# Patient Record
Sex: MALE | Race: ASIAN | NOT HISPANIC OR LATINO | Employment: OTHER | URBAN - METROPOLITAN AREA
[De-identification: names, ages, dates, MRNs, and addresses within clinical notes are randomized per-mention and may not be internally consistent; named-entity substitution may affect disease eponyms.]

---

## 2023-01-01 ENCOUNTER — APPOINTMENT (OUTPATIENT)
Dept: RADIOLOGY | Facility: HOSPITAL | Age: 68
DRG: 243 | End: 2023-01-01
Payer: MEDICARE

## 2023-01-01 ENCOUNTER — HOSPITAL ENCOUNTER (OUTPATIENT)
Dept: CARDIOLOGY | Facility: HOSPITAL | Age: 68
Discharge: HOME | End: 2023-10-28
Payer: MEDICARE

## 2023-01-01 ENCOUNTER — APPOINTMENT (OUTPATIENT)
Dept: CARDIOLOGY | Facility: CLINIC | Age: 68
End: 2023-01-01
Payer: MEDICARE

## 2023-01-01 ENCOUNTER — HOSPITAL ENCOUNTER (INPATIENT)
Facility: HOSPITAL | Age: 68
LOS: 5 days | Discharge: HOME | DRG: 243 | End: 2023-10-24
Attending: STUDENT IN AN ORGANIZED HEALTH CARE EDUCATION/TRAINING PROGRAM | Admitting: STUDENT IN AN ORGANIZED HEALTH CARE EDUCATION/TRAINING PROGRAM
Payer: MEDICARE

## 2023-01-01 ENCOUNTER — PATIENT OUTREACH (OUTPATIENT)
Dept: CARE COORDINATION | Facility: CLINIC | Age: 68
End: 2023-01-01
Payer: MEDICARE

## 2023-01-01 ENCOUNTER — APPOINTMENT (OUTPATIENT)
Dept: PRIMARY CARE | Facility: CLINIC | Age: 68
End: 2023-01-01
Payer: COMMERCIAL

## 2023-01-01 ENCOUNTER — PATIENT OUTREACH (OUTPATIENT)
Dept: PRIMARY CARE | Facility: CLINIC | Age: 68
End: 2023-01-01

## 2023-01-01 ENCOUNTER — TELEPHONE (OUTPATIENT)
Dept: CARDIOLOGY | Facility: CLINIC | Age: 68
End: 2023-01-01
Payer: MEDICARE

## 2023-01-01 ENCOUNTER — HOSPITAL ENCOUNTER (OUTPATIENT)
Dept: CARDIOLOGY | Facility: CLINIC | Age: 68
Discharge: HOME | End: 2023-11-02
Payer: MEDICARE

## 2023-01-01 ENCOUNTER — HOSPITAL ENCOUNTER (OUTPATIENT)
Dept: CARDIOLOGY | Facility: CLINIC | Age: 68
Discharge: HOME | End: 2023-10-06
Payer: MEDICARE

## 2023-01-01 ENCOUNTER — HOSPITAL ENCOUNTER (OUTPATIENT)
Dept: CARDIOLOGY | Facility: HOSPITAL | Age: 68
Discharge: HOME | End: 2023-12-21
Payer: MEDICARE

## 2023-01-01 ENCOUNTER — OFFICE VISIT (OUTPATIENT)
Dept: PRIMARY CARE | Facility: CLINIC | Age: 68
End: 2023-01-01
Payer: MEDICARE

## 2023-01-01 ENCOUNTER — HOSPITAL ENCOUNTER (OUTPATIENT)
Dept: CARDIOLOGY | Facility: CLINIC | Age: 68
Discharge: HOME | End: 2023-10-25
Payer: MEDICARE

## 2023-01-01 ENCOUNTER — TELEMEDICINE (OUTPATIENT)
Dept: PRIMARY CARE | Facility: CLINIC | Age: 68
End: 2023-01-01
Payer: MEDICARE

## 2023-01-01 ENCOUNTER — PATIENT OUTREACH (OUTPATIENT)
Dept: PRIMARY CARE | Facility: CLINIC | Age: 68
End: 2023-01-01
Payer: MEDICARE

## 2023-01-01 ENCOUNTER — HOSPITAL ENCOUNTER (OUTPATIENT)
Dept: RADIOLOGY | Facility: CLINIC | Age: 68
Discharge: HOME | End: 2023-10-31
Payer: MEDICARE

## 2023-01-01 ENCOUNTER — PREP FOR PROCEDURE (OUTPATIENT)
Dept: CARDIOLOGY | Facility: HOSPITAL | Age: 68
End: 2023-01-01
Payer: MEDICARE

## 2023-01-01 ENCOUNTER — APPOINTMENT (OUTPATIENT)
Dept: PRIMARY CARE | Facility: CLINIC | Age: 68
End: 2023-01-01
Payer: MEDICARE

## 2023-01-01 ENCOUNTER — HOSPITAL ENCOUNTER (OUTPATIENT)
Dept: CARDIOLOGY | Facility: CLINIC | Age: 68
Discharge: HOME | End: 2023-12-19
Payer: MEDICARE

## 2023-01-01 ENCOUNTER — CLINICAL SUPPORT (OUTPATIENT)
Dept: CARDIOLOGY | Facility: CLINIC | Age: 68
End: 2023-01-01
Payer: MEDICARE

## 2023-01-01 ENCOUNTER — OFFICE VISIT (OUTPATIENT)
Dept: CARDIAC SURGERY | Facility: HOSPITAL | Age: 68
End: 2023-01-01
Payer: MEDICARE

## 2023-01-01 ENCOUNTER — OFFICE VISIT (OUTPATIENT)
Dept: CARDIOLOGY | Facility: CLINIC | Age: 68
End: 2023-01-01
Payer: MEDICARE

## 2023-01-01 VITALS
BODY MASS INDEX: 20.78 KG/M2 | WEIGHT: 137.13 LBS | HEART RATE: 60 BPM | OXYGEN SATURATION: 97 % | DIASTOLIC BLOOD PRESSURE: 88 MMHG | SYSTOLIC BLOOD PRESSURE: 154 MMHG | RESPIRATION RATE: 20 BRPM | TEMPERATURE: 97.7 F | HEIGHT: 68 IN

## 2023-01-01 VITALS
BODY MASS INDEX: 21.61 KG/M2 | HEIGHT: 68 IN | SYSTOLIC BLOOD PRESSURE: 156 MMHG | RESPIRATION RATE: 18 BRPM | HEART RATE: 82 BPM | DIASTOLIC BLOOD PRESSURE: 81 MMHG | WEIGHT: 142.6 LBS | OXYGEN SATURATION: 98 %

## 2023-01-01 VITALS
SYSTOLIC BLOOD PRESSURE: 122 MMHG | DIASTOLIC BLOOD PRESSURE: 70 MMHG | WEIGHT: 145 LBS | HEIGHT: 68 IN | BODY MASS INDEX: 21.98 KG/M2

## 2023-01-01 VITALS
BODY MASS INDEX: 20.92 KG/M2 | DIASTOLIC BLOOD PRESSURE: 66 MMHG | HEIGHT: 68 IN | WEIGHT: 138 LBS | SYSTOLIC BLOOD PRESSURE: 124 MMHG

## 2023-01-01 VITALS
SYSTOLIC BLOOD PRESSURE: 122 MMHG | BODY MASS INDEX: 21.22 KG/M2 | WEIGHT: 140 LBS | DIASTOLIC BLOOD PRESSURE: 70 MMHG | HEIGHT: 68 IN

## 2023-01-01 VITALS
SYSTOLIC BLOOD PRESSURE: 138 MMHG | OXYGEN SATURATION: 97 % | HEIGHT: 68 IN | DIASTOLIC BLOOD PRESSURE: 70 MMHG | HEART RATE: 81 BPM | BODY MASS INDEX: 21.95 KG/M2 | WEIGHT: 144.8 LBS

## 2023-01-01 DIAGNOSIS — Z09 HOSPITAL DISCHARGE FOLLOW-UP: ICD-10-CM

## 2023-01-01 DIAGNOSIS — Z95.0 S/P PLACEMENT OF CARDIAC PACEMAKER: ICD-10-CM

## 2023-01-01 DIAGNOSIS — R91.1 SOLITARY PULMONARY NODULE: ICD-10-CM

## 2023-01-01 DIAGNOSIS — Z95.0 CARDIAC PACEMAKER IN SITU: ICD-10-CM

## 2023-01-01 DIAGNOSIS — I71.00 DISSECTION OF AORTA, UNSPECIFIED PORTION OF AORTA (MULTI): ICD-10-CM

## 2023-01-01 DIAGNOSIS — Z94.4 LIVER TRANSPLANTED (MULTI): ICD-10-CM

## 2023-01-01 DIAGNOSIS — I44.2 ATRIOVENTRICULAR BLOCK, COMPLETE (MULTI): ICD-10-CM

## 2023-01-01 DIAGNOSIS — C79.9 METASTATIC MALIGNANT NEOPLASM, UNSPECIFIED SITE (MULTI): ICD-10-CM

## 2023-01-01 DIAGNOSIS — I49.5 SINOATRIAL NODE DYSFUNCTION (MULTI): ICD-10-CM

## 2023-01-01 DIAGNOSIS — I42.9 CARDIOMYOPATHY, UNSPECIFIED TYPE (MULTI): ICD-10-CM

## 2023-01-01 DIAGNOSIS — I49.5 SICK SINUS SYNDROME (MULTI): ICD-10-CM

## 2023-01-01 DIAGNOSIS — Z86.79 H/O REPAIR OF DISSECTING ANEURYSM OF ASCENDING THORACIC AORTA: ICD-10-CM

## 2023-01-01 DIAGNOSIS — I49.5 SSS (SICK SINUS SYNDROME) (MULTI): Primary | ICD-10-CM

## 2023-01-01 DIAGNOSIS — I10 ESSENTIAL (PRIMARY) HYPERTENSION: ICD-10-CM

## 2023-01-01 DIAGNOSIS — R91.8 LUNG NODULES: Primary | ICD-10-CM

## 2023-01-01 DIAGNOSIS — I71.012 DISSECTING ANEURYSM OF THORACIC AORTA, STANFORD TYPE B (MULTI): Primary | ICD-10-CM

## 2023-01-01 DIAGNOSIS — D64.9 ANEMIA, UNSPECIFIED TYPE: ICD-10-CM

## 2023-01-01 DIAGNOSIS — R79.89 ELEVATED TROPONIN: ICD-10-CM

## 2023-01-01 DIAGNOSIS — I10 PRIMARY HYPERTENSION: Primary | ICD-10-CM

## 2023-01-01 DIAGNOSIS — R06.00 DYSPNEA: ICD-10-CM

## 2023-01-01 DIAGNOSIS — Z94.0 KIDNEY TRANSPLANTED (HHS-HCC): ICD-10-CM

## 2023-01-01 DIAGNOSIS — E11.69 TYPE 2 DIABETES MELLITUS WITH OTHER SPECIFIED COMPLICATION, UNSPECIFIED WHETHER LONG TERM INSULIN USE (MULTI): Primary | ICD-10-CM

## 2023-01-01 DIAGNOSIS — R55 SYNCOPE AND COLLAPSE: Primary | ICD-10-CM

## 2023-01-01 DIAGNOSIS — I49.5 SICK SINUS SYNDROME (MULTI): Primary | ICD-10-CM

## 2023-01-01 DIAGNOSIS — R01.1 CARDIAC MURMUR, UNSPECIFIED: ICD-10-CM

## 2023-01-01 DIAGNOSIS — I10 HYPERTENSION, UNSPECIFIED TYPE: ICD-10-CM

## 2023-01-01 DIAGNOSIS — Z98.890 H/O REPAIR OF DISSECTING ANEURYSM OF ASCENDING THORACIC AORTA: ICD-10-CM

## 2023-01-01 LAB
ACANTHOCYTES BLD QL SMEAR: ABNORMAL
ALBUMIN SERPL BCP-MCNC: 4.5 G/DL (ref 3.4–5)
ALP SERPL-CCNC: 72 U/L (ref 33–136)
ALT SERPL W P-5'-P-CCNC: 11 U/L (ref 10–52)
ANION GAP SERPL CALC-SCNC: 11 MMOL/L (ref 10–20)
ANION GAP SERPL CALC-SCNC: 12 MMOL/L (ref 10–20)
ANION GAP SERPL CALC-SCNC: 13 MMOL/L (ref 10–20)
ANION GAP SERPL CALC-SCNC: 13 MMOL/L (ref 10–20)
ANION GAP SERPL CALC-SCNC: 14 MMOL/L (ref 10–20)
ANION GAP SERPL CALC-SCNC: 14 MMOL/L (ref 10–20)
ANION GAP SERPL CALC-SCNC: 15 MMOL/L (ref 10–20)
APPEARANCE UR: CLEAR
AST SERPL W P-5'-P-CCNC: 14 U/L (ref 9–39)
ATRIAL RATE: 117 BPM
ATRIAL RATE: 44 BPM
ATRIAL RATE: 55 BPM
ATRIAL RATE: 62 BPM
ATRIAL RATE: 65 BPM
BACTERIA UR CULT: NO GROWTH
BASOPHILS # BLD MANUAL: 0 X10*3/UL (ref 0–0.1)
BASOPHILS # BLD MANUAL: 0 X10*3/UL (ref 0–0.1)
BASOPHILS NFR BLD MANUAL: 0 %
BASOPHILS NFR BLD MANUAL: 0 %
BILIRUB SERPL-MCNC: 0.8 MG/DL (ref 0–1.2)
BILIRUB UR STRIP.AUTO-MCNC: NEGATIVE MG/DL
BODY SURFACE AREA: 1.78 M2
BUN SERPL-MCNC: 18 MG/DL (ref 6–23)
BUN SERPL-MCNC: 18 MG/DL (ref 6–23)
BUN SERPL-MCNC: 21 MG/DL (ref 6–23)
BUN SERPL-MCNC: 22 MG/DL (ref 6–23)
BUN SERPL-MCNC: 22 MG/DL (ref 6–23)
BUN SERPL-MCNC: 40 MG/DL (ref 6–23)
BUN SERPL-MCNC: 40 MG/DL (ref 6–23)
BURR CELLS BLD QL SMEAR: ABNORMAL
CALCIUM SERPL-MCNC: 8.9 MG/DL (ref 8.6–10.3)
CALCIUM SERPL-MCNC: 8.9 MG/DL (ref 8.6–10.3)
CALCIUM SERPL-MCNC: 9 MG/DL (ref 8.6–10.3)
CALCIUM SERPL-MCNC: 9 MG/DL (ref 8.6–10.3)
CALCIUM SERPL-MCNC: 9.1 MG/DL (ref 8.6–10.3)
CARDIAC TROPONIN I PNL SERPL HS: 120 NG/L (ref 0–20)
CARDIAC TROPONIN I PNL SERPL HS: 171 NG/L (ref 0–20)
CHLORIDE SERPL-SCNC: 103 MMOL/L (ref 98–107)
CHLORIDE SERPL-SCNC: 104 MMOL/L (ref 98–107)
CHLORIDE SERPL-SCNC: 105 MMOL/L (ref 98–107)
CHLORIDE SERPL-SCNC: 105 MMOL/L (ref 98–107)
CHLORIDE SERPL-SCNC: 106 MMOL/L (ref 98–107)
CHLORIDE SERPL-SCNC: 106 MMOL/L (ref 98–107)
CHLORIDE SERPL-SCNC: 107 MMOL/L (ref 98–107)
CO2 SERPL-SCNC: 17 MMOL/L (ref 21–32)
CO2 SERPL-SCNC: 18 MMOL/L (ref 21–32)
CO2 SERPL-SCNC: 20 MMOL/L (ref 21–32)
CO2 SERPL-SCNC: 21 MMOL/L (ref 21–32)
CO2 SERPL-SCNC: 22 MMOL/L (ref 21–32)
COLOR UR: YELLOW
CREAT SERPL-MCNC: 1.29 MG/DL (ref 0.5–1.3)
CREAT SERPL-MCNC: 1.31 MG/DL (ref 0.5–1.3)
CREAT SERPL-MCNC: 1.34 MG/DL (ref 0.5–1.3)
CREAT SERPL-MCNC: 1.41 MG/DL (ref 0.5–1.3)
CREAT SERPL-MCNC: 1.42 MG/DL (ref 0.5–1.3)
CREAT SERPL-MCNC: 1.73 MG/DL (ref 0.5–1.3)
CREAT SERPL-MCNC: 2.05 MG/DL (ref 0.5–1.3)
D DIMER PPP FEU-MCNC: 1956 NG/ML FEU
DACRYOCYTES BLD QL SMEAR: ABNORMAL
DIASTOLIC BLOOD PRESSURE: 75 MMHG
DIASTOLIC BLOOD PRESSURE: 78 MMHG
DIASTOLIC BLOOD PRESSURE: 79 MMHG
DIASTOLIC BLOOD PRESSURE: 89 MMHG
EJECTION FRACTION APICAL 4 CHAMBER: 56.7
EOSINOPHIL # BLD MANUAL: 0 X10*3/UL (ref 0–0.7)
EOSINOPHIL # BLD MANUAL: 0.04 X10*3/UL (ref 0–0.7)
EOSINOPHIL NFR BLD MANUAL: 0 %
EOSINOPHIL NFR BLD MANUAL: 1 %
ERYTHROCYTE [DISTWIDTH] IN BLOOD BY AUTOMATED COUNT: 13.9 % (ref 11.5–14.5)
ERYTHROCYTE [DISTWIDTH] IN BLOOD BY AUTOMATED COUNT: 14.3 % (ref 11.5–14.5)
ERYTHROCYTE [DISTWIDTH] IN BLOOD BY AUTOMATED COUNT: 14.4 % (ref 11.5–14.5)
ERYTHROCYTE [DISTWIDTH] IN BLOOD BY AUTOMATED COUNT: 14.6 % (ref 11.5–14.5)
GFR SERPL CREATININE-BSD FRML MDRD: 35 ML/MIN/1.73M*2
GFR SERPL CREATININE-BSD FRML MDRD: 43 ML/MIN/1.73M*2
GFR SERPL CREATININE-BSD FRML MDRD: 54 ML/MIN/1.73M*2
GFR SERPL CREATININE-BSD FRML MDRD: 55 ML/MIN/1.73M*2
GFR SERPL CREATININE-BSD FRML MDRD: 58 ML/MIN/1.73M*2
GFR SERPL CREATININE-BSD FRML MDRD: 60 ML/MIN/1.73M*2
GFR SERPL CREATININE-BSD FRML MDRD: 61 ML/MIN/1.73M*2
GLUCOSE SERPL-MCNC: 109 MG/DL (ref 74–99)
GLUCOSE SERPL-MCNC: 118 MG/DL (ref 74–99)
GLUCOSE SERPL-MCNC: 125 MG/DL (ref 74–99)
GLUCOSE SERPL-MCNC: 165 MG/DL (ref 74–99)
GLUCOSE SERPL-MCNC: 177 MG/DL (ref 74–99)
GLUCOSE SERPL-MCNC: 216 MG/DL (ref 74–99)
GLUCOSE SERPL-MCNC: 95 MG/DL (ref 74–99)
GLUCOSE UR STRIP.AUTO-MCNC: NEGATIVE MG/DL
HCT VFR BLD AUTO: 30.5 % (ref 41–52)
HCT VFR BLD AUTO: 30.6 % (ref 41–52)
HCT VFR BLD AUTO: 31.4 % (ref 41–52)
HCT VFR BLD AUTO: 31.7 % (ref 41–52)
HCT VFR BLD AUTO: 31.9 % (ref 41–52)
HCT VFR BLD AUTO: 31.9 % (ref 41–52)
HGB BLD-MCNC: 10.4 G/DL (ref 13.5–17.5)
HGB BLD-MCNC: 10.4 G/DL (ref 13.5–17.5)
HGB BLD-MCNC: 10.6 G/DL (ref 13.5–17.5)
HGB BLD-MCNC: 10.7 G/DL (ref 13.5–17.5)
HGB BLD-MCNC: 10.7 G/DL (ref 13.5–17.5)
HGB BLD-MCNC: 10.8 G/DL (ref 13.5–17.5)
HOLD SPECIMEN: NORMAL
HOLD SPECIMEN: NORMAL
IMM GRANULOCYTES # BLD AUTO: 0.01 X10*3/UL (ref 0–0.7)
IMM GRANULOCYTES # BLD AUTO: 0.02 X10*3/UL (ref 0–0.7)
IMM GRANULOCYTES NFR BLD AUTO: 0.2 % (ref 0–0.9)
IMM GRANULOCYTES NFR BLD AUTO: 0.3 % (ref 0–0.9)
KETONES UR STRIP.AUTO-MCNC: NEGATIVE MG/DL
LEUKOCYTE ESTERASE UR QL STRIP.AUTO: ABNORMAL
LYMPHOCYTES # BLD MANUAL: 0.5 X10*3/UL (ref 1.2–4.8)
LYMPHOCYTES # BLD MANUAL: 1.12 X10*3/UL (ref 1.2–4.8)
LYMPHOCYTES NFR BLD MANUAL: 12 %
LYMPHOCYTES NFR BLD MANUAL: 17 %
MAGNESIUM SERPL-MCNC: 1.37 MG/DL (ref 1.6–2.4)
MAGNESIUM SERPL-MCNC: 1.39 MG/DL (ref 1.6–2.4)
MAGNESIUM SERPL-MCNC: 2.05 MG/DL (ref 1.6–2.4)
MAGNESIUM SERPL-MCNC: 2.14 MG/DL (ref 1.6–2.4)
MCH RBC QN AUTO: 34.2 PG (ref 26–34)
MCH RBC QN AUTO: 34.4 PG (ref 26–34)
MCH RBC QN AUTO: 34.5 PG (ref 26–34)
MCH RBC QN AUTO: 34.5 PG (ref 26–34)
MCH RBC QN AUTO: 34.6 PG (ref 26–34)
MCH RBC QN AUTO: 34.8 PG (ref 26–34)
MCHC RBC AUTO-ENTMCNC: 33.5 G/DL (ref 32–36)
MCHC RBC AUTO-ENTMCNC: 33.5 G/DL (ref 32–36)
MCHC RBC AUTO-ENTMCNC: 33.8 G/DL (ref 32–36)
MCHC RBC AUTO-ENTMCNC: 34 G/DL (ref 32–36)
MCHC RBC AUTO-ENTMCNC: 34.1 G/DL (ref 32–36)
MCHC RBC AUTO-ENTMCNC: 34.1 G/DL (ref 32–36)
MCV RBC AUTO: 101 FL (ref 80–100)
MCV RBC AUTO: 101 FL (ref 80–100)
MCV RBC AUTO: 102 FL (ref 80–100)
MCV RBC AUTO: 103 FL (ref 80–100)
MONOCYTES # BLD MANUAL: 0.42 X10*3/UL (ref 0.1–1)
MONOCYTES # BLD MANUAL: 0.59 X10*3/UL (ref 0.1–1)
MONOCYTES NFR BLD MANUAL: 10 %
MONOCYTES NFR BLD MANUAL: 9 %
MUCOUS THREADS #/AREA URNS AUTO: NORMAL /LPF
NEUTROPHILS # BLD MANUAL: 3.23 X10*3/UL (ref 1.2–7.7)
NEUTS BAND # BLD MANUAL: 0.04 X10*3/UL (ref 0–0.7)
NEUTS BAND NFR BLD MANUAL: 1 %
NEUTS SEG # BLD MANUAL: 3.19 X10*3/UL (ref 1.2–7)
NEUTS SEG # BLD MANUAL: 4.88 X10*3/UL (ref 1.2–7)
NEUTS SEG NFR BLD MANUAL: 74 %
NEUTS SEG NFR BLD MANUAL: 76 %
NITRITE UR QL STRIP.AUTO: NEGATIVE
NRBC BLD-RTO: 0 /100 WBCS (ref 0–0)
NRBC BLD-RTO: 0.2 /100 WBCS (ref 0–0)
OVALOCYTES BLD QL SMEAR: ABNORMAL
OVALOCYTES BLD QL SMEAR: ABNORMAL
P AXIS: 84 DEGREES
PH UR STRIP.AUTO: 5 [PH]
PLATELET # BLD AUTO: 148 X10*3/UL (ref 150–450)
PLATELET # BLD AUTO: 152 X10*3/UL (ref 150–450)
PLATELET # BLD AUTO: 154 X10*3/UL (ref 150–450)
PLATELET # BLD AUTO: 166 X10*3/UL (ref 150–450)
PMV BLD AUTO: 11.3 FL (ref 7.5–11.5)
PMV BLD AUTO: 11.5 FL (ref 7.5–11.5)
PMV BLD AUTO: 12 FL (ref 7.5–11.5)
POTASSIUM SERPL-SCNC: 4 MMOL/L (ref 3.5–5.3)
POTASSIUM SERPL-SCNC: 4.4 MMOL/L (ref 3.5–5.3)
POTASSIUM SERPL-SCNC: 4.4 MMOL/L (ref 3.5–5.3)
POTASSIUM SERPL-SCNC: 4.6 MMOL/L (ref 3.5–5.3)
POTASSIUM SERPL-SCNC: 4.7 MMOL/L (ref 3.5–5.3)
POTASSIUM SERPL-SCNC: 4.9 MMOL/L (ref 3.5–5.3)
POTASSIUM SERPL-SCNC: 5.2 MMOL/L (ref 3.5–5.3)
PR INTERVAL: 248 MS
PR INTERVAL: 400 MS
PROCALCITONIN SERPL-MCNC: <0.02 NG/ML
PROT SERPL-MCNC: 6.9 G/DL (ref 6.4–8.2)
PROT UR STRIP.AUTO-MCNC: NEGATIVE MG/DL
Q ONSET: 201 MS
Q ONSET: 219 MS
Q ONSET: 219 MS
Q ONSET: 223 MS
Q ONSET: 224 MS
QRS COUNT: 11 BEATS
QRS COUNT: 11 BEATS
QRS COUNT: 8 BEATS
QRS COUNT: 8 BEATS
QRS COUNT: 9 BEATS
QRS DURATION: 146 MS
QRS DURATION: 148 MS
QRS DURATION: 152 MS
QRS DURATION: 152 MS
QRS DURATION: 176 MS
QT INTERVAL: 468 MS
QT INTERVAL: 472 MS
QT INTERVAL: 472 MS
QT INTERVAL: 494 MS
QT INTERVAL: 508 MS
QTC CALCULATION(BAZETT): 422 MS
QTC CALCULATION(BAZETT): 426 MS
QTC CALCULATION(BAZETT): 455 MS
QTC CALCULATION(BAZETT): 490 MS
QTC CALCULATION(BAZETT): 515 MS
QTC FREDERICIA: 440 MS
QTC FREDERICIA: 445 MS
QTC FREDERICIA: 461 MS
QTC FREDERICIA: 484 MS
QTC FREDERICIA: 513 MS
R AXIS: -11 DEGREES
R AXIS: -13 DEGREES
R AXIS: -15 DEGREES
R AXIS: -82 DEGREES
R AXIS: 67 DEGREES
RBC # BLD AUTO: 3.02 X10*6/UL (ref 4.5–5.9)
RBC # BLD AUTO: 3.04 X10*6/UL (ref 4.5–5.9)
RBC # BLD AUTO: 3.05 X10*6/UL (ref 4.5–5.9)
RBC # BLD AUTO: 3.1 X10*6/UL (ref 4.5–5.9)
RBC # BLD AUTO: 3.1 X10*6/UL (ref 4.5–5.9)
RBC # BLD AUTO: 3.12 X10*6/UL (ref 4.5–5.9)
RBC # UR STRIP.AUTO: NEGATIVE /UL
RBC #/AREA URNS AUTO: NORMAL /HPF
RBC MORPH BLD: ABNORMAL
RBC MORPH BLD: ABNORMAL
SCHISTOCYTES BLD QL SMEAR: ABNORMAL
SODIUM SERPL-SCNC: 132 MMOL/L (ref 136–145)
SODIUM SERPL-SCNC: 133 MMOL/L (ref 136–145)
SODIUM SERPL-SCNC: 134 MMOL/L (ref 136–145)
SODIUM SERPL-SCNC: 135 MMOL/L (ref 136–145)
SODIUM SERPL-SCNC: 136 MMOL/L (ref 136–145)
SP GR UR STRIP.AUTO: 1.01
SYSTOLIC BLOOD PRESSURE: 140 MMHG
SYSTOLIC BLOOD PRESSURE: 146 MMHG
SYSTOLIC BLOOD PRESSURE: 148 MMHG
SYSTOLIC BLOOD PRESSURE: 167 MMHG
T AXIS: 44 DEGREES
T AXIS: 61 DEGREES
T AXIS: 63 DEGREES
T AXIS: 84 DEGREES
T AXIS: 98 DEGREES
T OFFSET: 455 MS
T OFFSET: 457 MS
T OFFSET: 471 MS
TOTAL CELLS COUNTED BLD: 100
TOTAL CELLS COUNTED BLD: 100
UROBILINOGEN UR STRIP.AUTO-MCNC: <2 MG/DL
VENTRICULAR RATE: 44 BPM
VENTRICULAR RATE: 50 BPM
VENTRICULAR RATE: 56 BPM
VENTRICULAR RATE: 62 BPM
VENTRICULAR RATE: 65 BPM
WBC # BLD AUTO: 10.2 X10*3/UL (ref 4.4–11.3)
WBC # BLD AUTO: 2.7 X10*3/UL (ref 4.4–11.3)
WBC # BLD AUTO: 4.2 X10*3/UL (ref 4.4–11.3)
WBC # BLD AUTO: 6.6 X10*3/UL (ref 4.4–11.3)
WBC # BLD AUTO: 6.6 X10*3/UL (ref 4.4–11.3)
WBC # BLD AUTO: 9 X10*3/UL (ref 4.4–11.3)
WBC #/AREA URNS AUTO: NORMAL /HPF

## 2023-01-01 PROCEDURE — 2500000001 HC RX 250 WO HCPCS SELF ADMINISTERED DRUGS (ALT 637 FOR MEDICARE OP)

## 2023-01-01 PROCEDURE — 93010 ELECTROCARDIOGRAM REPORT: CPT | Performed by: INTERNAL MEDICINE

## 2023-01-01 PROCEDURE — 99214 OFFICE O/P EST MOD 30 MIN: CPT | Performed by: STUDENT IN AN ORGANIZED HEALTH CARE EDUCATION/TRAINING PROGRAM

## 2023-01-01 PROCEDURE — 33207 INSERT HEART PM VENTRICULAR: CPT | Performed by: STUDENT IN AN ORGANIZED HEALTH CARE EDUCATION/TRAINING PROGRAM

## 2023-01-01 PROCEDURE — 1210000001 HC SEMI-PRIVATE ROOM DAILY

## 2023-01-01 PROCEDURE — 3074F SYST BP LT 130 MM HG: CPT | Performed by: STUDENT IN AN ORGANIZED HEALTH CARE EDUCATION/TRAINING PROGRAM

## 2023-01-01 PROCEDURE — 2500000001 HC RX 250 WO HCPCS SELF ADMINISTERED DRUGS (ALT 637 FOR MEDICARE OP): Performed by: INTERNAL MEDICINE

## 2023-01-01 PROCEDURE — 1159F MED LIST DOCD IN RCRD: CPT | Performed by: INTERNAL MEDICINE

## 2023-01-01 PROCEDURE — 71275 CT ANGIOGRAPHY CHEST: CPT | Performed by: RADIOLOGY

## 2023-01-01 PROCEDURE — 85379 FIBRIN DEGRADATION QUANT: CPT | Performed by: STUDENT IN AN ORGANIZED HEALTH CARE EDUCATION/TRAINING PROGRAM

## 2023-01-01 PROCEDURE — 71045 X-RAY EXAM CHEST 1 VIEW: CPT | Performed by: RADIOLOGY

## 2023-01-01 PROCEDURE — 2020000001 HC ICU ROOM DAILY

## 2023-01-01 PROCEDURE — 99232 SBSQ HOSP IP/OBS MODERATE 35: CPT | Performed by: INTERNAL MEDICINE

## 2023-01-01 PROCEDURE — 85027 COMPLETE CBC AUTOMATED: CPT

## 2023-01-01 PROCEDURE — 76376 3D RENDER W/INTRP POSTPROCES: CPT

## 2023-01-01 PROCEDURE — 99233 SBSQ HOSP IP/OBS HIGH 50: CPT | Performed by: INTERNAL MEDICINE

## 2023-01-01 PROCEDURE — 1036F TOBACCO NON-USER: CPT | Performed by: STUDENT IN AN ORGANIZED HEALTH CARE EDUCATION/TRAINING PROGRAM

## 2023-01-01 PROCEDURE — 84484 ASSAY OF TROPONIN QUANT: CPT | Performed by: PHYSICIAN ASSISTANT

## 2023-01-01 PROCEDURE — 36415 COLL VENOUS BLD VENIPUNCTURE: CPT | Performed by: PHYSICIAN ASSISTANT

## 2023-01-01 PROCEDURE — 2500000005 HC RX 250 GENERAL PHARMACY W/O HCPCS: Performed by: STUDENT IN AN ORGANIZED HEALTH CARE EDUCATION/TRAINING PROGRAM

## 2023-01-01 PROCEDURE — 85027 COMPLETE CBC AUTOMATED: CPT | Performed by: STUDENT IN AN ORGANIZED HEALTH CARE EDUCATION/TRAINING PROGRAM

## 2023-01-01 PROCEDURE — 80048 BASIC METABOLIC PNL TOTAL CA: CPT | Performed by: STUDENT IN AN ORGANIZED HEALTH CARE EDUCATION/TRAINING PROGRAM

## 2023-01-01 PROCEDURE — 99233 SBSQ HOSP IP/OBS HIGH 50: CPT

## 2023-01-01 PROCEDURE — 33216 INSERT 1 ELECTRODE PM-DEFIB: CPT | Mod: 53 | Performed by: STUDENT IN AN ORGANIZED HEALTH CARE EDUCATION/TRAINING PROGRAM

## 2023-01-01 PROCEDURE — 2500000004 HC RX 250 GENERAL PHARMACY W/ HCPCS (ALT 636 FOR OP/ED): Performed by: INTERNAL MEDICINE

## 2023-01-01 PROCEDURE — 2500000004 HC RX 250 GENERAL PHARMACY W/ HCPCS (ALT 636 FOR OP/ED)

## 2023-01-01 PROCEDURE — 71046 X-RAY EXAM CHEST 2 VIEWS: CPT | Performed by: RADIOLOGY

## 2023-01-01 PROCEDURE — 93005 ELECTROCARDIOGRAM TRACING: CPT

## 2023-01-01 PROCEDURE — 36415 COLL VENOUS BLD VENIPUNCTURE: CPT

## 2023-01-01 PROCEDURE — 2500000004 HC RX 250 GENERAL PHARMACY W/ HCPCS (ALT 636 FOR OP/ED): Performed by: STUDENT IN AN ORGANIZED HEALTH CARE EDUCATION/TRAINING PROGRAM

## 2023-01-01 PROCEDURE — 2500000004 HC RX 250 GENERAL PHARMACY W/ HCPCS (ALT 636 FOR OP/ED): Performed by: NURSE PRACTITIONER

## 2023-01-01 PROCEDURE — 83735 ASSAY OF MAGNESIUM: CPT

## 2023-01-01 PROCEDURE — 99239 HOSP IP/OBS DSCHRG MGMT >30: CPT | Performed by: STUDENT IN AN ORGANIZED HEALTH CARE EDUCATION/TRAINING PROGRAM

## 2023-01-01 PROCEDURE — 2750000001 HC OR 275 NO HCPCS: Performed by: STUDENT IN AN ORGANIZED HEALTH CARE EDUCATION/TRAINING PROGRAM

## 2023-01-01 PROCEDURE — 99223 1ST HOSP IP/OBS HIGH 75: CPT | Performed by: INTERNAL MEDICINE

## 2023-01-01 PROCEDURE — 71250 CT THORAX DX C-: CPT | Performed by: RADIOLOGY

## 2023-01-01 PROCEDURE — 84145 PROCALCITONIN (PCT): CPT | Mod: CMCLAB,PARLAB

## 2023-01-01 PROCEDURE — 96372 THER/PROPH/DIAG INJ SC/IM: CPT | Performed by: NURSE PRACTITIONER

## 2023-01-01 PROCEDURE — 2500000001 HC RX 250 WO HCPCS SELF ADMINISTERED DRUGS (ALT 637 FOR MEDICARE OP): Performed by: PHYSICIAN ASSISTANT

## 2023-01-01 PROCEDURE — 96372 THER/PROPH/DIAG INJ SC/IM: CPT

## 2023-01-01 PROCEDURE — 2500000004 HC RX 250 GENERAL PHARMACY W/ HCPCS (ALT 636 FOR OP/ED): Performed by: EMERGENCY MEDICINE

## 2023-01-01 PROCEDURE — 93279 PRGRMG DEV EVAL PM/LDLS PM: CPT

## 2023-01-01 PROCEDURE — 2500000001 HC RX 250 WO HCPCS SELF ADMINISTERED DRUGS (ALT 637 FOR MEDICARE OP): Performed by: EMERGENCY MEDICINE

## 2023-01-01 PROCEDURE — 70450 CT HEAD/BRAIN W/O DYE: CPT | Performed by: RADIOLOGY

## 2023-01-01 PROCEDURE — 93306 TTE W/DOPPLER COMPLETE: CPT | Performed by: INTERNAL MEDICINE

## 2023-01-01 PROCEDURE — 70450 CT HEAD/BRAIN W/O DYE: CPT | Mod: MG

## 2023-01-01 PROCEDURE — 3078F DIAST BP <80 MM HG: CPT | Performed by: STUDENT IN AN ORGANIZED HEALTH CARE EDUCATION/TRAINING PROGRAM

## 2023-01-01 PROCEDURE — 1159F MED LIST DOCD IN RCRD: CPT

## 2023-01-01 PROCEDURE — 2780000003 HC OR 278 NO HCPCS: Performed by: STUDENT IN AN ORGANIZED HEALTH CARE EDUCATION/TRAINING PROGRAM

## 2023-01-01 PROCEDURE — 99214 OFFICE O/P EST MOD 30 MIN: CPT | Performed by: INTERNAL MEDICINE

## 2023-01-01 PROCEDURE — 2500000001 HC RX 250 WO HCPCS SELF ADMINISTERED DRUGS (ALT 637 FOR MEDICARE OP): Performed by: NURSE PRACTITIONER

## 2023-01-01 PROCEDURE — 96376 TX/PRO/DX INJ SAME DRUG ADON: CPT

## 2023-01-01 PROCEDURE — 83735 ASSAY OF MAGNESIUM: CPT | Performed by: STUDENT IN AN ORGANIZED HEALTH CARE EDUCATION/TRAINING PROGRAM

## 2023-01-01 PROCEDURE — C1786 PMKR, SINGLE, RATE-RESP: HCPCS | Performed by: STUDENT IN AN ORGANIZED HEALTH CARE EDUCATION/TRAINING PROGRAM

## 2023-01-01 PROCEDURE — 3079F DIAST BP 80-89 MM HG: CPT

## 2023-01-01 PROCEDURE — 93227 XTRNL ECG REC<48 HR R&I: CPT | Performed by: INTERNAL MEDICINE

## 2023-01-01 PROCEDURE — 80048 BASIC METABOLIC PNL TOTAL CA: CPT

## 2023-01-01 PROCEDURE — 76376 3D RENDER W/INTRP POSTPROCES: CPT | Performed by: INTERNAL MEDICINE

## 2023-01-01 PROCEDURE — 36415 COLL VENOUS BLD VENIPUNCTURE: CPT | Performed by: STUDENT IN AN ORGANIZED HEALTH CARE EDUCATION/TRAINING PROGRAM

## 2023-01-01 PROCEDURE — 1036F TOBACCO NON-USER: CPT | Performed by: INTERNAL MEDICINE

## 2023-01-01 PROCEDURE — 82310 ASSAY OF CALCIUM: CPT

## 2023-01-01 PROCEDURE — 93279 PRGRMG DEV EVAL PM/LDLS PM: CPT | Performed by: STUDENT IN AN ORGANIZED HEALTH CARE EDUCATION/TRAINING PROGRAM

## 2023-01-01 PROCEDURE — 72125 CT NECK SPINE W/O DYE: CPT | Mod: ME

## 2023-01-01 PROCEDURE — 3075F SYST BP GE 130 - 139MM HG: CPT | Performed by: INTERNAL MEDICINE

## 2023-01-01 PROCEDURE — 93225 XTRNL ECG REC<48 HRS REC: CPT

## 2023-01-01 PROCEDURE — 99222 1ST HOSP IP/OBS MODERATE 55: CPT | Performed by: INTERNAL MEDICINE

## 2023-01-01 PROCEDURE — 99223 1ST HOSP IP/OBS HIGH 75: CPT

## 2023-01-01 PROCEDURE — 1159F MED LIST DOCD IN RCRD: CPT | Performed by: STUDENT IN AN ORGANIZED HEALTH CARE EDUCATION/TRAINING PROGRAM

## 2023-01-01 PROCEDURE — 1160F RVW MEDS BY RX/DR IN RCRD: CPT

## 2023-01-01 PROCEDURE — 71275 CT ANGIOGRAPHY CHEST: CPT

## 2023-01-01 PROCEDURE — 99153 MOD SED SAME PHYS/QHP EA: CPT | Performed by: STUDENT IN AN ORGANIZED HEALTH CARE EDUCATION/TRAINING PROGRAM

## 2023-01-01 PROCEDURE — 96372 THER/PROPH/DIAG INJ SC/IM: CPT | Performed by: STUDENT IN AN ORGANIZED HEALTH CARE EDUCATION/TRAINING PROGRAM

## 2023-01-01 PROCEDURE — 81001 URINALYSIS AUTO W/SCOPE: CPT | Performed by: PHYSICIAN ASSISTANT

## 2023-01-01 PROCEDURE — 87086 URINE CULTURE/COLONY COUNT: CPT | Mod: CMCLAB,PARLAB | Performed by: PHYSICIAN ASSISTANT

## 2023-01-01 PROCEDURE — 71046 X-RAY EXAM CHEST 2 VIEWS: CPT | Mod: FY

## 2023-01-01 PROCEDURE — 85007 BL SMEAR W/DIFF WBC COUNT: CPT | Performed by: STUDENT IN AN ORGANIZED HEALTH CARE EDUCATION/TRAINING PROGRAM

## 2023-01-01 PROCEDURE — C1898 LEAD, PMKR, OTHER THAN TRANS: HCPCS | Performed by: STUDENT IN AN ORGANIZED HEALTH CARE EDUCATION/TRAINING PROGRAM

## 2023-01-01 PROCEDURE — 1126F AMNT PAIN NOTED NONE PRSNT: CPT | Performed by: STUDENT IN AN ORGANIZED HEALTH CARE EDUCATION/TRAINING PROGRAM

## 2023-01-01 PROCEDURE — 85027 COMPLETE CBC AUTOMATED: CPT | Performed by: PHYSICIAN ASSISTANT

## 2023-01-01 PROCEDURE — 99212 OFFICE O/P EST SF 10 MIN: CPT | Performed by: NURSE PRACTITIONER

## 2023-01-01 PROCEDURE — 96374 THER/PROPH/DIAG INJ IV PUSH: CPT

## 2023-01-01 PROCEDURE — 97165 OT EVAL LOW COMPLEX 30 MIN: CPT | Mod: GO

## 2023-01-01 PROCEDURE — 1111F DSCHRG MED/CURRENT MED MERGE: CPT

## 2023-01-01 PROCEDURE — 1126F AMNT PAIN NOTED NONE PRSNT: CPT | Performed by: INTERNAL MEDICINE

## 2023-01-01 PROCEDURE — 1126F AMNT PAIN NOTED NONE PRSNT: CPT

## 2023-01-01 PROCEDURE — 3078F DIAST BP <80 MM HG: CPT | Performed by: INTERNAL MEDICINE

## 2023-01-01 PROCEDURE — 72125 CT NECK SPINE W/O DYE: CPT | Performed by: RADIOLOGY

## 2023-01-01 PROCEDURE — 99152 MOD SED SAME PHYS/QHP 5/>YRS: CPT | Performed by: STUDENT IN AN ORGANIZED HEALTH CARE EDUCATION/TRAINING PROGRAM

## 2023-01-01 PROCEDURE — 74174 CTA ABD&PLVS W/CONTRAST: CPT | Performed by: RADIOLOGY

## 2023-01-01 PROCEDURE — 3077F SYST BP >= 140 MM HG: CPT

## 2023-01-01 PROCEDURE — 2500000004 HC RX 250 GENERAL PHARMACY W/ HCPCS (ALT 636 FOR OP/ED): Performed by: PHYSICIAN ASSISTANT

## 2023-01-01 PROCEDURE — 99214 OFFICE O/P EST MOD 30 MIN: CPT

## 2023-01-01 PROCEDURE — 71275 CT ANGIOGRAPHY CHEST: CPT | Mod: ME

## 2023-01-01 PROCEDURE — 2550000001 HC RX 255 CONTRASTS: Performed by: STUDENT IN AN ORGANIZED HEALTH CARE EDUCATION/TRAINING PROGRAM

## 2023-01-01 PROCEDURE — 02HK3JZ INSERTION OF PACEMAKER LEAD INTO RIGHT VENTRICLE, PERCUTANEOUS APPROACH: ICD-10-PCS | Performed by: INTERNAL MEDICINE

## 2023-01-01 PROCEDURE — 74174 CTA ABD&PLVS W/CONTRAST: CPT

## 2023-01-01 PROCEDURE — 99285 EMERGENCY DEPT VISIT HI MDM: CPT | Performed by: STUDENT IN AN ORGANIZED HEALTH CARE EDUCATION/TRAINING PROGRAM

## 2023-01-01 PROCEDURE — 97161 PT EVAL LOW COMPLEX 20 MIN: CPT | Mod: GP

## 2023-01-01 PROCEDURE — 99212 OFFICE O/P EST SF 10 MIN: CPT | Mod: ZK,95 | Performed by: NURSE PRACTITIONER

## 2023-01-01 PROCEDURE — 71045 X-RAY EXAM CHEST 1 VIEW: CPT

## 2023-01-01 PROCEDURE — 99496 TRANSJ CARE MGMT HIGH F2F 7D: CPT | Performed by: STUDENT IN AN ORGANIZED HEALTH CARE EDUCATION/TRAINING PROGRAM

## 2023-01-01 PROCEDURE — 80053 COMPREHEN METABOLIC PANEL: CPT | Performed by: PHYSICIAN ASSISTANT

## 2023-01-01 PROCEDURE — 0JH604Z INSERTION OF PACEMAKER, SINGLE CHAMBER INTO CHEST SUBCUTANEOUS TISSUE AND FASCIA, OPEN APPROACH: ICD-10-PCS | Performed by: INTERNAL MEDICINE

## 2023-01-01 PROCEDURE — 97116 GAIT TRAINING THERAPY: CPT | Mod: GP,CQ

## 2023-01-01 PROCEDURE — 1036F TOBACCO NON-USER: CPT

## 2023-01-01 PROCEDURE — 1111F DSCHRG MED/CURRENT MED MERGE: CPT | Performed by: STUDENT IN AN ORGANIZED HEALTH CARE EDUCATION/TRAINING PROGRAM

## 2023-01-01 PROCEDURE — 71250 CT THORAX DX C-: CPT

## 2023-01-01 PROCEDURE — 2720000007 HC OR 272 NO HCPCS: Performed by: STUDENT IN AN ORGANIZED HEALTH CARE EDUCATION/TRAINING PROGRAM

## 2023-01-01 PROCEDURE — 93356 MYOCRD STRAIN IMG SPCKL TRCK: CPT | Performed by: INTERNAL MEDICINE

## 2023-01-01 PROCEDURE — 96375 TX/PRO/DX INJ NEW DRUG ADDON: CPT

## 2023-01-01 PROCEDURE — C1892 INTRO/SHEATH,FIXED,PEEL-AWAY: HCPCS | Performed by: STUDENT IN AN ORGANIZED HEALTH CARE EDUCATION/TRAINING PROGRAM

## 2023-01-01 PROCEDURE — 1160F RVW MEDS BY RX/DR IN RCRD: CPT | Performed by: INTERNAL MEDICINE

## 2023-01-01 PROCEDURE — 36415 COLL VENOUS BLD VENIPUNCTURE: CPT | Mod: PARLAB

## 2023-01-01 PROCEDURE — 85007 BL SMEAR W/DIFF WBC COUNT: CPT | Performed by: PHYSICIAN ASSISTANT

## 2023-01-01 PROCEDURE — 93356 MYOCRD STRAIN IMG SPCKL TRCK: CPT

## 2023-01-01 DEVICE — LEAD 5076-52 CAPSUREFIX NOVUS US EN
Type: IMPLANTABLE DEVICE | Status: NON-FUNCTIONAL
Brand: CAPSUREFIX® NOVUS

## 2023-01-01 DEVICE — LEAD 5076-58 CAPSUREFIX NOVUS US EN
Type: IMPLANTABLE DEVICE | Site: HEART | Status: FUNCTIONAL
Brand: CAPSUREFIX® NOVUS

## 2023-01-01 DEVICE — IPG W1SR01 AZURE XT SR MRI WL USA BCP
Type: IMPLANTABLE DEVICE | Site: CHEST  WALL | Status: FUNCTIONAL
Brand: AZURE™ XT SR MRI SURESCAN™

## 2023-01-01 RX ORDER — ACETAMINOPHEN 325 MG/1
650 TABLET ORAL EVERY 4 HOURS PRN
Status: DISCONTINUED | OUTPATIENT
Start: 2023-01-01 | End: 2023-01-01 | Stop reason: HOSPADM

## 2023-01-01 RX ORDER — MAGNESIUM SULFATE HEPTAHYDRATE 40 MG/ML
4 INJECTION, SOLUTION INTRAVENOUS ONCE
Status: COMPLETED | OUTPATIENT
Start: 2023-01-01 | End: 2023-01-01

## 2023-01-01 RX ORDER — SODIUM CHLORIDE 9 MG/ML
INJECTION, SOLUTION INTRAVENOUS CONTINUOUS PRN
Status: DISCONTINUED | OUTPATIENT
Start: 2023-01-01 | End: 2023-01-01 | Stop reason: HOSPADM

## 2023-01-01 RX ORDER — CARVEDILOL 12.5 MG/1
12.5 TABLET ORAL
Qty: 180 TABLET | Refills: 3 | Status: SHIPPED | OUTPATIENT
Start: 2023-01-01 | End: 2024-01-01 | Stop reason: ALTCHOICE

## 2023-01-01 RX ORDER — FAMOTIDINE 20 MG/1
20 TABLET, FILM COATED ORAL ONCE
Status: DISCONTINUED | OUTPATIENT
Start: 2023-01-01 | End: 2023-01-01

## 2023-01-01 RX ORDER — DIPHENHYDRAMINE HCL 25 MG
50 CAPSULE ORAL ONCE
Status: DISCONTINUED | OUTPATIENT
Start: 2023-01-01 | End: 2023-01-01

## 2023-01-01 RX ORDER — LANOLIN ALCOHOL/MO/W.PET/CERES
400 CREAM (GRAM) TOPICAL DAILY
Status: DISCONTINUED | OUTPATIENT
Start: 2023-01-01 | End: 2023-01-01

## 2023-01-01 RX ORDER — ONDANSETRON 4 MG/1
TABLET, FILM COATED ORAL
COMMUNITY
Start: 2023-03-06 | End: 2023-01-01 | Stop reason: ALTCHOICE

## 2023-01-01 RX ORDER — FAMOTIDINE 20 MG/1
20 TABLET, FILM COATED ORAL ONCE
Status: COMPLETED | OUTPATIENT
Start: 2023-01-01 | End: 2023-01-01

## 2023-01-01 RX ORDER — DIPHENHYDRAMINE HCL 25 MG
50 CAPSULE ORAL ONCE
Status: COMPLETED | OUTPATIENT
Start: 2023-01-01 | End: 2023-01-01

## 2023-01-01 RX ORDER — VALGANCICLOVIR 450 MG/1
2 TABLET, FILM COATED ORAL WEEKLY
COMMUNITY
Start: 2022-09-27 | End: 2023-01-01 | Stop reason: ALTCHOICE

## 2023-01-01 RX ORDER — LIDOCAINE HYDROCHLORIDE 20 MG/ML
INJECTION, SOLUTION INFILTRATION; PERINEURAL AS NEEDED
Status: DISCONTINUED | OUTPATIENT
Start: 2023-01-01 | End: 2023-01-01 | Stop reason: HOSPADM

## 2023-01-01 RX ORDER — NYSTATIN 100000 [USP'U]/ML
SUSPENSION ORAL
COMMUNITY
Start: 2023-05-07 | End: 2023-01-01 | Stop reason: ALTCHOICE

## 2023-01-01 RX ORDER — CHLORHEXIDINE GLUCONATE 40 MG/ML
SOLUTION TOPICAL ONCE
Status: DISCONTINUED | OUTPATIENT
Start: 2023-01-01 | End: 2023-01-01

## 2023-01-01 RX ORDER — SULFAMETHOXAZOLE AND TRIMETHOPRIM 400; 80 MG/1; MG/1
1 TABLET ORAL
Status: DISCONTINUED | OUTPATIENT
Start: 2023-01-01 | End: 2023-01-01

## 2023-01-01 RX ORDER — MIDAZOLAM HYDROCHLORIDE 1 MG/ML
INJECTION INTRAMUSCULAR; INTRAVENOUS AS NEEDED
Status: DISCONTINUED | OUTPATIENT
Start: 2023-01-01 | End: 2023-01-01 | Stop reason: HOSPADM

## 2023-01-01 RX ORDER — DIPHENHYDRAMINE HYDROCHLORIDE 50 MG/ML
25 INJECTION INTRAMUSCULAR; INTRAVENOUS ONCE
Status: COMPLETED | OUTPATIENT
Start: 2023-01-01 | End: 2023-01-01

## 2023-01-01 RX ORDER — BUMETANIDE 2 MG/1
TABLET ORAL
COMMUNITY
Start: 2023-03-08 | End: 2023-01-01 | Stop reason: HOSPADM

## 2023-01-01 RX ORDER — HYDRALAZINE HYDROCHLORIDE 25 MG/1
TABLET, FILM COATED ORAL
COMMUNITY
Start: 2020-11-02 | End: 2023-01-01 | Stop reason: HOSPADM

## 2023-01-01 RX ORDER — CLONIDINE HYDROCHLORIDE 0.1 MG/1
1 TABLET ORAL 2 TIMES DAILY
COMMUNITY
End: 2023-01-01 | Stop reason: HOSPADM

## 2023-01-01 RX ORDER — HEPARIN SODIUM 5000 [USP'U]/ML
5000 INJECTION, SOLUTION INTRAVENOUS; SUBCUTANEOUS EVERY 8 HOURS
Status: DISCONTINUED | OUTPATIENT
Start: 2023-01-01 | End: 2023-01-01 | Stop reason: HOSPADM

## 2023-01-01 RX ORDER — ACETAMINOPHEN 160 MG/5ML
650 SOLUTION ORAL EVERY 4 HOURS PRN
Status: DISCONTINUED | OUTPATIENT
Start: 2023-01-01 | End: 2023-01-01 | Stop reason: HOSPADM

## 2023-01-01 RX ORDER — NAPROXEN SODIUM 220 MG/1
324 TABLET, FILM COATED ORAL ONCE
Status: COMPLETED | OUTPATIENT
Start: 2023-01-01 | End: 2023-01-01

## 2023-01-01 RX ORDER — PREDNISONE 50 MG/1
TABLET ORAL
COMMUNITY
End: 2023-01-01 | Stop reason: ALTCHOICE

## 2023-01-01 RX ORDER — DOPAMINE HYDROCHLORIDE 160 MG/100ML
1-5 INJECTION, SOLUTION INTRAVENOUS CONTINUOUS
Status: DISCONTINUED | OUTPATIENT
Start: 2023-01-01 | End: 2023-01-01

## 2023-01-01 RX ORDER — HYDRALAZINE HYDROCHLORIDE 100 MG/1
1 TABLET, FILM COATED ORAL 2 TIMES DAILY
COMMUNITY
Start: 2022-03-18 | End: 2023-01-01 | Stop reason: HOSPADM

## 2023-01-01 RX ORDER — CIPROFLOXACIN 500 MG/1
500 TABLET ORAL 2 TIMES DAILY
COMMUNITY
Start: 2022-10-13 | End: 2023-01-01 | Stop reason: ALTCHOICE

## 2023-01-01 RX ORDER — PANTOPRAZOLE SODIUM 40 MG/1
40 TABLET, DELAYED RELEASE ORAL DAILY
Status: DISCONTINUED | OUTPATIENT
Start: 2023-01-01 | End: 2023-01-01 | Stop reason: HOSPADM

## 2023-01-01 RX ORDER — VALACYCLOVIR HYDROCHLORIDE 1 G/1
1000 TABLET, FILM COATED ORAL 2 TIMES DAILY
COMMUNITY
Start: 2023-01-01 | End: 2023-01-01 | Stop reason: ALTCHOICE

## 2023-01-01 RX ORDER — ASPIRIN 81 MG/1
81 TABLET ORAL DAILY
Status: DISCONTINUED | OUTPATIENT
Start: 2023-01-01 | End: 2023-01-01 | Stop reason: HOSPADM

## 2023-01-01 RX ORDER — ENOXAPARIN SODIUM 100 MG/ML
40 INJECTION SUBCUTANEOUS EVERY 24 HOURS
Status: DISCONTINUED | OUTPATIENT
Start: 2023-01-01 | End: 2023-01-01

## 2023-01-01 RX ORDER — NALOXONE HYDROCHLORIDE 1 MG/ML
0.2 INJECTION INTRAMUSCULAR; INTRAVENOUS; SUBCUTANEOUS EVERY 5 MIN PRN
Status: DISCONTINUED | OUTPATIENT
Start: 2023-01-01 | End: 2023-01-01 | Stop reason: HOSPADM

## 2023-01-01 RX ORDER — TACROLIMUS 1 MG/1
1 CAPSULE ORAL 2 TIMES DAILY
Status: DISCONTINUED | OUTPATIENT
Start: 2023-01-01 | End: 2023-01-01 | Stop reason: HOSPADM

## 2023-01-01 RX ORDER — DOPAMINE HYDROCHLORIDE 160 MG/100ML
INJECTION, SOLUTION INTRAVENOUS
Status: COMPLETED
Start: 2023-01-01 | End: 2023-01-01

## 2023-01-01 RX ORDER — ONDANSETRON HYDROCHLORIDE 2 MG/ML
4 INJECTION, SOLUTION INTRAVENOUS ONCE
Status: COMPLETED | OUTPATIENT
Start: 2023-01-01 | End: 2023-01-01

## 2023-01-01 RX ORDER — DIPHENHYDRAMINE HYDROCHLORIDE 50 MG/ML
50 INJECTION INTRAMUSCULAR; INTRAVENOUS ONCE
Status: DISCONTINUED | OUTPATIENT
Start: 2023-01-01 | End: 2023-01-01

## 2023-01-01 RX ORDER — CICLOPIROX 80 MG/ML
SOLUTION TOPICAL
COMMUNITY
Start: 2023-01-01 | End: 2023-01-01 | Stop reason: ALTCHOICE

## 2023-01-01 RX ORDER — PREDNISONE 10 MG/1
TABLET ORAL
COMMUNITY
Start: 2023-03-06 | End: 2023-01-01 | Stop reason: ALTCHOICE

## 2023-01-01 RX ORDER — SULFAMETHOXAZOLE AND TRIMETHOPRIM 400; 80 MG/1; MG/1
1 TABLET ORAL
Status: DISCONTINUED | OUTPATIENT
Start: 2023-01-01 | End: 2023-01-01 | Stop reason: HOSPADM

## 2023-01-01 RX ORDER — TRAMADOL HYDROCHLORIDE 50 MG/1
50 TABLET ORAL EVERY 8 HOURS PRN
Status: DISCONTINUED | OUTPATIENT
Start: 2023-01-01 | End: 2023-01-01 | Stop reason: HOSPADM

## 2023-01-01 RX ORDER — MYCOPHENOLIC ACID 360 MG/1
2 TABLET, DELAYED RELEASE ORAL 2 TIMES DAILY
COMMUNITY
End: 2023-01-01 | Stop reason: ALTCHOICE

## 2023-01-01 RX ORDER — SODIUM CHLORIDE, SODIUM LACTATE, POTASSIUM CHLORIDE, CALCIUM CHLORIDE 600; 310; 30; 20 MG/100ML; MG/100ML; MG/100ML; MG/100ML
100 INJECTION, SOLUTION INTRAVENOUS CONTINUOUS
Status: ACTIVE | OUTPATIENT
Start: 2023-01-01 | End: 2023-01-01

## 2023-01-01 RX ORDER — CEFTRIAXONE 1 G/50ML
1 INJECTION, SOLUTION INTRAVENOUS EVERY 24 HOURS
Status: DISCONTINUED | OUTPATIENT
Start: 2023-01-01 | End: 2023-01-01

## 2023-01-01 RX ORDER — AMLODIPINE BESYLATE 10 MG/1
1 TABLET ORAL DAILY
COMMUNITY
End: 2023-01-01 | Stop reason: HOSPADM

## 2023-01-01 RX ORDER — CEPHALEXIN 500 MG/1
500 CAPSULE ORAL 2 TIMES DAILY
Qty: 14 CAPSULE | Refills: 0 | Status: SHIPPED | OUTPATIENT
Start: 2023-01-01 | End: 2023-01-01

## 2023-01-01 RX ORDER — MYCOPHENOLIC ACID 180 MG/1
2 TABLET, DELAYED RELEASE ORAL 2 TIMES DAILY
COMMUNITY
Start: 2021-01-27 | End: 2023-01-01 | Stop reason: ALTCHOICE

## 2023-01-01 RX ORDER — CEFAZOLIN SODIUM 1 G/50ML
1 SOLUTION INTRAVENOUS ONCE
Status: COMPLETED | OUTPATIENT
Start: 2023-01-01 | End: 2023-01-01

## 2023-01-01 RX ORDER — SULFAMETHOXAZOLE AND TRIMETHOPRIM 400; 80 MG/1; MG/1
1 TABLET ORAL DAILY
Status: DISCONTINUED | OUTPATIENT
Start: 2023-01-01 | End: 2023-01-01

## 2023-01-01 RX ORDER — HYDROXYZINE HYDROCHLORIDE 25 MG/1
25 TABLET, FILM COATED ORAL
COMMUNITY
End: 2023-01-01 | Stop reason: ALTCHOICE

## 2023-01-01 RX ORDER — ACETAMINOPHEN 650 MG/1
650 SUPPOSITORY RECTAL EVERY 4 HOURS PRN
Status: DISCONTINUED | OUTPATIENT
Start: 2023-01-01 | End: 2023-01-01 | Stop reason: HOSPADM

## 2023-01-01 RX ORDER — MYCOPHENOLATE MOFETIL 250 MG/1
250 CAPSULE ORAL 2 TIMES DAILY
Status: DISCONTINUED | OUTPATIENT
Start: 2023-01-01 | End: 2023-01-01 | Stop reason: HOSPADM

## 2023-01-01 RX ORDER — FENTANYL CITRATE 50 UG/ML
INJECTION, SOLUTION INTRAMUSCULAR; INTRAVENOUS AS NEEDED
Status: DISCONTINUED | OUTPATIENT
Start: 2023-01-01 | End: 2023-01-01 | Stop reason: HOSPADM

## 2023-01-01 RX ORDER — CARVEDILOL 3.12 MG/1
6.25 TABLET ORAL
Status: DISCONTINUED | OUTPATIENT
Start: 2023-01-01 | End: 2023-01-01 | Stop reason: HOSPADM

## 2023-01-01 RX ORDER — AMLODIPINE BESYLATE 5 MG/1
5 TABLET ORAL DAILY
Status: DISCONTINUED | OUTPATIENT
Start: 2023-01-01 | End: 2023-01-01 | Stop reason: HOSPADM

## 2023-01-01 RX ORDER — PREDNISONE 20 MG/1
TABLET ORAL
COMMUNITY
Start: 2023-03-08 | End: 2023-01-01 | Stop reason: ALTCHOICE

## 2023-01-01 RX ADMIN — MYCOPHENOLATE MOFETIL 250 MG: 250 CAPSULE ORAL at 20:56

## 2023-01-01 RX ADMIN — FAMOTIDINE 20 MG: 20 TABLET ORAL at 15:38

## 2023-01-01 RX ADMIN — ASPIRIN 81 MG: 81 TABLET, COATED ORAL at 09:59

## 2023-01-01 RX ADMIN — TACROLIMUS 1 MG: 1 CAPSULE ORAL at 08:33

## 2023-01-01 RX ADMIN — MYCOPHENOLATE MOFETIL 250 MG: 250 CAPSULE ORAL at 21:53

## 2023-01-01 RX ADMIN — TACROLIMUS 1 MG: 1 CAPSULE ORAL at 21:01

## 2023-01-01 RX ADMIN — TACROLIMUS 1 MG: 1 CAPSULE ORAL at 20:56

## 2023-01-01 RX ADMIN — PREDNISONE 50 MG: 20 TABLET ORAL at 01:58

## 2023-01-01 RX ADMIN — MAGNESIUM OXIDE TAB 400 MG (241.3 MG ELEMENTAL MG) 400 MG: 400 (241.3 MG) TAB at 09:59

## 2023-01-01 RX ADMIN — MYCOPHENOLATE MOFETIL 250 MG: 250 CAPSULE ORAL at 08:33

## 2023-01-01 RX ADMIN — AMLODIPINE BESYLATE 5 MG: 5 TABLET ORAL at 20:55

## 2023-01-01 RX ADMIN — DOPAMINE HYDROCHLORIDE 1 MCG/KG/MIN: 160 INJECTION, SOLUTION INTRAVENOUS at 03:27

## 2023-01-01 RX ADMIN — PANTOPRAZOLE SODIUM 40 MG: 40 TABLET, DELAYED RELEASE ORAL at 09:59

## 2023-01-01 RX ADMIN — PREDNISONE 50 MG: 10 TABLET ORAL at 15:38

## 2023-01-01 RX ADMIN — ENOXAPARIN SODIUM 40 MG: 40 INJECTION SUBCUTANEOUS at 00:23

## 2023-01-01 RX ADMIN — FAMOTIDINE 20 MG: 20 TABLET, FILM COATED ORAL at 12:08

## 2023-01-01 RX ADMIN — MYCOPHENOLATE MOFETIL 250 MG: 250 CAPSULE ORAL at 08:31

## 2023-01-01 RX ADMIN — TACROLIMUS 1 MG: 1 CAPSULE ORAL at 10:27

## 2023-01-01 RX ADMIN — MYCOPHENOLATE MOFETIL 250 MG: 250 CAPSULE ORAL at 21:02

## 2023-01-01 RX ADMIN — MAGNESIUM SULFATE HEPTAHYDRATE 4 G: 40 INJECTION, SOLUTION INTRAVENOUS at 07:48

## 2023-01-01 RX ADMIN — TACROLIMUS 1 MG: 1 CAPSULE ORAL at 08:31

## 2023-01-01 RX ADMIN — SULFAMETHOXAZOLE AND TRIMETHOPRIM 1 TABLET: 400; 80 TABLET ORAL at 09:59

## 2023-01-01 RX ADMIN — SODIUM CHLORIDE, POTASSIUM CHLORIDE, SODIUM LACTATE AND CALCIUM CHLORIDE 100 ML/HR: 600; 310; 30; 20 INJECTION, SOLUTION INTRAVENOUS at 11:46

## 2023-01-01 RX ADMIN — SULFAMETHOXAZOLE AND TRIMETHOPRIM 1 TABLET: 400; 80 TABLET ORAL at 08:13

## 2023-01-01 RX ADMIN — HYDROCORTISONE SODIUM SUCCINATE 200 MG: 100 INJECTION, POWDER, FOR SOLUTION INTRAMUSCULAR; INTRAVENOUS at 21:49

## 2023-01-01 RX ADMIN — PANTOPRAZOLE SODIUM 40 MG: 40 TABLET, DELAYED RELEASE ORAL at 08:31

## 2023-01-01 RX ADMIN — TACROLIMUS 1 MG: 1 CAPSULE ORAL at 21:02

## 2023-01-01 RX ADMIN — CEFAZOLIN SODIUM 1 G: 1 INJECTION, SOLUTION INTRAVENOUS at 14:31

## 2023-01-01 RX ADMIN — AMLODIPINE BESYLATE 5 MG: 5 TABLET ORAL at 21:02

## 2023-01-01 RX ADMIN — MAGNESIUM OXIDE TAB 400 MG (241.3 MG ELEMENTAL MG) 400 MG: 400 (241.3 MG) TAB at 21:01

## 2023-01-01 RX ADMIN — MYCOPHENOLATE MOFETIL 250 MG: 250 CAPSULE ORAL at 10:27

## 2023-01-01 RX ADMIN — ASPIRIN 81 MG: 81 TABLET, COATED ORAL at 08:33

## 2023-01-01 RX ADMIN — DIPHENHYDRAMINE HYDROCHLORIDE 50 MG: 25 CAPSULE ORAL at 12:08

## 2023-01-01 RX ADMIN — PANTOPRAZOLE SODIUM 40 MG: 40 TABLET, DELAYED RELEASE ORAL at 08:12

## 2023-01-01 RX ADMIN — IOHEXOL 75 ML: 350 INJECTION, SOLUTION INTRAVENOUS at 18:14

## 2023-01-01 RX ADMIN — ASPIRIN 81 MG 324 MG: 81 TABLET ORAL at 16:54

## 2023-01-01 RX ADMIN — DIPHENHYDRAMINE HYDROCHLORIDE 50 MG: 25 CAPSULE ORAL at 15:38

## 2023-01-01 RX ADMIN — MYCOPHENOLATE MOFETIL 250 MG: 250 CAPSULE ORAL at 09:00

## 2023-01-01 RX ADMIN — ASPIRIN 81 MG: 81 TABLET, COATED ORAL at 10:27

## 2023-01-01 RX ADMIN — MAGNESIUM OXIDE TAB 400 MG (241.3 MG ELEMENTAL MG) 400 MG: 400 (241.3 MG) TAB at 10:27

## 2023-01-01 RX ADMIN — TACROLIMUS 1 MG: 1 CAPSULE ORAL at 20:55

## 2023-01-01 RX ADMIN — AZITHROMYCIN MONOHYDRATE 500 MG: 500 INJECTION, POWDER, LYOPHILIZED, FOR SOLUTION INTRAVENOUS at 05:17

## 2023-01-01 RX ADMIN — TACROLIMUS 1 MG: 1 CAPSULE ORAL at 10:00

## 2023-01-01 RX ADMIN — PANTOPRAZOLE SODIUM 40 MG: 40 TABLET, DELAYED RELEASE ORAL at 08:33

## 2023-01-01 RX ADMIN — PREDNISONE 50 MG: 20 TABLET ORAL at 08:12

## 2023-01-01 RX ADMIN — CARVEDILOL 6.25 MG: 3.12 TABLET, FILM COATED ORAL at 13:02

## 2023-01-01 RX ADMIN — HYDROCORTISONE SODIUM SUCCINATE 200 MG: 100 INJECTION, POWDER, FOR SOLUTION INTRAMUSCULAR; INTRAVENOUS at 17:43

## 2023-01-01 RX ADMIN — MYCOPHENOLATE MOFETIL 250 MG: 250 CAPSULE ORAL at 20:55

## 2023-01-01 RX ADMIN — HEPARIN SODIUM 5000 UNITS: 5000 INJECTION INTRAVENOUS; SUBCUTANEOUS at 08:31

## 2023-01-01 RX ADMIN — CEFTRIAXONE SODIUM 1 G: 1 INJECTION, SOLUTION INTRAVENOUS at 03:26

## 2023-01-01 RX ADMIN — PANTOPRAZOLE SODIUM 40 MG: 40 TABLET, DELAYED RELEASE ORAL at 10:27

## 2023-01-01 RX ADMIN — MYCOPHENOLATE MOFETIL 250 MG: 250 CAPSULE ORAL at 08:13

## 2023-01-01 RX ADMIN — IOHEXOL 80 ML: 350 INJECTION, SOLUTION INTRAVENOUS at 19:45

## 2023-01-01 RX ADMIN — ENOXAPARIN SODIUM 40 MG: 40 INJECTION SUBCUTANEOUS at 00:38

## 2023-01-01 RX ADMIN — TACROLIMUS 1 MG: 1 CAPSULE ORAL at 08:13

## 2023-01-01 RX ADMIN — DOPAMINE HYDROCHLORIDE 3 MCG/KG/MIN: 160 INJECTION, SOLUTION INTRAVENOUS at 06:19

## 2023-01-01 RX ADMIN — AZITHROMYCIN MONOHYDRATE 500 MG: 500 INJECTION, POWDER, LYOPHILIZED, FOR SOLUTION INTRAVENOUS at 03:26

## 2023-01-01 RX ADMIN — AMLODIPINE BESYLATE 5 MG: 5 TABLET ORAL at 21:01

## 2023-01-01 RX ADMIN — DIPHENHYDRAMINE HYDROCHLORIDE 25 MG: 50 INJECTION, SOLUTION INTRAMUSCULAR; INTRAVENOUS at 16:55

## 2023-01-01 RX ADMIN — CEFTRIAXONE SODIUM 1 G: 1 INJECTION, SOLUTION INTRAVENOUS at 05:16

## 2023-01-01 RX ADMIN — ASPIRIN 81 MG: 81 TABLET, COATED ORAL at 08:12

## 2023-01-01 RX ADMIN — ASPIRIN 81 MG: 81 TABLET, COATED ORAL at 08:31

## 2023-01-01 RX ADMIN — ONDANSETRON 4 MG: 2 INJECTION INTRAMUSCULAR; INTRAVENOUS at 16:55

## 2023-01-01 RX ADMIN — PREDNISONE 50 MG: 20 TABLET ORAL at 12:08

## 2023-01-01 SDOH — ECONOMIC STABILITY: FOOD INSECURITY: WITHIN THE PAST 12 MONTHS, YOU WORRIED THAT YOUR FOOD WOULD RUN OUT BEFORE YOU GOT MONEY TO BUY MORE.: NEVER TRUE

## 2023-01-01 SDOH — SOCIAL STABILITY: SOCIAL INSECURITY: HAS ANYONE EVER THREATENED TO HURT YOUR FAMILY OR YOUR PETS?: NO

## 2023-01-01 SDOH — ECONOMIC STABILITY: FOOD INSECURITY: WITHIN THE PAST 12 MONTHS, THE FOOD YOU BOUGHT JUST DIDN'T LAST AND YOU DIDN'T HAVE MONEY TO GET MORE.: NEVER TRUE

## 2023-01-01 SDOH — SOCIAL STABILITY: SOCIAL INSECURITY: HAVE YOU HAD THOUGHTS OF HARMING ANYONE ELSE?: NO

## 2023-01-01 SDOH — SOCIAL STABILITY: SOCIAL INSECURITY: ABUSE: ADULT

## 2023-01-01 SDOH — SOCIAL STABILITY: SOCIAL INSECURITY: DO YOU FEEL UNSAFE GOING BACK TO THE PLACE WHERE YOU ARE LIVING?: NO

## 2023-01-01 SDOH — SOCIAL STABILITY: SOCIAL INSECURITY: WERE YOU ABLE TO COMPLETE ALL THE BEHAVIORAL HEALTH SCREENINGS?: YES

## 2023-01-01 SDOH — SOCIAL STABILITY: SOCIAL INSECURITY: ARE YOU OR HAVE YOU BEEN THREATENED OR ABUSED PHYSICALLY, EMOTIONALLY, OR SEXUALLY BY ANYONE?: NO

## 2023-01-01 SDOH — SOCIAL STABILITY: SOCIAL INSECURITY: DO YOU FEEL ANYONE HAS EXPLOITED OR TAKEN ADVANTAGE OF YOU FINANCIALLY OR OF YOUR PERSONAL PROPERTY?: NO

## 2023-01-01 SDOH — SOCIAL STABILITY: SOCIAL INSECURITY: ARE THERE ANY APPARENT SIGNS OF INJURIES/BEHAVIORS THAT COULD BE RELATED TO ABUSE/NEGLECT?: NO

## 2023-01-01 SDOH — SOCIAL STABILITY: SOCIAL INSECURITY: DOES ANYONE TRY TO KEEP YOU FROM HAVING/CONTACTING OTHER FRIENDS OR DOING THINGS OUTSIDE YOUR HOME?: NO

## 2023-01-01 ASSESSMENT — PATIENT HEALTH QUESTIONNAIRE - PHQ9
2. FEELING DOWN, DEPRESSED OR HOPELESS: NOT AT ALL
1. LITTLE INTEREST OR PLEASURE IN DOING THINGS: NOT AT ALL
SUM OF ALL RESPONSES TO PHQ9 QUESTIONS 1 AND 2: 0
2. FEELING DOWN, DEPRESSED OR HOPELESS: NOT AT ALL
SUM OF ALL RESPONSES TO PHQ9 QUESTIONS 1 & 2: 0
1. LITTLE INTEREST OR PLEASURE IN DOING THINGS: NOT AT ALL

## 2023-01-01 ASSESSMENT — COGNITIVE AND FUNCTIONAL STATUS - GENERAL
DAILY ACTIVITIY SCORE: 24
MOBILITY SCORE: 20
WALKING IN HOSPITAL ROOM: A LITTLE
CLIMB 3 TO 5 STEPS WITH RAILING: A LITTLE
DAILY ACTIVITIY SCORE: 24
MOBILITY SCORE: 24
MOVING TO AND FROM BED TO CHAIR: A LITTLE
MOBILITY SCORE: 22
MOBILITY SCORE: 21
WALKING IN HOSPITAL ROOM: A LITTLE
TOILETING: A LITTLE
MOBILITY SCORE: 22
STANDING UP FROM CHAIR USING ARMS: A LITTLE
CLIMB 3 TO 5 STEPS WITH RAILING: A LITTLE
HELP NEEDED FOR BATHING: A LITTLE
MOVING TO AND FROM BED TO CHAIR: A LITTLE
DRESSING REGULAR LOWER BODY CLOTHING: A LITTLE
DAILY ACTIVITIY SCORE: 24
MOBILITY SCORE: 24
WALKING IN HOSPITAL ROOM: A LITTLE
CLIMB 3 TO 5 STEPS WITH RAILING: A LITTLE
CLIMB 3 TO 5 STEPS WITH RAILING: A LITTLE
DAILY ACTIVITIY SCORE: 21
MOBILITY SCORE: 24
PATIENT BASELINE BEDBOUND: NO
WALKING IN HOSPITAL ROOM: A LITTLE

## 2023-01-01 ASSESSMENT — PAIN SCALES - GENERAL

## 2023-01-01 ASSESSMENT — PAIN - FUNCTIONAL ASSESSMENT

## 2023-01-01 ASSESSMENT — ENCOUNTER SYMPTOMS
HYPERTENSION: 1
HEADACHES: 0
SHORTNESS OF BREATH: 0
PND: 0
SHORTNESS OF BREATH: 0
HEADACHES: 0
BLURRED VISION: 0
PND: 0
BLURRED VISION: 0
SWEATS: 0
NECK PAIN: 0
WHEEZING: 0
SWEATS: 0
DYSPNEA ON EXERTION: 1
SYNCOPE: 1
CHILLS: 0
NECK PAIN: 0
ORTHOPNEA: 0
ORTHOPNEA: 0
PALPITATIONS: 0
PALPITATIONS: 0
OCCASIONAL FEELINGS OF UNSTEADINESS: 0
SHORTNESS OF BREATH: 0
DEPRESSION: 0
ORTHOPNEA: 0
PALPITATIONS: 0
FEVER: 0
HYPERTENSION: 1
LOSS OF SENSATION IN FEET: 0

## 2023-01-01 ASSESSMENT — ACTIVITIES OF DAILY LIVING (ADL)
HEARING - RIGHT EAR: FUNCTIONAL
LACK_OF_TRANSPORTATION: NO
TOILETING: INDEPENDENT
GROOMING: INDEPENDENT
PATIENT'S MEMORY ADEQUATE TO SAFELY COMPLETE DAILY ACTIVITIES?: YES
JUDGMENT_ADEQUATE_SAFELY_COMPLETE_DAILY_ACTIVITIES: YES
LACK_OF_TRANSPORTATION: NO
ADEQUATE_TO_COMPLETE_ADL: YES
ADL_ASSISTANCE: INDEPENDENT
DRESSING YOURSELF: INDEPENDENT
BATHING: INDEPENDENT
WALKS IN HOME: NEEDS ASSISTANCE
HEARING - LEFT EAR: FUNCTIONAL
FEEDING YOURSELF: INDEPENDENT

## 2023-01-01 ASSESSMENT — COLUMBIA-SUICIDE SEVERITY RATING SCALE - C-SSRS
6. HAVE YOU EVER DONE ANYTHING, STARTED TO DO ANYTHING, OR PREPARED TO DO ANYTHING TO END YOUR LIFE?: NO
6. HAVE YOU EVER DONE ANYTHING, STARTED TO DO ANYTHING, OR PREPARED TO DO ANYTHING TO END YOUR LIFE?: NO
2. HAVE YOU ACTUALLY HAD ANY THOUGHTS OF KILLING YOURSELF?: NO
1. IN THE PAST MONTH, HAVE YOU WISHED YOU WERE DEAD OR WISHED YOU COULD GO TO SLEEP AND NOT WAKE UP?: NO
2. HAVE YOU ACTUALLY HAD ANY THOUGHTS OF KILLING YOURSELF?: NO
1. IN THE PAST MONTH, HAVE YOU WISHED YOU WERE DEAD OR WISHED YOU COULD GO TO SLEEP AND NOT WAKE UP?: NO

## 2023-01-01 ASSESSMENT — LIFESTYLE VARIABLES
SKIP TO QUESTIONS 9-10: 1
HOW MANY STANDARD DRINKS CONTAINING ALCOHOL DO YOU HAVE ON A TYPICAL DAY: PATIENT DOES NOT DRINK
HOW OFTEN DO YOU HAVE A DRINK CONTAINING ALCOHOL: NEVER
SUBSTANCE_ABUSE_PAST_12_MONTHS: NO
AUDIT-C TOTAL SCORE: 0
EVER FELT BAD OR GUILTY ABOUT YOUR DRINKING: NO
HAVE YOU EVER FELT YOU SHOULD CUT DOWN ON YOUR DRINKING: NO
EVER HAD A DRINK FIRST THING IN THE MORNING TO STEADY YOUR NERVES TO GET RID OF A HANGOVER: NO
HOW OFTEN DO YOU HAVE 6 OR MORE DRINKS ON ONE OCCASION: NEVER
AUDIT-C TOTAL SCORE: 0
REASON UNABLE TO ASSESS: NO
PRESCIPTION_ABUSE_PAST_12_MONTHS: NO
HAVE PEOPLE ANNOYED YOU BY CRITICIZING YOUR DRINKING: NO

## 2023-05-10 ENCOUNTER — OFFICE VISIT (OUTPATIENT)
Dept: PRIMARY CARE | Facility: CLINIC | Age: 68
End: 2023-05-10
Payer: MEDICARE

## 2023-05-10 VITALS
WEIGHT: 130 LBS | BODY MASS INDEX: 19.7 KG/M2 | HEIGHT: 68 IN | SYSTOLIC BLOOD PRESSURE: 112 MMHG | DIASTOLIC BLOOD PRESSURE: 70 MMHG

## 2023-05-10 DIAGNOSIS — E46 PROTEIN-CALORIE MALNUTRITION, UNSPECIFIED SEVERITY (MULTI): ICD-10-CM

## 2023-05-10 DIAGNOSIS — C79.9 METASTATIC MALIGNANT NEOPLASM, UNSPECIFIED SITE (MULTI): Primary | ICD-10-CM

## 2023-05-10 DIAGNOSIS — I71.00 DISSECTION OF AORTA, UNSPECIFIED PORTION OF AORTA (MULTI): ICD-10-CM

## 2023-05-10 DIAGNOSIS — E11.69 TYPE 2 DIABETES MELLITUS WITH OTHER SPECIFIED COMPLICATION, UNSPECIFIED WHETHER LONG TERM INSULIN USE (MULTI): ICD-10-CM

## 2023-05-10 DIAGNOSIS — Z94.0 KIDNEY TRANSPLANTED (HHS-HCC): ICD-10-CM

## 2023-05-10 DIAGNOSIS — I44.2 ATRIOVENTRICULAR BLOCK, COMPLETE (MULTI): ICD-10-CM

## 2023-05-10 DIAGNOSIS — I42.9 CARDIOMYOPATHY, UNSPECIFIED TYPE (MULTI): ICD-10-CM

## 2023-05-10 DIAGNOSIS — Z94.4 LIVER TRANSPLANTED (MULTI): ICD-10-CM

## 2023-05-10 DIAGNOSIS — E11.69 TYPE 2 DIABETES MELLITUS WITH OTHER SPECIFIED COMPLICATION, WITHOUT LONG-TERM CURRENT USE OF INSULIN (MULTI): ICD-10-CM

## 2023-05-10 PROBLEM — Z99.2 DEPENDENT ON HEMODIALYSIS (CMS-HCC): Status: RESOLVED | Noted: 2023-05-10 | Resolved: 2023-05-10

## 2023-05-10 PROBLEM — Z99.2 DEPENDENT ON HEMODIALYSIS (CMS-HCC): Status: ACTIVE | Noted: 2023-05-10

## 2023-05-10 PROCEDURE — 1036F TOBACCO NON-USER: CPT | Performed by: STUDENT IN AN ORGANIZED HEALTH CARE EDUCATION/TRAINING PROGRAM

## 2023-05-10 PROCEDURE — 3074F SYST BP LT 130 MM HG: CPT | Performed by: STUDENT IN AN ORGANIZED HEALTH CARE EDUCATION/TRAINING PROGRAM

## 2023-05-10 PROCEDURE — 3078F DIAST BP <80 MM HG: CPT | Performed by: STUDENT IN AN ORGANIZED HEALTH CARE EDUCATION/TRAINING PROGRAM

## 2023-05-10 PROCEDURE — 99215 OFFICE O/P EST HI 40 MIN: CPT | Performed by: STUDENT IN AN ORGANIZED HEALTH CARE EDUCATION/TRAINING PROGRAM

## 2023-05-10 RX ORDER — LANOLIN ALCOHOL/MO/W.PET/CERES
400 CREAM (GRAM) TOPICAL DAILY
COMMUNITY
Start: 2023-05-04 | End: 2024-01-01 | Stop reason: ENTERED-IN-ERROR

## 2023-05-10 RX ORDER — ASPIRIN 81 MG/1
81 TABLET ORAL EVERY MORNING
COMMUNITY
End: 2024-01-01 | Stop reason: HOSPADM

## 2023-05-10 RX ORDER — ERGOCALCIFEROL 1.25 MG/1
50000 CAPSULE ORAL
COMMUNITY
Start: 2023-04-13 | End: 2023-01-01 | Stop reason: ALTCHOICE

## 2023-05-10 RX ORDER — AMLODIPINE BESYLATE 5 MG/1
5 TABLET ORAL DAILY
COMMUNITY
Start: 2023-04-24 | End: 2024-01-01 | Stop reason: WASHOUT

## 2023-05-10 RX ORDER — PATIROMER 8.4 G/1
POWDER, FOR SUSPENSION ORAL
COMMUNITY
Start: 2023-05-09 | End: 2023-01-01 | Stop reason: ALTCHOICE

## 2023-05-10 RX ORDER — CARVEDILOL 6.25 MG/1
6.25 TABLET ORAL
COMMUNITY
End: 2023-01-01 | Stop reason: ALTCHOICE

## 2023-05-10 RX ORDER — TACROLIMUS 1 MG/1
2 CAPSULE ORAL EVERY EVENING
COMMUNITY
End: 2024-01-01 | Stop reason: HOSPADM

## 2023-05-10 RX ORDER — VALGANCICLOVIR 450 MG/1
450 TABLET, FILM COATED ORAL DAILY
COMMUNITY
Start: 2020-06-04 | End: 2023-01-01 | Stop reason: ALTCHOICE

## 2023-05-10 RX ORDER — SULFAMETHOXAZOLE AND TRIMETHOPRIM 400; 80 MG/1; MG/1
1 TABLET ORAL
COMMUNITY
End: 2024-01-01 | Stop reason: HOSPADM

## 2023-05-10 RX ORDER — MYCOPHENOLATE MOFETIL 250 MG/1
250 CAPSULE ORAL 2 TIMES DAILY
COMMUNITY
Start: 2023-05-09 | End: 2024-01-01 | Stop reason: ENTERED-IN-ERROR

## 2023-05-10 RX ORDER — PANTOPRAZOLE SODIUM 40 MG/1
40 TABLET, DELAYED RELEASE ORAL EVERY MORNING
COMMUNITY
Start: 2018-03-21 | End: 2024-01-01 | Stop reason: HOSPADM

## 2023-05-10 NOTE — PROGRESS NOTES
"Subjective   Patient ID: Reynaldo Francisco is a 67 y.o. male who presents for Establish Care.    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 Recent labs reviewed.     Accompanied by his wife who assists with history.    PMHx: as above  PSHx: liver transplant 2018, aortic dissection repair 2020, pancreatic surgery, kidney transplant March 2023, appendectomy, cholecystectomy, splenectomy   Allergies: shellfish, IV contrast dye (nausea)  Fam Hx: DM, HTN, mother with pacemaker  Social Hx: never smoker, rare alcohol use, no drug use, , 2 daughters, 1 grandchild & expecting second one, retired, worked as a  in a factory    Review of Systems  12-point ROS reviewed and is negative unless otherwise noted in HPI.     Objective   /70   Ht 1.727 m (5' 8\")   Wt 59 kg (130 lb)   BMI 19.77 kg/m²     Physical Exam  Physical Exam  GEN: conversant, NAD  HEENT: PERRL, EOMI, wearing mask, TMs clear bilaterally  NECK: supple  CV: S1, S2, regular, no murmur  PULM: CTAB  ABD: soft, NT, ND  EXT: no LE edema  NEURO: no gross focal deficits  PSYCH: normal affect    Assessment/Plan     #Hx insulinoma with mets to liver s/p chemo, pancreatic surgery & liver transplant 2018    #Type A aortic dissection s/p repair 2020  #h/o complete heart block (2/2 coreg)  #Renovascular HTN  #HLD  #Aortic regurgitation  -Follows with cardiology Dr. Barber, continue amlodipine, coreg, baby asa    #Hx ESRD on HD now s/p kidney transplant & off HD: follows with transplant team at [facility], managing immunosuppressants     #Anemia: follows with hematology Dr." Leopoldo    #HM: Last colonoscopy in 2019, next due in 5 years. Received both pna shots (last 2/23), Shingrix, flu shot. Vaccinated against COVID-19 & boosters. Tetanus ~2011, will double check records to make sure this is accurate. Recent labs reviewed.     RTC 3 mos or PRN    Patient seen and evaluated with resident Yamila Saldana.    Trainee role: Resident    I saw and evaluated the patient. I personally obtained the key and critical portions of the history and physical exam or was physically present for key and critical portions performed by the trainee. I reviewed the trainee's documentation and discussed the patient with the trainee. I agree with the trainee's medical decision making as documented on the trainee's notes.    John Manrique M.D.

## 2023-07-31 NOTE — PROGRESS NOTES
Subjective   Patient ID: Reynaldo Francisco is a 67 y.o. male who presents for Follow-up.    Hypertension  This is a chronic problem. The problem is unchanged. Pertinent negatives include no anxiety, blurred vision, chest pain, headaches, malaise/fatigue, neck pain, orthopnea, palpitations, peripheral edema, PND, shortness of breath or sweats. There are no associated agents to hypertension. There are no known risk factors for coronary artery disease. There are no compliance problems.       Mr. 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 He had a root canal on 7/10/23 and on 7/13-15 noticed a 4x4cm patch of vesicular rash accompanied with never pain and sensitivity. He was prescribed a course of valacyclovir by the transplant team which was extended after the rash did not improve after 6 days (currently finishing up 3 more days). Currently the rash, neve sensitivity has much improved over the past 3 days. Of note, he had a prior episode of shingles in 1/2015 hover rash was much worst and on his entire L leg. Pt received the singles vaccine in 2018. Pt reports diarrhea for the past day. He has 3 episode of watery stool yesterday and had one episode today. It is accompanied by mild abd pain that resolves after the BM. Pt denies any recent sick contacts nor new food or take out. He has been able to stay hydrated and eat normally.     Accompanied by his wife.    ROS negative for fever, chills, chest pain, shortness of breath, cough, abdominal pain, nausea, vomiting, dysuria.     Objective   There were no vitals taken for this visit.    Physical Exam  Constitutional:       General: He is not in acute  distress.  HENT:      Mouth/Throat:      Mouth: Mucous membranes are moist.   Eyes:      Extraocular Movements: Extraocular movements intact.      Pupils: Pupils are equal, round, and reactive to light.   Cardiovascular:      Rate and Rhythm: Normal rate and regular rhythm.   Pulmonary:      Breath sounds: Normal breath sounds. No wheezing or rhonchi.   Abdominal:      General: Bowel sounds are normal. There is no distension.      Palpations: Abdomen is soft.      Tenderness: There is no abdominal tenderness.   Musculoskeletal:      Right lower leg: No edema.      Left lower leg: No edema.   Skin:     General: Skin is warm and dry.   Neurological:      General: No focal deficit present.      Mental Status: He is alert and oriented to person, place, and time.   Psychiatric:         Mood and Affect: Mood normal.           Assessment/Plan   Mr. 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 Pt currently recovering from shingles (currently on valacyclovir). As for the diarrhea, suspect this is due to the recently increased dose of tacrolimus as well as the valacyclovir; pt has been able to hydrate well and is able to tolerate regular PO intake.    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 Barber, continue amlodipine, coreg, baby asa     #Hx ESRD on HD now s/p kidney transplant & off HD: follows with transplant team at [facility], managing immunosuppressants      #Anemia: follows with hematology  Dr. Mina     #HM: Last colonoscopy in 2019, next due in 5 years. Received both pna shots (last 2/23), Shingrix, flu shot. Vaccinated against COVID-19 & boosters. Tetanus ~2011, will double check records to make sure this is accurate. Recent labs reviewed.      RTC 3-4 mos or PRN    Trainee role: Resident    I saw and evaluated the patient. I personally obtained the key and critical portions of the history and physical exam or was physically present for key and critical portions performed by the trainee. I reviewed the trainee's documentation and discussed the patient with the trainee. I agree with the trainee's medical decision making as documented on the trainee's notes.    John Manrique M.D.

## 2023-08-21 PROBLEM — Z95.828: Status: ACTIVE | Noted: 2023-01-01

## 2023-08-21 PROBLEM — C44.92 SCC (SQUAMOUS CELL CARCINOMA): Status: ACTIVE | Noted: 2023-01-01

## 2023-08-21 PROBLEM — I45.10 RBBB: Status: ACTIVE | Noted: 2019-06-04

## 2023-08-21 PROBLEM — R91.1 LUNG NODULE: Status: ACTIVE | Noted: 2023-01-01

## 2023-08-21 PROBLEM — M54.9 BACK PAIN: Status: ACTIVE | Noted: 2023-01-01

## 2023-08-21 PROBLEM — T82.858A: Status: ACTIVE | Noted: 2023-01-01

## 2023-08-21 PROBLEM — A04.72 COLITIS, CLOSTRIDIUM DIFFICILE: Status: ACTIVE | Noted: 2018-01-13

## 2023-08-21 PROBLEM — R07.89 CHEST HEAVINESS: Status: ACTIVE | Noted: 2022-09-27

## 2023-08-21 PROBLEM — U07.1 COVID-19: Status: ACTIVE | Noted: 2023-01-01

## 2023-08-21 PROBLEM — Z99.2 END STAGE RENAL DISEASE ON DIALYSIS (MULTI): Status: ACTIVE | Noted: 2023-01-01

## 2023-08-21 PROBLEM — N17.0 ATN (ACUTE TUBULAR NECROSIS) (CMS-HCC): Status: ACTIVE | Noted: 2018-01-10

## 2023-08-21 PROBLEM — R10.31 RIGHT LOWER QUADRANT ABDOMINAL PAIN: Status: ACTIVE | Noted: 2023-01-01

## 2023-08-21 PROBLEM — N18.5 STAGE 5 CHRONIC KIDNEY DISEASE (MULTI): Status: ACTIVE | Noted: 2019-06-04

## 2023-08-21 PROBLEM — R00.0 INCREASED HEART RATE: Status: ACTIVE | Noted: 2023-01-01

## 2023-08-21 PROBLEM — I35.1 MODERATE AORTIC INSUFFICIENCY: Status: ACTIVE | Noted: 2023-01-01

## 2023-08-21 PROBLEM — I71.21 ASCENDING AORTIC ANEURYSM (CMS-HCC): Status: ACTIVE | Noted: 2023-01-01

## 2023-08-21 PROBLEM — I82.890 RIGHT JUGULAR VEIN THROMBOSIS: Status: ACTIVE | Noted: 2021-01-09

## 2023-08-21 PROBLEM — Z98.890 HISTORY OF REPAIR OF STANFORD TYPE A DISSECTING ANEURYSM OF THORACIC AORTA: Status: ACTIVE | Noted: 2020-09-30

## 2023-08-21 PROBLEM — Z86.79 HISTORY OF REPAIR OF STANFORD TYPE A DISSECTING ANEURYSM OF THORACIC AORTA: Status: ACTIVE | Noted: 2020-09-30

## 2023-08-21 PROBLEM — C18.9 MALIGNANT NEOPLASM OF COLON (MULTI): Status: ACTIVE | Noted: 2020-10-22

## 2023-08-21 PROBLEM — D64.9 ANEMIA: Status: ACTIVE | Noted: 2018-09-18

## 2023-08-21 PROBLEM — R01.1 SYSTOLIC MURMUR: Status: ACTIVE | Noted: 2023-01-01

## 2023-08-21 PROBLEM — K86.2 PANCREATIC CYST (HHS-HCC): Status: ACTIVE | Noted: 2023-01-01

## 2023-08-21 PROBLEM — T81.89XA SUTURE GRANULOMA: Status: ACTIVE | Noted: 2023-01-01

## 2023-08-21 PROBLEM — I66.9 CEREBRAL ARTERY OCCLUSION: Status: ACTIVE | Noted: 2019-06-04

## 2023-08-21 PROBLEM — N18.6 END STAGE RENAL DISEASE ON DIALYSIS (MULTI): Status: ACTIVE | Noted: 2023-01-01

## 2023-08-21 PROBLEM — I10 HYPERTENSION: Status: ACTIVE | Noted: 2020-10-22

## 2023-08-21 PROBLEM — K21.9 GASTROESOPHAGEAL REFLUX DISEASE: Status: ACTIVE | Noted: 2020-10-22

## 2023-10-19 PROBLEM — R55 SYNCOPE AND COLLAPSE: Status: ACTIVE | Noted: 2023-01-01

## 2023-10-19 NOTE — Clinical Note
An attempt was made to extract the LEAD, [employer], 52 CM - [id number] - [id number] lead. This resulted in complete removal of the lead.

## 2023-10-19 NOTE — Clinical Note
The PACEMAKER, SHANIA XT SR MRI - [id number] device was inserted. The leads were placed into the connector and visually verified to be in correct position. with Flow seal and tyrx pouch

## 2023-10-19 NOTE — ED TRIAGE NOTES
Patient arrives from home with complaints of dizziness ongoing for several weeks.  Patient is currently alert and oriented to person place and time he reports he was on the phone with daughter when he fell in the bathroom, unresponsive unknown length of time, states that he did not hit head

## 2023-10-19 NOTE — ED PROVIDER NOTES
HPI   Chief Complaint   Patient presents with   • Dizziness   • Weakness, Gen     Reports dizziness for 2-3 weeks, syncope, patient A&O on ems arrival.        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  He reports for the past 2 to 3 weeks he has felt lightheaded.  Today he was standing when he came lightheaded and had a syncopal event while talking to his daughter.  He did fall to the ground.  He denies having a headache, vision changes, neck pain, back pain or extremity pain.  He denies weakness or paresthesias in his extremities.  He denies having chest pain, shortness of breath or palpitations.  He denies recent illness including fevers, chills, cough, congestion, vomiting, diarrhea, melena or hematochezia.  He also denies recent urinary symptoms including frequency, dysuria or hematuria.  He does not have any other complaints at this time.                          Edmund Coma Scale Score: 15                  Patient History   Past Medical History:   Diagnosis Date   • Arteriovenous fistula, acquired (CMS/HCC) 06/17/2022    AV fistula   • Nutritional anemia, unspecified     Anemia, deficiency   • Personal history of malignant neoplasm of pancreas 06/17/2022    History of malignant neoplasm of pancreas     Past Surgical History:   Procedure Laterality Date   • LIVER TRANSPLANTATION     • OTHER SURGICAL HISTORY  03/17/2022    Pancreatic surgery   • OTHER SURGICAL HISTORY  06/17/2022    Liver surgery   • OTHER SURGICAL HISTORY  06/17/2022    Liver transplantation   • US KIDNEY TRANSPLANT       Family History   Problem Relation Name Age of Onset   • Diabetes Mother     • Hypertension Mother       Social History     Tobacco Use   • Smoking status: Never   • Smokeless tobacco: Never   Vaping Use   • Vaping Use: Never used    Substance Use Topics   • Alcohol use: Never   • Drug use: Not Currently       Physical Exam   ED Triage Vitals   Temp Heart Rate Resp BP   10/19/23 1145 10/19/23 1135 10/19/23 1135 10/19/23 1135   35.9 °C (96.6 °F) 50 17 145/65      SpO2 Temp Source Heart Rate Source Patient Position   10/19/23 1138 10/19/23 1145 10/19/23 1135 10/19/23 1135   99 % Tympanic Monitor Lying      BP Location FiO2 (%)     10/19/23 1145 --     Right arm        Physical Exam      Physical Exam:    Appearance: Alert and oriented.  Well-nourished well-developed male lying in bed in no acute distress.  He is slightly bradycardic with a heart rate of 50.  The rest of his vital signs are within acceptable normal limits.    Head: Normocephalic,  atraumatic.    Eyes: PERRLA, EOMI.    ENT: Moist mucous membranes.    Neck: No midline spinal tenderness palpation.    Cardiac: Regular rate and rhythm.  No murmur.    Pulmonary: Lungs clear bilaterally with good aeration.  No audible wheezing, rales or rhonchi.    Abdomen: Soft, nondistended, nontender with normoactive bowel sounds throughout.    Back: No midline spinal tenderness to palpation.    Musculoskeletal: No peripheral edema.  Neurovascular intact throughout.    Neurological: No focal or lateralizing deficit.    Skin: No rashes.    Psychiatric: Appropriate mood and affect.       ED Course & MDM   ED Course as of 10/26/23 0947   Thu Oct 19, 2023   1534 Troponin I, High Sensitivity(!!): 120 [SM]   2109 CT angio chest for pulmonary embolism [EC]      ED Course User Index  [EC] YESENIA Alfaro-CNP  [SM] Lucero Harrison PA-C         Diagnoses as of 10/26/23 0947   Syncope and collapse   Elevated troponin       Medical Decision Making  This is a 67-year-old male who presents by EMS after having a syncopal episode.  He reports for the past 2 to 3 weeks he has felt lightheaded.  Today he was standing when he became lightheaded and then syncopized and fell to the ground.  He denies having  headache or other neurologic symptoms.  He denies suffering any injuries from his fall.  He denies having chest pain, shortness of breath or palpitations.  He denies recent viral, GI or  symptoms.  He denies any recent adjustments to his medications.  He presents afebrile and hemodynamically stable however is bradycardic with a heart rate of 50.  On exam there are no signs of head or extremity trauma.  He does not have midline spinal tenderness.  He does not have any focal or lateralizing deficit.  After reviewing his records he was found to have a recent transthoracic echocardiogram done on the sixth of this month.  He was found to have an EF of 50 to 55% with no wall motion abnormalities.  Both the left and right atrium were moderately dilated.  He has mild aortic valve regurgitation and mild tricuspid regurgitation.  An EKG was obtained on arrival which revealed a sinus rhythm with a rate of 51 and a right bundle branch block which is not new.  ID interval 251 and QTc 460.  No acute ischemic changes.  Orthostatic vital signs were negative.  Labs are obtained and CBC revealed a mild leukopenia of 4.2.  CMP revealed a slightly low sodium of 132 and BUN/creatinine at 1.34.  His initial troponin was elevated at 171.  A 1 hour repeat was obtained and improved to 120.  Urinalysis was positive for moderate leukocytes but negative for white blood cells and bacteria.  Due to his elevated troponin and syncopal event a D-dimer was obtained and elevated at 1956.  He initially had an iodine contrast allergy listed however after thorough discussion he states he just gets nauseous and slightly warm.  A CT angiogram of the chest will be obtained. The patient will be signed out to Unruly Rowland CNP.  The plan will be to admit the patient pending this result.        Procedure  Procedures     Lucero Harrison PA-C  10/26/23 0947

## 2023-10-20 NOTE — HOSPITAL COURSE
Reynaldo Francisco is a 67-year-old male with a past medical history of insulinoma with metastasis to the liver (status post chemotherapy), pancreatic surgery, Type A aortic dissection status post repair in 2020, history of complete heart block, end-stage renal disease status post kidney transplant, hypertension, aortic vegetation, shingles, and anemia who presented with a fall and syncope found to be caused by symptomatic bradycardia.  Patient was also treated for CAP due to right lower lobe consolidation.  Patient was found to have sick sinus syndrome and was followed by cardiology who held his beta-blockers and inserted a pacemaker on 10/23.  Following the insertion of the pacemaker, patient's heart rate improved.  Patient's pneumonia work-up was found to be unremarkable and patient was not treated with any antibiotics.  Patient continue to improved and was deemed medically stable for discharge on 10/24 with

## 2023-10-20 NOTE — PROGRESS NOTES
SW spoke to Pt, Pt requested SW speak to Pt's dtr/Nan, Pt's English speaking/comprehension prevented full assessment w/ SW. SW spoke to Pt's dtr. Explained the role of TCC/ SW and completed initial assessment. Dtr denied needs for additional supports or services at home. Dtr/Nan stated Pt will stay w/ dtr/Nan after discharge until Pt's spouse returns from a trip so that Pt will not be alone post-discharge. Pt is independent at home and lives in a full ranch home. No concerns noted. Pt to discharge home with dtr/Nan - no additional needs.      10/20/23 1011   Discharge Planning   Living Arrangements Spouse/significant other;Other (Comment)  (Staying with adult children until spouse returns from trip)   Support Systems Spouse/significant other;Children   Assistance Needed None   Type of Residence Private residence   Number of Stairs to Enter Residence 3   Number of Stairs Within Residence 0   Do you have animals or pets at home? No   Home or Post Acute Services None   Patient expects to be discharged to: Home w/ adult daughter   Does the patient need discharge transport arranged? No   RoundTrip coordination needed? No   Has discharge transport been arranged? No   Financial Resource Strain   How hard is it for you to pay for the very basics like food, housing, medical care, and heating? Not hard   Housing Stability   In the last 12 months, was there a time when you were not able to pay the mortgage or rent on time? N   In the last 12 months, how many places have you lived? 1   In the last 12 months, was there a time when you did not have a steady place to sleep or slept in a shelter (including now)? N   Transportation Needs   In the past 12 months, has lack of transportation kept you from medical appointments or from getting medications? no   In the past 12 months, has lack of transportation kept you from meetings, work, or from getting things needed for daily living? No   Patient Choice   Provider Choice list and CMS  website (https://medicare.gov/care-compare#search) for post-acute Quality and Resource Measure Data were provided and reviewed with: Other (Comment)  (No choice list needed)

## 2023-10-20 NOTE — H&P
History Of Present Illness  Reynaldo Francisco is a 67 y.o. 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  Speaking the patient he mentions that he has had this issue before where he had lightheadedness/syncopal episode while on Coreg.  He says his symptoms have been going on for several weeks where he will intermittently feel lightheaded and have to wait for the feeling to subside.  Patient says he has noticed this has been around the time that he takes his carvedilol in the morning.  Earlier today around an hour and a half after taking his morning carvedilol dose he was on the phone with his daughter and as he was in the bathroom he had a syncopal episode where he fell to the ground.  He says that this was for a few seconds and he awoke to the sound of his daughter calling his name over the phone.  Patient was brought in by EMS for further evaluation.  He says that he has not had significant symptoms since coming into the hospital and currently feels well.    Recent lymph node enlargement being followed by [facility].   donor kidney transplant 2023, liver 2018.  Metastatic neuroendocrine insulinoma 2011.  Patient received 3 months of valacyclovir for CMV prophylaxis off prophylaxis currently.  Note from 2023 indicates negative CMV PCR to be repeated every 2 weeks.  Patient to be remained on Bactrim  for pneumocystis jiroveci prophylaxis.  Patient's next biopsy scheduled for 3/5/2024.  Patient had history of CMV and EBV as well as latent TB s/p  INH.    -Open cholecystectomy 2010  -Liver lobectomy 2010  -Splenectomy 2010.    ED Interventions and results:  Initial presentation the emergency department patient's heart rate was 50, respirations 17, blood pressure 145/65, patient's EKG in the emergency department demonstrated a type I AV block with prolonged KY.  Patient's initial labs significant for a slight hyponatremia of 132, bicarb of 20, creatinine 1.34 (roughly at baseline post transplant), troponin 170 1 repeat 120.  D-dimer 1956, hemoglobin 10.4, .  Patient had moderate leukocyte esterase, no WBCs or bacteria noted.  While in the emergency department patient received hydrocortisone 200 mg every 4 hours, CellCept    Admission Details  Patient is a 67-year-old male with very extensive past medical history including 2 transplants liver and most recently kidney earlier in 2023 on immunosuppressants, relevant history listed above.  Presents to [facility] with intermittent dizziness and a syncopal episode.    Acute Medical Conditions  #Syncopal episode  #Suspect cardiogenic syncope secondary to bradycardia  #Type I AV block  Patient's history of complete heart block on Coreg with similar symptoms in the past and current symptoms related to his Coreg dosing raise suspicion for symptomatic bradycardia secondary to his Coreg use.  Considering he has a clear precipitating cause may not require pacemaker but considering his extensive history would like evaluation by cardiology to clarify next steps.  -Holding patient's Coreg for the time being  -Monitor on telemetry  -We will consult cardiology, appreciate recommendations  -N.p.o. at midnight in case patient shows signs of complete heart block necessitating pacemaker  -Echo 10/2023 EF 50 to 55% with moderate left and right atrial dilation, mild tricuspid regurgitation, mild aortic valve regurgitation and reduced global longitudinal strain -15.9%.  -Orthostats to be obtained.      #Right lower  lobe consolidation  #Right sided pleural effusion moderate to large  Patient does not endorse any signs or symptoms of infection could be concerning for pneumonia, has no oxygen requirement or fever.  Patient demonstrated some signs of anasarca and left upper quadrant fluid collection, may be collecting fluid? His reported consolidation may be related to atelectasis secondary to his pleural effusion. Overall low suspicion for pneumonia but considering patient's extensive history of opportunistic infections (CMV, EBV, TB) as well as his current use of immunosuppressants after his transplant we will add coverage for community-acquired pneumonia and add procalcitonin to aid in the de-escalation of antibiotics..  -We will tentatively start patient on CAP coverage with Pro-Neal to aid in de-escalation  -We will consult pulmonology, evaluation of lung consolidation and large pleural effusion in the setting of immunosuppression.  Although patient not requiring oxygen unsure if patient may warrant a diagnostic Thora?.    Incidental Findings  -Outpouching distal aortic arch possible small penetrating ulcer at 1.9 cm previously 1.7 in 10/22.  -Thoracic aortic arch dissection stable from 10/2022  -Complex pancreatic cystic lesion 2.1 cm.  Also unchanged    Follow ups and Discharge Instructions    DVT: Patient on enoxaparin for DVT prophylaxis, creatinine clearance 45+  Diet: Regular diet, n.p.o. at midnight  Fluids: Maintenance fluids LR at 100/h for 1 L overnight  Electrolytes: We will replete as needed  Dispo: Telemetry  Code Status: Assumed full code, to be confirmed patient's daughter in the morning  Consults: Pulmonology  Antibiotics: Rocephin/azithromycin (10/20-)    Dr. Refugio Ozuna   Internal Medicine PGY-2  Available on [employer] for any questions.    This is a preliminary note pending signature from the attending physician.     Past Medical History  He has a past medical history of Arteriovenous fistula, acquired  (CMS/HCC) (06/17/2022), Nutritional anemia, unspecified, and Personal history of malignant neoplasm of pancreas (06/17/2022).    Surgical History  He has a past surgical history that includes Other surgical history (03/17/2022); Other surgical history (06/17/2022); Other surgical history (06/17/2022); US kidney transplant; and Liver transplantation.     Social History  He reports that he has never smoked. He has never used smokeless tobacco. He reports that he does not currently use drugs. He reports that he does not drink alcohol.    Family History  Family History   Problem Relation Name Age of Onset    Diabetes Mother      Hypertension Mother        Allergies  Shellfish derived    Review of Systems     Physical Exam  Constitutional: Well developed,  no distress, alert and cooperative  Respiratory/Thorax: Patent airways, diminished lung sounds right base  Cardiovascular: bradycardic, S1-S2 present  Gastrointestinal: Distended, nontender  Neurological: alert and oriented x3, intact senses, motor,   Psychological: Appropriate mood and behavior  Skin: Warm and dry    Last Recorded Vitals  BP (!) 193/86   Pulse 57   Temp 35.9 °C (96.6 °F) (Tympanic)   Resp 18   Wt 62.1 kg (137 lb)   SpO2 97%     Relevant Results         Assessment/Plan       Refugio Ozuna MD

## 2023-10-20 NOTE — PROGRESS NOTES
"Subjective:  Pt corroborated previously gathered history, mentioned that his daughters know his medical history better than him and could be reached for questions.     Objective:  BP (!) 185/83   Pulse 57   Temp 36.7 °C (98.1 °F)   Resp 18   Ht 1.727 m (5' 8\")   Wt 62.1 kg (137 lb)   SpO2 99%   BMI 20.83 kg/m²     Physical Exam:   GENERAL: Awake/ alert, cooperative.   HEAD:  Normocephalic, atraumatic.  EYES:  Round and reactive.  ENT:  No nasal discharge, mucous membranes moist and pink.  NECK:  Atraumatic, no meningismus.  CARDIOVASCULAR:  Regular rate and rhythm, no murmur.  RESPIRATORY:  Diminished breath sounds, no active work of breathing.   ABDOMEN:  Soft, no tenderness, nondistended.  EXTREMITIES:  No peripheral edema, no calf tenderness.  SKIN:  Warm and dry.  NEUROLOGICAL:  Nonfocal grossly.    Assessment/Plan:  Reynaldo Francisco is a 67 y.o. 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     10/20: Transferred to CVICU because of several pauses seen on telemetry tracing.     #Syncopal episode  #Suspect cardiogenic syncope secondary to bradycardia, Coreg use  #Type I AV block  #Symptomatic sick sinus syndrome  Patient's history of complete heart block on Coreg with similar symptoms in the past and current symptoms related to his Coreg dosing raise suspicion for symptomatic bradycardia secondary to his Coreg use.   -Holding home Coreg  -Monitor on telemetry  -We will consult cardiology, appreciate recommendations  Permanent pacemaker to be placed, NPO at midnight  -Echo 10/2023 EF 50 " to 55% with moderate left and right atrial dilation, mild tricuspid regurgitation, mild aortic valve regurgitation and reduced global longitudinal strain -15.9%.     #Right lower lobe consolidation  #Right sided pleural effusion moderate to large  Patient does not endorse any signs or symptoms of infection could be concerning for pneumonia, has no oxygen requirement or fever.  Patient demonstrated some signs of anasarca and left upper quadrant fluid collection, may be collecting fluid? His reported consolidation may be related to atelectasis secondary to his pleural effusion. Overall low suspicion for pneumonia but considering patient's extensive history of opportunistic infections (CMV, EBV, TB) as well as his current use of immunosuppressants after his transplant we will add coverage for community-acquired pneumonia and add procalcitonin to aid in the de-escalation of antibiotics..  -We will consult pulmonology, evaluation of lung consolidation and large pleural effusion in the setting of immunosuppression.  -Abx (day 1): Rocephin/azithromycin (started 10/20)     Incidental Findings  -Outpouching distal aortic arch possible small penetrating ulcer at 1.9 cm previously 1.7 in 10/22.  -Thoracic aortic arch dissection stable from 10/2022  -Complex pancreatic cystic lesion 2.1 cm.  Also unchanged     DVT: Patient on enoxaparin for DVT prophylaxis, creatinine clearance 45+  Diet: Regular diet, NPO at midnight  Fluids: None  Dispo: TBD  Consults: Pulmonology  Antibiotics: Rocephin/azithromycin (10/20-)    Orlin West DO   Internal Medicine PGY-1

## 2023-10-20 NOTE — NURSING NOTE
Patient's telemetry monitor was alarming at this time.  This RN ran to the room to see what was going on as the monitor read asystole with a HR of 0.  Patient was slumped forward and was drooling.  I grabbed his on his shoulder and said his name and asked him if he was okay and he promptly responded.  A few minutes later another event as such occurred, patient was awake and alert.  This RN contacted Dr. Ramicone to make him aware of the event and also Dr. Nichols the resident on his team to let him know that this occurred and that the patient should perhaps transfer to the CVICU.  He asked to obtain a set of VS and an EKG.  This RN delegated these tasks to the PCNA who completed them and shortly after a transfer order was placed.

## 2023-10-20 NOTE — CONSULTS
Consults  History Of Present Illness:    Reynaldo Francisco is a 67 y.o. male with PMH of insulinoma with mets to liver S/P Chemo, pancreatic surgery and liver transplant 2018, type A aortic dissection (S/P repair 2020), complete heart block secondary to Coreg, renovascular hypertension.  He has had previous bradycardiac episodes with Coreg but he has not had Coreg for > 24 hours and had a 4 second pause  this morning on telemetry.  He has had severe light headed episodes with near syncope.  No chest pain     Last Recorded Vitals:  Vitals:    10/19/23 2302 10/19/23 2303 10/20/23 0015 10/20/23 0807   BP: 168/82 166/83 158/81 (!) 182/81   BP Location: Right arm Right arm     Patient Position: Sitting Standing     Pulse: 61 66 59 61   Resp: 18 18 18 18   Temp:   36.6 °C (97.9 °F) 35.9 °C (96.6 °F)   TempSrc:    Temporal   SpO2: 97% 96% 97% 97%   Weight:       Height:           Last Labs:  CBC - 10/20/2023:  5:03 AM  2.7 10.4 166    30.6      CMP - 10/20/2023:  5:03 AM  9.1 6.9 14 --- 0.8   _ 4.5 11 72      PTT - No results in last year.  _   _ _     Troponin I, High Sensitivity   Date/Time Value Ref Range Status   10/19/2023 02:19  (HH) 0 - 20 ng/L Final     Comment:     Previous result verified on 10/19/2023 1402 on specimen/case [id number] called with component [facility] for procedure Troponin I, High Sensitivity with value 171 ng/L.   10/19/2023 01:16  (HH) 0 - 20 ng/L Final     Troponin I   Date/Time Value Ref Range Status   10/16/2022 12:57 AM 29 (H) 0 - 20 ng/L Final     Comment:     .  Less than 99th percentile of normal range cutoff-  Female and children under 18 years old <14 ng/L; Male <21 ng/L: Negative  Repeat testing should be performed if clinically indicated.   .  Female and children under 18 years old 14-50 ng/L; Male 21-50 ng/L:  Consistent with possible cardiac damage and possible increased clinical   risk. Serial measurements may help to assess extent of myocardial damage.   .  >50 ng/L:  Consistent with cardiac damage, increased clinical risk and  myocardial infarction. Serial measurements may help assess extent of   myocardial damage.   .   NOTE: Children less than 1 year old may have higher baseline troponin   levels and results should be interpreted in conjunction with the overall   clinical context.   .  NOTE: Troponin I testing is performed using a different   testing methodology at [facility] than at other   [facility]. Direct result comparisons should only   be made within the same method.     10/15/2022 11:23 PM 32 (H) 0 - 20 ng/L Final     Comment:     .  Less than 99th percentile of normal range cutoff-  Female and children under 18 years old <14 ng/L; Male <21 ng/L: Negative  Repeat testing should be performed if clinically indicated.   .  Female and children under 18 years old 14-50 ng/L; Male 21-50 ng/L:  Consistent with possible cardiac damage and possible increased clinical   risk. Serial measurements may help to assess extent of myocardial damage.   .  >50 ng/L: Consistent with cardiac damage, increased clinical risk and  myocardial infarction. Serial measurements may help assess extent of   myocardial damage.   .   NOTE: Children less than 1 year old may have higher baseline troponin   levels and results should be interpreted in conjunction with the overall   clinical context.   .  NOTE: Troponin I testing is performed using a different   testing methodology at [facility] than at other   [facility]. Direct result comparisons should only   be made within the same method.       BNP   Date/Time Value Ref Range Status   10/15/2022 11:23  (H) 0 - 99 pg/mL Final     Comment:     .  <100 pg/mL - Heart failure unlikely  100-299 pg/mL - Intermediate probability of acute heart  .               failure exacerbation. Correlate with clinical  .               context and patient history.    >=300 pg/mL - Heart Failure likely. Correlate with  "clinical  .               context and patient history.  BNP testing is performed using different testing   methodology at [facility] than at other   [facility]. Direct result comparisons should   only be made within the same method.     03/17/2022 10:02 AM 1,348 (H) 0 - 99 pg/mL Final     Comment:     .  <100 pg/mL - Heart failure unlikely  100-299 pg/mL - Intermediate probability of acute heart  .               failure exacerbation. Correlate with clinical  .               context and patient history.    >=300 pg/mL - Heart Failure likely. Correlate with clinical  .               context and patient history.  BNP testing is performed using different testing   methodology at [facility] than at other   [facility] hospitals. Direct result comparisons should   only be made within the same method.       Hemoglobin A1C   Date/Time Value Ref Range Status   05/01/2023 06:22 AM 6.1 (H) <5.7 % Final   03/07/2023 03:24 AM 5.2 <5.7 % Final      Last I/O:  No intake/output data recorded.    Past Cardiology Tests (Last 3 Years):  EKG:  No results found for this or any previous visit from the past 1095 days.    Echo:  Onco-Echo Complete (Strain & 3D) 10/11/2023    Ejection Fractions:  No results found for: \"EF\"  Cath:  No results found for this or any previous visit from the past 1095 days.    Stress Test:  No results found for this or any previous visit from the past 1095 days.    Cardiac Imaging:  No results found for this or any previous visit from the past 1095 days.      Past Medical History:  He has a past medical history of Arteriovenous fistula, acquired (CMS/HCC) (06/17/2022), Nutritional anemia, unspecified, and Personal history of malignant neoplasm of pancreas (06/17/2022).    Past Surgical History:  He has a past surgical history that includes Other surgical history (03/17/2022); Other surgical history (06/17/2022); Other surgical history (06/17/2022); US kidney transplant; and Liver " transplantation.      Social History:  He reports that he has never smoked. He has never used smokeless tobacco. He reports that he does not currently use drugs. He reports that he does not drink alcohol.    Family History:  Family History   Problem Relation Name Age of Onset    Diabetes Mother      Hypertension Mother          Allergies:  Shellfish derived and Iodinated contrast media    Inpatient Medications:  Scheduled medications   Medication Dose Route Frequency    amLODIPine  5 mg oral Daily    aspirin  81 mg oral Daily    azithromycin  500 mg intravenous q24h    [Held by provider] carvedilol  6.25 mg oral BID with meals    cefTRIAXone  1 g intravenous q24h    enoxaparin  40 mg subcutaneous q24h    magnesium oxide  400 mg oral Daily    mycophenolate  250 mg oral BID    pantoprazole  40 mg oral Daily    sulfamethoxazole-trimethoprim  1 tablet oral Every Mon/Wed/Fri    tacrolimus  1 mg oral BID     PRN medications   Medication     Continuous Medications   Medication Dose Last Rate     Outpatient Medications:  Current Outpatient Medications   Medication Instructions    amLODIPine (Norvasc) 10 mg tablet 1 tablet, oral, Daily    amLODIPine (NORVASC) 5 mg, oral, Daily    aspirin 81 mg, oral, Daily    bumetanide (Bumex) 2 mg tablet     carvedilol (COREG) 6.25 mg, oral, 2 times daily with meals    cloNIDine (Catapres) 0.1 mg tablet 1 tablet, oral, 2 times daily    hydrALAZINE (Apresoline) 100 mg tablet 1 tablet, oral, 2 times daily    hydrALAZINE (Apresoline) 25 mg tablet     magnesium oxide (MAG-OX) 400 mg, oral, Daily    mycophenolate (CELLCEPT) 250 mg, oral, 2 times daily    Protonix 40 mg, oral, Daily    sulfamethoxazole-trimethoprim (Bactrim) 400-80 mg tablet 1 tablet, oral, Every Mon/Wed/Fri    tacrolimus (PROGRAF) 1 mg, oral, 2 times daily       Physical Exam:  Gen : WD wnm sitting in bed  Skin: Warm good turgor     Assessment/Plan   IMP:  1.) Symptomatic sick sinus syndrome with 10/11 23 echocardiogram LVEF  =60-65%\  2.) S/P aortic dissection repair 2020  3.) Insulinoma with mets to the liver s/P chemo  4.) hypertension  5.) hyperlipidemia  6.) H/O shingles  7.) Mild aortic insufficiency  REC:  Permanent pacemaker discussed with dr. Ramicone by Test           Thank you for the consultation                             Will follow with you                                                    JLS    Peripheral IV 10/19/23 18 G Right Antecubital (Active)   Site Assessment Clean;Dry;Intact 10/20/23 0800   Dressing Status Clean;Dry 10/20/23 0800   Number of days: 1       Code Status:  Full Code    I spent 45minutes in the professional and overall care of this patient.        Emanuel Bain MD

## 2023-10-20 NOTE — CARE PLAN
Problem: OT Goals  Goal: Pt will independently complete ADL routine with 1 or fewer seated rest breaks.   Outcome: Progressing  Goal: Pt will tolerate standing for >10-minutes without s/s of SOB or dizziness.  Outcome: Progressing  Goal: Pt will relay 3 energy conservation strategies independently.   Outcome: Progressing

## 2023-10-20 NOTE — CONSULTS
Inpatient consult to Cardiology  Consult performed by: James C Ramicone, DO  Consult ordered by: Emanuel Bain MD      History Of Present Illness:      This is a 67-year-old male with a history of bradycardia.  The patient is being seen today in consultation at the request of Dr. Bain.  The patient has been on carvedilol and states that he has been noticing pauses in his heart rate which have been associated with lightheadedness and dizziness.  He has had some brief episodes where he loses consciousness.  Several pauses have been documented on telemetry, but the patient has now been off carvedilol for approximately 24 hours.  One of the pauses this morning was associated with near syncope.    Past medical history:    Insulinoma  Pancreatic surgery and liver transplant  Type a aortic dissection with surgical repair 2020  ESRD, history of renal transplant March 2023  Renovascular hypertension  Aortic regurgitation    Review of Systems    Other review of systems negative    Social History:  He reports that he has never smoked. He has never used smokeless tobacco. He reports that he does not currently use drugs. He reports that he does not drink alcohol.    Family History:  Family History   Problem Relation Name Age of Onset    Diabetes Mother      Hypertension Mother          Allergies:  Shellfish derived and Iodinated contrast media    Medications:  Current Facility-Administered Medications   Medication Dose Route Frequency Provider Last Rate Last Admin    amLODIPine (Norvasc) tablet 5 mg  5 mg oral Daily Refugio Ozuna MD   5 mg at 10/19/23 2101    aspirin EC tablet 81 mg  81 mg oral Daily Refugio Ozuna MD   81 mg at 10/20/23 0959    azithromycin (Zithromax) in dextrose 5 % in water (D5W) 250 mL  mg  500 mg intravenous q24h Refugio Ozuna MD   Stopped at 10/20/23 0617    [Held by provider] carvedilol (Coreg) tablet 6.25 mg  6.25 mg oral BID with meals Refugio Ozuna MD        cefTRIAXone (Rocephin)  "IVPB 1 g  1 g intravenous q24h Refugio Ozuna MD   Stopped at 10/20/23 0546    [MAR Hold] enoxaparin (Lovenox) syringe 40 mg  40 mg subcutaneous q24h Refugio Ozuna MD   40 mg at 10/20/23 0023    magnesium oxide (Mag-Ox) tablet 400 mg  400 mg oral Daily Refugio Ozuna MD   400 mg at 10/20/23 0959    mycophenolate (Cellcept) capsule 250 mg  250 mg oral BID Refugio Oznua MD   250 mg at 10/20/23 0900    pantoprazole (ProtoNix) EC tablet 40 mg  40 mg oral Daily Refugio Ozuna MD   40 mg at 10/20/23 0959    sulfamethoxazole-trimethoprim (Bactrim) 400-80 mg per tablet 1 tablet  1 tablet oral Every Mon/Wed/Fri Simone Polk MD   1 tablet at 10/20/23 0959    tacrolimus (Prograf) capsule 1 mg  1 mg oral BID Refugio Ozuna MD   1 mg at 10/20/23 1000         Last Recorded Vitals:  Vitals:    10/20/23 1154 10/20/23 1438 10/20/23 1500 10/20/23 1600   BP: (!) 185/83 (S) (!) 184/81 158/77 (S) 179/80   BP Location:  Right arm     Patient Position:  Sitting     Pulse: 57 61 56 56   Resp:  (!) 27 21 21   Temp: 36.7 °C (98.1 °F) 36.5 °C (97.7 °F)     TempSrc:  Temporal     SpO2: 99% 99% 98% 97%   Weight:       Height:           Physical Exam:    General Appearance:  Alert, oriented, no distress  Skin:  Warm and dry  Head and Neck:  No elevation of JVP, no carotid bruits  Cardiac Exam:  Rhythm is regular, S1 and S2 are normal, no murmur S3 or S4  Lungs:  Clear to auscultation  Extremities:  no edema  Neurologic:  No focal deficits  Psychiatric:  Appropriate mood and behavior        Last Labs:  CBC -  Lab Results   Component Value Date    WBC 2.7 (L) 10/20/2023    HGB 10.4 (L) 10/20/2023    HCT 30.6 (L) 10/20/2023     (H) 10/20/2023     10/20/2023       CMP -  Lab Results   Component Value Date    CALCIUM 9.1 10/20/2023    PROT 6.9 10/19/2023    ALBUMIN 4.5 10/19/2023    AST 14 10/19/2023    ALT 11 10/19/2023    ALKPHOS 72 10/19/2023    BILITOT 0.8 10/19/2023       LIPID PANEL -   No results found for: \"CHOL\", " "\"TRIG\", \"HDL\", \"CHHDL\", \"LDLF\", \"VLDL\", \"NHDL\"    RENAL FUNCTION PANEL -   Lab Results   Component Value Date    GLUCOSE 216 (H) 10/20/2023     (L) 10/20/2023    K 4.6 10/20/2023     10/20/2023    CO2 17 (L) 10/20/2023    ANIONGAP 15 10/20/2023    BUN 18 10/20/2023    CREATININE 1.31 (H) 10/20/2023    CALCIUM 9.1 10/20/2023    ALBUMIN 4.5 10/19/2023        No results found for: \"BNP\", \"HGBA1C\"    Test Review:  I have personally review the diagnostic testing:  Telemetry: Sinus rhythm with sinus bradycardia and sinus pauses  Echocardiogram: Normal left ventricular function    Assessment/Plan:    1.  Sick sinus syndrome: This patient has been experiencing episodes of symptomatic bradycardia which have been occurring in the absence of carvedilol.  A permanent pacemaker is recommended.  The device implantation procedure, risks, alternatives were discussed with the patient.  I offered to implant the pacemaker today, but he would like to wait another 24 to 48 hours to monitor his heart rate before proceeding with a pacemaker insertion.  The patient was transferred to heart center for closer monitoring because of the episodes of symptomatic bradycardia.  I will continue to follow this patient's progress.                James C Ramicone, DO   "

## 2023-10-20 NOTE — PROGRESS NOTES
Emergency Medicine Transition of Care Note.    I received Reynaldo Francisco in signout from [employer] [facility].  Please see the previous ED provider note for all HPI, PE and MDM up to the time of signout at 1700. This is in addition to the primary record.    In brief Reynaldo Francisco is an 67 y.o. male presenting for   Chief Complaint   Patient presents with    Dizziness    Weakness, Gen     Reports dizziness for 2-3 weeks, syncope, patient A&O on ems arrival.      At the time of signout we were awaiting: CTA of the chest    ED Course as of 10/19/23 2115   Thu Oct 19, 2023   1534 Troponin I, High Sensitivity(!!): 120 [SM]   2109 CT angio chest for pulmonary embolism [EC]      ED Course User Index  [EC] YESENIA Alfaro-CNP  [] Lucero Harrison PA-C         Diagnoses as of 10/19/23 2115   Syncope and collapse   Elevated troponin       Medical Decision Making  Patient care was signed out to me pending CTA chest.  CT PE study with chest does reveal an aortic dissection similar to previous exam, further evaluation with CT or MRI angiogram of the chest abdomen and pelvis per aortic dissection protocol was recommended.  CTA of the abdomen pelvis and chest was ordered.  During that time was brought to our attention patient had a 5-second pause as caught on the telemetry monitor.  Patient remained alert and oriented and not syncopized during that event.  CTA of the chest abdomen and pelvis reveals postsurgical changes of the aortic root and ascending thoracic aorta which focal outpouching along the distal aortic arch which could represent a small penetrating ulcer measuring up to 1.9 cm, previously 1.7 cm on 10/16/2022.  Moderate abdominal pelvic ascites with mesenteric stranding and mild anasarca, dominant false lumen is suboptimally filled with contrast predominantly at the level of distal thoracic aorta and proximal abdominal aorta which may be related to technique and contrast flow however new from prior imaging.  Given findings I  consulted Dr. Tadeo Manrique for admission.  Dr. Manrique has agreed admit patient to his service and will manage inpatient care.    Final diagnoses:   [R55] Syncope and collapse   [R79.89] Elevated troponin           Procedure  Procedures    Unruly Rowland, APRN-CNP

## 2023-10-20 NOTE — PROGRESS NOTES
Occupational Therapy    Evaluation    Patient Name: Reynaldo Francisco  MRN: [id number]  Today's Date: 10/20/2023  Time Calculation  Start Time: 1014  Stop Time: 1034  Time Calculation (min): 20 min        Assessment:  OT Assessment: OT Eval complete. Pt presents with deficits in activity tolerance, standing balance, endurance/strength which decreases independence in ADL task completion and transfers/mobility required for safe DC home. Recommend low intensity OT services to address previously listed deficits.  Prognosis: Good  Evaluation/Treatment Tolerance: Patient tolerated treatment well  End of Session Patient Position: Bed, 2 rail up, Alarm off, not on at start of session  OT Assessment Results: Decreased ADL status, Decreased safe judgment during ADL, Decreased endurance, Decreased sensation, Decreased functional mobility  Prognosis: Good  Evaluation/Treatment Tolerance: Patient tolerated treatment well  Strengths: Attitude of self, Ability to acquire knowledge, Living arrangement secure  Barriers to Participation: Comorbidities  Plan:  Treatment Interventions: ADL retraining, Functional transfer training, UE strengthening/ROM, Endurance training, Patient/family training, Fine motor coordination activities, Compensatory technique education  OT Frequency: 1 time per week  OT Discharge Recommendations: Low intensity level of continued care  Treatment Interventions: ADL retraining, Functional transfer training, UE strengthening/ROM, Endurance training, Patient/family training, Fine motor coordination activities, Compensatory technique education    Subjective   Current Problem:  1. Syncope and collapse        2. Elevated troponin          General:  General  Reason for Referral: Decline in ADL functioning and functional transfers/mobility  Referred By:  Ivory)  Past Medical History Relevant to Rehab:  (Anemia, ESRD, HTN, HLD, insulinoma, mets to liver s/p chemi, pancreatic sx, liver transplant x2, complete heart block,  aortic regurgitation, shingles, CMV, EBV, latent TB, aortic dissection repair, kidney transplant.)  Family/Caregiver Present: No  Co-Treatment: PT  Co-Treatment Reason:  (Safely assess pt deficits)  Prior to Session Communication: Bedside nurse  Patient Position Received: Bed, 2 rail up, Alarm off, not on at start of session (Tele monitor, RUE PICC line)  General Comment: Pt to ED from home with cardiogenic syncope, Type 1 AV block, R pleurel effusion, and small penetrating distal aortic ulcer.  Precautions:     Vital Signs:     Pain:  Pain Assessment  Pain Assessment: 0-10  Pain Score: 0 - No pain    Objective   Cognition:  Overall Cognitive Status: Within Functional Limits           Home Living:  Type of Home: House  Lives With: Spouse  Home Adaptive Equipment: None  Home Layout: One level  Home Access: Stairs to enter with rails (3 OTIS)  Bathroom Shower/Tub: Tub/shower unit  Bathroom Toilet: Standard  Bathroom Equipment: Shower chair with back  Prior Function:  Level of [place]: Independent with ADLs and functional transfers, Needs assistance with homemaking  Receives Help From: Family (spouse)  ADL Assistance: Independent  Homemaking Assistance: Needs assistance  Ambulatory Assistance: Independent (without device)  IADL History:     ADL:  ADL Comments:  (ADLs not formally assessed however through observation of pt's mobility and functional reach to BLE, it is anticipated that pt requires SBA for ADLs at this time with increased assist required when completing tasks standing d/t recent episodes of syncope)  Activity Tolerance:  Endurance:  (Fair)  Bed Mobility/Transfers: Bed Mobility  Bed Mobility: Yes (Independent for bed mobility)   and Transfers  Transfer: Yes (SBA without device; pt ambulated around room)   Modalities:       Sensation:  Sensation Comment:  (BLE neuropathy)  Strength:  Strength Comments:  (BUE strength/ROM WFL)           Outcome Measures:[facility] Daily Activity  Putting on and taking off  regular lower body clothing: A little  Bathing (including washing, rinsing, drying): A little  Putting on and taking off regular upper body clothing: None  Toileting, which includes using toilet, bedpan or urinal: A little  Taking care of personal grooming such as brushing teeth: None  Eating Meals: None  Daily Activity - Total Score: 21        Education Documentation  ADL Training, taught by Jesica Robbins OT at 10/20/2023  1:17 PM.  Learner: Patient  Readiness: Acceptance  Method: Explanation  Response: Verbalizes Understanding, Needs Reinforcement    Education Comments  No comments found.        OP EDUCATION:       Goals:  Encounter Problems       Encounter Problems (Active)       OT Goals       Pt will independently complete ADL routine with 1 or fewer seated rest breaks.  (Progressing)       Start:  10/20/23    Expected End:  11/04/23            Pt will tolerate standing for >10-minutes without s/s of SOB or dizziness. (Progressing)       Start:  10/20/23    Expected End:  11/04/23            Pt will relay 3 energy conservation strategies independently.  (Progressing)       Start:  10/20/23    Expected End:  11/04/23

## 2023-10-20 NOTE — H&P (VIEW-ONLY)
Inpatient consult to Cardiology  Consult performed by: James C Ramicone, DO  Consult ordered by: Emanuel Bain MD      History Of Present Illness:      This is a 67-year-old male with a history of bradycardia.  The patient is being seen today in consultation at the request of Dr. Bain.  The patient has been on carvedilol and states that he has been noticing pauses in his heart rate which have been associated with lightheadedness and dizziness.  He has had some brief episodes where he loses consciousness.  Several pauses have been documented on telemetry, but the patient has now been off carvedilol for approximately 24 hours.  One of the pauses this morning was associated with near syncope.    Past medical history:    Insulinoma  Pancreatic surgery and liver transplant  Type a aortic dissection with surgical repair 2020  ESRD, history of renal transplant March 2023  Renovascular hypertension  Aortic regurgitation    Review of Systems    Other review of systems negative    Social History:  He reports that he has never smoked. He has never used smokeless tobacco. He reports that he does not currently use drugs. He reports that he does not drink alcohol.    Family History:  Family History   Problem Relation Name Age of Onset    Diabetes Mother      Hypertension Mother          Allergies:  Shellfish derived and Iodinated contrast media    Medications:  Current Facility-Administered Medications   Medication Dose Route Frequency Provider Last Rate Last Admin    amLODIPine (Norvasc) tablet 5 mg  5 mg oral Daily Refugio Ozuna MD   5 mg at 10/19/23 2101    aspirin EC tablet 81 mg  81 mg oral Daily Refugio Ozuna MD   81 mg at 10/20/23 0959    azithromycin (Zithromax) in dextrose 5 % in water (D5W) 250 mL  mg  500 mg intravenous q24h Refugio Ozuna MD   Stopped at 10/20/23 0617    [Held by provider] carvedilol (Coreg) tablet 6.25 mg  6.25 mg oral BID with meals Refugio Ozuna MD        cefTRIAXone (Rocephin)  "IVPB 1 g  1 g intravenous q24h Refugio Ozuna MD   Stopped at 10/20/23 0546    [MAR Hold] enoxaparin (Lovenox) syringe 40 mg  40 mg subcutaneous q24h Refugio Ozuna MD   40 mg at 10/20/23 0023    magnesium oxide (Mag-Ox) tablet 400 mg  400 mg oral Daily Refugio Ozuna MD   400 mg at 10/20/23 0959    mycophenolate (Cellcept) capsule 250 mg  250 mg oral BID Refugio Ozuna MD   250 mg at 10/20/23 0900    pantoprazole (ProtoNix) EC tablet 40 mg  40 mg oral Daily Refugio Ozuna MD   40 mg at 10/20/23 0959    sulfamethoxazole-trimethoprim (Bactrim) 400-80 mg per tablet 1 tablet  1 tablet oral Every Mon/Wed/Fri Simone Polk MD   1 tablet at 10/20/23 0959    tacrolimus (Prograf) capsule 1 mg  1 mg oral BID Refugio Ozuna MD   1 mg at 10/20/23 1000         Last Recorded Vitals:  Vitals:    10/20/23 1154 10/20/23 1438 10/20/23 1500 10/20/23 1600   BP: (!) 185/83 (S) (!) 184/81 158/77 (S) 179/80   BP Location:  Right arm     Patient Position:  Sitting     Pulse: 57 61 56 56   Resp:  (!) 27 21 21   Temp: 36.7 °C (98.1 °F) 36.5 °C (97.7 °F)     TempSrc:  Temporal     SpO2: 99% 99% 98% 97%   Weight:       Height:           Physical Exam:    General Appearance:  Alert, oriented, no distress  Skin:  Warm and dry  Head and Neck:  No elevation of JVP, no carotid bruits  Cardiac Exam:  Rhythm is regular, S1 and S2 are normal, no murmur S3 or S4  Lungs:  Clear to auscultation  Extremities:  no edema  Neurologic:  No focal deficits  Psychiatric:  Appropriate mood and behavior        Last Labs:  CBC -  Lab Results   Component Value Date    WBC 2.7 (L) 10/20/2023    HGB 10.4 (L) 10/20/2023    HCT 30.6 (L) 10/20/2023     (H) 10/20/2023     10/20/2023       CMP -  Lab Results   Component Value Date    CALCIUM 9.1 10/20/2023    PROT 6.9 10/19/2023    ALBUMIN 4.5 10/19/2023    AST 14 10/19/2023    ALT 11 10/19/2023    ALKPHOS 72 10/19/2023    BILITOT 0.8 10/19/2023       LIPID PANEL -   No results found for: \"CHOL\", " "\"TRIG\", \"HDL\", \"CHHDL\", \"LDLF\", \"VLDL\", \"NHDL\"    RENAL FUNCTION PANEL -   Lab Results   Component Value Date    GLUCOSE 216 (H) 10/20/2023     (L) 10/20/2023    K 4.6 10/20/2023     10/20/2023    CO2 17 (L) 10/20/2023    ANIONGAP 15 10/20/2023    BUN 18 10/20/2023    CREATININE 1.31 (H) 10/20/2023    CALCIUM 9.1 10/20/2023    ALBUMIN 4.5 10/19/2023        No results found for: \"BNP\", \"HGBA1C\"    Test Review:  I have personally review the diagnostic testing:  Telemetry: Sinus rhythm with sinus bradycardia and sinus pauses  Echocardiogram: Normal left ventricular function    Assessment/Plan:    1.  Sick sinus syndrome: This patient has been experiencing episodes of symptomatic bradycardia which have been occurring in the absence of carvedilol.  A permanent pacemaker is recommended.  The device implantation procedure, risks, alternatives were discussed with the patient.  I offered to implant the pacemaker today, but he would like to wait another 24 to 48 hours to monitor his heart rate before proceeding with a pacemaker insertion.  The patient was transferred to heart center for closer monitoring because of the episodes of symptomatic bradycardia.  I will continue to follow this patient's progress.                James C Ramicone, DO   "

## 2023-10-20 NOTE — PROGRESS NOTES
Physical Therapy    Physical Therapy Evaluation    Patient Name: Reynaldo Francisco  MRN: [id number]  Today's Date: 10/20/2023   Time Calculation  Start Time: 1015  Stop Time: 1034  Time Calculation (min): 19 min    Assessment/Plan   PT Assessment  PT Assessment Results: Impaired balance, Decreased mobility  End of Session Patient Position: Alarm off, not on at start of session (Sitting up on EOB with all needs in reach and no complaints noted, educated pt in not getting up without assist, pt voiced understanding.)  IP OR SWING BED PT PLAN  Inpatient or Swing Bed: Inpatient  PT Plan  Treatment/Interventions: Transfer training, Gait training, Strengthening  PT Plan: Skilled PT  PT Frequency: 2 times per week  PT Discharge Recommendations: Low intensity level of continued care  PT - OK to Discharge: Yes    Subjective     Current Problem:  1. Syncope and collapse        2. Elevated troponin          Past Medical History:   Diagnosis Date    Arteriovenous fistula, acquired (CMS/HCC) 06/17/2022    AV fistula    Nutritional anemia, unspecified     Anemia, deficiency    Personal history of malignant neoplasm of pancreas 06/17/2022    History of malignant neoplasm of pancreas     Past Surgical History:   Procedure Laterality Date    LIVER TRANSPLANTATION      OTHER SURGICAL HISTORY  03/17/2022    Pancreatic surgery    OTHER SURGICAL HISTORY  06/17/2022    Liver surgery    OTHER SURGICAL HISTORY  06/17/2022    Liver transplantation    US KIDNEY TRANSPLANT         General Visit Information:  General  Reason for Referral: PT Eval and Treat  Referred By: Sulma  Past Medical History Relevant to Rehab: 67 y.o. male with extensive past medical history including insulinoma (mets to liver s/p chemo, pancreatic surgery and liver transplant 2018), type a aortic dissection (s/p repair 2020, history of complete heart block (secondary to Coreg), history of end-stage renal disease (s/p kidney transplant 03/2023), renovascular hypertension,  hyperlipidemia, aortic regurgitation, history of shingles (on valacyclovir) and anemia presenting with syncopal episode in the setting of bradycardia.  Prior to Session Communication: Bedside nurse  Patient Position Received:  (Supine in bed with daughter on the phone and agreeable to PT)    Home Living:  Home Living  Home Living Comments: Pt lives with his wife in a 1 story house with 3 OTIS with HR, there is a basement with laundry however pt does not go down there. Pt has a bathroom with bathtub/shower with seat and standard toilet (pts daughter assisted with the hx)    Prior Level of Function:  Prior Function Per Pt/Caregiver Report  Level of [place]: Independent with ADLs and functional transfers, Needs assistance with homemaking (Pt amb independently without an AD and (+) driving)    Precautions:  Precautions  Precautions Comment: Fall Precautions    Objective     Pain:  Pain Assessment  Pain Assessment:  (0/10)    Cognition:  Cognition  Overall Cognitive Status: Within Functional Limits    General Assessments:  Sensation  Light Touch: No apparent deficits  Strength  Strength Comments: B LE ROM and strength WFL  Dynamic Standing Balance  Dynamic Standing-Comments: Fair+ standing balance    Functional Assessments:  Bed Mobility  Bed Mobility:  (supine <> sitting: independent)  Transfers  Transfer:  (STS: SBA)  Ambulation/Gait Training  Ambulation/Gait Training Performed:  (Pt was able to amb 30' x 2 without an AD with CGA- SBA with no LOB noted)    Outcome Measures:  [facility] Basic Mobility  Turning from your back to your side while in a flat bed without using bedrails: None  Moving from lying on your back to sitting on the side of a flat bed without using bedrails: None  Moving to and from bed to chair (including a wheelchair): A little  Standing up from a chair using your arms (e.g. wheelchair or bedside chair): A little  To walk in hospital room: A little  Climbing 3-5 steps with railing: A little  Basic  Mobility - Total Score: 20     Goals:  Encounter Problems       Encounter Problems (Active)       PT Problem       STG - Pt will transfer STS with IND  (Progressing)       Start:  10/20/23    Expected End:  11/03/23            STG - Pt will amb 50' using no AD with SUP  (Progressing)       Start:  10/20/23    Expected End:  11/03/23            STG - Pt will perform a B LE ther ex program of 2-3 sets of 10  (Progressing)       Start:  10/20/23    Expected End:  11/03/23                 Education Documentation  Precautions, taught by Dotty Junior PT at 10/20/2023  2:13 PM.  Learner: Patient  Readiness: Acceptance  Method: Explanation  Response: Verbalizes Understanding    Mobility Training, taught by Dotty Junior PT at 10/20/2023  2:13 PM.  Learner: Patient  Readiness: Acceptance  Method: Explanation  Response: Verbalizes Understanding    Education Comments  No comments found.

## 2023-10-21 NOTE — PROGRESS NOTES
"Reynaldo Francisco is a 67 y.o. male on day 2 of admission presenting with Syncope and collapse.    HPI:  This is a 67-year-old male with symptomatic bradycardia.  He was transferred to the heart center last night because of sinus pauses and intravenous dopamine was initiated.  Currently telemetry shows sinus bradycardia with periods of junctional bradycardia and the heart rate has been primarily in the 40s and 50s.  He is asymptomatic at this time.    Physical Exam:    General Appearance:  Alert, oriented, no distress  Skin:  Warm and dry  Head and Neck:  No elevation of JVP, no carotid bruits  Cardiac Exam:  Rhythm is regular and bradycardic, S1 and S2 are normal, no murmur S3 or S4  Lungs:  Clear to auscultation  Extremities:  no edema  Neurologic:  No focal deficits  Psychiatric:  Appropriate mood and behavior     Last Recorded Vitals  Blood pressure 121/60, pulse (!) 38, temperature 36.6 °C (97.9 °F), temperature source Temporal, resp. rate 12, height 1.727 m (5' 8\"), weight 59 kg (130 lb 1.1 oz), SpO2 98 %.  Intake/Output last 3 Shifts:  I/O last 3 completed shifts:  In: - (0 mL/kg)   Out: 1575 (26.7 mL/kg) [Urine:1575 (0.7 mL/kg/hr)]  Weight: 59 kg     Medications:  Scheduled medications  amLODIPine, 5 mg, oral, Daily  aspirin, 81 mg, oral, Daily  [Held by provider] carvedilol, 6.25 mg, oral, BID with meals  [MAR Hold] enoxaparin, 40 mg, subcutaneous, q24h  magnesium oxide, 400 mg, oral, Daily  mycophenolate, 250 mg, oral, BID  pantoprazole, 40 mg, oral, Daily  sulfamethoxazole-trimethoprim, 1 tablet, oral, Every Mon/Wed/Fri  tacrolimus, 1 mg, oral, BID        Labs:  CBC -  Lab Results   Component Value Date    WBC 6.6 10/21/2023    WBC 6.6 10/21/2023    HGB 10.7 (L) 10/21/2023    HGB 10.7 (L) 10/21/2023    HCT 31.9 (L) 10/21/2023    HCT 31.9 (L) 10/21/2023     (H) 10/21/2023     (H) 10/21/2023     10/21/2023     10/21/2023       CMP -  Lab Results   Component Value Date    CALCIUM 9.1 " "10/21/2023    PROT 6.9 10/19/2023    ALBUMIN 4.5 10/19/2023    AST 14 10/19/2023    ALT 11 10/19/2023    ALKPHOS 72 10/19/2023    BILITOT 0.8 10/19/2023       LIPID PANEL -   No results found for: \"CHOL\", \"TRIG\", \"HDL\", \"CHHDL\", \"LDLF\", \"VLDL\", \"NHDL\"    RENAL FUNCTION PANEL -   Lab Results   Component Value Date    GLUCOSE 125 (H) 10/21/2023     (L) 10/21/2023    K 4.0 10/21/2023     10/21/2023    CO2 18 (L) 10/21/2023    ANIONGAP 14 10/21/2023    BUN 22 10/21/2023    CREATININE 1.42 (H) 10/21/2023    CALCIUM 9.1 10/21/2023    ALBUMIN 4.5 10/19/2023        Test Results:    Echocardiogram: Normal left ventricular function     Assessment/Plan:    1.  Sick sinus syndrome: This patient will require placement of a dual-chamber permanent pacemaker for treatment of symptomatic sinus bradycardia.  He has stabilized on low-dose intravenous dopamine and is currently asymptomatic.  The device implantation procedure, risks, and alternatives were discussed with the patient.  He is in agreement with the recommendation.  The pacemaker will be scheduled for Monday.    James C Ramicone, DO     "

## 2023-10-21 NOTE — PROGRESS NOTES
"Subjective:  Pt endorses feeling intermittently unwell (combination of nausea/dizziness/fatigue) overnight. Denies chest pain, SOB, dyspnea, palpitations.     Objective:  /60   Pulse (!) 38   Temp 36.6 °C (97.9 °F) (Temporal)   Resp 12   Ht 1.727 m (5' 8\")   Wt 59 kg (130 lb 1.1 oz)   SpO2 98%   BMI 19.78 kg/m²     Physical Exam:   GENERAL: Awake/ alert, cooperative.   HEAD:  Normocephalic, atraumatic.  EYES:  Round and reactive.  ENT:  No nasal discharge, mucous membranes moist and pink.  NECK:  Atraumatic, no meningismus.  CARDIOVASCULAR:  Regular rate and rhythm, no murmur.  RESPIRATORY:  Diminished breath sounds, no active work of breathing.   ABDOMEN:  Soft, no tenderness, nondistended.  EXTREMITIES:  No peripheral edema, no calf tenderness.  SKIN:  Warm and dry.  NEUROLOGICAL:  Nonfocal grossly.    Assessment/Plan:  Reynaldo Francisco is a 67 y.o. 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     10/20: Transferred to CVICU because of several pauses seen on telemetry tracing.   10/21: Vitals and labs stable. Pacemaker placement tomorrow or Monday per cardiology.    #Syncopal episode  #Suspect cardiogenic syncope secondary to bradycardia, Coreg use  #Type I AV block  #Symptomatic sick sinus syndrome  Patient's history of complete heart block on Coreg with similar symptoms in the past and current symptoms related to his Coreg dosing raise suspicion for symptomatic bradycardia secondary to his Coreg use.   -Holding home Coreg  -Low-dose IV dopamine  -Monitor on telemetry: 20+ episodes " of sinus pauses overnight  -We will consult cardiology, appreciate recommendations  Dual chamber permanent pacemaker to be placed on Monday per Dr. Ramicone  -Echo 10/2023 EF 50 to 55% with moderate left and right atrial dilation, mild tricuspid regurgitation, mild aortic valve regurgitation and reduced global longitudinal strain -15.9%.     #Right lower lobe consolidation 2/2 atelectasis   #Right sided pleural effusion moderate to large  Patient does not endorse any signs or symptoms of infection could be concerning for pneumonia, has no oxygen requirement or fever.  Patient demonstrated some signs of anasarca and left upper quadrant fluid collection, may be collecting fluid? His reported consolidation may be related to atelectasis secondary to his pleural effusion. Overall low suspicion for pneumonia but considering patient's extensive history of opportunistic infections (CMV, EBV, TB) as well as his current use of immunosuppressants after his transplant we will add coverage for community-acquired pneumonia and add procalcitonin to aid in the de-escalation of antibiotics..  -We will consult pulmonology, evaluation of lung consolidation and large pleural effusion in the setting of immunosuppression.  -Procal negative, abx discontinued      Incidental Findings  -Outpouching distal aortic arch possible small penetrating ulcer at 1.9 cm previously 1.7 in 10/22.  -Thoracic aortic arch dissection stable from 10/2022  -Complex pancreatic cystic lesion 2.1 cm.  Also unchanged     DVT: Patient on enoxaparin for DVT prophylaxis, creatinine clearance 45+  Diet: Regular diet, NPO at midnight  Fluids: None  Dispo: TBD  Consults: Pulmonology, Cardiology  Antibiotics: None    Orlin West DO   Internal Medicine PGY-1

## 2023-10-22 NOTE — PROGRESS NOTES
"Subjective:  Pt denies chest pain, SOB, dyspnea, palpitations, dizziness, faintness. Eagerly awaiting permanent pacemaker placement. Per cardiology, procedure will be tomorrow afternoon with Dr. Pina.     Objective:  /76   Pulse 59   Temp 36.6 °C (97.9 °F) (Tympanic)   Resp 20   Ht 1.727 m (5' 8\")   Wt 62.2 kg (137 lb 2 oz)   SpO2 99%   BMI 20.85 kg/m²     Physical Exam:   GENERAL: Awake/ alert, cooperative.   HEAD:  Normocephalic, atraumatic.  EYES:  Round and reactive.  ENT:  No nasal discharge, mucous membranes moist and pink.  NECK:  Atraumatic, no meningismus.  CARDIOVASCULAR:  Regular rate and rhythm, no murmur.  RESPIRATORY:  Diminished breath sounds, no active work of breathing.   ABDOMEN:  Soft, no tenderness, nondistended.  EXTREMITIES:  No peripheral edema, no calf tenderness.  SKIN:  Warm and dry.  NEUROLOGICAL:  Nonfocal grossly.    Assessment/Plan:  Reynaldo Francisco is a 67 y.o. 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     10/20: Transferred to CVICU because of several pauses seen on telemetry tracing.   10/21: Vitals and labs stable. Pacemaker placement tomorrow or Monday per cardiology.  10/22: Vitals and labs stable. On 2L NC. Low magnesium; repleted. Permanent pacemaker placement tomorrow afternoon with Dr. Pina.     #Syncopal episode  #Suspect cardiogenic syncope secondary to bradycardia, Coreg use  #Type I AV block  #Symptomatic sick sinus syndrome  Patient's history of complete heart block on Coreg with similar symptoms in the past and current symptoms related " to his Coreg dosing raise suspicion for symptomatic bradycardia secondary to his Coreg use.   -Holding home Coreg  -Low-dose IV dopamine  -Monitor on telemetry: 20+ episodes of sinus pauses overnight  -We will consult cardiology, appreciate recommendations  Dual chamber permanent pacemaker to be placed on Monday per Dr. Ramicone  -Echo 10/2023 EF 50 to 55% with moderate left and right atrial dilation, mild tricuspid regurgitation, mild aortic valve regurgitation and reduced global longitudinal strain -15.9%.     #Right lower lobe consolidation 2/2 atelectasis   #Right sided pleural effusion moderate to large  Patient does not endorse any signs or symptoms of infection could be concerning for pneumonia, has no oxygen requirement or fever.  Patient demonstrated some signs of anasarca and left upper quadrant fluid collection, may be collecting fluid? His reported consolidation may be related to atelectasis secondary to his pleural effusion. Overall low suspicion for pneumonia but considering patient's extensive history of opportunistic infections (CMV, EBV, TB) as well as his current use of immunosuppressants after his transplant we will add coverage for community-acquired pneumonia and add procalcitonin to aid in the de-escalation of antibiotics..  -We will consult pulmonology, evaluation of lung consolidation and large pleural effusion in the setting of immunosuppression.  -Procal negative, abx discontinued      Incidental Findings  -Outpouching distal aortic arch possible small penetrating ulcer at 1.9 cm previously 1.7 in 10/22.  -Thoracic aortic arch dissection stable from 10/2022  -Complex pancreatic cystic lesion 2.1 cm.  Also unchanged     DVT: Patient on enoxaparin for DVT prophylaxis, creatinine clearance 45+  Diet: Regular diet, NPO at midnight  Fluids: None  Dispo: TBD  Consults: Pulmonology, Cardiology  Antibiotics: None    Orlin West DO   Internal Medicine PGY-1

## 2023-10-22 NOTE — PROGRESS NOTES
"Reynaldo Francisco is a 67 y.o. male on day 3 of admission presenting with Syncope and collapse.    HPI:  This is a 67-year-old male with symptomatic bradycardia.  He was transferred to the heart center last night because of sinus pauses and intravenous dopamine was initiated.  Currently telemetry shows sinus bradycardia with periods of junctional bradycardia and the heart rate has been primarily in the 40s and 50s.  He is asymptomatic at this time.    Physical Exam:    General Appearance:  Alert, oriented, no distress  Skin:  Warm and dry  Head and Neck:  No elevation of JVP, no carotid bruits  Cardiac Exam:  Rhythm is regular and bradycardic, S1 and S2 are normal, no murmur S3 or S4  Lungs:  Clear to auscultation  Extremities:  no edema  Neurologic:  No focal deficits  Psychiatric:  Appropriate mood and behavior     Last Recorded Vitals  Blood pressure 162/76, pulse 59, temperature 36.6 °C (97.9 °F), temperature source Tympanic, resp. rate 20, height 1.727 m (5' 8\"), weight 62.2 kg (137 lb 2 oz), SpO2 99 %.  Intake/Output last 3 Shifts:  I/O last 3 completed shifts:  In: 859.5 (13.8 mL/kg) [P.O.:720; I.V.:139.5 (2.2 mL/kg)]  Out: 2750 (44.2 mL/kg) [Urine:2750 (1.2 mL/kg/hr)]  Weight: 62.2 kg     Medications:  Scheduled medications  amLODIPine, 5 mg, oral, Daily  aspirin, 81 mg, oral, Daily  [Held by provider] carvedilol, 6.25 mg, oral, BID with meals  [MAR Hold] enoxaparin, 40 mg, subcutaneous, q24h  mycophenolate, 250 mg, oral, BID  pantoprazole, 40 mg, oral, Daily  sulfamethoxazole-trimethoprim, 1 tablet, oral, Every Mon/Wed/Fri  tacrolimus, 1 mg, oral, BID        Labs:  CBC -  Lab Results   Component Value Date    WBC 9.0 10/22/2023    HGB 10.6 (L) 10/22/2023    HCT 31.4 (L) 10/22/2023     (H) 10/22/2023     (L) 10/22/2023       CMP -  Lab Results   Component Value Date    CALCIUM 8.9 10/22/2023    PROT 6.9 10/19/2023    ALBUMIN 4.5 10/19/2023    AST 14 10/19/2023    ALT 11 10/19/2023    ALKPHOS 72 " "10/19/2023    BILITOT 0.8 10/19/2023       LIPID PANEL -   No results found for: \"CHOL\", \"TRIG\", \"HDL\", \"CHHDL\", \"LDLF\", \"VLDL\", \"NHDL\"    RENAL FUNCTION PANEL -   Lab Results   Component Value Date    GLUCOSE 95 10/22/2023     10/22/2023    K 4.4 10/22/2023     10/22/2023    CO2 22 10/22/2023    ANIONGAP 12 10/22/2023    BUN 22 10/22/2023    CREATININE 1.41 (H) 10/22/2023    CALCIUM 8.9 10/22/2023    ALBUMIN 4.5 10/19/2023        Test Results:    Echocardiogram: Normal left ventricular function     Assessment/Plan:    1.  Sick sinus syndrome: This patient will require placement of a dual-chamber permanent pacemaker for treatment of symptomatic sinus bradycardia.  He has stabilized on low-dose intravenous dopamine.  The pacemaker will be scheduled for Monday afternoon with Dr. Pina.    James C Ramicone, DO     "

## 2023-10-22 NOTE — CARE PLAN
The patient's goals for the shift include      The clinical goals for the shift include patient will remain HDS throughout the shift

## 2023-10-23 PROBLEM — I49.5 SICK SINUS SYNDROME (MULTI): Status: ACTIVE | Noted: 2023-01-01

## 2023-10-23 NOTE — INTERVAL H&P NOTE
H&P reviewed. The patient was examined and there are no changes to the H&P.    Pt has an old AV fistula along left arm from prior HD (pre-renal transplant) and h/o port on right sided. Plan will be to ultrasound sight side and if vessel open then plan for right sided dual chamber pacermaker, if vessel small on not open then will plan for left sided device. This was discuss with pt and he is agreeable.   Pt also has ?Contrast allergy and was pre-medicated with steroids, will plan for IV benadryl intraprocedure is contrast is used.

## 2023-10-23 NOTE — NURSING NOTE
07:45 - assumed care of patient.   08:30 - patient re-educated on remaining NPO for procedure at 14:00.  09:46 - cath lab Susi RN notified of patient having left arm fistula with inability to insert IV into that arm. Per Susi RN - patient to be picked up for cath lab at 13:00 - give benadryl, pepcid, and prednisone at 12:00.  14:00 - cath lab RN's at bedside to take patient back to cath lab for ppm placement. Patient off floor at this time for ppm placement  17:00 - patient returned from cath lab. VS in chart.   19:15 - report given to oncoming RN; signing off patient at this time.

## 2023-10-23 NOTE — PROGRESS NOTES
"Reynaldo Francisco is a 67 y.o. male on day 4 of admission presenting with Syncope and collapse.    SUBJECTIVE    Overnight patient was found to have sinus pause and was started on IV dopamine.  Patient was transferred to heart center.  Today, patient is not endorsing any new or active symptoms.  Patient denies chest pain, shortness of breath, and states he feels good.  Patient ready for pacemaker procedure later today.    OBJECTIVE    Physical Exam:  General:  Pleasant and cooperative. No apparent distress.  HEENT:  Normocephalic, atraumatic  Chest:  Clear to auscultation bilaterally. No wheezes, rales, or rhonchi.  CV:  Regular rate and rhythm. Systolic murmur   Abdomen: Abdomen is soft, non-tender, non-distended. BS +   Extremities:  No lower extremity edema or cyanosis.   Neurological:  AAOx3. No focal deficits.  Skin:  Warm and dry.    Last Recorded Vitals  Blood pressure 156/72, pulse (!) 46, temperature 36.5 °C (97.7 °F), temperature source Temporal, resp. rate 16, height 1.727 m (5' 8\"), weight 62.2 kg (137 lb 2 oz), SpO2 97 %.  Intake/Output last 3 Shifts:  I/O last 3 completed shifts:  In: 1480.1 (23.8 mL/kg) [P.O.:1200; I.V.:280.1 (4.5 mL/kg)]  Out: 2450 (39.4 mL/kg) [Urine:2450 (1.1 mL/kg/hr)]  Weight: 62.2 kg     Last CBC & BMP  Lab Results   Component Value Date    GLUCOSE 177 (H) 10/23/2023    CALCIUM 9.1 10/23/2023     (L) 10/23/2023    K 5.2 10/23/2023    CO2 22 10/23/2023     10/23/2023    BUN 21 10/23/2023    CREATININE 1.29 10/23/2023     Lab Results   Component Value Date    WBC 9.0 10/22/2023    HGB 10.6 (L) 10/22/2023    HCT 31.4 (L) 10/22/2023     (H) 10/22/2023     (L) 10/22/2023       ASSESSMENT & PLAN  Reynaldo Francisco is a 67 y.o. 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      Suspected cardiogenic syncopal episode secondary to bradycardia with Coreg use  Type I AV block  Symptomatic sinus bradycardia  -Echo 50-55% with moderate left and right atrial dilation, mild tricuspid regurgitation, mild aortic valve regurgitation   Plan:  -Patient will require replacement of dual-chamber permanent pacemaker pericardial, will undergo treatment today  -Patient currently on low-dose dopamine  -Continue to hold Coreg  -Continue telemetry      Right lower lobe consolidation 2/2 atelectasis   Right sided pleural effusion moderate to large  Plan:  -There is low suspicion for infectious process given patient's lack of symptoms, leukocytosis, shortness of breath, or need for supplemental O2  -Continue to follow    Chronic medical conditions:  Insulinoma  History of complete heart block  Ascending aortic dissection  End-stage renal disease, not on dialysis  Hypertension  Aortic regurg/tricuspid regurg  Shingles  Anemia  Plan:  -Continue amlodipine, aspirin,   -Continue Tacrolimus, Mycophenolate, & Bactrim  -Held Carvedilol, Clonidine, Hydralazine, Coreg     DVT ppx: Lovenox Held   GI ppx: PPIs  Drip: Dopamine  Diet: NPO  Consults: Cardiology        Michael Sanders MD   PGY-1, Internal Medicine  Please SecureChat for any further questions  This is a preliminary note, please await attending attestation for final A/P

## 2023-10-23 NOTE — CARE PLAN
The patient's goals for the shift include  remaining free from falls/injuries and have increased heart rate post ppm placement.     The clinical goals for the shift include ppm placement

## 2023-10-24 NOTE — PROGRESS NOTES
10/24/2023   Followed up with pt in room, introduced self and explained role.  Discussed how he did in therapy and recommendation for HHC.  Pt stated he is going to stay with his daughter and does not feel he needs HHC. Stated home is all flat. Stated he has no problem walking. Stated he is able to perform ADL's.  Support provided.   Lucero Crespo RN [facility]

## 2023-10-24 NOTE — PROGRESS NOTES
10/24/2023   Last [facility]-= 20, low recommended on 10/20. Asking therapy for updated therapy today. Ct Team will follow for needs.   Lucero Crespo RN [facility]

## 2023-10-24 NOTE — DISCHARGE INSTRUCTIONS
Pacemaker incision check 10/30/23 at 1 PM.  [facility]  [address] [address]  [phone]    Medication changes:  -Continue with Coreg   -Continue with other home medications, unchanged  -Take Keflex twice a day for 7 days

## 2023-10-24 NOTE — CARE PLAN
The patient's goals for the shift include      The clinical goals for the shift include Patient will get rest by end of shift.

## 2023-10-24 NOTE — PROGRESS NOTES
"Reynaldo Francisco is a 67 y.o. male on day 5 of admission presenting with Syncope and collapse.    HPI:    The patient is feeling well today.  No complaints of chest pain or shortness of breath.  He was ambulating in the [place] with physical therapy.    Physical Exam:    General Appearance:  Alert, oriented, no distress  Skin:  Warm and dry  Head and Neck:  No elevation of JVP, no carotid bruits  Cardiac Exam:  Rhythm is regular, S1 and S2 are normal, no murmur S3 or S4  Lungs:  Clear to auscultation  Extremities:  no edema  Neurologic:  No focal deficits  Psychiatric:  Appropriate mood and behavior     Last Recorded Vitals  Blood pressure 144/85, pulse 65, temperature 36.8 °C (98.2 °F), resp. rate 20, height 1.727 m (5' 8\"), weight 62.2 kg (137 lb 2 oz), SpO2 97 %.  Intake/Output last 3 Shifts:  I/O last 3 completed shifts:  In: 125.1 (2 mL/kg) [I.V.:125.1 (2 mL/kg)]  Out: 565 (9.1 mL/kg) [Urine:550 (0.2 mL/kg/hr); Blood:15]  Weight: 62.2 kg     Medications:  Scheduled medications  amLODIPine, 5 mg, oral, Daily  aspirin, 81 mg, oral, Daily  carvedilol, 6.25 mg, oral, BID with meals  heparin (porcine), 5,000 Units, subcutaneous, q8h  mycophenolate, 250 mg, oral, BID  pantoprazole, 40 mg, oral, Daily  sulfamethoxazole-trimethoprim, 1 tablet, oral, Every Mon/Wed/Fri  tacrolimus, 1 mg, oral, BID        Labs:  CBC -  Lab Results   Component Value Date    WBC 10.2 10/24/2023    HGB 10.8 (L) 10/24/2023    HCT 31.7 (L) 10/24/2023     (H) 10/24/2023     10/24/2023       CMP -  Lab Results   Component Value Date    CALCIUM 8.9 10/24/2023       LIPID PANEL -   No results found for: \"CHOL\", \"TRIG\", \"HDL\", \"CHHDL\", \"LDLF\", \"VLDL\", \"NHDL\"    RENAL FUNCTION PANEL -   Lab Results   Component Value Date    GLUCOSE 165 (H) 10/24/2023     (L) 10/24/2023    K 4.9 10/24/2023     10/24/2023    CO2 21 10/24/2023    ANIONGAP 13 10/24/2023    BUN 40 (H) 10/24/2023    CREATININE 2.05 (H) 10/24/2023    CALCIUM 8.9 10/24/2023 "        Test Results:    Telemetry shows sinus rhythm    Assessment/Plan:    1.  Sick sinus syndrome: This patient underwent insertion of a pacemaker yesterday.  He received a VVI pacemaker because there was no capture in the atrium with the atrial pacing lead and it was therefore removed.  Please refer to the operative report for details.  The patient is clear for discharge today from a cardiac perspective and arrangements will be made for a pacemaker incision check next week.  Incision care instructions were discussed with the patient.    James C Ramicone, DO

## 2023-10-24 NOTE — CARE PLAN
The patient's goals for the shift include      The clinical goals for the shift include Patient will maintain heart rate >60bpm through 10/25/23

## 2023-10-24 NOTE — DISCHARGE SUMMARY
Discharge Diagnosis  Syncope and collapse    Issues Requiring Follow-Up  Follow up PCP  Follow up blood work  Follow up cardiology on 10/31    Discharge Meds     Your medication list        START taking these medications        Instructions Last Dose Given Next Dose Due   cephalexin 500 mg capsule  Commonly known as: Keflex      Take 1 capsule (500 mg) by mouth 2 times a day for 7 days.              CHANGE how you take these medications        Instructions Last Dose Given Next Dose Due   amLODIPine 5 mg tablet  Commonly known as: Norvasc  What changed: Another medication with the same name was removed. Continue taking this medication, and follow the directions you see here.                  CONTINUE taking these medications        Instructions Last Dose Given Next Dose Due   aspirin 81 mg EC tablet           carvedilol 6.25 mg tablet  Commonly known as: Coreg           magnesium oxide 400 mg (241.3 mg magnesium) tablet  Commonly known as: Mag-Ox           mycophenolate 250 mg capsule  Commonly known as: Cellcept           Protonix 40 mg EC tablet  Generic drug: pantoprazole           sulfamethoxazole-trimethoprim 400-80 mg tablet  Commonly known as: Bactrim           tacrolimus 1 mg capsule  Commonly known as: Prograf                  STOP taking these medications      bumetanide 2 mg tablet  Commonly known as: Bumex        cloNIDine 0.1 mg tablet  Commonly known as: Catapres        hydrALAZINE 100 mg tablet  Commonly known as: Apresoline        hydrALAZINE 25 mg tablet  Commonly known as: Apresoline                  Where to Get Your Medications        These medications were sent to [facility]/pharmacy #[id number] [address] AT CORNER OF [place] [id number]      Phone: [phone]   cephalexin 500 mg capsule         Test Results Pending At Discharge  Pending Labs       No current pending labs.            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  Patient was also treated for CAP due to right lower lobe consolidation.  Patient was found to have sick sinus syndrome and was followed by cardiology who held his beta-blockers and inserted a pacemaker on 10/23.  Following the insertion of the pacemaker, patient's heart rate improved.  Patient's pneumonia work-up was found to be unremarkable and patient was not treated with any antibiotics.  Patient continue to improved and was deemed medically stable for discharge on 10/24 with        Pertinent Physical Exam At Time of Discharge  Physical Exam  General:  Pleasant and cooperative. No apparent distress.  HEENT:  Normocephalic, atraumatic  Chest:  Clear to auscultation bilaterally. No wheezes, rales, or rhonchi.  CV:  Regular rate and rhythm. Systolic murmur   Abdomen: Abdomen is soft, non-tender, non-distended. BS +   Extremities:  No lower extremity edema or cyanosis.   Neurological:  AAOx3. No focal deficits.  Skin:  Warm and dry.    Outpatient Follow-Up  Future Appointments   Date Time Provider Department Center   10/30/2023  1:00 PM CARDIOLOGY DO [id number] NURSE [id number] [place]   10/31/2023  8:30 AM PAR OPCTR PET CT PAROPCPETMOB [facility]   11/2/2023  1:00 PM Melanie Rivera APRN-CNP [id number] [place]   11/6/2023  9:00 AM Benita Garcia APRN-CNP [id number] [facility]   12/5/2023 11:45 AM John Manrique MD [id number] [place]   12/7/2023  2:30 PM Vladimir Barber MD [id number] [place]   2/5/2024 11:00 AM [id number] [place]   2/12/2024 11:00 AM Declan Mina MD [id number] [place]         Michael Sanders MD    Trainee role:  Resident    I saw and evaluated the patient. I personally obtained the key and critical portions of the history and physical exam or was physically present for key and critical portions performed by the trainee. I reviewed the trainee's documentation and discussed the patient with the trainee. I agree with the trainee's medical decision making as documented on the trainee's notes.    Bradycardia SSS s/p PPM placement. Discharged home. Advised close PCP and cardiology fu. I spent >30 minutes in direct patient care, counseling, and/or coordination of care.      John Manrique M.D.

## 2023-10-24 NOTE — PROGRESS NOTES
Physical Therapy    Physical Therapy Treatment    Patient Name: Reynaldo Francisco  MRN: [id number]  Today's Date: 10/24/2023  Time Calculation  Start Time: 1400  Stop Time: 1412  Time Calculation (min): 12 min       Assessment/Plan   PT Assessment  Evaluation/Treatment Tolerance: Patient tolerated treatment well  End of Session Patient Position:  (pt seated EOB;  MD present in room.)     PT Plan  Treatment/Interventions: Transfer training, Gait training, Strengthening  PT Plan: Skilled PT  PT Frequency: 2 times per week  PT Discharge Recommendations: Low intensity level of continued care  PT - OK to Discharge: Yes      General Visit Information:   PT  Visit  PT Received On: 10/24/23  General  Prior to Session Communication: Bedside nurse  Patient Position Received: Bed, 2 rail up, Alarm off, not on at start of session  General Comment:  (pt pleasant and agreeable to participate in therapy session)    Subjective   Precautions:  Precautions  Precautions Comment: Fall Precautions     Objective   Pain:  Pain Assessment  Pain Assessment: 0-10  Pain Score: 0 - No pain     Treatments:  Bed Mobility  Bed Mobility:  (sup -> sit independent)    Ambulation/Gait Training  Ambulation/Gait Training Performed:  (pt ambulates >/= 50 ft x 2 with no AD and supervision.  slower praveen with some increased lateral sway noted however overall steady with no instances LOB.)    Transfers  Transfer:  (sit <-> stand independent)    Stairs  Stairs:  (pt navigates 3 steps with BHR and SBA to supervision.)    Outcome Measures:  [facility] Basic Mobility  Turning from your back to your side while in a flat bed without using bedrails: None  Moving from lying on your back to sitting on the side of a flat bed without using bedrails: None  Moving to and from bed to chair (including a wheelchair): A little  Standing up from a chair using your arms (e.g. wheelchair or bedside chair): None  To walk in hospital room: A little  Climbing 3-5 steps with railing: A  christopher  Basic Mobility - Total Score: 21    Education Documentation  Mobility Training, taught by Halina Barr PTA at 10/24/2023  2:26 PM.  Learner: Patient  Readiness: Acceptance  Method: Explanation  Response: Verbalizes Understanding, Demonstrated Understanding    Education Comments  No comments found.        EDUCATION:       Encounter Problems       Encounter Problems (Active)       PT Problem       STG - Pt will transfer STS with IND  (Met)       Start:  10/20/23    Expected End:  11/03/23    Resolved:  10/24/23         STG - Pt will amb 50' using no AD with SUP  (Met)       Start:  10/20/23    Expected End:  11/03/23    Resolved:  10/24/23         STG - Pt will perform a B LE ther ex program of 2-3 sets of 10  (Progressing)       Start:  10/20/23    Expected End:  11/03/23

## 2023-10-25 NOTE — PROGRESS NOTES
Discharge Facility:  [facility]   Discharge Diagnosis:  Syncope and collapse   Admission Date:  10/19/23   Discharge Date:   10/24/23     PCP Appointment Date:  10/26/23  Specialist Appointment Date:  10/30/23  Hospital Encounter and Summary: Linked  See discharge assessment below for further details    Engagement  Call Start Time: 1300 (10/25/2023  1:04 PM)    Medications  Medications reviewed with patient/caregiver?: Yes (10/25/2023  1:04 PM)  Is the patient having any side effects they believe may be caused by any medication additions or changes?: No (10/25/2023  1:04 PM)  Does the patient have all medications ordered at discharge?: Yes (10/25/2023  1:04 PM)  Prescription Comments: keflex script given at d/c (10/25/2023  1:04 PM)  Is the patient taking all medications as directed (includes completed medication regime)?: Yes (10/25/2023  1:04 PM)  Medication Comments: pt denies problems obtaining or affording medication (10/25/2023  1:04 PM)    Appointments  Does the patient have a primary care provider?: Yes (10/25/2023  1:04 PM)  Care Management Interventions: Verified appointment date/time/provider (10/25/2023  1:04 PM)    Self Management  What is the home health agency?: denies need (10/25/2023  1:04 PM)    Patient Teaching  Does the patient have access to their discharge instructions?: Yes (10/25/2023  1:04 PM)  Care Management Interventions: Reviewed instructions with patient (10/25/2023  1:04 PM)  What is the patient's perception of their health status since discharge?: Improving (10/25/2023  1:04 PM)  Is the patient/caregiver able to teach back the hierarchy of who to call/visit for symptoms/problems? PCP, Specialist, Home Health nurse, Urgent Care, ED, 911: Yes (10/25/2023  1:04 PM)  Patient/Caregiver Education Comments: This CM spoke with pts spouse via phone. Spouse reports pt doing well at home since discharge. New meds reviewed. Pt denies CP and SOB. spouse aware of my availability for non emergent  concerns. Contact info provided to spouse. (10/25/2023  1:04 PM)

## 2023-10-26 NOTE — PROGRESS NOTES
"Subjective   Patient ID: Reynaldo Francisco is a 67 y.o. male who presents for Hospital Follow-up (HOSP FOLLOW UP ([place])).    HPI   Hospital discharge follow-up and transition care management visit.  He was hospitalized at [facility] about 1 week ago for bradycardia and syncope. He was found to have SSS. PPM was placed. Patient was discharged home 2 days ago.    No new complaints, feeling better than prior to PPM placement.    He is accompanied today by his daughter.    Review of Systems  A ten point ROS was performed and was negative unless mentioned above    Objective   /67   Ht 1.727 m (5' 8\")   Wt 62.6 kg (138 lb)   BMI 20.98 kg/m²     Physical Exam  GEN: conversant, NAD  HEENT: PERRL, EOMI, MMM  NECK: supple, no carotid bruits appreciated b/l  CV: S1, S2, RRR  PULM: CTAB  ABD: soft, NT, ND  NEURO: no new gross focal deficits  EXT: no sig LE edema  PSYCH: appropriate affect    Assessment/Plan     This was a highly complex transition of care visit completed within 7 days of hospital discharge. Available hospital records, labs, imaging were reviewed. Inpatient and outpatient medications were reconciled.    #Sick Sinus syndrome - Pacemaker placed 10/2023. Continue following with cardiology     #Hx insulinoma with mets to liver s/p chemo, pancreatic surgery & liver transplant 2018     #Type A aortic dissection s/p repair 2020  #h/o complete heart block (2/2 coreg)  #Renovascular HTN  #HLD  #Aortic regurgitation  -Follows with cardiology Dr. Barber, continue amlodipine, coreg, baby asa     #Hx ESRD on HD now s/p kidney transplant & off HD: follows with transplant team at [facility], managing immunosuppressants      #Anemia: follows with hematology Dr. Mina     #HM: Last colonoscopy in 2019, next due in 5 years. Received both pna shots (last 2/23), Shingrix, flu shot. Vaccinated against COVID-19 & boosters. Tetanus ~2011    RTC 3-4 mos or PRN     John Saldana DO, PGY-1   Internal Medicine    Trainee role: " Resident    I saw and evaluated the patient. I personally obtained the key and critical portions of the history and physical exam or was physically present for key and critical portions performed by the trainee. I reviewed the trainee's documentation and discussed the patient with the trainee. I agree with the trainee's medical decision making as documented on the trainee's notes.    John Manrique M.D.

## 2023-10-30 NOTE — PROGRESS NOTES
Pt presented with daughter for site check, following pacemaker placement by Dr Pina on 10/23/2023. Surgical bandage removed. Steri strips intact. Instructed pt and daughter to allow them to fall off naturally. Site has a large amount of bruising around pacer site (right chest). Bruising is resolving as demonstrated by fading and yellow color. Pacer site is soft with mild swelling. No drainage, no signs of infection. Pt states that it is a little sore when site is touched. I shared pic of site to Dr Pina, no new orders. Reinforced activity restrictions with pt and daughter. Follow up appts given to pt and daughter.

## 2023-11-02 PROBLEM — T86.19: Status: ACTIVE | Noted: 2023-03-08

## 2023-11-02 PROBLEM — Z95.0 PACEMAKER: Status: ACTIVE | Noted: 2023-01-01

## 2023-11-02 PROBLEM — Z99.2: Status: ACTIVE | Noted: 2023-03-08

## 2023-11-02 PROBLEM — I72.1: Status: ACTIVE | Noted: 2021-04-21

## 2023-11-02 NOTE — PATIENT INSTRUCTIONS
Previously seen lung nodules measuring up to 3 mm. are not apparent on the exam.    Moderate right pleural effusion unchanged, with decrease in the compression atelectasis of the right lung.  No Pulmonary parenchymal nodule is seen on this exam.    Patient will be following with Pulmonary, Cardiology and Oncology Moving forward.   Family is taking patient to [place] tomorrow -  Dr. Vanderweil [facility] - transplant institution.  Oncology

## 2023-11-02 NOTE — PROGRESS NOTES
Subjective   Patient ID: Reynaldo Francisco is a 67 y.o. male who presents for No chief complaint on file..  HPI  67-year-old male presents today for lung nodule clinic    Never smoker  No family history of lung cancer.    CT Chest 10/31/2023  No pulmonary parenchymal nodule is seen. A moderate  right-sided effusion unchanged from the previous examination  10/19/2023. Mild compression atelectasis is seen at the right lung  base, decreased from the previous examination    CTA Chest dated 10/19/2023  Small to moderate right pleural effusion with adjacent atelectasis present.    Chest CT 7/20/2023 compared with Chest CT 4/19/2023  Scattered micronodules are  seen throughout the lungs with the 2 largest measuring 2 mm in the right upper lobe.     CTA chest dated 10/16/2022  Minimal paraseptal emphysema. Stable 0.3 cm subpleural nodule at the lateral right lung apex.   Review of Systems  Consultation only.   Spoke with patient and his daugher re: results.  Objective   There were no vitals taken for this visit.     Physical Exam  Assessment/Plan        Family is taking patient to [place] Dr. [facility]we[facility] - transplant institution.  Oncology tomorrow.   Patient will be following with Pulmonary, Cardiology and Oncology Moving forward.   Moderate right pleural effusion unchanged, with decrease in the compression atelectasis of the right lung.  No Pulmonary parenchymal nodule is seen on this exam.

## 2023-11-06 PROBLEM — Z86.79 H/O REPAIR OF DISSECTING ANEURYSM OF ASCENDING THORACIC AORTA: Status: ACTIVE | Noted: 2023-01-01

## 2023-11-06 PROBLEM — I71.012 DISSECTING ANEURYSM OF THORACIC AORTA, STANFORD TYPE B (MULTI): Status: ACTIVE | Noted: 2023-01-01

## 2023-11-06 PROBLEM — Z98.890 H/O REPAIR OF DISSECTING ANEURYSM OF ASCENDING THORACIC AORTA: Status: ACTIVE | Noted: 2023-01-01

## 2023-11-06 NOTE — PROGRESS NOTES
Subjective   Reynaldo Francisco is a 68 y.o. male.    Chief Complaint:  Aortic clinic follow-up visit    HPI    68 years old male who has been followed by the aortic center due to his long history of previous type A aortic dissection that was repaired back in 2020. He is being followed because of the residual type B dissection.     Mr. Francisco presents today accompanied by his daughter Clara for his follow up appointment. He reports that he is about two weeks post pacemaker placement and still feels pretty sore and bruised  in the area. Clara stated that he has not been taking his Carvedilol as directed and patient confirmed by saying that it was because his pulse was in the 40's prior to his pacemaker being placed. He also reports soreness and bruising underneath the left breast area and dyspnea on exertion. He denies chest pain, palpitations, orthopnea, and LE edema.     Review of Systems   Constitutional: Negative for chills and fever.   Cardiovascular:  Positive for dyspnea on exertion. Negative for leg swelling, orthopnea and palpitations.   Respiratory:  Negative for shortness of breath and wheezing.        Objective   Constitutional:       General: Awake.   Neurological:      Mental Status: Alert.   Psychiatric:         Behavior: Behavior is cooperative.       Assessment/Plan     In summary Mr. Francisco is being followed by the aortic center because of a type B foraminal dissection which remains stable at 4.8 cm confirmed by CT done on 10/19/2023. There is a small bilateral pleural effusion was found on both CT and chest x-ray. Clara stated that patient has not been very active at all. Patient states that he will start to increase his level of activity. Blood pressure was not at goal today, so re-educated on the importance of taking his Carvedilol as prescribed and included the risks associated with not taking and the benefits of taking as prescribed. Pacemaker site looks pretty edematous and there is purple and reddish bruising  around the pacemaker site and underneath the left breat to midline of his chest.    Plan:  Take Carvedilol 6.25 mg twice daily.  Increase level of activity  Do deep breathing exercises.  Contact electrophysiologist about pacemaker site.  Follow up in 1 year with CT angio chest, abdomen & Pelvis.

## 2023-12-04 NOTE — PROGRESS NOTES
"Pallavi Francisco is a 68 y.o. male.    Chief Complaint:  Hospital follow-up.  Permanent pacemaker.    HPI    Since his last visit he presented with a syncopal event.  Monitoring demonstrated up to 10-second pauses.  He is admitted for pacemaker.  He is here for follow-up.  He feels much better.  No further episodes.  No chest pains or shortness of breath.  Activity levels are back to normal.    Permanent pacer insertion was performed on October 23, 2023.  The patient had a single-chamber VVI device placed.    He was hospitalized on March 28, 2023 when he presented with a syncopal event. He was found to be in complete heart block. His beta-blockers were stopped and his heart block resolved. He did require a temporary pacemaker. At the time of his admission he was taken carvedilol 100 mg twice daily.     The patient suffered a type a aortic dissection in 2020 in [place]. He had emergent surgery.     Past medical history significant for a liver transplant. He has end-stage renal disease on dialysis after his liver transplant. He makes no urine. He is on a transplant list.     Allergies  Medication    · No Known Drug Allergies   Recorded By: Ruperto Bustos; 3/17/2022 8:47:23 AM  NonMedication    · IV Contrast Dye   Allergy; Edema; Swelling; Recorded By: Ruperto Bustos; 3/17/2022 8:47:23 AM     Family History  Mother    · Family history of diabetes mellitus (V18.0) (Z83.3)   · Family history of hypertension (V17.49) (Z82.49)     Social History  Problems    · No alcohol use   · No illicit drug use   · Non-smoker (V49.89) (Z78.9)      Review of Systems   Cardiovascular:  Positive for syncope.       Visit Vitals  /70 (BP Location: Left arm, Patient Position: Sitting, BP Cuff Size: Adult)   Pulse 81   Ht 1.727 m (5' 8\")   Wt 65.7 kg (144 lb 12.8 oz)   SpO2 97%   BMI 22.02 kg/m²   Smoking Status Never   BSA 1.78 m²        Objective     HEENT: Normal  Neck: No carotid bruits  Lungs: Clear to auscultation  Chest: " Pacer site looks good with no hematoma formation.  Cardiac: Regular rhythm normal S1 and S2 no murmur or gallop  Abdomen: Benign  Extremities: 2+ pulses without edema  Neurologic: No focal deficits  Skin: No rash    Lab Review:   Lab Results   Component Value Date     (L) 10/24/2023    K 4.7 10/24/2023     10/24/2023    CO2 22 10/24/2023    BUN 40 (H) 10/24/2023    CREATININE 1.73 (H) 10/24/2023    GLUCOSE 118 (H) 10/24/2023    CALCIUM 9.0 10/24/2023       Assessment:    1.  Status post pacer.  A pacer check demonstrates normal pacer function.    2.  Coronary disease risk factors.  We personally reviewed the CT scan of the chest done on October 19, 2023.  This shows no evidence of atherosclerosis in the distribution of the coronary arteries.  This makes his risk of of an acute coronary syndrome extremely low.    3.  Type B aortic dissection.  Clearly demonstrated.  He has good pedal pulses.  No intervention is indicated at this point in time.    4.  Renal insufficiency.  Has had a renal transplant.  Followed by the transplant service    5.  Hypertension.  Will increase carvedilol.  If the blood pressures continue to be elevated we can add hydralazine 50 mg twice daily.  Other alternative is to discontinue carvedilol and start labetalol.

## 2023-12-07 NOTE — PATIENT INSTRUCTIONS
Increase the carvedilol to 12.5 mg twice daily.    If your blood pressure continues to be above 140 systolic (upper number) call us.

## 2023-12-11 NOTE — PROGRESS NOTES
Spouse returned call states pt is doing well . Verified appt for 12/13. No further concerns at this time. Pts spouse  aware of my availability for non emergent concerns. Contact info provided.     The catheter was inserted over the wire into the left ventricle. Hemodynamics were performed.  and Pullback was recorded.

## 2024-01-01 ENCOUNTER — APPOINTMENT (OUTPATIENT)
Dept: DIALYSIS | Facility: HOSPITAL | Age: 69
End: 2024-01-01
Payer: MEDICARE

## 2024-01-01 ENCOUNTER — APPOINTMENT (OUTPATIENT)
Dept: RADIOLOGY | Facility: HOSPITAL | Age: 69
DRG: 275 | End: 2024-01-01
Payer: MEDICARE

## 2024-01-01 ENCOUNTER — CLINICAL SUPPORT (OUTPATIENT)
Dept: EMERGENCY MEDICINE | Facility: HOSPITAL | Age: 69
DRG: 312 | End: 2024-01-01
Payer: OTHER MISCELLANEOUS

## 2024-01-01 ENCOUNTER — HOME CARE VISIT (OUTPATIENT)
Dept: HOME HEALTH SERVICES | Facility: HOME HEALTH | Age: 69
End: 2024-01-01
Payer: MEDICARE

## 2024-01-01 ENCOUNTER — TELEPHONE (OUTPATIENT)
Dept: NEPHROLOGY | Facility: HOSPITAL | Age: 69
End: 2024-01-01

## 2024-01-01 ENCOUNTER — OFFICE VISIT (OUTPATIENT)
Dept: PRIMARY CARE | Facility: CLINIC | Age: 69
End: 2024-01-01
Payer: MEDICARE

## 2024-01-01 ENCOUNTER — APPOINTMENT (OUTPATIENT)
Dept: CARDIOLOGY | Facility: HOSPITAL | Age: 69
DRG: 312 | End: 2024-01-01
Payer: OTHER MISCELLANEOUS

## 2024-01-01 ENCOUNTER — TELEPHONE (OUTPATIENT)
Dept: CARDIOLOGY | Facility: CLINIC | Age: 69
End: 2024-01-01
Payer: MEDICARE

## 2024-01-01 ENCOUNTER — APPOINTMENT (OUTPATIENT)
Dept: RADIOLOGY | Facility: HOSPITAL | Age: 69
DRG: 280 | End: 2024-01-01
Payer: MEDICARE

## 2024-01-01 ENCOUNTER — OFFICE VISIT (OUTPATIENT)
Dept: CARDIOLOGY | Facility: CLINIC | Age: 69
End: 2024-01-01
Payer: MEDICARE

## 2024-01-01 ENCOUNTER — TELEPHONE (OUTPATIENT)
Dept: PRIMARY CARE | Facility: CLINIC | Age: 69
End: 2024-01-01
Payer: MEDICARE

## 2024-01-01 ENCOUNTER — TELEPHONE (OUTPATIENT)
Dept: TRANSPLANT | Facility: HOSPITAL | Age: 69
End: 2024-01-01
Payer: MEDICARE

## 2024-01-01 ENCOUNTER — HOSPITAL ENCOUNTER (OUTPATIENT)
Dept: CARDIOLOGY | Facility: CLINIC | Age: 69
Discharge: HOME | End: 2024-03-28
Payer: MEDICARE

## 2024-01-01 ENCOUNTER — APPOINTMENT (OUTPATIENT)
Dept: RADIOLOGY | Facility: HOSPITAL | Age: 69
DRG: 291 | End: 2024-01-01
Payer: MEDICARE

## 2024-01-01 ENCOUNTER — HOSPITAL ENCOUNTER (EMERGENCY)
Facility: HOSPITAL | Age: 69
Discharge: OTHER INSTITUTION | End: 2024-02-29
Attending: STUDENT IN AN ORGANIZED HEALTH CARE EDUCATION/TRAINING PROGRAM
Payer: MEDICARE

## 2024-01-01 ENCOUNTER — DOCUMENTATION (OUTPATIENT)
Dept: HOME HEALTH SERVICES | Facility: HOME HEALTH | Age: 69
End: 2024-01-01
Payer: MEDICARE

## 2024-01-01 ENCOUNTER — APPOINTMENT (OUTPATIENT)
Dept: RADIOLOGY | Facility: HOSPITAL | Age: 69
End: 2024-01-01
Payer: MEDICARE

## 2024-01-01 ENCOUNTER — APPOINTMENT (OUTPATIENT)
Dept: CARDIOLOGY | Facility: HOSPITAL | Age: 69
DRG: 275 | End: 2024-01-01
Payer: MEDICARE

## 2024-01-01 ENCOUNTER — DOCUMENTATION (OUTPATIENT)
Dept: PRIMARY CARE | Facility: CLINIC | Age: 69
End: 2024-01-01

## 2024-01-01 ENCOUNTER — APPOINTMENT (OUTPATIENT)
Dept: CARDIOLOGY | Facility: HOSPITAL | Age: 69
DRG: 280 | End: 2024-01-01
Payer: MEDICARE

## 2024-01-01 ENCOUNTER — APPOINTMENT (OUTPATIENT)
Dept: CARDIOLOGY | Facility: HOSPITAL | Age: 69
End: 2024-01-01
Payer: MEDICARE

## 2024-01-01 ENCOUNTER — TELEPHONE (OUTPATIENT)
Dept: CARDIOLOGY | Facility: CLINIC | Age: 69
End: 2024-01-01

## 2024-01-01 ENCOUNTER — HOSPITAL ENCOUNTER (OUTPATIENT)
Dept: DIALYSIS | Facility: HOSPITAL | Age: 69
Setting detail: DIALYSIS SERIES
Discharge: HOME | End: 2024-04-01
Payer: MEDICARE

## 2024-01-01 ENCOUNTER — ANCILLARY PROCEDURE (OUTPATIENT)
Dept: RADIOLOGY | Facility: CLINIC | Age: 69
End: 2024-01-01
Payer: MEDICARE

## 2024-01-01 ENCOUNTER — APPOINTMENT (OUTPATIENT)
Dept: CARDIOLOGY | Facility: CLINIC | Age: 69
End: 2024-01-01
Payer: MEDICARE

## 2024-01-01 ENCOUNTER — PATIENT OUTREACH (OUTPATIENT)
Dept: CARE COORDINATION | Facility: CLINIC | Age: 69
End: 2024-01-01

## 2024-01-01 ENCOUNTER — HOSPITAL ENCOUNTER (OUTPATIENT)
Dept: DIALYSIS | Facility: HOSPITAL | Age: 69
Setting detail: DIALYSIS SERIES
Discharge: HOME | End: 2024-03-25
Payer: MEDICARE

## 2024-01-01 ENCOUNTER — APPOINTMENT (OUTPATIENT)
Dept: RADIOLOGY | Facility: HOSPITAL | Age: 69
DRG: 312 | End: 2024-01-01
Payer: OTHER MISCELLANEOUS

## 2024-01-01 ENCOUNTER — APPOINTMENT (OUTPATIENT)
Dept: TRANSPLANT | Facility: HOSPITAL | Age: 69
End: 2024-01-01
Payer: MEDICARE

## 2024-01-01 ENCOUNTER — HOSPITAL ENCOUNTER (INPATIENT)
Facility: HOSPITAL | Age: 69
LOS: 1 days | DRG: 312 | End: 2024-05-23
Attending: INTERNAL MEDICINE
Payer: OTHER MISCELLANEOUS

## 2024-01-01 ENCOUNTER — TELEMEDICINE (OUTPATIENT)
Dept: PHARMACY | Facility: HOSPITAL | Age: 69
End: 2024-01-01
Payer: MEDICARE

## 2024-01-01 ENCOUNTER — PATIENT OUTREACH (OUTPATIENT)
Dept: CARE COORDINATION | Facility: CLINIC | Age: 69
End: 2024-01-01
Payer: MEDICARE

## 2024-01-01 ENCOUNTER — HOSPITAL ENCOUNTER (INPATIENT)
Facility: HOSPITAL | Age: 69
LOS: 20 days | Discharge: HOME | DRG: 275 | End: 2024-03-20
Attending: INTERNAL MEDICINE | Admitting: INTERNAL MEDICINE
Payer: MEDICARE

## 2024-01-01 ENCOUNTER — HOSPITAL ENCOUNTER (OUTPATIENT)
Dept: DIALYSIS | Facility: HOSPITAL | Age: 69
Setting detail: DIALYSIS SERIES
Discharge: HOME | End: 2024-03-22
Payer: MEDICARE

## 2024-01-01 ENCOUNTER — PHARMACY VISIT (OUTPATIENT)
Dept: PHARMACY | Facility: CLINIC | Age: 69
End: 2024-01-01
Payer: COMMERCIAL

## 2024-01-01 ENCOUNTER — LAB (OUTPATIENT)
Dept: LAB | Facility: CLINIC | Age: 69
End: 2024-01-01
Payer: MEDICARE

## 2024-01-01 ENCOUNTER — LAB (OUTPATIENT)
Dept: LAB | Facility: LAB | Age: 69
End: 2024-01-01
Payer: MEDICARE

## 2024-01-01 ENCOUNTER — HOSPITAL ENCOUNTER (OUTPATIENT)
Dept: CARDIOLOGY | Facility: HOSPITAL | Age: 69
Discharge: HOME | End: 2024-04-12
Payer: MEDICARE

## 2024-01-01 ENCOUNTER — HOSPITAL ENCOUNTER (OUTPATIENT)
Dept: DIALYSIS | Facility: HOSPITAL | Age: 69
Setting detail: DIALYSIS SERIES
Discharge: HOME | End: 2024-04-15
Payer: MEDICARE

## 2024-01-01 ENCOUNTER — TELEMEDICINE (OUTPATIENT)
Dept: PRIMARY CARE | Facility: CLINIC | Age: 69
End: 2024-01-01
Payer: MEDICARE

## 2024-01-01 ENCOUNTER — APPOINTMENT (OUTPATIENT)
Dept: PRIMARY CARE | Facility: CLINIC | Age: 69
End: 2024-01-01
Payer: MEDICARE

## 2024-01-01 ENCOUNTER — LAB REQUISITION (OUTPATIENT)
Dept: LAB | Facility: CLINIC | Age: 69
End: 2024-01-01
Payer: MEDICARE

## 2024-01-01 ENCOUNTER — OFFICE VISIT (OUTPATIENT)
Dept: TRANSPLANT | Facility: HOSPITAL | Age: 69
DRG: 291 | End: 2024-01-01
Payer: MEDICARE

## 2024-01-01 ENCOUNTER — HOSPITAL ENCOUNTER (INPATIENT)
Facility: HOSPITAL | Age: 69
LOS: 7 days | Discharge: HOME | DRG: 291 | End: 2024-04-08
Attending: STUDENT IN AN ORGANIZED HEALTH CARE EDUCATION/TRAINING PROGRAM | Admitting: INTERNAL MEDICINE
Payer: MEDICARE

## 2024-01-01 ENCOUNTER — DOCUMENTATION (OUTPATIENT)
Dept: PRIMARY CARE | Facility: CLINIC | Age: 69
End: 2024-01-01
Payer: MEDICARE

## 2024-01-01 ENCOUNTER — HOME HEALTH ADMISSION (OUTPATIENT)
Dept: HOME HEALTH SERVICES | Facility: HOME HEALTH | Age: 69
End: 2024-01-01
Payer: MEDICARE

## 2024-01-01 ENCOUNTER — HOSPITAL ENCOUNTER (OUTPATIENT)
Dept: DIALYSIS | Facility: HOSPITAL | Age: 69
Setting detail: DIALYSIS SERIES
Discharge: HOME | End: 2024-03-29
Payer: MEDICARE

## 2024-01-01 ENCOUNTER — OFFICE VISIT (OUTPATIENT)
Dept: HEMATOLOGY/ONCOLOGY | Facility: CLINIC | Age: 69
End: 2024-01-01
Payer: MEDICARE

## 2024-01-01 ENCOUNTER — HOSPITAL ENCOUNTER (OUTPATIENT)
Dept: DIALYSIS | Facility: HOSPITAL | Age: 69
Setting detail: DIALYSIS SERIES
Discharge: HOME | End: 2024-04-12
Payer: MEDICARE

## 2024-01-01 ENCOUNTER — APPOINTMENT (OUTPATIENT)
Dept: LAB | Facility: CLINIC | Age: 69
End: 2024-01-01
Payer: MEDICARE

## 2024-01-01 ENCOUNTER — HOSPITAL ENCOUNTER (OUTPATIENT)
Dept: DIALYSIS | Facility: HOSPITAL | Age: 69
Setting detail: DIALYSIS SERIES
Discharge: HOME | End: 2024-04-10
Payer: MEDICARE

## 2024-01-01 ENCOUNTER — DOCUMENTATION (OUTPATIENT)
Dept: NEPHROLOGY | Facility: HOSPITAL | Age: 69
End: 2024-01-01
Payer: MEDICARE

## 2024-01-01 ENCOUNTER — HOSPITAL ENCOUNTER (OUTPATIENT)
Dept: CARDIOLOGY | Facility: HOSPITAL | Age: 69
Discharge: HOME | DRG: 280 | End: 2024-04-24
Payer: MEDICARE

## 2024-01-01 ENCOUNTER — HOSPITAL ENCOUNTER (INPATIENT)
Facility: HOSPITAL | Age: 69
LOS: 1 days | Discharge: HOSPICE | DRG: 312 | End: 2024-05-22
Attending: EMERGENCY MEDICINE
Payer: OTHER MISCELLANEOUS

## 2024-01-01 ENCOUNTER — HOSPITAL ENCOUNTER (INPATIENT)
Facility: HOSPITAL | Age: 69
LOS: 1 days | Discharge: HOME | DRG: 280 | End: 2024-04-24
Attending: INTERNAL MEDICINE | Admitting: STUDENT IN AN ORGANIZED HEALTH CARE EDUCATION/TRAINING PROGRAM
Payer: MEDICARE

## 2024-01-01 ENCOUNTER — APPOINTMENT (OUTPATIENT)
Dept: PHYSICAL THERAPY | Facility: CLINIC | Age: 69
End: 2024-01-01
Payer: MEDICARE

## 2024-01-01 VITALS — DIASTOLIC BLOOD PRESSURE: 62 MMHG | SYSTOLIC BLOOD PRESSURE: 100 MMHG | HEIGHT: 68 IN | BODY MASS INDEX: 19.14 KG/M2

## 2024-01-01 VITALS
OXYGEN SATURATION: 99 % | RESPIRATION RATE: 18 BRPM | SYSTOLIC BLOOD PRESSURE: 152 MMHG | HEART RATE: 61 BPM | BODY MASS INDEX: 21.66 KG/M2 | HEIGHT: 67 IN | TEMPERATURE: 97 F | DIASTOLIC BLOOD PRESSURE: 70 MMHG | WEIGHT: 138.01 LBS

## 2024-01-01 VITALS — WEIGHT: 152 LBS | BODY MASS INDEX: 23.11 KG/M2 | DIASTOLIC BLOOD PRESSURE: 68 MMHG | SYSTOLIC BLOOD PRESSURE: 113 MMHG

## 2024-01-01 VITALS
BODY MASS INDEX: 22.74 KG/M2 | OXYGEN SATURATION: 100 % | HEART RATE: 90 BPM | DIASTOLIC BLOOD PRESSURE: 67 MMHG | WEIGHT: 145.2 LBS | SYSTOLIC BLOOD PRESSURE: 103 MMHG | TEMPERATURE: 97.9 F

## 2024-01-01 VITALS
RESPIRATION RATE: 20 BRPM | SYSTOLIC BLOOD PRESSURE: 102 MMHG | DIASTOLIC BLOOD PRESSURE: 64 MMHG | TEMPERATURE: 98.6 F | OXYGEN SATURATION: 97 % | WEIGHT: 137 LBS | BODY MASS INDEX: 21.5 KG/M2 | HEIGHT: 67 IN | HEART RATE: 73 BPM

## 2024-01-01 VITALS
TEMPERATURE: 98.1 F | RESPIRATION RATE: 21 BRPM | HEIGHT: 68 IN | OXYGEN SATURATION: 100 % | DIASTOLIC BLOOD PRESSURE: 67 MMHG | HEART RATE: 60 BPM | WEIGHT: 137 LBS | SYSTOLIC BLOOD PRESSURE: 135 MMHG | BODY MASS INDEX: 20.76 KG/M2

## 2024-01-01 VITALS — HEART RATE: 77 BPM | TEMPERATURE: 97.6 F

## 2024-01-01 VITALS
WEIGHT: 136 LBS | DIASTOLIC BLOOD PRESSURE: 70 MMHG | HEIGHT: 67 IN | BODY MASS INDEX: 21.35 KG/M2 | OXYGEN SATURATION: 98 % | SYSTOLIC BLOOD PRESSURE: 130 MMHG | HEART RATE: 79 BPM

## 2024-01-01 VITALS
WEIGHT: 141.31 LBS | RESPIRATION RATE: 17 BRPM | OXYGEN SATURATION: 92 % | SYSTOLIC BLOOD PRESSURE: 98 MMHG | HEART RATE: 73 BPM | HEIGHT: 67 IN | DIASTOLIC BLOOD PRESSURE: 56 MMHG | BODY MASS INDEX: 22.18 KG/M2 | TEMPERATURE: 99.1 F

## 2024-01-01 VITALS
SYSTOLIC BLOOD PRESSURE: 78 MMHG | BODY MASS INDEX: 20.4 KG/M2 | DIASTOLIC BLOOD PRESSURE: 42 MMHG | WEIGHT: 130 LBS | HEART RATE: 65 BPM | HEIGHT: 67 IN | OXYGEN SATURATION: 95 %

## 2024-01-01 VITALS
TEMPERATURE: 98 F | RESPIRATION RATE: 22 BRPM | HEART RATE: 63 BPM | BODY MASS INDEX: 19.3 KG/M2 | DIASTOLIC BLOOD PRESSURE: 55 MMHG | OXYGEN SATURATION: 94 % | SYSTOLIC BLOOD PRESSURE: 81 MMHG | WEIGHT: 123 LBS | HEIGHT: 67 IN

## 2024-01-01 VITALS — SYSTOLIC BLOOD PRESSURE: 92 MMHG | OXYGEN SATURATION: 96 % | DIASTOLIC BLOOD PRESSURE: 60 MMHG | HEART RATE: 64 BPM

## 2024-01-01 VITALS — SYSTOLIC BLOOD PRESSURE: 128 MMHG | WEIGHT: 130 LBS | BODY MASS INDEX: 19.77 KG/M2 | DIASTOLIC BLOOD PRESSURE: 70 MMHG

## 2024-01-01 VITALS — HEART RATE: 76 BPM | SYSTOLIC BLOOD PRESSURE: 100 MMHG | DIASTOLIC BLOOD PRESSURE: 60 MMHG

## 2024-01-01 VITALS
HEART RATE: 89 BPM | WEIGHT: 137 LBS | HEIGHT: 67 IN | DIASTOLIC BLOOD PRESSURE: 60 MMHG | SYSTOLIC BLOOD PRESSURE: 96 MMHG | OXYGEN SATURATION: 96 % | BODY MASS INDEX: 21.5 KG/M2

## 2024-01-01 VITALS
OXYGEN SATURATION: 95 % | SYSTOLIC BLOOD PRESSURE: 100 MMHG | HEART RATE: 72 BPM | WEIGHT: 130 LBS | HEIGHT: 67 IN | DIASTOLIC BLOOD PRESSURE: 64 MMHG | TEMPERATURE: 98.4 F | BODY MASS INDEX: 20.4 KG/M2 | RESPIRATION RATE: 16 BRPM

## 2024-01-01 VITALS — OXYGEN SATURATION: 98 %

## 2024-01-01 VITALS — DIASTOLIC BLOOD PRESSURE: 75 MMHG | SYSTOLIC BLOOD PRESSURE: 108 MMHG

## 2024-01-01 VITALS — TEMPERATURE: 96.8 F | HEART RATE: 69 BPM

## 2024-01-01 VITALS
HEIGHT: 68 IN | DIASTOLIC BLOOD PRESSURE: 53 MMHG | BODY MASS INDEX: 19.08 KG/M2 | WEIGHT: 125.88 LBS | HEART RATE: 83 BPM | TEMPERATURE: 97.5 F | OXYGEN SATURATION: 96 % | RESPIRATION RATE: 18 BRPM | TEMPERATURE: 95.7 F | SYSTOLIC BLOOD PRESSURE: 104 MMHG | HEART RATE: 89 BPM

## 2024-01-01 VITALS — TEMPERATURE: 97.5 F | HEART RATE: 90 BPM

## 2024-01-01 VITALS — OXYGEN SATURATION: 97 %

## 2024-01-01 VITALS — HEART RATE: 103 BPM | TEMPERATURE: 97.3 F

## 2024-01-01 VITALS — OXYGEN SATURATION: 96 %

## 2024-01-01 VITALS — HEART RATE: 74 BPM | TEMPERATURE: 97.8 F

## 2024-01-01 VITALS — OXYGEN SATURATION: 97 % | SYSTOLIC BLOOD PRESSURE: 100 MMHG | DIASTOLIC BLOOD PRESSURE: 60 MMHG | HEART RATE: 76 BPM

## 2024-01-01 VITALS — HEART RATE: 89 BPM | TEMPERATURE: 98.2 F

## 2024-01-01 DIAGNOSIS — Z95.0 PACEMAKER: ICD-10-CM

## 2024-01-01 DIAGNOSIS — I49.5 SICK SINUS SYNDROME (MULTI): ICD-10-CM

## 2024-01-01 DIAGNOSIS — N18.5 CKD (CHRONIC KIDNEY DISEASE) STAGE 5, GFR LESS THAN 15 ML/MIN (MULTI): Primary | ICD-10-CM

## 2024-01-01 DIAGNOSIS — I42.5 OTHER RESTRICTIVE CARDIOMYOPATHY (MULTI): ICD-10-CM

## 2024-01-01 DIAGNOSIS — Z51.5 HOSPICE CARE: ICD-10-CM

## 2024-01-01 DIAGNOSIS — I45.4 BBB (BUNDLE BRANCH BLOCK): ICD-10-CM

## 2024-01-01 DIAGNOSIS — N18.6 END STAGE RENAL DISEASE ON DIALYSIS (MULTI): ICD-10-CM

## 2024-01-01 DIAGNOSIS — I21.9 MYOCARDIAL INFARCTION, UNSPECIFIED MI TYPE, UNSPECIFIED ARTERY (MULTI): ICD-10-CM

## 2024-01-01 DIAGNOSIS — N18.4 ACUTE RENAL FAILURE WITH ACUTE RENAL CORTICAL NECROSIS SUPERIMPOSED ON STAGE 4 CHRONIC KIDNEY DISEASE (MULTI): Primary | ICD-10-CM

## 2024-01-01 DIAGNOSIS — I42.0 DILATED CARDIOMYOPATHY (MULTI): ICD-10-CM

## 2024-01-01 DIAGNOSIS — I13.0: ICD-10-CM

## 2024-01-01 DIAGNOSIS — Z95.0 S/P PLACEMENT OF CARDIAC PACEMAKER: ICD-10-CM

## 2024-01-01 DIAGNOSIS — R29.898 MUSCULAR DECONDITIONING: ICD-10-CM

## 2024-01-01 DIAGNOSIS — N48.1 BALANITIS: ICD-10-CM

## 2024-01-01 DIAGNOSIS — N19 RENAL FAILURE: Primary | ICD-10-CM

## 2024-01-01 DIAGNOSIS — I42.9 CARDIOMYOPATHY OF UNDETERMINED TYPE (MULTI): ICD-10-CM

## 2024-01-01 DIAGNOSIS — N17.1 ACUTE RENAL FAILURE WITH ACUTE RENAL CORTICAL NECROSIS SUPERIMPOSED ON STAGE 4 CHRONIC KIDNEY DISEASE (MULTI): Primary | ICD-10-CM

## 2024-01-01 DIAGNOSIS — R53.83 FATIGUE, UNSPECIFIED TYPE: ICD-10-CM

## 2024-01-01 DIAGNOSIS — Z98.890 HISTORY OF REPAIR OF STANFORD TYPE A DISSECTING ANEURYSM OF THORACIC AORTA: ICD-10-CM

## 2024-01-01 DIAGNOSIS — I50.20 HFREF (HEART FAILURE WITH REDUCED EJECTION FRACTION) (MULTI): ICD-10-CM

## 2024-01-01 DIAGNOSIS — D64.9 ANEMIA, UNSPECIFIED TYPE: Primary | ICD-10-CM

## 2024-01-01 DIAGNOSIS — Z99.2 END STAGE RENAL DISEASE ON DIALYSIS (MULTI): ICD-10-CM

## 2024-01-01 DIAGNOSIS — E11.9 TYPE 2 DIABETES MELLITUS WITHOUT COMPLICATION, WITHOUT LONG-TERM CURRENT USE OF INSULIN (MULTI): ICD-10-CM

## 2024-01-01 DIAGNOSIS — E11.69 TYPE 2 DIABETES MELLITUS WITH OTHER SPECIFIED COMPLICATION, WITHOUT LONG-TERM CURRENT USE OF INSULIN (MULTI): ICD-10-CM

## 2024-01-01 DIAGNOSIS — C79.9 METASTATIC MALIGNANT NEOPLASM, UNSPECIFIED SITE (MULTI): ICD-10-CM

## 2024-01-01 DIAGNOSIS — N18.4 ACUTE RENAL FAILURE WITH ACUTE RENAL CORTICAL NECROSIS SUPERIMPOSED ON STAGE 4 CHRONIC KIDNEY DISEASE (MULTI): ICD-10-CM

## 2024-01-01 DIAGNOSIS — N17.1 ACUTE RENAL FAILURE WITH ACUTE RENAL CORTICAL NECROSIS SUPERIMPOSED ON STAGE 4 CHRONIC KIDNEY DISEASE (MULTI): ICD-10-CM

## 2024-01-01 DIAGNOSIS — C18.9 MALIGNANT NEOPLASM OF COLON, UNSPECIFIED PART OF COLON (MULTI): ICD-10-CM

## 2024-01-01 DIAGNOSIS — N18.6 ESRD (END STAGE RENAL DISEASE) (MULTI): ICD-10-CM

## 2024-01-01 DIAGNOSIS — Z94.0 KIDNEY TRANSPLANTED (HHS-HCC): ICD-10-CM

## 2024-01-01 DIAGNOSIS — Z95.810 AICD (AUTOMATIC CARDIOVERTER/DEFIBRILLATOR) PRESENT: Primary | ICD-10-CM

## 2024-01-01 DIAGNOSIS — I50.9 HEART FAILURE (MULTI): ICD-10-CM

## 2024-01-01 DIAGNOSIS — I49.5 SSS (SICK SINUS SYNDROME) (MULTI): ICD-10-CM

## 2024-01-01 DIAGNOSIS — I50.21 ACUTE SYSTOLIC (CONGESTIVE) HEART FAILURE (MULTI): ICD-10-CM

## 2024-01-01 DIAGNOSIS — R40.4 TRANSIENT ALTERATION OF AWARENESS: Primary | ICD-10-CM

## 2024-01-01 DIAGNOSIS — I35.1 MODERATE AORTIC INSUFFICIENCY: ICD-10-CM

## 2024-01-01 DIAGNOSIS — R06.02 SHORTNESS OF BREATH: ICD-10-CM

## 2024-01-01 DIAGNOSIS — Z00.00 EXAMINATION: ICD-10-CM

## 2024-01-01 DIAGNOSIS — R53.1 GENERALIZED WEAKNESS: Primary | ICD-10-CM

## 2024-01-01 DIAGNOSIS — I50.23 ACUTE ON CHRONIC SYSTOLIC (CONGESTIVE) HEART FAILURE (MULTI): ICD-10-CM

## 2024-01-01 DIAGNOSIS — I45.10 RBBB: Primary | ICD-10-CM

## 2024-01-01 DIAGNOSIS — R06.02 SHORTNESS OF BREATH: Primary | ICD-10-CM

## 2024-01-01 DIAGNOSIS — I50.20 HFREF (HEART FAILURE WITH REDUCED EJECTION FRACTION) (MULTI): Primary | ICD-10-CM

## 2024-01-01 DIAGNOSIS — D64.9 ANEMIA, UNSPECIFIED TYPE: ICD-10-CM

## 2024-01-01 DIAGNOSIS — E87.79 OTHER HYPERVOLEMIA: ICD-10-CM

## 2024-01-01 DIAGNOSIS — I50.21 ACUTE SYSTOLIC (CONGESTIVE) HEART FAILURE (MULTI): Primary | ICD-10-CM

## 2024-01-01 DIAGNOSIS — I15.1 HYPERTENSION SECONDARY TO OTHER RENAL DISORDERS: Primary | ICD-10-CM

## 2024-01-01 DIAGNOSIS — I50.23 ACUTE ON CHRONIC SYSTOLIC CONGESTIVE HEART FAILURE (MULTI): ICD-10-CM

## 2024-01-01 DIAGNOSIS — D13.7 INSULINOMA: ICD-10-CM

## 2024-01-01 DIAGNOSIS — R55 SYNCOPE AND COLLAPSE: ICD-10-CM

## 2024-01-01 DIAGNOSIS — I50.21 HEART FAILURE, ACUTE SYSTOLIC (MULTI): ICD-10-CM

## 2024-01-01 DIAGNOSIS — I47.29 PAROXYSMAL VENTRICULAR TACHYCARDIA (MULTI): ICD-10-CM

## 2024-01-01 DIAGNOSIS — I42.9 CARDIOMYOPATHY, UNSPECIFIED TYPE (MULTI): ICD-10-CM

## 2024-01-01 DIAGNOSIS — I21.4 NSTEMI, INITIAL EPISODE OF CARE (MULTI): Primary | ICD-10-CM

## 2024-01-01 DIAGNOSIS — Z94.0 KIDNEY TRANSPLANT STATUS: ICD-10-CM

## 2024-01-01 DIAGNOSIS — I50.9 HEART FAILURE, UNSPECIFIED HF CHRONICITY, UNSPECIFIED HEART FAILURE TYPE (MULTI): ICD-10-CM

## 2024-01-01 DIAGNOSIS — Z94.4 LIVER TRANSPLANTED (MULTI): ICD-10-CM

## 2024-01-01 DIAGNOSIS — I21.4 NSTEMI (NON-ST ELEVATED MYOCARDIAL INFARCTION) (MULTI): ICD-10-CM

## 2024-01-01 DIAGNOSIS — Z95.810 AICD (AUTOMATIC CARDIOVERTER/DEFIBRILLATOR) PRESENT: ICD-10-CM

## 2024-01-01 DIAGNOSIS — Z09 HOSPITAL DISCHARGE FOLLOW-UP: Primary | ICD-10-CM

## 2024-01-01 DIAGNOSIS — I95.9 HYPOTENSION, UNSPECIFIED HYPOTENSION TYPE: ICD-10-CM

## 2024-01-01 DIAGNOSIS — Z86.79 HISTORY OF REPAIR OF STANFORD TYPE A DISSECTING ANEURYSM OF THORACIC AORTA: ICD-10-CM

## 2024-01-01 DIAGNOSIS — N17.9 AKI (ACUTE KIDNEY INJURY) (CMS-HCC): Primary | ICD-10-CM

## 2024-01-01 DIAGNOSIS — Z94.0 KIDNEY REPLACED BY TRANSPLANT (HHS-HCC): ICD-10-CM

## 2024-01-01 DIAGNOSIS — I15.1 HYPERTENSION SECONDARY TO OTHER RENAL DISORDERS: ICD-10-CM

## 2024-01-01 DIAGNOSIS — Z94.4 LIVER TRANSPLANTED (MULTI): Primary | ICD-10-CM

## 2024-01-01 DIAGNOSIS — I50.9 HEART FAILURE, UNSPECIFIED HF CHRONICITY, UNSPECIFIED HEART FAILURE TYPE (MULTI): Primary | ICD-10-CM

## 2024-01-01 DIAGNOSIS — I44.2 ATRIOVENTRICULAR BLOCK, COMPLETE (MULTI): ICD-10-CM

## 2024-01-01 DIAGNOSIS — Z09 HOSPITAL DISCHARGE FOLLOW-UP: ICD-10-CM

## 2024-01-01 LAB
ABO GROUP (TYPE) IN BLOOD: NORMAL
ACANTHOCYTES BLD QL SMEAR: ABNORMAL
ACANTHOCYTES BLD QL SMEAR: NORMAL
ALBUMIN MFR UR ELPH: 57.2 %
ALBUMIN SERPL BCP-MCNC: 2.7 G/DL (ref 3.4–5)
ALBUMIN SERPL BCP-MCNC: 3.6 G/DL (ref 3.4–5)
ALBUMIN SERPL BCP-MCNC: 3.7 G/DL (ref 3.4–5)
ALBUMIN SERPL BCP-MCNC: 3.8 G/DL (ref 3.4–5)
ALBUMIN SERPL BCP-MCNC: 3.9 G/DL (ref 3.4–5)
ALBUMIN SERPL BCP-MCNC: 4 G/DL (ref 3.4–5)
ALBUMIN SERPL BCP-MCNC: 4.1 G/DL (ref 3.4–5)
ALBUMIN SERPL BCP-MCNC: 4.2 G/DL (ref 3.4–5)
ALBUMIN SERPL BCP-MCNC: 4.3 G/DL (ref 3.4–5)
ALBUMIN SERPL BCP-MCNC: 4.4 G/DL (ref 3.4–5)
ALBUMIN SERPL BCP-MCNC: 4.5 G/DL (ref 3.4–5)
ALBUMIN SERPL BCP-MCNC: 4.6 G/DL (ref 3.4–5)
ALBUMIN SERPL BCP-MCNC: 4.6 G/DL (ref 3.4–5)
ALBUMIN SERPL BCP-MCNC: 4.7 G/DL (ref 3.4–5)
ALBUMIN SERPL BCP-MCNC: 4.7 G/DL (ref 3.4–5)
ALBUMIN: 4.2 G/DL (ref 3.4–5)
ALP SERPL-CCNC: 46 U/L (ref 33–136)
ALP SERPL-CCNC: 62 U/L (ref 33–136)
ALP SERPL-CCNC: 62 U/L (ref 33–136)
ALP SERPL-CCNC: 63 U/L (ref 33–136)
ALP SERPL-CCNC: 69 U/L (ref 33–136)
ALP SERPL-CCNC: 70 U/L (ref 33–136)
ALP SERPL-CCNC: 74 U/L (ref 33–136)
ALP SERPL-CCNC: 77 U/L (ref 33–136)
ALP SERPL-CCNC: 77 U/L (ref 33–136)
ALP SERPL-CCNC: 78 U/L (ref 33–136)
ALP SERPL-CCNC: 82 U/L (ref 33–136)
ALP SERPL-CCNC: 84 U/L (ref 33–136)
ALP SERPL-CCNC: 92 U/L (ref 33–136)
ALPHA 1 GLOBULIN: 0.3 G/DL (ref 0.2–0.6)
ALPHA 2 GLOBULIN: 0.5 G/DL (ref 0.4–1.1)
ALPHA1 GLOB MFR UR ELPH: 7.1 %
ALPHA2 GLOB MFR UR ELPH: 5.4 %
ALT SERPL W P-5'-P-CCNC: 10 U/L (ref 10–52)
ALT SERPL W P-5'-P-CCNC: 4 U/L (ref 10–52)
ALT SERPL W P-5'-P-CCNC: 4 U/L (ref 10–52)
ALT SERPL W P-5'-P-CCNC: 5 U/L (ref 10–52)
ALT SERPL W P-5'-P-CCNC: 5 U/L (ref 10–52)
ALT SERPL W P-5'-P-CCNC: 6 U/L (ref 10–52)
ALT SERPL W P-5'-P-CCNC: 7 U/L (ref 10–52)
ALT SERPL W P-5'-P-CCNC: 8 U/L (ref 10–52)
AMYLASE FLD-CCNC: 15 U/L
ANION GAP BLDMV CALCULATED.4IONS-SCNC: 12 MMO/L (ref 10–25)
ANION GAP BLDMV CALCULATED.4IONS-SCNC: 13 MMO/L (ref 10–25)
ANION GAP BLDMV CALCULATED.4IONS-SCNC: 13 MMO/L (ref 10–25)
ANION GAP BLDMV CALCULATED.4IONS-SCNC: 14 MMO/L (ref 10–25)
ANION GAP BLDMV CALCULATED.4IONS-SCNC: 15 MMO/L (ref 10–25)
ANION GAP BLDMV CALCULATED.4IONS-SCNC: 15 MMO/L (ref 10–25)
ANION GAP BLDMV CALCULATED.4IONS-SCNC: 16 MMO/L (ref 10–25)
ANION GAP BLDMV CALCULATED.4IONS-SCNC: 17 MMO/L (ref 10–25)
ANION GAP BLDMV CALCULATED.4IONS-SCNC: 20 MMO/L (ref 10–25)
ANION GAP BLDV CALCULATED.4IONS-SCNC: 17 MMOL/L (ref 10–25)
ANION GAP BLDV CALCULATED.4IONS-SCNC: 9 MMOL/L (ref 10–25)
ANION GAP SERPL CALC-SCNC: 12 MMOL/L (ref 10–20)
ANION GAP SERPL CALC-SCNC: 13 MMOL/L (ref 10–20)
ANION GAP SERPL CALC-SCNC: 14 MMOL/L (ref 10–20)
ANION GAP SERPL CALC-SCNC: 15 MMOL/L (ref 10–20)
ANION GAP SERPL CALC-SCNC: 16 MMOL/L (ref 10–20)
ANION GAP SERPL CALC-SCNC: 17 MMOL/L (ref 10–20)
ANION GAP SERPL CALC-SCNC: 18 MMOL/L (ref 10–20)
ANION GAP SERPL CALC-SCNC: 19 MMOL/L (ref 10–20)
ANION GAP SERPL CALC-SCNC: 20 MMOL/L (ref 10–20)
ANION GAP SERPL CALC-SCNC: 20 MMOL/L (ref 10–20)
ANION GAP SERPL CALC-SCNC: 21 MMOL/L (ref 10–20)
ANTIBODY SCREEN: NORMAL
AORTIC VALVE PEAK VELOCITY: 0.87 M/S
AORTIC VALVE PEAK VELOCITY: 1.04 M/S
APPEARANCE UR: CLEAR
APPEARANCE UR: CLEAR
APTT PPP: 30 SECONDS (ref 27–38)
APTT PPP: 31 SECONDS (ref 27–38)
APTT PPP: 31 SECONDS (ref 27–38)
APTT PPP: 32 SECONDS (ref 27–38)
APTT PPP: 33 SECONDS (ref 27–38)
APTT PPP: 51 SECONDS (ref 27–38)
AST SERPL W P-5'-P-CCNC: 10 U/L (ref 9–39)
AST SERPL W P-5'-P-CCNC: 14 U/L (ref 9–39)
AST SERPL W P-5'-P-CCNC: 14 U/L (ref 9–39)
AST SERPL W P-5'-P-CCNC: 15 U/L (ref 9–39)
AST SERPL W P-5'-P-CCNC: 15 U/L (ref 9–39)
AST SERPL W P-5'-P-CCNC: 16 U/L (ref 9–39)
AST SERPL W P-5'-P-CCNC: 17 U/L (ref 9–39)
AST SERPL W P-5'-P-CCNC: 19 U/L (ref 9–39)
ATRIAL RATE: 0 BPM
ATRIAL RATE: 0 BPM
ATRIAL RATE: 36 BPM
ATRIAL RATE: 43 BPM
ATRIAL RATE: 52 BPM
ATRIAL RATE: 63 BPM
ATRIAL RATE: 71 BPM
ATRIAL RATE: 75 BPM
ATRIAL RATE: 80 BPM
ATRIAL RATE: 80 BPM
ATRIAL RATE: 89 BPM
AV PEAK GRADIENT: 3.1 MMHG
AV PEAK GRADIENT: 4.3 MMHG
AVA (PEAK VEL): 2.97 CM2
B-GLOBULIN MFR UR ELPH: 10.8 %
BACTERIA FLD CULT: NORMAL
BASE EXCESS BLDMV CALC-SCNC: -0.2 MMOL/L (ref -2–3)
BASE EXCESS BLDMV CALC-SCNC: -0.3 MMOL/L (ref -2–3)
BASE EXCESS BLDMV CALC-SCNC: -0.7 MMOL/L (ref -2–3)
BASE EXCESS BLDMV CALC-SCNC: -10.8 MMOL/L (ref -2–3)
BASE EXCESS BLDMV CALC-SCNC: -2.5 MMOL/L (ref -2–3)
BASE EXCESS BLDMV CALC-SCNC: -4.3 MMOL/L (ref -2–3)
BASE EXCESS BLDMV CALC-SCNC: -4.8 MMOL/L (ref -2–3)
BASE EXCESS BLDMV CALC-SCNC: -4.8 MMOL/L (ref -2–3)
BASE EXCESS BLDMV CALC-SCNC: -7 MMOL/L (ref -2–3)
BASE EXCESS BLDMV CALC-SCNC: -7.4 MMOL/L (ref -2–3)
BASE EXCESS BLDMV CALC-SCNC: -8.5 MMOL/L (ref -2–3)
BASE EXCESS BLDMV CALC-SCNC: 1 MMOL/L (ref -2–3)
BASE EXCESS BLDMV CALC-SCNC: 1 MMOL/L (ref -2–3)
BASE EXCESS BLDV CALC-SCNC: -10.7 MMOL/L (ref -2–3)
BASE EXCESS BLDV CALC-SCNC: 6.9 MMOL/L (ref -2–3)
BASO STIPL BLD QL SMEAR: PRESENT
BASOPHILS # BLD AUTO: 0.01 X10*3/UL (ref 0–0.1)
BASOPHILS # BLD AUTO: 0.02 X10*3/UL (ref 0–0.1)
BASOPHILS # BLD AUTO: 0.03 X10*3/UL (ref 0–0.1)
BASOPHILS # BLD AUTO: 0.03 X10*3/UL (ref 0–0.1)
BASOPHILS # BLD AUTO: 0.04 X10*3/UL (ref 0–0.1)
BASOPHILS # BLD AUTO: 0.04 X10*3/UL (ref 0–0.1)
BASOPHILS # BLD MANUAL: 0 X10*3/UL (ref 0–0.1)
BASOPHILS # BLD MANUAL: 0.03 X10*3/UL (ref 0–0.1)
BASOPHILS # BLD MANUAL: 0.03 X10*3/UL (ref 0–0.1)
BASOPHILS # BLD MANUAL: 0.07 X10*3/UL (ref 0–0.1)
BASOPHILS # BLD MANUAL: 0.07 X10*3/UL (ref 0–0.1)
BASOPHILS # BLD MANUAL: 0.09 X10*3/UL (ref 0–0.1)
BASOPHILS NFR BLD AUTO: 0.2 %
BASOPHILS NFR BLD AUTO: 0.2 %
BASOPHILS NFR BLD AUTO: 0.3 %
BASOPHILS NFR BLD AUTO: 0.5 %
BASOPHILS NFR BLD AUTO: 0.7 %
BASOPHILS NFR BLD AUTO: 0.7 %
BASOPHILS NFR BLD AUTO: 0.9 %
BASOPHILS NFR BLD AUTO: 0.9 %
BASOPHILS NFR BLD AUTO: 1.1 %
BASOPHILS NFR BLD MANUAL: 0 %
BASOPHILS NFR BLD MANUAL: 0.9 %
BASOPHILS NFR BLD MANUAL: 0.9 %
BASOPHILS NFR BLD MANUAL: 1.7 %
BASOPHILS NFR BLD MANUAL: 2 %
BASOPHILS NFR BLD MANUAL: 2 %
BASOPHILS NFR FLD MANUAL: 0 %
BETA GLOBULIN: 0.6 G/DL (ref 0.5–1.2)
BILIRUB DIRECT SERPL-MCNC: 0.1 MG/DL (ref 0–0.3)
BILIRUB DIRECT SERPL-MCNC: 0.2 MG/DL (ref 0–0.3)
BILIRUB DIRECT SERPL-MCNC: 0.3 MG/DL (ref 0–0.3)
BILIRUB DIRECT SERPL-MCNC: 0.4 MG/DL (ref 0–0.3)
BILIRUB SERPL-MCNC: 0.4 MG/DL (ref 0–1.2)
BILIRUB SERPL-MCNC: 0.7 MG/DL (ref 0–1.2)
BILIRUB SERPL-MCNC: 0.8 MG/DL (ref 0–1.2)
BILIRUB SERPL-MCNC: 0.8 MG/DL (ref 0–1.2)
BILIRUB SERPL-MCNC: 0.9 MG/DL (ref 0–1.2)
BILIRUB SERPL-MCNC: 1 MG/DL (ref 0–1.2)
BILIRUB SERPL-MCNC: 1 MG/DL (ref 0–1.2)
BILIRUB SERPL-MCNC: 1.1 MG/DL (ref 0–1.2)
BILIRUB SERPL-MCNC: 1.4 MG/DL (ref 0–1.2)
BILIRUB SERPL-MCNC: 1.6 MG/DL (ref 0–1.2)
BILIRUB SERPL-MCNC: 1.8 MG/DL (ref 0–1.2)
BILIRUB UR STRIP.AUTO-MCNC: NEGATIVE MG/DL
BILIRUB UR STRIP.AUTO-MCNC: NEGATIVE MG/DL
BKV DNA SERPL NAA+PROBE-ACNC: 38 IU/ML
BKV DNA SERPL NAA+PROBE-LOG#: 1.58 LOG IU/ML
BLASTS NFR FLD MANUAL: 0 %
BLOOD EXPIRATION DATE: NORMAL
BNP SERPL-MCNC: 1284 PG/ML (ref 0–99)
BNP SERPL-MCNC: 1722 PG/ML (ref 0–99)
BNP SERPL-MCNC: 408 PG/ML (ref 0–99)
BNP SERPL-MCNC: 802 PG/ML (ref 0–99)
BNP SERPL-MCNC: 863 PG/ML (ref 0–99)
BODY TEMPERATURE: 37 DEGREES CELSIUS
BUN SERPL-MCNC: 102 MG/DL (ref 6–23)
BUN SERPL-MCNC: 16 MG/DL (ref 6–23)
BUN SERPL-MCNC: 24 MG/DL (ref 6–23)
BUN SERPL-MCNC: 30 MG/DL (ref 6–23)
BUN SERPL-MCNC: 36 MG/DL (ref 6–23)
BUN SERPL-MCNC: 40 MG/DL (ref 6–23)
BUN SERPL-MCNC: 40 MG/DL (ref 6–23)
BUN SERPL-MCNC: 41 MG/DL (ref 6–23)
BUN SERPL-MCNC: 41 MG/DL (ref 6–23)
BUN SERPL-MCNC: 47 MG/DL (ref 6–23)
BUN SERPL-MCNC: 48 MG/DL (ref 6–23)
BUN SERPL-MCNC: 49 MG/DL (ref 6–23)
BUN SERPL-MCNC: 50 MG/DL (ref 6–23)
BUN SERPL-MCNC: 51 MG/DL (ref 6–23)
BUN SERPL-MCNC: 52 MG/DL (ref 6–23)
BUN SERPL-MCNC: 53 MG/DL (ref 6–23)
BUN SERPL-MCNC: 56 MG/DL (ref 6–23)
BUN SERPL-MCNC: 58 MG/DL (ref 6–23)
BUN SERPL-MCNC: 63 MG/DL (ref 6–23)
BUN SERPL-MCNC: 63 MG/DL (ref 6–23)
BUN SERPL-MCNC: 66 MG/DL (ref 6–23)
BUN SERPL-MCNC: 67 MG/DL (ref 6–23)
BUN SERPL-MCNC: 71 MG/DL (ref 6–23)
BUN SERPL-MCNC: 73 MG/DL (ref 6–23)
BUN SERPL-MCNC: 76 MG/DL (ref 6–23)
BUN SERPL-MCNC: 77 MG/DL (ref 6–23)
BUN SERPL-MCNC: 79 MG/DL (ref 6–23)
BUN SERPL-MCNC: 79 MG/DL (ref 6–23)
BUN SERPL-MCNC: 80 MG/DL (ref 6–23)
BUN SERPL-MCNC: 81 MG/DL (ref 6–23)
BUN SERPL-MCNC: 83 MG/DL (ref 6–23)
BUN SERPL-MCNC: 84 MG/DL (ref 6–23)
BUN SERPL-MCNC: 86 MG/DL (ref 6–23)
BUN SERPL-MCNC: 86 MG/DL (ref 6–23)
BUN SERPL-MCNC: 88 MG/DL (ref 6–23)
BUN SERPL-MCNC: 88 MG/DL (ref 6–23)
BUN SERPL-MCNC: 90 MG/DL (ref 6–23)
BUN SERPL-MCNC: 90 MG/DL (ref 6–23)
BUN SERPL-MCNC: 93 MG/DL (ref 6–23)
BUN SERPL-MCNC: 93 MG/DL (ref 6–23)
BUN SERPL-MCNC: 95 MG/DL (ref 6–23)
BUN SERPL-MCNC: 98 MG/DL (ref 6–23)
BURR CELLS BLD QL SMEAR: ABNORMAL
CA-I BLDMV-SCNC: 1.17 MMOL/L (ref 1.1–1.33)
CA-I BLDMV-SCNC: 1.19 MMOL/L (ref 1.1–1.33)
CA-I BLDMV-SCNC: 1.2 MMOL/L (ref 1.1–1.33)
CA-I BLDMV-SCNC: 1.2 MMOL/L (ref 1.1–1.33)
CA-I BLDMV-SCNC: 1.21 MMOL/L (ref 1.1–1.33)
CA-I BLDMV-SCNC: 1.24 MMOL/L (ref 1.1–1.33)
CA-I BLDMV-SCNC: 1.26 MMOL/L (ref 1.1–1.33)
CA-I BLDV-SCNC: 1.08 MMOL/L (ref 1.1–1.33)
CA-I BLDV-SCNC: 1.21 MMOL/L (ref 1.1–1.33)
CALCIUM SERPL-MCNC: 10 MG/DL (ref 8.6–10.6)
CALCIUM SERPL-MCNC: 6.1 MG/DL (ref 8.6–10.6)
CALCIUM SERPL-MCNC: 8.3 MG/DL (ref 8.6–10.6)
CALCIUM SERPL-MCNC: 8.4 MG/DL (ref 8.6–10.6)
CALCIUM SERPL-MCNC: 8.4 MG/DL (ref 8.6–10.6)
CALCIUM SERPL-MCNC: 8.5 MG/DL (ref 8.6–10.3)
CALCIUM SERPL-MCNC: 8.5 MG/DL (ref 8.6–10.6)
CALCIUM SERPL-MCNC: 8.6 MG/DL (ref 8.6–10.3)
CALCIUM SERPL-MCNC: 8.6 MG/DL (ref 8.6–10.6)
CALCIUM SERPL-MCNC: 8.7 MG/DL (ref 8.6–10.6)
CALCIUM SERPL-MCNC: 8.7 MG/DL (ref 8.6–10.6)
CALCIUM SERPL-MCNC: 8.8 MG/DL (ref 8.6–10.6)
CALCIUM SERPL-MCNC: 8.9 MG/DL (ref 8.6–10.3)
CALCIUM SERPL-MCNC: 8.9 MG/DL (ref 8.6–10.6)
CALCIUM SERPL-MCNC: 8.9 MG/DL (ref 8.6–10.6)
CALCIUM SERPL-MCNC: 9 MG/DL (ref 8.6–10.6)
CALCIUM SERPL-MCNC: 9.1 MG/DL (ref 8.6–10.6)
CALCIUM SERPL-MCNC: 9.2 MG/DL (ref 8.6–10.6)
CALCIUM SERPL-MCNC: 9.2 MG/DL (ref 8.6–10.6)
CALCIUM SERPL-MCNC: 9.3 MG/DL (ref 8.6–10.3)
CALCIUM SERPL-MCNC: 9.3 MG/DL (ref 8.6–10.6)
CALCIUM SERPL-MCNC: 9.4 MG/DL (ref 8.6–10.3)
CALCIUM SERPL-MCNC: 9.4 MG/DL (ref 8.6–10.6)
CALCIUM SERPL-MCNC: 9.5 MG/DL (ref 8.6–10.6)
CALCIUM SERPL-MCNC: 9.6 MG/DL (ref 8.6–10.6)
CALCIUM SERPL-MCNC: 9.6 MG/DL (ref 8.6–10.6)
CALCIUM SERPL-MCNC: 9.7 MG/DL (ref 8.6–10.6)
CALCIUM SERPL-MCNC: 9.9 MG/DL (ref 8.6–10.6)
CALCIUM SERPL-MCNC: 9.9 MG/DL (ref 8.6–10.6)
CARDIAC TROPONIN I PNL SERPL HS: 1008 NG/L (ref 0–53)
CARDIAC TROPONIN I PNL SERPL HS: 467 NG/L (ref 0–20)
CARDIAC TROPONIN I PNL SERPL HS: 604 NG/L (ref 0–20)
CARDIAC TROPONIN I PNL SERPL HS: 614 NG/L (ref 0–20)
CARDIAC TROPONIN I PNL SERPL HS: 650 NG/L (ref 0–20)
CARDIAC TROPONIN I PNL SERPL HS: 664 NG/L (ref 0–20)
CARDIAC TROPONIN I PNL SERPL HS: 771 NG/L (ref 0–20)
CARDIAC TROPONIN I PNL SERPL HS: 845 NG/L (ref 0–53)
CARDIAC TROPONIN I PNL SERPL HS: 873 NG/L (ref 0–20)
CHLORIDE BLD-SCNC: 102 MMOL/L (ref 98–107)
CHLORIDE BLD-SCNC: 104 MMOL/L (ref 98–107)
CHLORIDE BLD-SCNC: 104 MMOL/L (ref 98–107)
CHLORIDE BLD-SCNC: 105 MMOL/L (ref 98–107)
CHLORIDE BLD-SCNC: 106 MMOL/L (ref 98–107)
CHLORIDE BLD-SCNC: 106 MMOL/L (ref 98–107)
CHLORIDE BLD-SCNC: 107 MMOL/L (ref 98–107)
CHLORIDE BLD-SCNC: 109 MMOL/L (ref 98–107)
CHLORIDE BLD-SCNC: 110 MMOL/L (ref 98–107)
CHLORIDE BLDV-SCNC: 111 MMOL/L (ref 98–107)
CHLORIDE BLDV-SCNC: 96 MMOL/L (ref 98–107)
CHLORIDE SERPL-SCNC: 100 MMOL/L (ref 98–107)
CHLORIDE SERPL-SCNC: 100 MMOL/L (ref 98–107)
CHLORIDE SERPL-SCNC: 101 MMOL/L (ref 98–107)
CHLORIDE SERPL-SCNC: 102 MMOL/L (ref 98–107)
CHLORIDE SERPL-SCNC: 102 MMOL/L (ref 98–107)
CHLORIDE SERPL-SCNC: 103 MMOL/L (ref 98–107)
CHLORIDE SERPL-SCNC: 103 MMOL/L (ref 98–107)
CHLORIDE SERPL-SCNC: 106 MMOL/L (ref 98–107)
CHLORIDE SERPL-SCNC: 109 MMOL/L (ref 98–107)
CHLORIDE SERPL-SCNC: 111 MMOL/L (ref 98–107)
CHLORIDE SERPL-SCNC: 112 MMOL/L (ref 98–107)
CHLORIDE SERPL-SCNC: 113 MMOL/L (ref 98–107)
CHLORIDE SERPL-SCNC: 114 MMOL/L (ref 98–107)
CHLORIDE SERPL-SCNC: 92 MMOL/L (ref 98–107)
CHLORIDE SERPL-SCNC: 93 MMOL/L (ref 98–107)
CHLORIDE SERPL-SCNC: 93 MMOL/L (ref 98–107)
CHLORIDE SERPL-SCNC: 94 MMOL/L (ref 98–107)
CHLORIDE SERPL-SCNC: 95 MMOL/L (ref 98–107)
CHLORIDE SERPL-SCNC: 96 MMOL/L (ref 98–107)
CHLORIDE SERPL-SCNC: 97 MMOL/L (ref 98–107)
CHLORIDE SERPL-SCNC: 98 MMOL/L (ref 98–107)
CHLORIDE SERPL-SCNC: 99 MMOL/L (ref 98–107)
CHLORIDE UR-SCNC: 138 MMOL/L
CHLORIDE UR-SCNC: 71 MMOL/L
CHLORIDE/CREATININE (MMOL/G) IN URINE: 72 MMOL/G CREAT (ref 23–275)
CHLORIDE/CREATININE (MMOL/G) IN URINE: 730 MMOL/G CREAT (ref 23–275)
CLARITY FLD: CLEAR
CMV DNA SERPL NAA+PROBE-LOG IU: NORMAL {LOG_IU}/ML
CO2 SERPL-SCNC: 13 MMOL/L (ref 21–32)
CO2 SERPL-SCNC: 13 MMOL/L (ref 21–32)
CO2 SERPL-SCNC: 16 MMOL/L (ref 21–32)
CO2 SERPL-SCNC: 16 MMOL/L (ref 21–32)
CO2 SERPL-SCNC: 18 MMOL/L (ref 21–32)
CO2 SERPL-SCNC: 18 MMOL/L (ref 21–32)
CO2 SERPL-SCNC: 19 MMOL/L (ref 21–32)
CO2 SERPL-SCNC: 20 MMOL/L (ref 21–32)
CO2 SERPL-SCNC: 22 MMOL/L (ref 21–32)
CO2 SERPL-SCNC: 22 MMOL/L (ref 21–32)
CO2 SERPL-SCNC: 23 MMOL/L (ref 21–32)
CO2 SERPL-SCNC: 24 MMOL/L (ref 21–32)
CO2 SERPL-SCNC: 25 MMOL/L (ref 21–32)
CO2 SERPL-SCNC: 26 MMOL/L (ref 21–32)
CO2 SERPL-SCNC: 27 MMOL/L (ref 21–32)
CO2 SERPL-SCNC: 28 MMOL/L (ref 21–32)
CO2 SERPL-SCNC: 28 MMOL/L (ref 21–32)
CO2 SERPL-SCNC: 29 MMOL/L (ref 21–32)
CO2 SERPL-SCNC: 30 MMOL/L (ref 21–32)
CO2 SERPL-SCNC: 30 MMOL/L (ref 21–32)
COLOR FLD: YELLOW
COLOR UR: COLORLESS
COLOR UR: YELLOW
CREAT SERPL-MCNC: 2.2 MG/DL (ref 0.5–1.3)
CREAT SERPL-MCNC: 2.74 MG/DL (ref 0.5–1.3)
CREAT SERPL-MCNC: 3.08 MG/DL (ref 0.5–1.3)
CREAT SERPL-MCNC: 3.23 MG/DL (ref 0.5–1.3)
CREAT SERPL-MCNC: 3.34 MG/DL (ref 0.5–1.3)
CREAT SERPL-MCNC: 3.37 MG/DL (ref 0.5–1.3)
CREAT SERPL-MCNC: 3.41 MG/DL (ref 0.5–1.3)
CREAT SERPL-MCNC: 3.48 MG/DL (ref 0.5–1.3)
CREAT SERPL-MCNC: 3.5 MG/DL (ref 0.5–1.3)
CREAT SERPL-MCNC: 3.53 MG/DL (ref 0.5–1.3)
CREAT SERPL-MCNC: 3.7 MG/DL (ref 0.5–1.3)
CREAT SERPL-MCNC: 3.71 MG/DL (ref 0.5–1.3)
CREAT SERPL-MCNC: 3.74 MG/DL (ref 0.5–1.3)
CREAT SERPL-MCNC: 3.83 MG/DL (ref 0.5–1.3)
CREAT SERPL-MCNC: 3.85 MG/DL (ref 0.5–1.3)
CREAT SERPL-MCNC: 3.96 MG/DL (ref 0.5–1.3)
CREAT SERPL-MCNC: 4.01 MG/DL (ref 0.5–1.3)
CREAT SERPL-MCNC: 4.03 MG/DL (ref 0.5–1.3)
CREAT SERPL-MCNC: 4.06 MG/DL (ref 0.5–1.3)
CREAT SERPL-MCNC: 4.17 MG/DL (ref 0.5–1.3)
CREAT SERPL-MCNC: 4.24 MG/DL (ref 0.5–1.3)
CREAT SERPL-MCNC: 4.28 MG/DL (ref 0.5–1.3)
CREAT SERPL-MCNC: 4.3 MG/DL (ref 0.5–1.3)
CREAT SERPL-MCNC: 4.33 MG/DL (ref 0.5–1.3)
CREAT SERPL-MCNC: 4.36 MG/DL (ref 0.5–1.3)
CREAT SERPL-MCNC: 4.39 MG/DL (ref 0.5–1.3)
CREAT SERPL-MCNC: 4.39 MG/DL (ref 0.5–1.3)
CREAT SERPL-MCNC: 4.46 MG/DL (ref 0.5–1.3)
CREAT SERPL-MCNC: 4.5 MG/DL (ref 0.5–1.3)
CREAT SERPL-MCNC: 4.54 MG/DL (ref 0.5–1.3)
CREAT SERPL-MCNC: 4.68 MG/DL (ref 0.5–1.3)
CREAT SERPL-MCNC: 4.7 MG/DL (ref 0.5–1.3)
CREAT SERPL-MCNC: 4.74 MG/DL (ref 0.5–1.3)
CREAT SERPL-MCNC: 4.74 MG/DL (ref 0.5–1.3)
CREAT SERPL-MCNC: 4.83 MG/DL (ref 0.5–1.3)
CREAT SERPL-MCNC: 4.86 MG/DL (ref 0.5–1.3)
CREAT SERPL-MCNC: 4.91 MG/DL (ref 0.5–1.3)
CREAT SERPL-MCNC: 4.92 MG/DL (ref 0.5–1.3)
CREAT SERPL-MCNC: 5.23 MG/DL (ref 0.5–1.3)
CREAT SERPL-MCNC: 5.26 MG/DL (ref 0.5–1.3)
CREAT SERPL-MCNC: 5.3 MG/DL (ref 0.5–1.3)
CREAT SERPL-MCNC: 5.32 MG/DL (ref 0.5–1.3)
CREAT SERPL-MCNC: 5.49 MG/DL (ref 0.5–1.3)
CREAT SERPL-MCNC: 5.64 MG/DL (ref 0.5–1.3)
CREAT SERPL-MCNC: 5.7 MG/DL (ref 0.5–1.3)
CREAT SERPL-MCNC: 5.85 MG/DL (ref 0.5–1.3)
CREAT UR-MCNC: 18.9 MG/DL (ref 20–370)
CREAT UR-MCNC: 98.6 MG/DL (ref 20–370)
CREAT UR-MCNC: 98.8 MG/DL (ref 20–370)
D DIMER PPP FEU-MCNC: 6880 NG/ML FEU
DISPENSE STATUS: NORMAL
EBV DNA SPEC NAA+PROBE-LOG#: NORMAL {LOG_COPIES}/ML
EGFRCR SERPLBLD CKD-EPI 2021: 10 ML/MIN/1.73M*2
EGFRCR SERPLBLD CKD-EPI 2021: 11 ML/MIN/1.73M*2
EGFRCR SERPLBLD CKD-EPI 2021: 12 ML/MIN/1.73M*2
EGFRCR SERPLBLD CKD-EPI 2021: 13 ML/MIN/1.73M*2
EGFRCR SERPLBLD CKD-EPI 2021: 14 ML/MIN/1.73M*2
EGFRCR SERPLBLD CKD-EPI 2021: 15 ML/MIN/1.73M*2
EGFRCR SERPLBLD CKD-EPI 2021: 16 ML/MIN/1.73M*2
EGFRCR SERPLBLD CKD-EPI 2021: 17 ML/MIN/1.73M*2
EGFRCR SERPLBLD CKD-EPI 2021: 18 ML/MIN/1.73M*2
EGFRCR SERPLBLD CKD-EPI 2021: 19 ML/MIN/1.73M*2
EGFRCR SERPLBLD CKD-EPI 2021: 20 ML/MIN/1.73M*2
EGFRCR SERPLBLD CKD-EPI 2021: 21 ML/MIN/1.73M*2
EGFRCR SERPLBLD CKD-EPI 2021: 25 ML/MIN/1.73M*2
EGFRCR SERPLBLD CKD-EPI 2021: 32 ML/MIN/1.73M*2
EJECTION FRACTION APICAL 4 CHAMBER: 20.9
EJECTION FRACTION APICAL 4 CHAMBER: 23.7
EJECTION FRACTION: 33 %
EOSINOPHIL # BLD AUTO: 0.03 X10*3/UL (ref 0–0.7)
EOSINOPHIL # BLD AUTO: 0.03 X10*3/UL (ref 0–0.7)
EOSINOPHIL # BLD AUTO: 0.06 X10*3/UL (ref 0–0.7)
EOSINOPHIL # BLD AUTO: 0.06 X10*3/UL (ref 0–0.7)
EOSINOPHIL # BLD AUTO: 0.07 X10*3/UL (ref 0–0.7)
EOSINOPHIL # BLD AUTO: 0.08 X10*3/UL (ref 0–0.7)
EOSINOPHIL # BLD AUTO: 0.08 X10*3/UL (ref 0–0.7)
EOSINOPHIL # BLD AUTO: 0.09 X10*3/UL (ref 0–0.7)
EOSINOPHIL # BLD AUTO: 0.09 X10*3/UL (ref 0–0.7)
EOSINOPHIL # BLD AUTO: 0.12 X10*3/UL (ref 0–0.7)
EOSINOPHIL # BLD AUTO: 0.13 X10*3/UL (ref 0–0.7)
EOSINOPHIL # BLD AUTO: 0.16 X10*3/UL (ref 0–0.7)
EOSINOPHIL # BLD AUTO: 0.16 X10*3/UL (ref 0–0.7)
EOSINOPHIL # BLD AUTO: 0.17 X10*3/UL (ref 0–0.7)
EOSINOPHIL # BLD AUTO: 0.2 X10*3/UL (ref 0–0.7)
EOSINOPHIL # BLD MANUAL: 0 X10*3/UL (ref 0–0.7)
EOSINOPHIL # BLD MANUAL: 0.04 X10*3/UL (ref 0–0.7)
EOSINOPHIL # BLD MANUAL: 0.05 X10*3/UL (ref 0–0.7)
EOSINOPHIL # BLD MANUAL: 0.07 X10*3/UL (ref 0–0.7)
EOSINOPHIL # BLD MANUAL: 0.07 X10*3/UL (ref 0–0.7)
EOSINOPHIL # BLD MANUAL: 0.09 X10*3/UL (ref 0–0.7)
EOSINOPHIL # BLD MANUAL: 0.19 X10*3/UL (ref 0–0.7)
EOSINOPHIL NFR BLD AUTO: 0.8 %
EOSINOPHIL NFR BLD AUTO: 1 %
EOSINOPHIL NFR BLD AUTO: 1.1 %
EOSINOPHIL NFR BLD AUTO: 1.5 %
EOSINOPHIL NFR BLD AUTO: 1.6 %
EOSINOPHIL NFR BLD AUTO: 1.7 %
EOSINOPHIL NFR BLD AUTO: 2.2 %
EOSINOPHIL NFR BLD AUTO: 2.2 %
EOSINOPHIL NFR BLD AUTO: 2.3 %
EOSINOPHIL NFR BLD AUTO: 2.4 %
EOSINOPHIL NFR BLD AUTO: 2.4 %
EOSINOPHIL NFR BLD AUTO: 3.2 %
EOSINOPHIL NFR BLD AUTO: 3.7 %
EOSINOPHIL NFR BLD AUTO: 4.3 %
EOSINOPHIL NFR BLD AUTO: 4.5 %
EOSINOPHIL NFR BLD AUTO: 4.6 %
EOSINOPHIL NFR BLD AUTO: 5.8 %
EOSINOPHIL NFR BLD MANUAL: 0 %
EOSINOPHIL NFR BLD MANUAL: 0.9 %
EOSINOPHIL NFR BLD MANUAL: 1 %
EOSINOPHIL NFR BLD MANUAL: 1.8 %
EOSINOPHIL NFR BLD MANUAL: 2 %
EOSINOPHIL NFR BLD MANUAL: 2.6 %
EOSINOPHIL NFR BLD MANUAL: 4.4 %
EOSINOPHIL NFR FLD MANUAL: 0 %
ERYTHROCYTE [DISTWIDTH] IN BLOOD BY AUTOMATED COUNT: 13.9 % (ref 11.5–14.5)
ERYTHROCYTE [DISTWIDTH] IN BLOOD BY AUTOMATED COUNT: 13.9 % (ref 11.5–14.5)
ERYTHROCYTE [DISTWIDTH] IN BLOOD BY AUTOMATED COUNT: 14 % (ref 11.5–14.5)
ERYTHROCYTE [DISTWIDTH] IN BLOOD BY AUTOMATED COUNT: 14 % (ref 11.5–14.5)
ERYTHROCYTE [DISTWIDTH] IN BLOOD BY AUTOMATED COUNT: 14.2 % (ref 11.5–14.5)
ERYTHROCYTE [DISTWIDTH] IN BLOOD BY AUTOMATED COUNT: 14.5 % (ref 11.5–14.5)
ERYTHROCYTE [DISTWIDTH] IN BLOOD BY AUTOMATED COUNT: 14.6 % (ref 11.5–14.5)
ERYTHROCYTE [DISTWIDTH] IN BLOOD BY AUTOMATED COUNT: 14.6 % (ref 11.5–14.5)
ERYTHROCYTE [DISTWIDTH] IN BLOOD BY AUTOMATED COUNT: 14.7 % (ref 11.5–14.5)
ERYTHROCYTE [DISTWIDTH] IN BLOOD BY AUTOMATED COUNT: 14.8 % (ref 11.5–14.5)
ERYTHROCYTE [DISTWIDTH] IN BLOOD BY AUTOMATED COUNT: 14.9 % (ref 11.5–14.5)
ERYTHROCYTE [DISTWIDTH] IN BLOOD BY AUTOMATED COUNT: 14.9 % (ref 11.5–14.5)
ERYTHROCYTE [DISTWIDTH] IN BLOOD BY AUTOMATED COUNT: 15 % (ref 11.5–14.5)
ERYTHROCYTE [DISTWIDTH] IN BLOOD BY AUTOMATED COUNT: 15.6 % (ref 11.5–14.5)
ERYTHROCYTE [DISTWIDTH] IN BLOOD BY AUTOMATED COUNT: 15.6 % (ref 11.5–14.5)
ERYTHROCYTE [DISTWIDTH] IN BLOOD BY AUTOMATED COUNT: 15.9 % (ref 11.5–14.5)
ERYTHROCYTE [DISTWIDTH] IN BLOOD BY AUTOMATED COUNT: 16 % (ref 11.5–14.5)
ERYTHROCYTE [DISTWIDTH] IN BLOOD BY AUTOMATED COUNT: 16 % (ref 11.5–14.5)
ERYTHROCYTE [DISTWIDTH] IN BLOOD BY AUTOMATED COUNT: 16.1 % (ref 11.5–14.5)
ERYTHROCYTE [DISTWIDTH] IN BLOOD BY AUTOMATED COUNT: 16.2 % (ref 11.5–14.5)
ERYTHROCYTE [DISTWIDTH] IN BLOOD BY AUTOMATED COUNT: 16.3 % (ref 11.5–14.5)
ERYTHROCYTE [DISTWIDTH] IN BLOOD BY AUTOMATED COUNT: 16.4 % (ref 11.5–14.5)
ERYTHROCYTE [DISTWIDTH] IN BLOOD BY AUTOMATED COUNT: 16.4 % (ref 11.5–14.5)
ERYTHROCYTE [DISTWIDTH] IN BLOOD BY AUTOMATED COUNT: 16.7 % (ref 11.5–14.5)
ERYTHROCYTE [DISTWIDTH] IN BLOOD BY AUTOMATED COUNT: 16.8 % (ref 11.5–14.5)
ERYTHROCYTE [DISTWIDTH] IN BLOOD BY AUTOMATED COUNT: 16.8 % (ref 11.5–14.5)
ERYTHROCYTE [DISTWIDTH] IN BLOOD BY AUTOMATED COUNT: 17 % (ref 11.5–14.5)
ERYTHROCYTE [DISTWIDTH] IN BLOOD BY AUTOMATED COUNT: 17.1 % (ref 11.5–14.5)
ERYTHROCYTE [DISTWIDTH] IN BLOOD BY AUTOMATED COUNT: 17.1 % (ref 11.5–14.5)
ERYTHROCYTE [DISTWIDTH] IN BLOOD BY AUTOMATED COUNT: 17.2 % (ref 11.5–14.5)
ERYTHROCYTE [DISTWIDTH] IN BLOOD BY AUTOMATED COUNT: 17.3 % (ref 11.5–14.5)
ERYTHROCYTE [DISTWIDTH] IN BLOOD BY AUTOMATED COUNT: 17.4 % (ref 11.5–14.5)
ERYTHROCYTE [DISTWIDTH] IN BLOOD BY AUTOMATED COUNT: 17.5 % (ref 11.5–14.5)
ERYTHROCYTE [DISTWIDTH] IN BLOOD BY AUTOMATED COUNT: 17.6 % (ref 11.5–14.5)
ERYTHROCYTE [DISTWIDTH] IN BLOOD BY AUTOMATED COUNT: 18.5 % (ref 11.5–14.5)
EST. AVERAGE GLUCOSE BLD GHB EST-MCNC: 123 MG/DL
FERRITIN SERPL-MCNC: 764 NG/ML (ref 20–300)
FERRITIN SERPL-MCNC: 822 NG/ML (ref 20–300)
FLUAV RNA RESP QL NAA+PROBE: NOT DETECTED
FLUAV RNA RESP QL NAA+PROBE: NOT DETECTED
FLUBV RNA RESP QL NAA+PROBE: NOT DETECTED
FLUBV RNA RESP QL NAA+PROBE: NOT DETECTED
GAMMA GLOB MFR UR ELPH: 19.5 %
GAMMA GLOBULIN: 1.3 G/DL (ref 0.5–1.4)
GIANT PLATELETS BLD QL SMEAR: ABNORMAL
GLUCOSE BLD MANUAL STRIP-MCNC: 101 MG/DL (ref 74–99)
GLUCOSE BLD MANUAL STRIP-MCNC: 76 MG/DL (ref 74–99)
GLUCOSE BLD MANUAL STRIP-MCNC: 80 MG/DL (ref 74–99)
GLUCOSE BLD-MCNC: 104 MG/DL (ref 74–99)
GLUCOSE BLD-MCNC: 110 MG/DL (ref 74–99)
GLUCOSE BLD-MCNC: 115 MG/DL (ref 74–99)
GLUCOSE BLD-MCNC: 117 MG/DL (ref 74–99)
GLUCOSE BLD-MCNC: 119 MG/DL (ref 74–99)
GLUCOSE BLD-MCNC: 120 MG/DL (ref 74–99)
GLUCOSE BLD-MCNC: 120 MG/DL (ref 74–99)
GLUCOSE BLD-MCNC: 125 MG/DL (ref 74–99)
GLUCOSE BLD-MCNC: 129 MG/DL (ref 74–99)
GLUCOSE BLD-MCNC: 138 MG/DL (ref 74–99)
GLUCOSE BLD-MCNC: 160 MG/DL (ref 74–99)
GLUCOSE BLD-MCNC: 182 MG/DL (ref 74–99)
GLUCOSE BLD-MCNC: 93 MG/DL (ref 74–99)
GLUCOSE BLDV-MCNC: 103 MG/DL (ref 74–99)
GLUCOSE BLDV-MCNC: 106 MG/DL (ref 74–99)
GLUCOSE FLD-MCNC: 115 MG/DL
GLUCOSE SERPL-MCNC: 101 MG/DL (ref 74–99)
GLUCOSE SERPL-MCNC: 101 MG/DL (ref 74–99)
GLUCOSE SERPL-MCNC: 103 MG/DL (ref 74–99)
GLUCOSE SERPL-MCNC: 104 MG/DL (ref 74–99)
GLUCOSE SERPL-MCNC: 104 MG/DL (ref 74–99)
GLUCOSE SERPL-MCNC: 105 MG/DL (ref 74–99)
GLUCOSE SERPL-MCNC: 106 MG/DL (ref 74–99)
GLUCOSE SERPL-MCNC: 106 MG/DL (ref 74–99)
GLUCOSE SERPL-MCNC: 107 MG/DL (ref 74–99)
GLUCOSE SERPL-MCNC: 110 MG/DL (ref 74–99)
GLUCOSE SERPL-MCNC: 112 MG/DL (ref 74–99)
GLUCOSE SERPL-MCNC: 112 MG/DL (ref 74–99)
GLUCOSE SERPL-MCNC: 113 MG/DL (ref 74–99)
GLUCOSE SERPL-MCNC: 113 MG/DL (ref 74–99)
GLUCOSE SERPL-MCNC: 115 MG/DL (ref 74–99)
GLUCOSE SERPL-MCNC: 116 MG/DL (ref 74–99)
GLUCOSE SERPL-MCNC: 117 MG/DL (ref 74–99)
GLUCOSE SERPL-MCNC: 120 MG/DL (ref 74–99)
GLUCOSE SERPL-MCNC: 120 MG/DL (ref 74–99)
GLUCOSE SERPL-MCNC: 122 MG/DL (ref 74–99)
GLUCOSE SERPL-MCNC: 123 MG/DL (ref 74–99)
GLUCOSE SERPL-MCNC: 125 MG/DL (ref 74–99)
GLUCOSE SERPL-MCNC: 126 MG/DL (ref 74–99)
GLUCOSE SERPL-MCNC: 132 MG/DL (ref 74–99)
GLUCOSE SERPL-MCNC: 139 MG/DL (ref 74–99)
GLUCOSE SERPL-MCNC: 145 MG/DL (ref 74–99)
GLUCOSE SERPL-MCNC: 147 MG/DL (ref 74–99)
GLUCOSE SERPL-MCNC: 150 MG/DL (ref 74–99)
GLUCOSE SERPL-MCNC: 159 MG/DL (ref 74–99)
GLUCOSE SERPL-MCNC: 165 MG/DL (ref 74–99)
GLUCOSE SERPL-MCNC: 173 MG/DL (ref 74–99)
GLUCOSE SERPL-MCNC: 178 MG/DL (ref 74–99)
GLUCOSE SERPL-MCNC: 72 MG/DL (ref 74–99)
GLUCOSE SERPL-MCNC: 87 MG/DL (ref 74–99)
GLUCOSE SERPL-MCNC: 88 MG/DL (ref 74–99)
GLUCOSE SERPL-MCNC: 90 MG/DL (ref 74–99)
GLUCOSE SERPL-MCNC: 91 MG/DL (ref 74–99)
GLUCOSE SERPL-MCNC: 91 MG/DL (ref 74–99)
GLUCOSE SERPL-MCNC: 93 MG/DL (ref 74–99)
GLUCOSE SERPL-MCNC: 94 MG/DL (ref 74–99)
GLUCOSE SERPL-MCNC: 95 MG/DL (ref 74–99)
GLUCOSE SERPL-MCNC: 95 MG/DL (ref 74–99)
GLUCOSE SERPL-MCNC: 98 MG/DL (ref 74–99)
GLUCOSE SERPL-MCNC: 99 MG/DL (ref 74–99)
GLUCOSE UR STRIP.AUTO-MCNC: NORMAL MG/DL
GLUCOSE UR STRIP.AUTO-MCNC: NORMAL MG/DL
GRAM STN SPEC: NORMAL
GRAM STN SPEC: NORMAL
HBA1C MFR BLD: 5.9 %
HBV SURFACE AB SER-ACNC: 7.1 MIU/ML
HBV SURFACE AB SER-ACNC: 7.4 MIU/ML
HBV SURFACE AG SERPL QL IA: NONREACTIVE
HBV SURFACE AG SERPL QL IA: NONREACTIVE
HCO3 BLDMV-SCNC: 13.7 MMOL/L (ref 22–26)
HCO3 BLDMV-SCNC: 15.8 MMOL/L (ref 22–26)
HCO3 BLDMV-SCNC: 16.6 MMOL/L (ref 22–26)
HCO3 BLDMV-SCNC: 17.3 MMOL/L (ref 22–26)
HCO3 BLDMV-SCNC: 19 MMOL/L (ref 22–26)
HCO3 BLDMV-SCNC: 19.4 MMOL/L (ref 22–26)
HCO3 BLDMV-SCNC: 20 MMOL/L (ref 22–26)
HCO3 BLDMV-SCNC: 21.4 MMOL/L (ref 22–26)
HCO3 BLDMV-SCNC: 23.1 MMOL/L (ref 22–26)
HCO3 BLDMV-SCNC: 23.9 MMOL/L (ref 22–26)
HCO3 BLDMV-SCNC: 24 MMOL/L (ref 22–26)
HCO3 BLDMV-SCNC: 25.1 MMOL/L (ref 22–26)
HCO3 BLDMV-SCNC: 25.1 MMOL/L (ref 22–26)
HCO3 BLDV-SCNC: 15 MMOL/L (ref 22–26)
HCO3 BLDV-SCNC: 31.3 MMOL/L (ref 22–26)
HCT VFR BLD AUTO: 19.4 % (ref 41–52)
HCT VFR BLD AUTO: 20.2 % (ref 41–52)
HCT VFR BLD AUTO: 21.6 % (ref 41–52)
HCT VFR BLD AUTO: 22.1 % (ref 41–52)
HCT VFR BLD AUTO: 22.6 % (ref 41–52)
HCT VFR BLD AUTO: 22.7 % (ref 41–52)
HCT VFR BLD AUTO: 22.8 % (ref 41–52)
HCT VFR BLD AUTO: 22.8 % (ref 41–52)
HCT VFR BLD AUTO: 23.2 % (ref 41–52)
HCT VFR BLD AUTO: 23.7 % (ref 41–52)
HCT VFR BLD AUTO: 23.7 % (ref 41–52)
HCT VFR BLD AUTO: 23.9 % (ref 41–52)
HCT VFR BLD AUTO: 23.9 % (ref 41–52)
HCT VFR BLD AUTO: 24 % (ref 41–52)
HCT VFR BLD AUTO: 24.1 % (ref 41–52)
HCT VFR BLD AUTO: 24.3 % (ref 41–52)
HCT VFR BLD AUTO: 24.4 % (ref 41–52)
HCT VFR BLD AUTO: 24.5 % (ref 41–52)
HCT VFR BLD AUTO: 24.6 % (ref 41–52)
HCT VFR BLD AUTO: 24.7 % (ref 41–52)
HCT VFR BLD AUTO: 24.8 % (ref 41–52)
HCT VFR BLD AUTO: 24.9 % (ref 41–52)
HCT VFR BLD AUTO: 25 % (ref 41–52)
HCT VFR BLD AUTO: 25.1 % (ref 41–52)
HCT VFR BLD AUTO: 25.1 % (ref 41–52)
HCT VFR BLD AUTO: 25.6 % (ref 41–52)
HCT VFR BLD AUTO: 25.6 % (ref 41–52)
HCT VFR BLD AUTO: 25.7 % (ref 41–52)
HCT VFR BLD AUTO: 25.8 % (ref 41–52)
HCT VFR BLD AUTO: 26.5 % (ref 41–52)
HCT VFR BLD AUTO: 26.8 % (ref 41–52)
HCT VFR BLD AUTO: 27.2 % (ref 41–52)
HCT VFR BLD AUTO: 27.3 % (ref 41–52)
HCT VFR BLD AUTO: 28.1 % (ref 41–52)
HCT VFR BLD AUTO: 29.4 % (ref 41–52)
HCT VFR BLD AUTO: 30.3 % (ref 41–52)
HCT VFR BLD AUTO: 31 % (ref 41–52)
HCT VFR BLD EST: 27 % (ref 41–52)
HCT VFR BLD EST: 28 % (ref 41–52)
HCT VFR BLD EST: 30 % (ref 41–52)
HCT VFR BLD EST: 31 % (ref 41–52)
HCT VFR BLD EST: 32 % (ref 41–52)
HCT VFR BLD EST: 33 % (ref 41–52)
HGB BLD-MCNC: 10.3 G/DL (ref 13.5–17.5)
HGB BLD-MCNC: 10.4 G/DL (ref 13.5–17.5)
HGB BLD-MCNC: 10.6 G/DL (ref 13.5–17.5)
HGB BLD-MCNC: 6.8 G/DL (ref 13.5–17.5)
HGB BLD-MCNC: 7 G/DL (ref 13.5–17.5)
HGB BLD-MCNC: 7.4 G/DL (ref 13.5–17.5)
HGB BLD-MCNC: 7.4 G/DL (ref 13.5–17.5)
HGB BLD-MCNC: 7.7 G/DL (ref 13.5–17.5)
HGB BLD-MCNC: 7.8 G/DL (ref 13.5–17.5)
HGB BLD-MCNC: 7.8 G/DL (ref 13.5–17.5)
HGB BLD-MCNC: 7.9 G/DL (ref 13.5–17.5)
HGB BLD-MCNC: 8 G/DL (ref 13.5–17.5)
HGB BLD-MCNC: 8.1 G/DL (ref 13.5–17.5)
HGB BLD-MCNC: 8.2 G/DL (ref 13.5–17.5)
HGB BLD-MCNC: 8.2 G/DL (ref 13.5–17.5)
HGB BLD-MCNC: 8.3 G/DL (ref 13.5–17.5)
HGB BLD-MCNC: 8.3 G/DL (ref 13.5–17.5)
HGB BLD-MCNC: 8.4 G/DL (ref 13.5–17.5)
HGB BLD-MCNC: 8.4 G/DL (ref 13.5–17.5)
HGB BLD-MCNC: 8.6 G/DL (ref 13.5–17.5)
HGB BLD-MCNC: 8.6 G/DL (ref 13.5–17.5)
HGB BLD-MCNC: 8.7 G/DL (ref 13.5–17.5)
HGB BLD-MCNC: 8.8 G/DL (ref 13.5–17.5)
HGB BLD-MCNC: 9.1 G/DL (ref 13.5–17.5)
HGB BLD-MCNC: 9.2 G/DL (ref 13.5–17.5)
HGB BLD-MCNC: 9.6 G/DL (ref 13.5–17.5)
HGB BLD-MCNC: 9.9 G/DL (ref 13.5–17.5)
HGB BLDMV-MCNC: 10 G/DL (ref 13.5–17.5)
HGB BLDMV-MCNC: 10.1 G/DL (ref 13.5–17.5)
HGB BLDMV-MCNC: 10.2 G/DL (ref 13.5–17.5)
HGB BLDMV-MCNC: 10.2 G/DL (ref 13.5–17.5)
HGB BLDMV-MCNC: 10.3 G/DL (ref 13.5–17.5)
HGB BLDMV-MCNC: 10.4 G/DL (ref 13.5–17.5)
HGB BLDMV-MCNC: 11.1 G/DL (ref 13.5–17.5)
HGB BLDMV-MCNC: 9.1 G/DL (ref 13.5–17.5)
HGB BLDMV-MCNC: 9.3 G/DL (ref 13.5–17.5)
HGB BLDV-MCNC: 10.2 G/DL (ref 13.5–17.5)
HGB BLDV-MCNC: 10.8 G/DL (ref 13.5–17.5)
HLA CLS I TYP PNL BLD/T DONR HIGH RES: NORMAL
HLA RESULTS: NORMAL
HLA RESULTS: NORMAL
HLA-A+B+C AB NFR SER: NORMAL %
HLA-DP+DQ+DR AB NFR SER: NORMAL %
HLA-DP2 QL: NORMAL
HLA-DQB1 HIGH RES: NORMAL
HLA-DRB1 HIGH RES: NORMAL
HOLD SPECIMEN: NORMAL
HOLD SPECIMEN: NORMAL
HOWELL-JOLLY BOD BLD QL SMEAR: PRESENT
HYALINE CASTS #/AREA URNS AUTO: ABNORMAL /LPF
HYPOCHROMIA BLD QL SMEAR: ABNORMAL
IMM GRANULOCYTES # BLD AUTO: 0 X10*3/UL (ref 0–0.7)
IMM GRANULOCYTES # BLD AUTO: 0 X10*3/UL (ref 0–0.7)
IMM GRANULOCYTES # BLD AUTO: 0.01 X10*3/UL (ref 0–0.7)
IMM GRANULOCYTES # BLD AUTO: 0.02 X10*3/UL (ref 0–0.7)
IMM GRANULOCYTES NFR BLD AUTO: 0 % (ref 0–0.9)
IMM GRANULOCYTES NFR BLD AUTO: 0 % (ref 0–0.9)
IMM GRANULOCYTES NFR BLD AUTO: 0.2 % (ref 0–0.9)
IMM GRANULOCYTES NFR BLD AUTO: 0.2 % (ref 0–0.9)
IMM GRANULOCYTES NFR BLD AUTO: 0.3 % (ref 0–0.9)
IMM GRANULOCYTES NFR BLD AUTO: 0.4 % (ref 0–0.9)
IMM GRANULOCYTES NFR BLD AUTO: 0.5 % (ref 0–0.9)
IMMATURE GRANULOCYTES IN FLUID: 0 %
IMMUNOFIXATION COMMENT: NORMAL
IMMUNOFIXATION COMMENT: NORMAL
INHALED O2 CONCENTRATION: 21 %
INR PPP: 1.2 (ref 0.9–1.1)
INR PPP: 1.2 (ref 0.9–1.1)
INR PPP: 1.3 (ref 0.9–1.1)
INR PPP: 1.4 (ref 0.9–1.1)
IRON SATN MFR SERPL: 29 % (ref 25–45)
IRON SATN MFR SERPL: 30 % (ref 25–45)
IRON SERPL-MCNC: 75 UG/DL (ref 35–150)
IRON SERPL-MCNC: 81 UG/DL (ref 35–150)
KAPPA LC SERPL-MCNC: 10.09 MG/DL (ref 0.33–1.94)
KAPPA LC/LAMBDA SER: 1.78 {RATIO} (ref 0.26–1.65)
KETONES UR STRIP.AUTO-MCNC: NEGATIVE MG/DL
KETONES UR STRIP.AUTO-MCNC: NEGATIVE MG/DL
LAB AP ASR DISCLAIMER: NORMAL
LABORATORY COMMENT REPORT: DETECTED
LABORATORY COMMENT REPORT: NORMAL
LABORATORY COMMENT REPORT: NOT DETECTED
LABORATORY COMMENT REPORT: NOT DETECTED
LACTATE BLDMV-SCNC: 0.4 MMOL/L (ref 0.4–2)
LACTATE BLDMV-SCNC: 0.5 MMOL/L (ref 0.4–2)
LACTATE BLDMV-SCNC: 0.6 MMOL/L (ref 0.4–2)
LACTATE BLDMV-SCNC: 0.7 MMOL/L (ref 0.4–2)
LACTATE BLDMV-SCNC: 0.8 MMOL/L (ref 0.4–2)
LACTATE BLDV-SCNC: 1 MMOL/L (ref 0.4–2)
LACTATE BLDV-SCNC: 1.8 MMOL/L (ref 0.4–2)
LACTATE SERPL-SCNC: 1.2 MMOL/L (ref 0.4–2)
LAMBDA LC SERPL-MCNC: 5.67 MG/DL (ref 0.57–2.63)
LDH FLD L TO P-CCNC: 90 U/L
LDH SERPL L TO P-CCNC: 200 U/L (ref 84–246)
LEFT ATRIUM VOLUME AREA LENGTH INDEX BSA: 49.1 ML/M2
LEFT VENTRICLE INTERNAL DIMENSION DIASTOLE: 5.7 CM (ref 3.5–6)
LEFT VENTRICLE INTERNAL DIMENSION DIASTOLE: 6.3 CM (ref 3.5–6)
LEFT VENTRICULAR OUTFLOW TRACT DIAMETER: 2.1 CM
LEUKOCYTE ESTERASE UR QL STRIP.AUTO: NEGATIVE
LEUKOCYTE ESTERASE UR QL STRIP.AUTO: NEGATIVE
LV EJECTION FRACTION BIPLANE: 26 %
LYMPHOCYTES # BLD AUTO: 0.51 X10*3/UL (ref 1.2–4.8)
LYMPHOCYTES # BLD AUTO: 0.52 X10*3/UL (ref 1.2–4.8)
LYMPHOCYTES # BLD AUTO: 0.53 X10*3/UL (ref 1.2–4.8)
LYMPHOCYTES # BLD AUTO: 0.56 X10*3/UL (ref 1.2–4.8)
LYMPHOCYTES # BLD AUTO: 0.57 X10*3/UL (ref 1.2–4.8)
LYMPHOCYTES # BLD AUTO: 0.58 X10*3/UL (ref 1.2–4.8)
LYMPHOCYTES # BLD AUTO: 0.59 X10*3/UL (ref 1.2–4.8)
LYMPHOCYTES # BLD AUTO: 0.6 X10*3/UL (ref 1.2–4.8)
LYMPHOCYTES # BLD AUTO: 0.65 X10*3/UL (ref 1.2–4.8)
LYMPHOCYTES # BLD AUTO: 0.67 X10*3/UL (ref 1.2–4.8)
LYMPHOCYTES # BLD AUTO: 0.69 X10*3/UL (ref 1.2–4.8)
LYMPHOCYTES # BLD AUTO: 0.73 X10*3/UL (ref 1.2–4.8)
LYMPHOCYTES # BLD AUTO: 0.74 X10*3/UL (ref 1.2–4.8)
LYMPHOCYTES # BLD AUTO: 0.82 X10*3/UL (ref 1.2–4.8)
LYMPHOCYTES # BLD AUTO: 0.83 X10*3/UL (ref 1.2–4.8)
LYMPHOCYTES # BLD MANUAL: 0.27 X10*3/UL (ref 1.2–4.8)
LYMPHOCYTES # BLD MANUAL: 0.36 X10*3/UL (ref 1.2–4.8)
LYMPHOCYTES # BLD MANUAL: 0.45 X10*3/UL (ref 1.2–4.8)
LYMPHOCYTES # BLD MANUAL: 0.48 X10*3/UL (ref 1.2–4.8)
LYMPHOCYTES # BLD MANUAL: 0.55 X10*3/UL (ref 1.2–4.8)
LYMPHOCYTES # BLD MANUAL: 0.58 X10*3/UL (ref 1.2–4.8)
LYMPHOCYTES # BLD MANUAL: 0.68 X10*3/UL (ref 1.2–4.8)
LYMPHOCYTES # BLD MANUAL: 0.69 X10*3/UL (ref 1.2–4.8)
LYMPHOCYTES # BLD MANUAL: 0.69 X10*3/UL (ref 1.2–4.8)
LYMPHOCYTES # BLD MANUAL: 0.83 X10*3/UL (ref 1.2–4.8)
LYMPHOCYTES NFR BLD AUTO: 11.1 %
LYMPHOCYTES NFR BLD AUTO: 12.2 %
LYMPHOCYTES NFR BLD AUTO: 13.2 %
LYMPHOCYTES NFR BLD AUTO: 13.3 %
LYMPHOCYTES NFR BLD AUTO: 14.5 %
LYMPHOCYTES NFR BLD AUTO: 14.9 %
LYMPHOCYTES NFR BLD AUTO: 15.4 %
LYMPHOCYTES NFR BLD AUTO: 15.5 %
LYMPHOCYTES NFR BLD AUTO: 15.5 %
LYMPHOCYTES NFR BLD AUTO: 15.9 %
LYMPHOCYTES NFR BLD AUTO: 18.5 %
LYMPHOCYTES NFR BLD AUTO: 20.7 %
LYMPHOCYTES NFR BLD AUTO: 21.4 %
LYMPHOCYTES NFR BLD AUTO: 21.8 %
LYMPHOCYTES NFR BLD AUTO: 22 %
LYMPHOCYTES NFR BLD AUTO: 22.2 %
LYMPHOCYTES NFR BLD AUTO: 22.6 %
LYMPHOCYTES NFR BLD MANUAL: 10.4 %
LYMPHOCYTES NFR BLD MANUAL: 10.7 %
LYMPHOCYTES NFR BLD MANUAL: 14.1 %
LYMPHOCYTES NFR BLD MANUAL: 14.2 %
LYMPHOCYTES NFR BLD MANUAL: 16.8 %
LYMPHOCYTES NFR BLD MANUAL: 19 %
LYMPHOCYTES NFR BLD MANUAL: 21 %
LYMPHOCYTES NFR BLD MANUAL: 25 %
LYMPHOCYTES NFR BLD MANUAL: 5.2 %
LYMPHOCYTES NFR BLD MANUAL: 9.5 %
LYMPHOCYTES NFR FLD MANUAL: 59 %
MAGNESIUM SERPL-MCNC: 1.24 MG/DL (ref 1.6–2.4)
MAGNESIUM SERPL-MCNC: 1.6 MG/DL (ref 1.6–2.4)
MAGNESIUM SERPL-MCNC: 1.61 MG/DL (ref 1.6–2.4)
MAGNESIUM SERPL-MCNC: 1.62 MG/DL (ref 1.6–2.4)
MAGNESIUM SERPL-MCNC: 1.64 MG/DL (ref 1.6–2.4)
MAGNESIUM SERPL-MCNC: 1.64 MG/DL (ref 1.6–2.4)
MAGNESIUM SERPL-MCNC: 1.67 MG/DL (ref 1.6–2.4)
MAGNESIUM SERPL-MCNC: 1.68 MG/DL (ref 1.6–2.4)
MAGNESIUM SERPL-MCNC: 1.69 MG/DL (ref 1.6–2.4)
MAGNESIUM SERPL-MCNC: 1.7 MG/DL (ref 1.6–2.4)
MAGNESIUM SERPL-MCNC: 1.7 MG/DL (ref 1.6–2.4)
MAGNESIUM SERPL-MCNC: 1.71 MG/DL (ref 1.6–2.4)
MAGNESIUM SERPL-MCNC: 1.73 MG/DL (ref 1.6–2.4)
MAGNESIUM SERPL-MCNC: 1.74 MG/DL (ref 1.6–2.4)
MAGNESIUM SERPL-MCNC: 1.79 MG/DL (ref 1.6–2.4)
MAGNESIUM SERPL-MCNC: 1.8 MG/DL (ref 1.6–2.4)
MAGNESIUM SERPL-MCNC: 1.81 MG/DL (ref 1.6–2.4)
MAGNESIUM SERPL-MCNC: 1.84 MG/DL (ref 1.6–2.4)
MAGNESIUM SERPL-MCNC: 1.85 MG/DL (ref 1.6–2.4)
MAGNESIUM SERPL-MCNC: 1.87 MG/DL (ref 1.6–2.4)
MAGNESIUM SERPL-MCNC: 1.91 MG/DL (ref 1.6–2.4)
MAGNESIUM SERPL-MCNC: 1.92 MG/DL (ref 1.6–2.4)
MAGNESIUM SERPL-MCNC: 1.94 MG/DL (ref 1.6–2.4)
MAGNESIUM SERPL-MCNC: 1.94 MG/DL (ref 1.6–2.4)
MAGNESIUM SERPL-MCNC: 1.97 MG/DL (ref 1.6–2.4)
MAGNESIUM SERPL-MCNC: 1.98 MG/DL (ref 1.6–2.4)
MAGNESIUM SERPL-MCNC: 2 MG/DL (ref 1.6–2.4)
MAGNESIUM SERPL-MCNC: 2 MG/DL (ref 1.6–2.4)
MAGNESIUM SERPL-MCNC: 2.04 MG/DL (ref 1.6–2.4)
MAGNESIUM SERPL-MCNC: 2.05 MG/DL (ref 1.6–2.4)
MAGNESIUM SERPL-MCNC: 2.05 MG/DL (ref 1.6–2.4)
MAGNESIUM SERPL-MCNC: 2.06 MG/DL (ref 1.6–2.4)
MAGNESIUM SERPL-MCNC: 2.09 MG/DL (ref 1.6–2.4)
MAGNESIUM SERPL-MCNC: 2.1 MG/DL (ref 1.6–2.4)
MAGNESIUM SERPL-MCNC: 2.12 MG/DL (ref 1.6–2.4)
MAGNESIUM SERPL-MCNC: 2.13 MG/DL (ref 1.6–2.4)
MAGNESIUM SERPL-MCNC: 2.13 MG/DL (ref 1.6–2.4)
MAGNESIUM SERPL-MCNC: 2.17 MG/DL (ref 1.6–2.4)
MAGNESIUM SERPL-MCNC: 2.18 MG/DL (ref 1.6–2.4)
MAGNESIUM SERPL-MCNC: 2.26 MG/DL (ref 1.6–2.4)
MAGNESIUM SERPL-MCNC: 2.28 MG/DL (ref 1.6–2.4)
MAGNESIUM SERPL-MCNC: 2.29 MG/DL (ref 1.6–2.4)
MAGNESIUM SERPL-MCNC: 2.72 MG/DL (ref 1.6–2.4)
MCH RBC QN AUTO: 32.1 PG (ref 26–34)
MCH RBC QN AUTO: 32.5 PG (ref 26–34)
MCH RBC QN AUTO: 32.5 PG (ref 26–34)
MCH RBC QN AUTO: 32.6 PG (ref 26–34)
MCH RBC QN AUTO: 32.7 PG (ref 26–34)
MCH RBC QN AUTO: 32.8 PG (ref 26–34)
MCH RBC QN AUTO: 32.8 PG (ref 26–34)
MCH RBC QN AUTO: 32.9 PG (ref 26–34)
MCH RBC QN AUTO: 33.2 PG (ref 26–34)
MCH RBC QN AUTO: 33.3 PG (ref 26–34)
MCH RBC QN AUTO: 33.5 PG (ref 26–34)
MCH RBC QN AUTO: 33.6 PG (ref 26–34)
MCH RBC QN AUTO: 33.6 PG (ref 26–34)
MCH RBC QN AUTO: 33.7 PG (ref 26–34)
MCH RBC QN AUTO: 33.7 PG (ref 26–34)
MCH RBC QN AUTO: 33.8 PG (ref 26–34)
MCH RBC QN AUTO: 33.8 PG (ref 26–34)
MCH RBC QN AUTO: 33.9 PG (ref 26–34)
MCH RBC QN AUTO: 34.1 PG (ref 26–34)
MCH RBC QN AUTO: 34.2 PG (ref 26–34)
MCH RBC QN AUTO: 34.3 PG (ref 26–34)
MCH RBC QN AUTO: 34.5 PG (ref 26–34)
MCH RBC QN AUTO: 34.8 PG (ref 26–34)
MCHC RBC AUTO-ENTMCNC: 30.6 G/DL (ref 32–36)
MCHC RBC AUTO-ENTMCNC: 31 G/DL (ref 32–36)
MCHC RBC AUTO-ENTMCNC: 32.1 G/DL (ref 32–36)
MCHC RBC AUTO-ENTMCNC: 32.1 G/DL (ref 32–36)
MCHC RBC AUTO-ENTMCNC: 32.4 G/DL (ref 32–36)
MCHC RBC AUTO-ENTMCNC: 32.6 G/DL (ref 32–36)
MCHC RBC AUTO-ENTMCNC: 32.7 G/DL (ref 32–36)
MCHC RBC AUTO-ENTMCNC: 32.8 G/DL (ref 32–36)
MCHC RBC AUTO-ENTMCNC: 32.8 G/DL (ref 32–36)
MCHC RBC AUTO-ENTMCNC: 32.9 G/DL (ref 32–36)
MCHC RBC AUTO-ENTMCNC: 33.2 G/DL (ref 32–36)
MCHC RBC AUTO-ENTMCNC: 33.2 G/DL (ref 32–36)
MCHC RBC AUTO-ENTMCNC: 33.3 G/DL (ref 32–36)
MCHC RBC AUTO-ENTMCNC: 33.3 G/DL (ref 32–36)
MCHC RBC AUTO-ENTMCNC: 33.5 G/DL (ref 32–36)
MCHC RBC AUTO-ENTMCNC: 33.6 G/DL (ref 32–36)
MCHC RBC AUTO-ENTMCNC: 33.7 G/DL (ref 32–36)
MCHC RBC AUTO-ENTMCNC: 33.7 G/DL (ref 32–36)
MCHC RBC AUTO-ENTMCNC: 33.8 G/DL (ref 32–36)
MCHC RBC AUTO-ENTMCNC: 33.9 G/DL (ref 32–36)
MCHC RBC AUTO-ENTMCNC: 34 G/DL (ref 32–36)
MCHC RBC AUTO-ENTMCNC: 34.1 G/DL (ref 32–36)
MCHC RBC AUTO-ENTMCNC: 34.2 G/DL (ref 32–36)
MCHC RBC AUTO-ENTMCNC: 34.3 G/DL (ref 32–36)
MCHC RBC AUTO-ENTMCNC: 34.3 G/DL (ref 32–36)
MCHC RBC AUTO-ENTMCNC: 34.4 G/DL (ref 32–36)
MCHC RBC AUTO-ENTMCNC: 34.5 G/DL (ref 32–36)
MCHC RBC AUTO-ENTMCNC: 34.6 G/DL (ref 32–36)
MCHC RBC AUTO-ENTMCNC: 34.7 G/DL (ref 32–36)
MCHC RBC AUTO-ENTMCNC: 34.8 G/DL (ref 32–36)
MCHC RBC AUTO-ENTMCNC: 35.1 G/DL (ref 32–36)
MCHC RBC AUTO-ENTMCNC: 35.2 G/DL (ref 32–36)
MCHC RBC AUTO-ENTMCNC: 35.4 G/DL (ref 32–36)
MCHC RBC AUTO-ENTMCNC: 35.9 G/DL (ref 32–36)
MCHC RBC AUTO-ENTMCNC: 36.3 G/DL (ref 32–36)
MCHC RBC AUTO-ENTMCNC: 36.4 G/DL (ref 32–36)
MCHC RBC AUTO-ENTMCNC: 37.1 G/DL (ref 32–36)
MCHC RBC AUTO-ENTMCNC: 37.4 G/DL (ref 32–36)
MCV RBC AUTO: 100 FL (ref 80–100)
MCV RBC AUTO: 101 FL (ref 80–100)
MCV RBC AUTO: 102 FL (ref 80–100)
MCV RBC AUTO: 103 FL (ref 80–100)
MCV RBC AUTO: 104 FL (ref 80–100)
MCV RBC AUTO: 104 FL (ref 80–100)
MCV RBC AUTO: 105 FL (ref 80–100)
MCV RBC AUTO: 106 FL (ref 80–100)
MCV RBC AUTO: 90 FL (ref 80–100)
MCV RBC AUTO: 91 FL (ref 80–100)
MCV RBC AUTO: 92 FL (ref 80–100)
MCV RBC AUTO: 93 FL (ref 80–100)
MCV RBC AUTO: 95 FL (ref 80–100)
MCV RBC AUTO: 96 FL (ref 80–100)
MCV RBC AUTO: 96 FL (ref 80–100)
MCV RBC AUTO: 97 FL (ref 80–100)
MCV RBC AUTO: 98 FL (ref 80–100)
MCV RBC AUTO: 98 FL (ref 80–100)
MCV RBC AUTO: 99 FL (ref 80–100)
MONOCYTES # BLD AUTO: 0.47 X10*3/UL (ref 0.1–1)
MONOCYTES # BLD AUTO: 0.48 X10*3/UL (ref 0.1–1)
MONOCYTES # BLD AUTO: 0.55 X10*3/UL (ref 0.1–1)
MONOCYTES # BLD AUTO: 0.56 X10*3/UL (ref 0.1–1)
MONOCYTES # BLD AUTO: 0.57 X10*3/UL (ref 0.1–1)
MONOCYTES # BLD AUTO: 0.58 X10*3/UL (ref 0.1–1)
MONOCYTES # BLD AUTO: 0.6 X10*3/UL (ref 0.1–1)
MONOCYTES # BLD AUTO: 0.6 X10*3/UL (ref 0.1–1)
MONOCYTES # BLD AUTO: 0.62 X10*3/UL (ref 0.1–1)
MONOCYTES # BLD AUTO: 0.63 X10*3/UL (ref 0.1–1)
MONOCYTES # BLD AUTO: 0.65 X10*3/UL (ref 0.1–1)
MONOCYTES # BLD AUTO: 0.66 X10*3/UL (ref 0.1–1)
MONOCYTES # BLD AUTO: 0.71 X10*3/UL (ref 0.1–1)
MONOCYTES # BLD AUTO: 0.8 X10*3/UL (ref 0.1–1)
MONOCYTES # BLD MANUAL: 0.23 X10*3/UL (ref 0.1–1)
MONOCYTES # BLD MANUAL: 0.23 X10*3/UL (ref 0.1–1)
MONOCYTES # BLD MANUAL: 0.27 X10*3/UL (ref 0.1–1)
MONOCYTES # BLD MANUAL: 0.3 X10*3/UL (ref 0.1–1)
MONOCYTES # BLD MANUAL: 0.31 X10*3/UL (ref 0.1–1)
MONOCYTES # BLD MANUAL: 0.32 X10*3/UL (ref 0.1–1)
MONOCYTES # BLD MANUAL: 0.34 X10*3/UL (ref 0.1–1)
MONOCYTES # BLD MANUAL: 0.36 X10*3/UL (ref 0.1–1)
MONOCYTES # BLD MANUAL: 0.39 X10*3/UL (ref 0.1–1)
MONOCYTES # BLD MANUAL: 0.65 X10*3/UL (ref 0.1–1)
MONOCYTES NFR BLD AUTO: 12.5 %
MONOCYTES NFR BLD AUTO: 12.5 %
MONOCYTES NFR BLD AUTO: 12.7 %
MONOCYTES NFR BLD AUTO: 13 %
MONOCYTES NFR BLD AUTO: 14.2 %
MONOCYTES NFR BLD AUTO: 14.5 %
MONOCYTES NFR BLD AUTO: 15.2 %
MONOCYTES NFR BLD AUTO: 16 %
MONOCYTES NFR BLD AUTO: 16.5 %
MONOCYTES NFR BLD AUTO: 16.6 %
MONOCYTES NFR BLD AUTO: 17.6 %
MONOCYTES NFR BLD AUTO: 18 %
MONOCYTES NFR BLD AUTO: 18 %
MONOCYTES NFR BLD AUTO: 18.1 %
MONOCYTES NFR BLD AUTO: 18.9 %
MONOCYTES NFR BLD AUTO: 19.1 %
MONOCYTES NFR BLD AUTO: 19.3 %
MONOCYTES NFR BLD MANUAL: 18 %
MONOCYTES NFR BLD MANUAL: 6 %
MONOCYTES NFR BLD MANUAL: 7 %
MONOCYTES NFR BLD MANUAL: 7.7 %
MONOCYTES NFR BLD MANUAL: 8 %
MONOCYTES NFR BLD MANUAL: 8.8 %
MONOCYTES NFR BLD MANUAL: 9.7 %
MONOS+MACROS NFR FLD MANUAL: 32 %
MUCOUS THREADS #/AREA URNS AUTO: ABNORMAL /LPF
MYCOPHENOLATE SERPL-MCNC: 4 UG/ML (ref 1–3.5)
NEUTROPHILS # BLD AUTO: 1.53 X10*3/UL (ref 1.2–7.7)
NEUTROPHILS # BLD AUTO: 1.8 X10*3/UL (ref 1.2–7.7)
NEUTROPHILS # BLD AUTO: 1.84 X10*3/UL (ref 1.2–7.7)
NEUTROPHILS # BLD AUTO: 1.89 X10*3/UL (ref 1.2–7.7)
NEUTROPHILS # BLD AUTO: 1.89 X10*3/UL (ref 1.2–7.7)
NEUTROPHILS # BLD AUTO: 1.91 X10*3/UL (ref 1.2–7.7)
NEUTROPHILS # BLD AUTO: 2.11 X10*3/UL (ref 1.2–7.7)
NEUTROPHILS # BLD AUTO: 2.17 X10*3/UL (ref 1.2–7.7)
NEUTROPHILS # BLD AUTO: 2.41 X10*3/UL (ref 1.2–7.7)
NEUTROPHILS # BLD AUTO: 2.56 X10*3/UL (ref 1.2–7.7)
NEUTROPHILS # BLD AUTO: 2.58 X10*3/UL (ref 1.2–7.7)
NEUTROPHILS # BLD AUTO: 2.66 X10*3/UL (ref 1.2–7.7)
NEUTROPHILS # BLD AUTO: 2.68 X10*3/UL (ref 1.2–7.7)
NEUTROPHILS # BLD AUTO: 2.98 X10*3/UL (ref 1.2–7.7)
NEUTROPHILS # BLD AUTO: 3.24 X10*3/UL (ref 1.2–7.7)
NEUTROPHILS # BLD AUTO: 3.39 X10*3/UL (ref 1.2–7.7)
NEUTROPHILS # BLD AUTO: 3.9 X10*3/UL (ref 1.2–7.7)
NEUTROPHILS # BLD MANUAL: 2.25 X10*3/UL (ref 1.2–7.7)
NEUTROPHILS # BLD MANUAL: 2.73 X10*3/UL (ref 1.2–7.7)
NEUTROPHILS # BLD MANUAL: 3.29 X10*3/UL (ref 1.2–7.7)
NEUTROPHILS # BLD MANUAL: 3.74 X10*3/UL (ref 1.2–7.7)
NEUTROPHILS NFR BLD AUTO: 53.1 %
NEUTROPHILS NFR BLD AUTO: 55.1 %
NEUTROPHILS NFR BLD AUTO: 56.4 %
NEUTROPHILS NFR BLD AUTO: 56.8 %
NEUTROPHILS NFR BLD AUTO: 56.9 %
NEUTROPHILS NFR BLD AUTO: 58.5 %
NEUTROPHILS NFR BLD AUTO: 59.2 %
NEUTROPHILS NFR BLD AUTO: 61 %
NEUTROPHILS NFR BLD AUTO: 64.2 %
NEUTROPHILS NFR BLD AUTO: 66 %
NEUTROPHILS NFR BLD AUTO: 67.8 %
NEUTROPHILS NFR BLD AUTO: 68.2 %
NEUTROPHILS NFR BLD AUTO: 68.6 %
NEUTROPHILS NFR BLD AUTO: 69.1 %
NEUTROPHILS NFR BLD AUTO: 71 %
NEUTROPHILS NFR BLD AUTO: 72.3 %
NEUTROPHILS NFR BLD AUTO: 73.6 %
NEUTROPHILS NFR FLD MANUAL: 9 %
NEUTS BAND # BLD MANUAL: 0.07 X10*3/UL (ref 0–0.7)
NEUTS BAND # BLD MANUAL: 0.11 X10*3/UL (ref 0–0.7)
NEUTS BAND # BLD MANUAL: 0.12 X10*3/UL (ref 0–0.7)
NEUTS BAND # BLD MANUAL: 0.22 X10*3/UL (ref 0–0.7)
NEUTS BAND NFR BLD MANUAL: 2 %
NEUTS BAND NFR BLD MANUAL: 2.6 %
NEUTS BAND NFR BLD MANUAL: 3.6 %
NEUTS BAND NFR BLD MANUAL: 4.4 %
NEUTS SEG # BLD MANUAL: 2.16 X10*3/UL (ref 1.2–7)
NEUTS SEG # BLD MANUAL: 2.18 X10*3/UL (ref 1.2–7)
NEUTS SEG # BLD MANUAL: 2.22 X10*3/UL (ref 1.2–7)
NEUTS SEG # BLD MANUAL: 2.24 X10*3/UL (ref 1.2–7)
NEUTS SEG # BLD MANUAL: 2.61 X10*3/UL (ref 1.2–7)
NEUTS SEG # BLD MANUAL: 3.08 X10*3/UL (ref 1.2–7)
NEUTS SEG # BLD MANUAL: 3.18 X10*3/UL (ref 1.2–7)
NEUTS SEG # BLD MANUAL: 3.52 X10*3/UL (ref 1.2–7)
NEUTS SEG # BLD MANUAL: 4.18 X10*3/UL (ref 1.2–7)
NEUTS SEG # BLD MANUAL: 4.48 X10*3/UL (ref 1.2–7)
NEUTS SEG NFR BLD MANUAL: 60 %
NEUTS SEG NFR BLD MANUAL: 66 %
NEUTS SEG NFR BLD MANUAL: 67.3 %
NEUTS SEG NFR BLD MANUAL: 68 %
NEUTS SEG NFR BLD MANUAL: 71.9 %
NEUTS SEG NFR BLD MANUAL: 73.9 %
NEUTS SEG NFR BLD MANUAL: 75.2 %
NEUTS SEG NFR BLD MANUAL: 76.8 %
NEUTS SEG NFR BLD MANUAL: 81.9 %
NEUTS SEG NFR BLD MANUAL: 86.2 %
NITRITE UR QL STRIP.AUTO: NEGATIVE
NITRITE UR QL STRIP.AUTO: NEGATIVE
NRBC BLD-RTO: 0 /100 WBCS (ref 0–0)
NRBC BLD-RTO: 0.4 /100 WBCS (ref 0–0)
NRBC BLD-RTO: 0.5 /100 WBCS (ref 0–0)
NRBC BLD-RTO: 0.5 /100 WBCS (ref 0–0)
NRBC BLD-RTO: 0.6 /100 WBCS (ref 0–0)
NRBC BLD-RTO: 0.6 /100 WBCS (ref 0–0)
NRBC BLD-RTO: 0.7 /100 WBCS (ref 0–0)
NRBC BLD-RTO: 0.8 /100 WBCS (ref 0–0)
NRBC BLD-RTO: 1 /100 WBCS (ref 0–0)
NRBC BLD-RTO: 1 /100 WBCS (ref 0–0)
NRBC BLD-RTO: 1.1 /100 WBCS (ref 0–0)
NRBC BLD-RTO: 1.1 /100 WBCS (ref 0–0)
NRBC BLD-RTO: 1.4 /100 WBCS (ref 0–0)
NRBC BLD-RTO: 1.5 /100 WBCS (ref 0–0)
OSMOLALITY UR: 328 MOSM/KG (ref 200–1200)
OTHER CELLS NFR FLD MANUAL: 0 %
OVALOCYTES BLD QL SMEAR: ABNORMAL
OXYHGB MFR BLDMV: 64.2 % (ref 45–75)
OXYHGB MFR BLDMV: 67.4 % (ref 45–75)
OXYHGB MFR BLDMV: 67.8 % (ref 45–75)
OXYHGB MFR BLDMV: 68.5 % (ref 45–75)
OXYHGB MFR BLDMV: 69.2 % (ref 45–75)
OXYHGB MFR BLDMV: 69.7 % (ref 45–75)
OXYHGB MFR BLDMV: 70.2 % (ref 45–75)
OXYHGB MFR BLDMV: 70.3 % (ref 45–75)
OXYHGB MFR BLDMV: 71.6 % (ref 45–75)
OXYHGB MFR BLDMV: 72.6 % (ref 45–75)
OXYHGB MFR BLDMV: 73 % (ref 45–75)
OXYHGB MFR BLDMV: 74.9 % (ref 45–75)
OXYHGB MFR BLDMV: 76 % (ref 45–75)
OXYHGB MFR BLDV: 27 % (ref 45–75)
OXYHGB MFR BLDV: 56.1 % (ref 45–75)
P AXIS: -46 DEGREES
P AXIS: -58 DEGREES
P AXIS: 245 DEGREES
P AXIS: 50 DEGREES
P AXIS: 60 DEGREES
P AXIS: 76 DEGREES
P OFFSET: 208 MS
P OFFSET: 211 MS
P OFFSET: 216 MS
P ONSET: 144 MS
P ONSET: 173 MS
P ONSET: 174 MS
PATH REPORT.ADDENDUM SPEC: NORMAL
PATH REPORT.COMMENTS IMP SPEC: NORMAL
PATH REPORT.FINAL DX SPEC: NORMAL
PATH REPORT.GROSS SPEC: NORMAL
PATH REPORT.MICROSCOPIC SPEC OTHER STN: NORMAL
PATH REPORT.MICROSCOPIC SPEC OTHER STN: NORMAL
PATH REPORT.RELEVANT HX SPEC: NORMAL
PATH REPORT.TOTAL CANCER: NORMAL
PATH REVIEW - SERUM IMMUNOFIXATION: NORMAL
PATH REVIEW - URINE IMMUNOFIXATION: NORMAL
PATH REVIEW-SERUM PROTEIN ELECTROPHORESIS: NORMAL
PATH REVIEW-URINE PROTEIN ELECTROPHORESIS: NORMAL
PCO2 BLDMV: 26 MM HG (ref 41–51)
PCO2 BLDMV: 28 MM HG (ref 41–51)
PCO2 BLDMV: 28 MM HG (ref 41–51)
PCO2 BLDMV: 30 MM HG (ref 41–51)
PCO2 BLDMV: 30 MM HG (ref 41–51)
PCO2 BLDMV: 32 MM HG (ref 41–51)
PCO2 BLDMV: 33 MM HG (ref 41–51)
PCO2 BLDMV: 33 MM HG (ref 41–51)
PCO2 BLDMV: 34 MM HG (ref 41–51)
PCO2 BLDMV: 36 MM HG (ref 41–51)
PCO2 BLDMV: 37 MM HG (ref 41–51)
PCO2 BLDV: 32 MM HG (ref 41–51)
PCO2 BLDV: 43 MM HG (ref 41–51)
PH BLDMV: 7.33 PH (ref 7.33–7.43)
PH BLDMV: 7.36 PH (ref 7.33–7.43)
PH BLDMV: 7.37 PH (ref 7.33–7.43)
PH BLDMV: 7.38 PH (ref 7.33–7.43)
PH BLDMV: 7.39 PH (ref 7.33–7.43)
PH BLDMV: 7.39 PH (ref 7.33–7.43)
PH BLDMV: 7.41 PH (ref 7.33–7.43)
PH BLDMV: 7.42 PH (ref 7.33–7.43)
PH BLDMV: 7.42 PH (ref 7.33–7.43)
PH BLDMV: 7.43 PH (ref 7.33–7.43)
PH BLDMV: 7.44 PH (ref 7.33–7.43)
PH BLDV: 7.28 PH (ref 7.33–7.43)
PH BLDV: 7.47 PH (ref 7.33–7.43)
PH FLD: 5.45 [PH]
PH UR STRIP.AUTO: 5 [PH]
PH UR STRIP.AUTO: 5.5 [PH]
PHOSPHATE SERPL-MCNC: 2.5 MG/DL (ref 2.5–4.9)
PHOSPHATE SERPL-MCNC: 2.8 MG/DL (ref 2.5–4.9)
PHOSPHATE SERPL-MCNC: 2.9 MG/DL (ref 2.5–4.9)
PHOSPHATE SERPL-MCNC: 2.9 MG/DL (ref 2.5–4.9)
PHOSPHATE SERPL-MCNC: 3.1 MG/DL (ref 2.5–4.9)
PHOSPHATE SERPL-MCNC: 3.2 MG/DL (ref 2.5–4.9)
PHOSPHATE SERPL-MCNC: 3.3 MG/DL (ref 2.5–4.9)
PHOSPHATE SERPL-MCNC: 3.5 MG/DL (ref 2.5–4.9)
PHOSPHATE SERPL-MCNC: 3.5 MG/DL (ref 2.5–4.9)
PHOSPHATE SERPL-MCNC: 3.6 MG/DL (ref 2.5–4.9)
PHOSPHATE SERPL-MCNC: 3.6 MG/DL (ref 2.5–4.9)
PHOSPHATE SERPL-MCNC: 3.7 MG/DL (ref 2.5–4.9)
PHOSPHATE SERPL-MCNC: 3.8 MG/DL (ref 2.5–4.9)
PHOSPHATE SERPL-MCNC: 3.8 MG/DL (ref 2.5–4.9)
PHOSPHATE SERPL-MCNC: 3.9 MG/DL (ref 2.5–4.9)
PHOSPHATE SERPL-MCNC: 4 MG/DL (ref 2.5–4.9)
PHOSPHATE SERPL-MCNC: 4.1 MG/DL (ref 2.5–4.9)
PHOSPHATE SERPL-MCNC: 4.1 MG/DL (ref 2.5–4.9)
PHOSPHATE SERPL-MCNC: 4.2 MG/DL (ref 2.5–4.9)
PHOSPHATE SERPL-MCNC: 4.3 MG/DL (ref 2.5–4.9)
PHOSPHATE SERPL-MCNC: 4.3 MG/DL (ref 2.5–4.9)
PHOSPHATE SERPL-MCNC: 4.4 MG/DL (ref 2.5–4.9)
PHOSPHATE SERPL-MCNC: 4.4 MG/DL (ref 2.5–4.9)
PHOSPHATE SERPL-MCNC: 4.6 MG/DL (ref 2.5–4.9)
PHOSPHATE SERPL-MCNC: 4.7 MG/DL (ref 2.5–4.9)
PHOSPHATE SERPL-MCNC: 4.8 MG/DL (ref 2.5–4.9)
PHOSPHATE SERPL-MCNC: 4.8 MG/DL (ref 2.5–4.9)
PHOSPHATE SERPL-MCNC: 4.9 MG/DL (ref 2.5–4.9)
PHOSPHATE SERPL-MCNC: 4.9 MG/DL (ref 2.5–4.9)
PHOSPHATE SERPL-MCNC: 5 MG/DL (ref 2.5–4.9)
PHOSPHATE SERPL-MCNC: 5 MG/DL (ref 2.5–4.9)
PLASMA CELLS NFR FLD MANUAL: 0 %
PLATELET # BLD AUTO: 103 X10*3/UL (ref 150–450)
PLATELET # BLD AUTO: 103 X10*3/UL (ref 150–450)
PLATELET # BLD AUTO: 105 X10*3/UL (ref 150–450)
PLATELET # BLD AUTO: 105 X10*3/UL (ref 150–450)
PLATELET # BLD AUTO: 107 X10*3/UL (ref 150–450)
PLATELET # BLD AUTO: 109 X10*3/UL (ref 150–450)
PLATELET # BLD AUTO: 110 X10*3/UL (ref 150–450)
PLATELET # BLD AUTO: 113 X10*3/UL (ref 150–450)
PLATELET # BLD AUTO: 114 X10*3/UL (ref 150–450)
PLATELET # BLD AUTO: 116 X10*3/UL (ref 150–450)
PLATELET # BLD AUTO: 117 X10*3/UL (ref 150–450)
PLATELET # BLD AUTO: 119 X10*3/UL (ref 150–450)
PLATELET # BLD AUTO: 122 X10*3/UL (ref 150–450)
PLATELET # BLD AUTO: 131 X10*3/UL (ref 150–450)
PLATELET # BLD AUTO: 133 X10*3/UL (ref 150–450)
PLATELET # BLD AUTO: 141 X10*3/UL (ref 150–450)
PLATELET # BLD AUTO: 142 X10*3/UL (ref 150–450)
PLATELET # BLD AUTO: 143 X10*3/UL (ref 150–450)
PLATELET # BLD AUTO: 145 X10*3/UL (ref 150–450)
PLATELET # BLD AUTO: 145 X10*3/UL (ref 150–450)
PLATELET # BLD AUTO: 149 X10*3/UL (ref 150–450)
PLATELET # BLD AUTO: 153 X10*3/UL (ref 150–450)
PLATELET # BLD AUTO: 161 X10*3/UL (ref 150–450)
PLATELET # BLD AUTO: 163 X10*3/UL (ref 150–450)
PLATELET # BLD AUTO: 167 X10*3/UL (ref 150–450)
PLATELET # BLD AUTO: 303 X10*3/UL (ref 150–450)
PLATELET # BLD AUTO: 73 X10*3/UL (ref 150–450)
PLATELET # BLD AUTO: 78 X10*3/UL (ref 150–450)
PLATELET # BLD AUTO: 80 X10*3/UL (ref 150–450)
PLATELET # BLD AUTO: 80 X10*3/UL (ref 150–450)
PLATELET # BLD AUTO: 89 X10*3/UL (ref 150–450)
PLATELET # BLD AUTO: 90 X10*3/UL (ref 150–450)
PLATELET # BLD AUTO: 90 X10*3/UL (ref 150–450)
PLATELET # BLD AUTO: 92 X10*3/UL (ref 150–450)
PLATELET # BLD AUTO: 94 X10*3/UL (ref 150–450)
PLATELET # BLD AUTO: 95 X10*3/UL (ref 150–450)
PLATELET # BLD AUTO: 96 X10*3/UL (ref 150–450)
PLATELET # BLD AUTO: 97 X10*3/UL (ref 150–450)
PLATELET # BLD AUTO: 98 X10*3/UL (ref 150–450)
PO2 BLDMV: 43 MM HG (ref 35–45)
PO2 BLDMV: 46 MM HG (ref 35–45)
PO2 BLDMV: 47 MM HG (ref 35–45)
PO2 BLDMV: 48 MM HG (ref 35–45)
PO2 BLDMV: 49 MM HG (ref 35–45)
PO2 BLDMV: 50 MM HG (ref 35–45)
PO2 BLDMV: 51 MM HG (ref 35–45)
PO2 BLDV: 27 MM HG (ref 35–45)
PO2 BLDV: 41 MM HG (ref 35–45)
POLYCHROMASIA BLD QL SMEAR: ABNORMAL
POLYCHROMASIA BLD QL SMEAR: ABNORMAL
POLYCHROMASIA BLD QL SMEAR: NORMAL
POTASSIUM BLDMV-SCNC: 4.4 MMOL/L (ref 3.5–5.3)
POTASSIUM BLDMV-SCNC: 4.4 MMOL/L (ref 3.5–5.3)
POTASSIUM BLDMV-SCNC: 4.5 MMOL/L (ref 3.5–5.3)
POTASSIUM BLDMV-SCNC: 4.7 MMOL/L (ref 3.5–5.3)
POTASSIUM BLDMV-SCNC: 4.8 MMOL/L (ref 3.5–5.3)
POTASSIUM BLDMV-SCNC: 5 MMOL/L (ref 3.5–5.3)
POTASSIUM BLDV-SCNC: 2.9 MMOL/L (ref 3.5–5.3)
POTASSIUM BLDV-SCNC: 4.8 MMOL/L (ref 3.5–5.3)
POTASSIUM SERPL-SCNC: 2.8 MMOL/L (ref 3.5–5.3)
POTASSIUM SERPL-SCNC: 2.9 MMOL/L (ref 3.5–5.3)
POTASSIUM SERPL-SCNC: 3.4 MMOL/L (ref 3.5–5.3)
POTASSIUM SERPL-SCNC: 3.5 MMOL/L (ref 3.5–5.3)
POTASSIUM SERPL-SCNC: 3.6 MMOL/L (ref 3.5–5.3)
POTASSIUM SERPL-SCNC: 3.7 MMOL/L (ref 3.5–5.3)
POTASSIUM SERPL-SCNC: 3.8 MMOL/L (ref 3.5–5.3)
POTASSIUM SERPL-SCNC: 3.9 MMOL/L (ref 3.5–5.3)
POTASSIUM SERPL-SCNC: 4 MMOL/L (ref 3.5–5.3)
POTASSIUM SERPL-SCNC: 4.1 MMOL/L (ref 3.5–5.3)
POTASSIUM SERPL-SCNC: 4.2 MMOL/L (ref 3.5–5.3)
POTASSIUM SERPL-SCNC: 4.3 MMOL/L (ref 3.5–5.3)
POTASSIUM SERPL-SCNC: 4.4 MMOL/L (ref 3.5–5.3)
POTASSIUM SERPL-SCNC: 4.5 MMOL/L (ref 3.5–5.3)
POTASSIUM SERPL-SCNC: 4.6 MMOL/L (ref 3.5–5.3)
POTASSIUM SERPL-SCNC: 4.6 MMOL/L (ref 3.5–5.3)
POTASSIUM SERPL-SCNC: 4.7 MMOL/L (ref 3.5–5.3)
POTASSIUM SERPL-SCNC: 4.8 MMOL/L (ref 3.5–5.3)
POTASSIUM SERPL-SCNC: 4.9 MMOL/L (ref 3.5–5.3)
POTASSIUM SERPL-SCNC: 4.9 MMOL/L (ref 3.5–5.3)
POTASSIUM SERPL-SCNC: 5 MMOL/L (ref 3.5–5.3)
POTASSIUM UR-SCNC: 26 MMOL/L
POTASSIUM UR-SCNC: 8 MMOL/L
POTASSIUM/CREAT UR-RTO: 26 MMOL/G CREAT
POTASSIUM/CREAT UR-RTO: 42 MMOL/G CREAT
PR INTERVAL: 148 MS
PR INTERVAL: 251 MS
PR INTERVAL: 380 MS
PR INTERVAL: 45 MS
PR INTERVAL: 84 MS
PROCALCITONIN SERPL-MCNC: 0.08 NG/ML
PRODUCT BLOOD TYPE: 5100
PRODUCT CODE: NORMAL
PROT FLD-MCNC: 3.6 G/DL
PROT SERPL-MCNC: 4.5 G/DL (ref 6.4–8.2)
PROT SERPL-MCNC: 6.5 G/DL (ref 6.4–8.2)
PROT SERPL-MCNC: 6.6 G/DL (ref 6.4–8.2)
PROT SERPL-MCNC: 6.7 G/DL (ref 6.4–8.2)
PROT SERPL-MCNC: 6.8 G/DL (ref 6.4–8.2)
PROT SERPL-MCNC: 6.9 G/DL (ref 6.4–8.2)
PROT SERPL-MCNC: 7 G/DL (ref 6.4–8.2)
PROT SERPL-MCNC: 7 G/DL (ref 6.4–8.2)
PROT SERPL-MCNC: 7.1 G/DL (ref 6.4–8.2)
PROT SERPL-MCNC: 7.2 G/DL (ref 6.4–8.2)
PROT SERPL-MCNC: 7.3 G/DL (ref 6.4–8.2)
PROT SERPL-MCNC: 7.3 G/DL (ref 6.4–8.2)
PROT SERPL-MCNC: 7.4 G/DL (ref 6.4–8.2)
PROT SERPL-MCNC: 7.6 G/DL (ref 6.4–8.2)
PROT UR STRIP.AUTO-MCNC: ABNORMAL MG/DL
PROT UR STRIP.AUTO-MCNC: NEGATIVE MG/DL
PROT UR-ACNC: 21 MG/DL (ref 5–25)
PROT UR-ACNC: 6 MG/DL (ref 5–25)
PROT/CREAT UR: 0.06 MG/MG CREAT (ref 0–0.17)
PROTEIN ELECTROPHORESIS COMMENT: NORMAL
PROTHROMBIN TIME: 13.3 SECONDS (ref 9.8–12.8)
PROTHROMBIN TIME: 13.7 SECONDS (ref 9.8–12.8)
PROTHROMBIN TIME: 14.2 SECONDS (ref 9.8–12.8)
PROTHROMBIN TIME: 14.4 SECONDS (ref 9.8–12.8)
PROTHROMBIN TIME: 14.6 SECONDS (ref 9.8–12.8)
PROTHROMBIN TIME: 15.2 SECONDS (ref 9.8–12.8)
PROTHROMBIN TIME: 15.3 SECONDS (ref 9.8–12.8)
PTH-INTACT SERPL-MCNC: 148.5 PG/ML (ref 18.5–88)
Q ONSET: 174 MS
Q ONSET: 183 MS
Q ONSET: 184 MS
Q ONSET: 188 MS
Q ONSET: 201 MS
Q ONSET: 213 MS
Q ONSET: 216 MS
Q ONSET: 218 MS
Q ONSET: 249 MS
Q ONSET: 249 MS
Q ONSET: 253 MS
QRS COUNT: 10 BEATS
QRS COUNT: 10 BEATS
QRS COUNT: 11 BEATS
QRS COUNT: 12 BEATS
QRS COUNT: 13 BEATS
QRS COUNT: 9 BEATS
QRS COUNT: 9 BEATS
QRS DURATION: 122 MS
QRS DURATION: 162 MS
QRS DURATION: 164 MS
QRS DURATION: 168 MS
QRS DURATION: 172 MS
QRS DURATION: 174 MS
QRS DURATION: 174 MS
QRS DURATION: 186 MS
QRS DURATION: 208 MS
QRS DURATION: 90 MS
QRS DURATION: 92 MS
QT INTERVAL: 396 MS
QT INTERVAL: 418 MS
QT INTERVAL: 422 MS
QT INTERVAL: 486 MS
QT INTERVAL: 486 MS
QT INTERVAL: 492 MS
QT INTERVAL: 502 MS
QT INTERVAL: 508 MS
QT INTERVAL: 513 MS
QT INTERVAL: 522 MS
QT INTERVAL: 542 MS
QTC CALCULATION(BAZETT): 442 MS
QTC CALCULATION(BAZETT): 456 MS
QTC CALCULATION(BAZETT): 468 MS
QTC CALCULATION(BAZETT): 482 MS
QTC CALCULATION(BAZETT): 486 MS
QTC CALCULATION(BAZETT): 513 MS
QTC CALCULATION(BAZETT): 526 MS
QTC CALCULATION(BAZETT): 530 MS
QTC CALCULATION(BAZETT): 559 MS
QTC CALCULATION(BAZETT): 571 MS
QTC CALCULATION(BAZETT): 588 MS
QTC FREDERICIA: 436 MS
QTC FREDERICIA: 436 MS
QTC FREDERICIA: 460 MS
QTC FREDERICIA: 481 MS
QTC FREDERICIA: 486 MS
QTC FREDERICIA: 504 MS
QTC FREDERICIA: 521 MS
QTC FREDERICIA: 521 MS
QTC FREDERICIA: 543 MS
QTC FREDERICIA: 547 MS
QTC FREDERICIA: 573 MS
R AXIS: -11 DEGREES
R AXIS: -34 DEGREES
R AXIS: -73 DEGREES
R AXIS: -76 DEGREES
R AXIS: 0 DEGREES
R AXIS: 124 DEGREES
R AXIS: 172 DEGREES
R AXIS: 211 DEGREES
R AXIS: 216 DEGREES
R AXIS: 229 DEGREES
R AXIS: 230 DEGREES
RBC # BLD AUTO: 2.02 X10*6/UL (ref 4.5–5.9)
RBC # BLD AUTO: 2.19 X10*6/UL (ref 4.5–5.9)
RBC # BLD AUTO: 2.23 X10*6/UL (ref 4.5–5.9)
RBC # BLD AUTO: 2.26 X10*6/UL (ref 4.5–5.9)
RBC # BLD AUTO: 2.28 X10*6/UL (ref 4.5–5.9)
RBC # BLD AUTO: 2.28 X10*6/UL (ref 4.5–5.9)
RBC # BLD AUTO: 2.31 X10*6/UL (ref 4.5–5.9)
RBC # BLD AUTO: 2.31 X10*6/UL (ref 4.5–5.9)
RBC # BLD AUTO: 2.32 X10*6/UL (ref 4.5–5.9)
RBC # BLD AUTO: 2.33 X10*6/UL (ref 4.5–5.9)
RBC # BLD AUTO: 2.35 X10*6/UL (ref 4.5–5.9)
RBC # BLD AUTO: 2.37 X10*6/UL (ref 4.5–5.9)
RBC # BLD AUTO: 2.37 X10*6/UL (ref 4.5–5.9)
RBC # BLD AUTO: 2.39 X10*6/UL (ref 4.5–5.9)
RBC # BLD AUTO: 2.39 X10*6/UL (ref 4.5–5.9)
RBC # BLD AUTO: 2.4 X10*6/UL (ref 4.5–5.9)
RBC # BLD AUTO: 2.4 X10*6/UL (ref 4.5–5.9)
RBC # BLD AUTO: 2.41 X10*6/UL (ref 4.5–5.9)
RBC # BLD AUTO: 2.42 X10*6/UL (ref 4.5–5.9)
RBC # BLD AUTO: 2.42 X10*6/UL (ref 4.5–5.9)
RBC # BLD AUTO: 2.43 X10*6/UL (ref 4.5–5.9)
RBC # BLD AUTO: 2.44 X10*6/UL (ref 4.5–5.9)
RBC # BLD AUTO: 2.45 X10*6/UL (ref 4.5–5.9)
RBC # BLD AUTO: 2.45 X10*6/UL (ref 4.5–5.9)
RBC # BLD AUTO: 2.5 X10*6/UL (ref 4.5–5.9)
RBC # BLD AUTO: 2.51 X10*6/UL (ref 4.5–5.9)
RBC # BLD AUTO: 2.52 X10*6/UL (ref 4.5–5.9)
RBC # BLD AUTO: 2.53 X10*6/UL (ref 4.5–5.9)
RBC # BLD AUTO: 2.56 X10*6/UL (ref 4.5–5.9)
RBC # BLD AUTO: 2.57 X10*6/UL (ref 4.5–5.9)
RBC # BLD AUTO: 2.58 X10*6/UL (ref 4.5–5.9)
RBC # BLD AUTO: 2.58 X10*6/UL (ref 4.5–5.9)
RBC # BLD AUTO: 2.67 X10*6/UL (ref 4.5–5.9)
RBC # BLD AUTO: 2.83 X10*6/UL (ref 4.5–5.9)
RBC # BLD AUTO: 2.83 X10*6/UL (ref 4.5–5.9)
RBC # BLD AUTO: 2.92 X10*6/UL (ref 4.5–5.9)
RBC # BLD AUTO: 3.02 X10*6/UL (ref 4.5–5.9)
RBC # BLD AUTO: 3.02 X10*6/UL (ref 4.5–5.9)
RBC # BLD AUTO: 3.08 X10*6/UL (ref 4.5–5.9)
RBC # BLD AUTO: 3.1 X10*6/UL (ref 4.5–5.9)
RBC # BLD AUTO: 3.13 X10*6/UL (ref 4.5–5.9)
RBC # FLD MANUAL: 1000 /UL
RBC # UR STRIP.AUTO: ABNORMAL /UL
RBC # UR STRIP.AUTO: NEGATIVE /UL
RBC #/AREA URNS AUTO: ABNORMAL /HPF
RBC #/AREA URNS AUTO: NORMAL /HPF
RBC MORPH BLD: ABNORMAL
RBC MORPH BLD: NORMAL
RBC MORPH BLD: NORMAL
RH FACTOR (ANTIGEN D): NORMAL
RIGHT VENTRICLE FREE WALL PEAK S': 7.38 CM/S
RIGHT VENTRICLE PEAK SYSTOLIC PRESSURE: 42.7 MMHG
RIGHT VENTRICLE PEAK SYSTOLIC PRESSURE: 59.4 MMHG
SAO2 % BLDMV: 65 % (ref 45–75)
SAO2 % BLDMV: 69 % (ref 45–75)
SAO2 % BLDMV: 69 % (ref 45–75)
SAO2 % BLDMV: 70 % (ref 45–75)
SAO2 % BLDMV: 71 % (ref 45–75)
SAO2 % BLDMV: 71 % (ref 45–75)
SAO2 % BLDMV: 72 % (ref 45–75)
SAO2 % BLDMV: 72 % (ref 45–75)
SAO2 % BLDMV: 73 % (ref 45–75)
SAO2 % BLDMV: 74 % (ref 45–75)
SAO2 % BLDMV: 74 % (ref 45–75)
SAO2 % BLDMV: 76 % (ref 45–75)
SAO2 % BLDMV: 78 % (ref 45–75)
SAO2 % BLDV: 27 % (ref 45–75)
SAO2 % BLDV: 57 % (ref 45–75)
SARS-COV-2 RNA RESP QL NAA+PROBE: NOT DETECTED
SARS-COV-2 RNA RESP QL NAA+PROBE: NOT DETECTED
SCAN RESULT: ABNORMAL
SCHISTOCYTES BLD QL SMEAR: ABNORMAL
SODIUM BLDMV-SCNC: 134 MMOL/L (ref 136–145)
SODIUM BLDMV-SCNC: 135 MMOL/L (ref 136–145)
SODIUM BLDMV-SCNC: 136 MMOL/L (ref 136–145)
SODIUM BLDMV-SCNC: 137 MMOL/L (ref 136–145)
SODIUM BLDMV-SCNC: 137 MMOL/L (ref 136–145)
SODIUM BLDMV-SCNC: 138 MMOL/L (ref 136–145)
SODIUM BLDMV-SCNC: 139 MMOL/L (ref 136–145)
SODIUM BLDV-SCNC: 133 MMOL/L (ref 136–145)
SODIUM BLDV-SCNC: 138 MMOL/L (ref 136–145)
SODIUM SERPL-SCNC: 131 MMOL/L (ref 136–145)
SODIUM SERPL-SCNC: 131 MMOL/L (ref 136–145)
SODIUM SERPL-SCNC: 132 MMOL/L (ref 136–145)
SODIUM SERPL-SCNC: 133 MMOL/L (ref 136–145)
SODIUM SERPL-SCNC: 134 MMOL/L (ref 136–145)
SODIUM SERPL-SCNC: 135 MMOL/L (ref 136–145)
SODIUM SERPL-SCNC: 136 MMOL/L (ref 136–145)
SODIUM SERPL-SCNC: 137 MMOL/L (ref 136–145)
SODIUM SERPL-SCNC: 138 MMOL/L (ref 136–145)
SODIUM SERPL-SCNC: 139 MMOL/L (ref 136–145)
SODIUM SERPL-SCNC: 140 MMOL/L (ref 136–145)
SODIUM SERPL-SCNC: 141 MMOL/L (ref 136–145)
SODIUM SERPL-SCNC: 141 MMOL/L (ref 136–145)
SODIUM UR-SCNC: 127 MMOL/L
SODIUM UR-SCNC: 72 MMOL/L
SODIUM/CREAT UR-RTO: 672 MMOL/G CREAT
SODIUM/CREAT UR-RTO: 73 MMOL/G CREAT
SP GR UR STRIP.AUTO: 1.01
SP GR UR STRIP.AUTO: 1.02
T AXIS: 100 DEGREES
T AXIS: 105 DEGREES
T AXIS: 21 DEGREES
T AXIS: 29 DEGREES
T AXIS: 36 DEGREES
T AXIS: 56 DEGREES
T AXIS: 60 DEGREES
T AXIS: 64 DEGREES
T AXIS: 64 DEGREES
T AXIS: 65 DEGREES
T AXIS: 95 DEGREES
T OFFSET: 416 MS
T OFFSET: 417 MS
T OFFSET: 424 MS
T OFFSET: 425 MS
T OFFSET: 430 MS
T OFFSET: 444 MS
T OFFSET: 444 MS
T OFFSET: 459 MS
T OFFSET: 500 MS
T OFFSET: 503 MS
T OFFSET: 509 MS
TACROLIMUS BLD-MCNC: 10.5 NG/ML
TACROLIMUS BLD-MCNC: 10.8 NG/ML
TACROLIMUS BLD-MCNC: 11.3 NG/ML
TACROLIMUS BLD-MCNC: 11.6 NG/ML
TACROLIMUS BLD-MCNC: 12.2 NG/ML
TACROLIMUS BLD-MCNC: 13.5 NG/ML
TACROLIMUS BLD-MCNC: 14.2 NG/ML
TACROLIMUS BLD-MCNC: 15.6 NG/ML
TACROLIMUS BLD-MCNC: 4.3 NG/ML
TACROLIMUS BLD-MCNC: 4.4 NG/ML
TACROLIMUS BLD-MCNC: 4.5 NG/ML
TACROLIMUS BLD-MCNC: 4.8 NG/ML
TACROLIMUS BLD-MCNC: 4.9 NG/ML
TACROLIMUS BLD-MCNC: 5.6 NG/ML
TACROLIMUS BLD-MCNC: 5.8 NG/ML
TACROLIMUS BLD-MCNC: 6.1 NG/ML
TACROLIMUS BLD-MCNC: 6.2 NG/ML
TACROLIMUS BLD-MCNC: 6.3 NG/ML
TACROLIMUS BLD-MCNC: 6.5 NG/ML
TACROLIMUS BLD-MCNC: 6.6 NG/ML
TACROLIMUS BLD-MCNC: 6.8 NG/ML
TACROLIMUS BLD-MCNC: 7.2 NG/ML
TACROLIMUS BLD-MCNC: 7.7 NG/ML
TACROLIMUS BLD-MCNC: 8.3 NG/ML
TACROLIMUS BLD-MCNC: 8.5 NG/ML
TACROLIMUS BLD-MCNC: 9.3 NG/ML
TACROLIMUS BLD-MCNC: 9.9 NG/ML
TARGETS BLD QL SMEAR: ABNORMAL
TARGETS BLD QL SMEAR: NORMAL
TIBC SERPL-MCNC: 252 UG/DL (ref 240–445)
TIBC SERPL-MCNC: 283 UG/DL (ref 240–445)
TOTAL CELLS COUNTED BLD: 100
TOTAL CELLS COUNTED BLD: 112
TOTAL CELLS COUNTED BLD: 113
TOTAL CELLS COUNTED BLD: 113
TOTAL CELLS COUNTED BLD: 114
TOTAL CELLS COUNTED BLD: 115
TOTAL CELLS COUNTED BLD: 116
TOTAL CELLS COUNTED BLD: 116
TOTAL CELLS COUNTED FLD: 100
TRICUSPID ANNULAR PLANE SYSTOLIC EXCURSION: 1 CM
TRIGL FLD-MCNC: 35 MG/DL
UFH PPP CHRO-ACNC: 0.4 IU/ML
UFH PPP CHRO-ACNC: <0.1 IU/ML
UIBC SERPL-MCNC: 177 UG/DL (ref 110–370)
UIBC SERPL-MCNC: 202 UG/DL (ref 110–370)
UNIT ABO: NORMAL
UNIT NUMBER: NORMAL
UNIT RH: NORMAL
UNIT VOLUME: 350
URINE ELECTROPHORESIS COMMENT: NORMAL
UROBILINOGEN UR STRIP.AUTO-MCNC: NORMAL MG/DL
UROBILINOGEN UR STRIP.AUTO-MCNC: NORMAL MG/DL
VARIANT LYMPHS # BLD MANUAL: 0.05 X10*3/UL (ref 0–0.5)
VARIANT LYMPHS # BLD MANUAL: 0.07 X10*3/UL (ref 0–0.5)
VARIANT LYMPHS # BLD MANUAL: 0.09 X10*3/UL (ref 0–0.5)
VARIANT LYMPHS # BLD MANUAL: 0.13 X10*3/UL (ref 0–0.5)
VARIANT LYMPHS NFR BLD: 0.9 %
VARIANT LYMPHS NFR BLD: 1.7 %
VARIANT LYMPHS NFR BLD: 2.6 %
VARIANT LYMPHS NFR BLD: 2.7 %
VENTRICULAR RATE: 51 BPM
VENTRICULAR RATE: 60 BPM
VENTRICULAR RATE: 63 BPM
VENTRICULAR RATE: 66 BPM
VENTRICULAR RATE: 67 BPM
VENTRICULAR RATE: 67 BPM
VENTRICULAR RATE: 69 BPM
VENTRICULAR RATE: 71 BPM
VENTRICULAR RATE: 80 BPM
VENTRICULAR RATE: 80 BPM
VENTRICULAR RATE: 81 BPM
WBC # BLD AUTO: 2.8 X10*3/UL (ref 4.4–11.3)
WBC # BLD AUTO: 2.9 X10*3/UL (ref 4.4–11.3)
WBC # BLD AUTO: 3 X10*3/UL (ref 4.4–11.3)
WBC # BLD AUTO: 3 X10*3/UL (ref 4.4–11.3)
WBC # BLD AUTO: 3.1 X10*3/UL (ref 4.4–11.3)
WBC # BLD AUTO: 3.2 X10*3/UL (ref 4.4–11.3)
WBC # BLD AUTO: 3.2 X10*3/UL (ref 4.4–11.3)
WBC # BLD AUTO: 3.3 X10*3/UL (ref 4.4–11.3)
WBC # BLD AUTO: 3.4 X10*3/UL (ref 4.4–11.3)
WBC # BLD AUTO: 3.5 X10*3/UL (ref 4.4–11.3)
WBC # BLD AUTO: 3.6 X10*3/UL (ref 4.4–11.3)
WBC # BLD AUTO: 3.7 X10*3/UL (ref 4.4–11.3)
WBC # BLD AUTO: 3.8 X10*3/UL (ref 4.4–11.3)
WBC # BLD AUTO: 3.9 X10*3/UL (ref 4.4–11.3)
WBC # BLD AUTO: 3.9 X10*3/UL (ref 4.4–11.3)
WBC # BLD AUTO: 4 X10*3/UL (ref 4.4–11.3)
WBC # BLD AUTO: 4.1 X10*3/UL (ref 4.4–11.3)
WBC # BLD AUTO: 4.3 X10*3/UL (ref 4.4–11.3)
WBC # BLD AUTO: 4.3 X10*3/UL (ref 4.4–11.3)
WBC # BLD AUTO: 4.5 X10*3/UL (ref 4.4–11.3)
WBC # BLD AUTO: 4.5 X10*3/UL (ref 4.4–11.3)
WBC # BLD AUTO: 4.6 X10*3/UL (ref 4.4–11.3)
WBC # BLD AUTO: 4.6 X10*3/UL (ref 4.4–11.3)
WBC # BLD AUTO: 4.7 X10*3/UL (ref 4.4–11.3)
WBC # BLD AUTO: 4.9 X10*3/UL (ref 4.4–11.3)
WBC # BLD AUTO: 5.1 X10*3/UL (ref 4.4–11.3)
WBC # BLD AUTO: 5.2 X10*3/UL (ref 4.4–11.3)
WBC # BLD AUTO: 5.6 X10*3/UL (ref 4.4–11.3)
WBC # BLD AUTO: 5.9 X10*3/UL (ref 4.4–11.3)
WBC # FLD MANUAL: 83 /UL
WBC #/AREA URNS AUTO: ABNORMAL /HPF
WBC #/AREA URNS AUTO: NORMAL /HPF
XM INTEP: NORMAL

## 2024-01-01 PROCEDURE — 84132 ASSAY OF SERUM POTASSIUM: CPT

## 2024-01-01 PROCEDURE — 85027 COMPLETE CBC AUTOMATED: CPT

## 2024-01-01 PROCEDURE — 83735 ASSAY OF MAGNESIUM: CPT

## 2024-01-01 PROCEDURE — 1090000001 HH PPS REVENUE CREDIT

## 2024-01-01 PROCEDURE — 1159F MED LIST DOCD IN RCRD: CPT | Performed by: STUDENT IN AN ORGANIZED HEALTH CARE EDUCATION/TRAINING PROGRAM

## 2024-01-01 PROCEDURE — 1090000002 HH PPS REVENUE DEBIT

## 2024-01-01 PROCEDURE — 1126F AMNT PAIN NOTED NONE PRSNT: CPT | Performed by: INTERNAL MEDICINE

## 2024-01-01 PROCEDURE — 99233 SBSQ HOSP IP/OBS HIGH 50: CPT | Performed by: INTERNAL MEDICINE

## 2024-01-01 PROCEDURE — 3074F SYST BP LT 130 MM HG: CPT | Performed by: STUDENT IN AN ORGANIZED HEALTH CARE EDUCATION/TRAINING PROGRAM

## 2024-01-01 PROCEDURE — 2780000003 HC OR 278 NO HCPCS: Performed by: INTERNAL MEDICINE

## 2024-01-01 PROCEDURE — 5A1D70Z PERFORMANCE OF URINARY FILTRATION, INTERMITTENT, LESS THAN 6 HOURS PER DAY: ICD-10-PCS | Performed by: INTERNAL MEDICINE

## 2024-01-01 PROCEDURE — 2500000004 HC RX 250 GENERAL PHARMACY W/ HCPCS (ALT 636 FOR OP/ED): Performed by: INTERNAL MEDICINE

## 2024-01-01 PROCEDURE — 36415 COLL VENOUS BLD VENIPUNCTURE: CPT | Performed by: INTERNAL MEDICINE

## 2024-01-01 PROCEDURE — 85027 COMPLETE CBC AUTOMATED: CPT | Performed by: INTERNAL MEDICINE

## 2024-01-01 PROCEDURE — 80069 RENAL FUNCTION PANEL: CPT | Performed by: INTERNAL MEDICINE

## 2024-01-01 PROCEDURE — 93308 TTE F-UP OR LMTD: CPT

## 2024-01-01 PROCEDURE — 88341 IMHCHEM/IMCYTCHM EA ADD ANTB: CPT | Performed by: PATHOLOGY

## 2024-01-01 PROCEDURE — 3061F NEG MICROALBUMINURIA REV: CPT | Performed by: INTERNAL MEDICINE

## 2024-01-01 PROCEDURE — 84132 ASSAY OF SERUM POTASSIUM: CPT | Performed by: STUDENT IN AN ORGANIZED HEALTH CARE EDUCATION/TRAINING PROGRAM

## 2024-01-01 PROCEDURE — 71045 X-RAY EXAM CHEST 1 VIEW: CPT | Mod: FOREIGN READ | Performed by: RADIOLOGY

## 2024-01-01 PROCEDURE — 33233 REMOVAL OF PM GENERATOR: CPT | Performed by: INTERNAL MEDICINE

## 2024-01-01 PROCEDURE — 88312 SPECIAL STAINS GROUP 1: CPT | Performed by: PATHOLOGY

## 2024-01-01 PROCEDURE — 88342 IMHCHEM/IMCYTCHM 1ST ANTB: CPT | Performed by: PATHOLOGY

## 2024-01-01 PROCEDURE — 99223 1ST HOSP IP/OBS HIGH 75: CPT | Performed by: INTERNAL MEDICINE

## 2024-01-01 PROCEDURE — 84100 ASSAY OF PHOSPHORUS: CPT

## 2024-01-01 PROCEDURE — 2500000004 HC RX 250 GENERAL PHARMACY W/ HCPCS (ALT 636 FOR OP/ED)

## 2024-01-01 PROCEDURE — 93451 RIGHT HEART CATH: CPT | Performed by: INTERNAL MEDICINE

## 2024-01-01 PROCEDURE — 2500000004 HC RX 250 GENERAL PHARMACY W/ HCPCS (ALT 636 FOR OP/ED): Mod: JW | Performed by: INTERNAL MEDICINE

## 2024-01-01 PROCEDURE — 85007 BL SMEAR W/DIFF WBC COUNT: CPT

## 2024-01-01 PROCEDURE — 99291 CRITICAL CARE FIRST HOUR: CPT

## 2024-01-01 PROCEDURE — 37799 UNLISTED PX VASCULAR SURGERY: CPT

## 2024-01-01 PROCEDURE — 1159F MED LIST DOCD IN RCRD: CPT | Performed by: INTERNAL MEDICINE

## 2024-01-01 PROCEDURE — 99232 SBSQ HOSP IP/OBS MODERATE 35: CPT | Performed by: INTERNAL MEDICINE

## 2024-01-01 PROCEDURE — 83880 ASSAY OF NATRIURETIC PEPTIDE: CPT | Performed by: STUDENT IN AN ORGANIZED HEALTH CARE EDUCATION/TRAINING PROGRAM

## 2024-01-01 PROCEDURE — 83970 ASSAY OF PARATHORMONE: CPT

## 2024-01-01 PROCEDURE — 84166 PROTEIN E-PHORESIS/URINE/CSF: CPT | Performed by: PATHOLOGY

## 2024-01-01 PROCEDURE — 96374 THER/PROPH/DIAG INJ IV PUSH: CPT | Mod: 59

## 2024-01-01 PROCEDURE — 71045 X-RAY EXAM CHEST 1 VIEW: CPT | Performed by: RADIOLOGY

## 2024-01-01 PROCEDURE — 37799 UNLISTED PX VASCULAR SURGERY: CPT | Performed by: STUDENT IN AN ORGANIZED HEALTH CARE EDUCATION/TRAINING PROGRAM

## 2024-01-01 PROCEDURE — 2500000001 HC RX 250 WO HCPCS SELF ADMINISTERED DRUGS (ALT 637 FOR MEDICARE OP): Performed by: INTERNAL MEDICINE

## 2024-01-01 PROCEDURE — 2500000001 HC RX 250 WO HCPCS SELF ADMINISTERED DRUGS (ALT 637 FOR MEDICARE OP): Performed by: STUDENT IN AN ORGANIZED HEALTH CARE EDUCATION/TRAINING PROGRAM

## 2024-01-01 PROCEDURE — 80197 ASSAY OF TACROLIMUS: CPT

## 2024-01-01 PROCEDURE — 2500000001 HC RX 250 WO HCPCS SELF ADMINISTERED DRUGS (ALT 637 FOR MEDICARE OP)

## 2024-01-01 PROCEDURE — 80048 BASIC METABOLIC PNL TOTAL CA: CPT

## 2024-01-01 PROCEDURE — 86901 BLOOD TYPING SEROLOGIC RH(D): CPT | Performed by: STUDENT IN AN ORGANIZED HEALTH CARE EDUCATION/TRAINING PROGRAM

## 2024-01-01 PROCEDURE — 85007 BL SMEAR W/DIFF WBC COUNT: CPT | Performed by: STUDENT IN AN ORGANIZED HEALTH CARE EDUCATION/TRAINING PROGRAM

## 2024-01-01 PROCEDURE — 76776 US EXAM K TRANSPL W/DOPPLER: CPT

## 2024-01-01 PROCEDURE — 36415 COLL VENOUS BLD VENIPUNCTURE: CPT

## 2024-01-01 PROCEDURE — 85025 COMPLETE CBC W/AUTO DIFF WBC: CPT

## 2024-01-01 PROCEDURE — 83735 ASSAY OF MAGNESIUM: CPT | Performed by: INTERNAL MEDICINE

## 2024-01-01 PROCEDURE — 1036F TOBACCO NON-USER: CPT | Performed by: INTERNAL MEDICINE

## 2024-01-01 PROCEDURE — 83935 ASSAY OF URINE OSMOLALITY: CPT

## 2024-01-01 PROCEDURE — P9040 RBC LEUKOREDUCED IRRADIATED: HCPCS

## 2024-01-01 PROCEDURE — 2500000005 HC RX 250 GENERAL PHARMACY W/O HCPCS: Performed by: STUDENT IN AN ORGANIZED HEALTH CARE EDUCATION/TRAINING PROGRAM

## 2024-01-01 PROCEDURE — 2500000004 HC RX 250 GENERAL PHARMACY W/ HCPCS (ALT 636 FOR OP/ED): Performed by: STUDENT IN AN ORGANIZED HEALTH CARE EDUCATION/TRAINING PROGRAM

## 2024-01-01 PROCEDURE — 88307 TISSUE EXAM BY PATHOLOGIST: CPT | Performed by: PATHOLOGY

## 2024-01-01 PROCEDURE — 80197 ASSAY OF TACROLIMUS: CPT | Performed by: INTERNAL MEDICINE

## 2024-01-01 PROCEDURE — 36430 TRANSFUSION BLD/BLD COMPNT: CPT

## 2024-01-01 PROCEDURE — G0151 HHCP-SERV OF PT,EA 15 MIN: HCPCS | Mod: HHH

## 2024-01-01 PROCEDURE — 99231 SBSQ HOSP IP/OBS SF/LOW 25: CPT | Performed by: INTERNAL MEDICINE

## 2024-01-01 PROCEDURE — 2500000002 HC RX 250 W HCPCS SELF ADMINISTERED DRUGS (ALT 637 FOR MEDICARE OP, ALT 636 FOR OP/ED): Mod: MUE

## 2024-01-01 PROCEDURE — 84484 ASSAY OF TROPONIN QUANT: CPT | Performed by: STUDENT IN AN ORGANIZED HEALTH CARE EDUCATION/TRAINING PROGRAM

## 2024-01-01 PROCEDURE — 1200000002 HC GENERAL ROOM WITH TELEMETRY DAILY

## 2024-01-01 PROCEDURE — 3061F NEG MICROALBUMINURIA REV: CPT | Performed by: STUDENT IN AN ORGANIZED HEALTH CARE EDUCATION/TRAINING PROGRAM

## 2024-01-01 PROCEDURE — 50200 RENAL BIOPSY PERQ: CPT | Performed by: RADIOLOGY

## 2024-01-01 PROCEDURE — 2500000002 HC RX 250 W HCPCS SELF ADMINISTERED DRUGS (ALT 637 FOR MEDICARE OP, ALT 636 FOR OP/ED)

## 2024-01-01 PROCEDURE — 88112 CYTOPATH CELL ENHANCE TECH: CPT | Performed by: PATHOLOGY

## 2024-01-01 PROCEDURE — 99232 SBSQ HOSP IP/OBS MODERATE 35: CPT

## 2024-01-01 PROCEDURE — 83540 ASSAY OF IRON: CPT | Performed by: INTERNAL MEDICINE

## 2024-01-01 PROCEDURE — 99233 SBSQ HOSP IP/OBS HIGH 50: CPT | Performed by: HOSPITALIST

## 2024-01-01 PROCEDURE — 97110 THERAPEUTIC EXERCISES: CPT | Mod: GP,CQ

## 2024-01-01 PROCEDURE — 80069 RENAL FUNCTION PANEL: CPT

## 2024-01-01 PROCEDURE — G0157 HHC PT ASSISTANT EA 15: HCPCS | Mod: HHH

## 2024-01-01 PROCEDURE — 83735 ASSAY OF MAGNESIUM: CPT | Performed by: STUDENT IN AN ORGANIZED HEALTH CARE EDUCATION/TRAINING PROGRAM

## 2024-01-01 PROCEDURE — 96360 HYDRATION IV INFUSION INIT: CPT

## 2024-01-01 PROCEDURE — 97165 OT EVAL LOW COMPLEX 30 MIN: CPT | Mod: GO

## 2024-01-01 PROCEDURE — 93010 ELECTROCARDIOGRAM REPORT: CPT | Performed by: INTERNAL MEDICINE

## 2024-01-01 PROCEDURE — 50200 RENAL BIOPSY PERQ: CPT | Mod: RIGHT SIDE | Performed by: RADIOLOGY

## 2024-01-01 PROCEDURE — 3074F SYST BP LT 130 MM HG: CPT | Performed by: INTERNAL MEDICINE

## 2024-01-01 PROCEDURE — 99152 MOD SED SAME PHYS/QHP 5/>YRS: CPT | Performed by: INTERNAL MEDICINE

## 2024-01-01 PROCEDURE — 85610 PROTHROMBIN TIME: CPT

## 2024-01-01 PROCEDURE — 71045 X-RAY EXAM CHEST 1 VIEW: CPT

## 2024-01-01 PROCEDURE — C1727 CATH, BAL TIS DIS, NON-VAS: HCPCS | Performed by: INTERNAL MEDICINE

## 2024-01-01 PROCEDURE — 2720000007 HC OR 272 NO HCPCS

## 2024-01-01 PROCEDURE — RXMED WILLOW AMBULATORY MEDICATION CHARGE

## 2024-01-01 PROCEDURE — 84484 ASSAY OF TROPONIN QUANT: CPT | Performed by: INTERNAL MEDICINE

## 2024-01-01 PROCEDURE — 80197 ASSAY OF TACROLIMUS: CPT | Performed by: STUDENT IN AN ORGANIZED HEALTH CARE EDUCATION/TRAINING PROGRAM

## 2024-01-01 PROCEDURE — 51701 INSERT BLADDER CATHETER: CPT

## 2024-01-01 PROCEDURE — 1111F DSCHRG MED/CURRENT MED MERGE: CPT | Performed by: INTERNAL MEDICINE

## 2024-01-01 PROCEDURE — 86850 RBC ANTIBODY SCREEN: CPT

## 2024-01-01 PROCEDURE — 85610 PROTHROMBIN TIME: CPT | Performed by: STUDENT IN AN ORGANIZED HEALTH CARE EDUCATION/TRAINING PROGRAM

## 2024-01-01 PROCEDURE — 84460 ALANINE AMINO (ALT) (SGPT): CPT

## 2024-01-01 PROCEDURE — 97530 THERAPEUTIC ACTIVITIES: CPT | Mod: GO

## 2024-01-01 PROCEDURE — 1100000001 HC PRIVATE ROOM DAILY

## 2024-01-01 PROCEDURE — 78580 LUNG PERFUSION IMAGING: CPT

## 2024-01-01 PROCEDURE — G2211 COMPLEX E/M VISIT ADD ON: HCPCS | Performed by: STUDENT IN AN ORGANIZED HEALTH CARE EDUCATION/TRAINING PROGRAM

## 2024-01-01 PROCEDURE — G0157 HHC PT ASSISTANT EA 15: HCPCS | Mod: CQ,HHH

## 2024-01-01 PROCEDURE — 88350 IMFLUOR EA ADDL 1ANTB STN PX: CPT | Performed by: PATHOLOGY

## 2024-01-01 PROCEDURE — 3078F DIAST BP <80 MM HG: CPT | Performed by: STUDENT IN AN ORGANIZED HEALTH CARE EDUCATION/TRAINING PROGRAM

## 2024-01-01 PROCEDURE — 80076 HEPATIC FUNCTION PANEL: CPT

## 2024-01-01 PROCEDURE — 88305 TISSUE EXAM BY PATHOLOGIST: CPT | Performed by: PATHOLOGY

## 2024-01-01 PROCEDURE — 80069 RENAL FUNCTION PANEL: CPT | Performed by: STUDENT IN AN ORGANIZED HEALTH CARE EDUCATION/TRAINING PROGRAM

## 2024-01-01 PROCEDURE — 36415 COLL VENOUS BLD VENIPUNCTURE: CPT | Performed by: STUDENT IN AN ORGANIZED HEALTH CARE EDUCATION/TRAINING PROGRAM

## 2024-01-01 PROCEDURE — 02PA3MZ REMOVAL OF CARDIAC LEAD FROM HEART, PERCUTANEOUS APPROACH: ICD-10-PCS | Performed by: STUDENT IN AN ORGANIZED HEALTH CARE EDUCATION/TRAINING PROGRAM

## 2024-01-01 PROCEDURE — 3078F DIAST BP <80 MM HG: CPT | Performed by: INTERNAL MEDICINE

## 2024-01-01 PROCEDURE — 84100 ASSAY OF PHOSPHORUS: CPT | Performed by: INTERNAL MEDICINE

## 2024-01-01 PROCEDURE — 71046 X-RAY EXAM CHEST 2 VIEWS: CPT | Performed by: RADIOLOGY

## 2024-01-01 PROCEDURE — 1036F TOBACCO NON-USER: CPT | Performed by: STUDENT IN AN ORGANIZED HEALTH CARE EDUCATION/TRAINING PROGRAM

## 2024-01-01 PROCEDURE — 3044F HG A1C LEVEL LT 7.0%: CPT | Performed by: INTERNAL MEDICINE

## 2024-01-01 PROCEDURE — 97116 GAIT TRAINING THERAPY: CPT | Mod: GP,CQ

## 2024-01-01 PROCEDURE — C1887 CATHETER, GUIDING: HCPCS | Performed by: INTERNAL MEDICINE

## 2024-01-01 PROCEDURE — 85730 THROMBOPLASTIN TIME PARTIAL: CPT | Mod: 91

## 2024-01-01 PROCEDURE — 71046 X-RAY EXAM CHEST 2 VIEWS: CPT

## 2024-01-01 PROCEDURE — 82248 BILIRUBIN DIRECT: CPT

## 2024-01-01 PROCEDURE — C1882 AICD, OTHER THAN SING/DUAL: HCPCS | Performed by: INTERNAL MEDICINE

## 2024-01-01 PROCEDURE — 90937 HEMODIALYSIS REPEATED EVAL: CPT | Mod: V7,G6

## 2024-01-01 PROCEDURE — 86900 BLOOD TYPING SEROLOGIC ABO: CPT | Mod: 91 | Performed by: INTERNAL MEDICINE

## 2024-01-01 PROCEDURE — 99223 1ST HOSP IP/OBS HIGH 75: CPT | Performed by: SPECIALIST

## 2024-01-01 PROCEDURE — 86706 HEP B SURFACE ANTIBODY: CPT | Performed by: STUDENT IN AN ORGANIZED HEALTH CARE EDUCATION/TRAINING PROGRAM

## 2024-01-01 PROCEDURE — 99153 MOD SED SAME PHYS/QHP EA: CPT | Performed by: INTERNAL MEDICINE

## 2024-01-01 PROCEDURE — 2020000001 HC ICU ROOM DAILY

## 2024-01-01 PROCEDURE — 80053 COMPREHEN METABOLIC PANEL: CPT | Performed by: INTERNAL MEDICINE

## 2024-01-01 PROCEDURE — 82150 ASSAY OF AMYLASE: CPT

## 2024-01-01 PROCEDURE — 36415 COLL VENOUS BLD VENIPUNCTURE: CPT | Mod: V7 | Performed by: INTERNAL MEDICINE

## 2024-01-01 PROCEDURE — 33225 L VENTRIC PACING LEAD ADD-ON: CPT | Performed by: INTERNAL MEDICINE

## 2024-01-01 PROCEDURE — 2500000005 HC RX 250 GENERAL PHARMACY W/O HCPCS: Performed by: INTERNAL MEDICINE

## 2024-01-01 PROCEDURE — 82728 ASSAY OF FERRITIN: CPT | Performed by: STUDENT IN AN ORGANIZED HEALTH CARE EDUCATION/TRAINING PROGRAM

## 2024-01-01 PROCEDURE — 99233 SBSQ HOSP IP/OBS HIGH 50: CPT | Performed by: STUDENT IN AN ORGANIZED HEALTH CARE EDUCATION/TRAINING PROGRAM

## 2024-01-01 PROCEDURE — 2550000001 HC RX 255 CONTRASTS

## 2024-01-01 PROCEDURE — 2500000002 HC RX 250 W HCPCS SELF ADMINISTERED DRUGS (ALT 637 FOR MEDICARE OP, ALT 636 FOR OP/ED): Performed by: STUDENT IN AN ORGANIZED HEALTH CARE EDUCATION/TRAINING PROGRAM

## 2024-01-01 PROCEDURE — 87799 DETECT AGENT NOS DNA QUANT: CPT

## 2024-01-01 PROCEDURE — 88313 SPECIAL STAINS GROUP 2: CPT | Performed by: STUDENT IN AN ORGANIZED HEALTH CARE EDUCATION/TRAINING PROGRAM

## 2024-01-01 PROCEDURE — 2500000001 HC RX 250 WO HCPCS SELF ADMINISTERED DRUGS (ALT 637 FOR MEDICARE OP): Performed by: PHYSICIAN ASSISTANT

## 2024-01-01 PROCEDURE — 97116 GAIT TRAINING THERAPY: CPT | Mod: GP

## 2024-01-01 PROCEDURE — 93290 INTERROG DEV EVAL ICPMS IP: CPT | Performed by: INTERNAL MEDICINE

## 2024-01-01 PROCEDURE — 86334 IMMUNOFIX E-PHORESIS SERUM: CPT

## 2024-01-01 PROCEDURE — 88350 IMFLUOR EA ADDL 1ANTB STN PX: CPT | Performed by: STUDENT IN AN ORGANIZED HEALTH CARE EDUCATION/TRAINING PROGRAM

## 2024-01-01 PROCEDURE — 0023 HH SOC

## 2024-01-01 PROCEDURE — 85027 COMPLETE CBC AUTOMATED: CPT | Performed by: EMERGENCY MEDICINE

## 2024-01-01 PROCEDURE — 2500000002 HC RX 250 W HCPCS SELF ADMINISTERED DRUGS (ALT 637 FOR MEDICARE OP, ALT 636 FOR OP/ED): Mod: MUE | Performed by: STUDENT IN AN ORGANIZED HEALTH CARE EDUCATION/TRAINING PROGRAM

## 2024-01-01 PROCEDURE — 99232 SBSQ HOSP IP/OBS MODERATE 35: CPT | Performed by: STUDENT IN AN ORGANIZED HEALTH CARE EDUCATION/TRAINING PROGRAM

## 2024-01-01 PROCEDURE — 75561 CARDIAC MRI FOR MORPH W/DYE: CPT

## 2024-01-01 PROCEDURE — 85730 THROMBOPLASTIN TIME PARTIAL: CPT | Performed by: INTERNAL MEDICINE

## 2024-01-01 PROCEDURE — 99223 1ST HOSP IP/OBS HIGH 75: CPT | Performed by: STUDENT IN AN ORGANIZED HEALTH CARE EDUCATION/TRAINING PROGRAM

## 2024-01-01 PROCEDURE — 84478 ASSAY OF TRIGLYCERIDES: CPT

## 2024-01-01 PROCEDURE — 2500000006 HC RX 250 W HCPCS SELF ADMINISTERED DRUGS (ALT 637 FOR ALL PAYERS): Mod: MUE | Performed by: STUDENT IN AN ORGANIZED HEALTH CARE EDUCATION/TRAINING PROGRAM

## 2024-01-01 PROCEDURE — 82947 ASSAY GLUCOSE BLOOD QUANT: CPT

## 2024-01-01 PROCEDURE — 93005 ELECTROCARDIOGRAM TRACING: CPT

## 2024-01-01 PROCEDURE — 99285 EMERGENCY DEPT VISIT HI MDM: CPT | Mod: 25

## 2024-01-01 PROCEDURE — 76776 US EXAM K TRANSPL W/DOPPLER: CPT | Performed by: RADIOLOGY

## 2024-01-01 PROCEDURE — 78708 K FLOW/FUNCT IMAGE W/DRUG: CPT | Performed by: NUCLEAR MEDICINE

## 2024-01-01 PROCEDURE — 2500000005 HC RX 250 GENERAL PHARMACY W/O HCPCS

## 2024-01-01 PROCEDURE — 85520 HEPARIN ASSAY: CPT | Performed by: STUDENT IN AN ORGANIZED HEALTH CARE EDUCATION/TRAINING PROGRAM

## 2024-01-01 PROCEDURE — 99214 OFFICE O/P EST MOD 30 MIN: CPT | Performed by: STUDENT IN AN ORGANIZED HEALTH CARE EDUCATION/TRAINING PROGRAM

## 2024-01-01 PROCEDURE — 87636 SARSCOV2 & INF A&B AMP PRB: CPT | Performed by: STUDENT IN AN ORGANIZED HEALTH CARE EDUCATION/TRAINING PROGRAM

## 2024-01-01 PROCEDURE — 93321 DOPPLER ECHO F-UP/LMTD STD: CPT | Performed by: INTERNAL MEDICINE

## 2024-01-01 PROCEDURE — 88313 SPECIAL STAINS GROUP 2: CPT | Performed by: PATHOLOGY

## 2024-01-01 PROCEDURE — 99239 HOSP IP/OBS DSCHRG MGMT >30: CPT

## 2024-01-01 PROCEDURE — C1894 INTRO/SHEATH, NON-LASER: HCPCS | Performed by: INTERNAL MEDICINE

## 2024-01-01 PROCEDURE — 3075F SYST BP GE 130 - 139MM HG: CPT | Performed by: INTERNAL MEDICINE

## 2024-01-01 PROCEDURE — 99215 OFFICE O/P EST HI 40 MIN: CPT | Performed by: INTERNAL MEDICINE

## 2024-01-01 PROCEDURE — 32555 ASPIRATE PLEURA W/ IMAGING: CPT

## 2024-01-01 PROCEDURE — 2500000004 HC RX 250 GENERAL PHARMACY W/ HCPCS (ALT 636 FOR OP/ED): Mod: JW | Performed by: STUDENT IN AN ORGANIZED HEALTH CARE EDUCATION/TRAINING PROGRAM

## 2024-01-01 PROCEDURE — 51702 INSERT TEMP BLADDER CATH: CPT

## 2024-01-01 PROCEDURE — 99214 OFFICE O/P EST MOD 30 MIN: CPT | Performed by: INTERNAL MEDICINE

## 2024-01-01 PROCEDURE — C1781 MESH (IMPLANTABLE): HCPCS | Performed by: INTERNAL MEDICINE

## 2024-01-01 PROCEDURE — 93308 TTE F-UP OR LMTD: CPT | Performed by: INTERNAL MEDICINE

## 2024-01-01 PROCEDURE — 93286 PERI-PX EVAL PM/LDLS PM IP: CPT | Performed by: INTERNAL MEDICINE

## 2024-01-01 PROCEDURE — 02HK3KZ INSERTION OF DEFIBRILLATOR LEAD INTO RIGHT VENTRICLE, PERCUTANEOUS APPROACH: ICD-10-PCS | Performed by: STUDENT IN AN ORGANIZED HEALTH CARE EDUCATION/TRAINING PROGRAM

## 2024-01-01 PROCEDURE — 86320 SERUM IMMUNOELECTROPHORESIS: CPT | Performed by: PATHOLOGY

## 2024-01-01 PROCEDURE — 99152 MOD SED SAME PHYS/QHP 5/>YRS: CPT

## 2024-01-01 PROCEDURE — 83605 ASSAY OF LACTIC ACID: CPT | Performed by: INTERNAL MEDICINE

## 2024-01-01 PROCEDURE — 86920 COMPATIBILITY TEST SPIN: CPT

## 2024-01-01 PROCEDURE — 99215 OFFICE O/P EST HI 40 MIN: CPT | Performed by: STUDENT IN AN ORGANIZED HEALTH CARE EDUCATION/TRAINING PROGRAM

## 2024-01-01 PROCEDURE — 3044F HG A1C LEVEL LT 7.0%: CPT | Performed by: STUDENT IN AN ORGANIZED HEALTH CARE EDUCATION/TRAINING PROGRAM

## 2024-01-01 PROCEDURE — 02H63KZ INSERTION OF DEFIBRILLATOR LEAD INTO RIGHT ATRIUM, PERCUTANEOUS APPROACH: ICD-10-PCS | Performed by: STUDENT IN AN ORGANIZED HEALTH CARE EDUCATION/TRAINING PROGRAM

## 2024-01-01 PROCEDURE — C1751 CATH, INF, PER/CENT/MIDLINE: HCPCS | Performed by: INTERNAL MEDICINE

## 2024-01-01 PROCEDURE — 99213 OFFICE O/P EST LOW 20 MIN: CPT | Performed by: STUDENT IN AN ORGANIZED HEALTH CARE EDUCATION/TRAINING PROGRAM

## 2024-01-01 PROCEDURE — 88305 TISSUE EXAM BY PATHOLOGIST: CPT | Mod: TC,MCY,91

## 2024-01-01 PROCEDURE — G0152 HHCP-SERV OF OT,EA 15 MIN: HCPCS | Mod: HHH

## 2024-01-01 PROCEDURE — 80180 DRUG SCRN QUAN MYCOPHENOLATE: CPT | Performed by: STUDENT IN AN ORGANIZED HEALTH CARE EDUCATION/TRAINING PROGRAM

## 2024-01-01 PROCEDURE — 32555 ASPIRATE PLEURA W/ IMAGING: CPT | Performed by: NURSE PRACTITIONER

## 2024-01-01 PROCEDURE — 90937 HEMODIALYSIS REPEATED EVAL: CPT | Mod: G6,V7

## 2024-01-01 PROCEDURE — 1126F AMNT PAIN NOTED NONE PRSNT: CPT | Performed by: STUDENT IN AN ORGANIZED HEALTH CARE EDUCATION/TRAINING PROGRAM

## 2024-01-01 PROCEDURE — 2720000007 HC OR 272 NO HCPCS: Performed by: INTERNAL MEDICINE

## 2024-01-01 PROCEDURE — 97161 PT EVAL LOW COMPLEX 20 MIN: CPT | Mod: GP

## 2024-01-01 PROCEDURE — 78708 K FLOW/FUNCT IMAGE W/DRUG: CPT

## 2024-01-01 PROCEDURE — 02BK3ZX EXCISION OF RIGHT VENTRICLE, PERCUTANEOUS APPROACH, DIAGNOSTIC: ICD-10-PCS | Performed by: INTERNAL MEDICINE

## 2024-01-01 PROCEDURE — 99239 HOSP IP/OBS DSCHRG MGMT >30: CPT | Performed by: INTERNAL MEDICINE

## 2024-01-01 PROCEDURE — 84132 ASSAY OF SERUM POTASSIUM: CPT | Mod: 91

## 2024-01-01 PROCEDURE — 83521 IG LIGHT CHAINS FREE EACH: CPT

## 2024-01-01 PROCEDURE — 84484 ASSAY OF TROPONIN QUANT: CPT

## 2024-01-01 PROCEDURE — 93010 ELECTROCARDIOGRAM REPORT: CPT | Performed by: EMERGENCY MEDICINE

## 2024-01-01 PROCEDURE — C1769 GUIDE WIRE: HCPCS | Performed by: INTERNAL MEDICINE

## 2024-01-01 PROCEDURE — 2500000006 HC RX 250 W HCPCS SELF ADMINISTERED DRUGS (ALT 637 FOR ALL PAYERS): Performed by: STUDENT IN AN ORGANIZED HEALTH CARE EDUCATION/TRAINING PROGRAM

## 2024-01-01 PROCEDURE — 4A023N6 MEASUREMENT OF CARDIAC SAMPLING AND PRESSURE, RIGHT HEART, PERCUTANEOUS APPROACH: ICD-10-PCS | Performed by: INTERNAL MEDICINE

## 2024-01-01 PROCEDURE — 76942 ECHO GUIDE FOR BIOPSY: CPT | Performed by: RADIOLOGY

## 2024-01-01 PROCEDURE — 02HQ32Z INSERTION OF MONITORING DEVICE INTO RIGHT PULMONARY ARTERY, PERCUTANEOUS APPROACH: ICD-10-PCS | Performed by: STUDENT IN AN ORGANIZED HEALTH CARE EDUCATION/TRAINING PROGRAM

## 2024-01-01 PROCEDURE — 90935 HEMODIALYSIS ONE EVALUATION: CPT | Performed by: INTERNAL MEDICINE

## 2024-01-01 PROCEDURE — 84132 ASSAY OF SERUM POTASSIUM: CPT | Performed by: INTERNAL MEDICINE

## 2024-01-01 PROCEDURE — 8010000001 HC DIALYSIS - HEMODIALYSIS PER DAY

## 2024-01-01 PROCEDURE — 78432 MYOCRD IMG PET 2RTRACER: CPT

## 2024-01-01 PROCEDURE — 2750000001 HC OR 275 NO HCPCS: Performed by: INTERNAL MEDICINE

## 2024-01-01 PROCEDURE — 99223 1ST HOSP IP/OBS HIGH 75: CPT

## 2024-01-01 PROCEDURE — 37799 UNLISTED PX VASCULAR SURGERY: CPT | Performed by: INTERNAL MEDICINE

## 2024-01-01 PROCEDURE — 84132 ASSAY OF SERUM POTASSIUM: CPT | Mod: 91 | Performed by: EMERGENCY MEDICINE

## 2024-01-01 PROCEDURE — 83735 ASSAY OF MAGNESIUM: CPT | Mod: 91

## 2024-01-01 PROCEDURE — 93286 PERI-PX EVAL PM/LDLS PM IP: CPT

## 2024-01-01 PROCEDURE — 99233 SBSQ HOSP IP/OBS HIGH 50: CPT

## 2024-01-01 PROCEDURE — 76604 US EXAM CHEST: CPT | Performed by: STUDENT IN AN ORGANIZED HEALTH CARE EDUCATION/TRAINING PROGRAM

## 2024-01-01 PROCEDURE — 88346 IMFLUOR 1ST 1ANTB STAIN PX: CPT | Performed by: PATHOLOGY

## 2024-01-01 PROCEDURE — 99231 SBSQ HOSP IP/OBS SF/LOW 25: CPT | Performed by: STUDENT IN AN ORGANIZED HEALTH CARE EDUCATION/TRAINING PROGRAM

## 2024-01-01 PROCEDURE — 84165 PROTEIN E-PHORESIS SERUM: CPT | Performed by: PATHOLOGY

## 2024-01-01 PROCEDURE — 99291 CRITICAL CARE FIRST HOUR: CPT | Performed by: STUDENT IN AN ORGANIZED HEALTH CARE EDUCATION/TRAINING PROGRAM

## 2024-01-01 PROCEDURE — 88346 IMFLUOR 1ST 1ANTB STAIN PX: CPT | Performed by: STUDENT IN AN ORGANIZED HEALTH CARE EDUCATION/TRAINING PROGRAM

## 2024-01-01 PROCEDURE — 99221 1ST HOSP IP/OBS SF/LOW 40: CPT | Performed by: STUDENT IN AN ORGANIZED HEALTH CARE EDUCATION/TRAINING PROGRAM

## 2024-01-01 PROCEDURE — 82728 ASSAY OF FERRITIN: CPT | Mod: PARLAB | Performed by: INTERNAL MEDICINE

## 2024-01-01 PROCEDURE — 2550000001 HC RX 255 CONTRASTS: Performed by: INTERNAL MEDICINE

## 2024-01-01 PROCEDURE — 78430 MYOCRD IMG PET RST/STRS W/CT: CPT | Performed by: NUCLEAR MEDICINE

## 2024-01-01 PROCEDURE — 86325 OTHER IMMUNOELECTROPHORESIS: CPT | Performed by: PATHOLOGY

## 2024-01-01 PROCEDURE — 84145 PROCALCITONIN (PCT): CPT | Mod: PARLAB | Performed by: STUDENT IN AN ORGANIZED HEALTH CARE EDUCATION/TRAINING PROGRAM

## 2024-01-01 PROCEDURE — 86901 BLOOD TYPING SEROLOGIC RH(D): CPT | Performed by: INTERNAL MEDICINE

## 2024-01-01 PROCEDURE — 93325 DOPPLER ECHO COLOR FLOW MAPG: CPT | Performed by: INTERNAL MEDICINE

## 2024-01-01 PROCEDURE — 84100 ASSAY OF PHOSPHORUS: CPT | Mod: 91 | Performed by: INTERNAL MEDICINE

## 2024-01-01 PROCEDURE — C1900 LEAD, CORONARY VENOUS: HCPCS | Performed by: INTERNAL MEDICINE

## 2024-01-01 PROCEDURE — 0W9930Z DRAINAGE OF RIGHT PLEURAL CAVITY WITH DRAINAGE DEVICE, PERCUTANEOUS APPROACH: ICD-10-PCS | Performed by: NURSE PRACTITIONER

## 2024-01-01 PROCEDURE — 0TB03ZX EXCISION OF RIGHT KIDNEY, PERCUTANEOUS APPROACH, DIAGNOSTIC: ICD-10-PCS | Performed by: RADIOLOGY

## 2024-01-01 PROCEDURE — 90937 HEMODIALYSIS REPEATED EVAL: CPT | Mod: V7

## 2024-01-01 PROCEDURE — 88307 TISSUE EXAM BY PATHOLOGIST: CPT | Mod: TC,SUR | Performed by: STUDENT IN AN ORGANIZED HEALTH CARE EDUCATION/TRAINING PROGRAM

## 2024-01-01 PROCEDURE — 80053 COMPREHEN METABOLIC PANEL: CPT

## 2024-01-01 PROCEDURE — 85007 BL SMEAR W/DIFF WBC COUNT: CPT | Performed by: INTERNAL MEDICINE

## 2024-01-01 PROCEDURE — 3430000001 HC RX 343 DIAGNOSTIC RADIOPHARMACEUTICALS: Performed by: STUDENT IN AN ORGANIZED HEALTH CARE EDUCATION/TRAINING PROGRAM

## 2024-01-01 PROCEDURE — 86900 BLOOD TYPING SEROLOGIC ABO: CPT | Mod: 91

## 2024-01-01 PROCEDURE — 2500000004 HC RX 250 GENERAL PHARMACY W/ HCPCS (ALT 636 FOR OP/ED): Performed by: PHYSICIAN ASSISTANT

## 2024-01-01 PROCEDURE — 93306 TTE W/DOPPLER COMPLETE: CPT | Performed by: INTERNAL MEDICINE

## 2024-01-01 PROCEDURE — 81001 URINALYSIS AUTO W/SCOPE: CPT

## 2024-01-01 PROCEDURE — A9562 TC99M MERTIATIDE: HCPCS | Performed by: STUDENT IN AN ORGANIZED HEALTH CARE EDUCATION/TRAINING PROGRAM

## 2024-01-01 PROCEDURE — 86832 HLA CLASS I HIGH DEFIN QUAL: CPT | Performed by: STUDENT IN AN ORGANIZED HEALTH CARE EDUCATION/TRAINING PROGRAM

## 2024-01-01 PROCEDURE — 84157 ASSAY OF PROTEIN OTHER: CPT

## 2024-01-01 PROCEDURE — 87075 CULTR BACTERIA EXCEPT BLOOD: CPT | Mod: 91

## 2024-01-01 PROCEDURE — 85007 BL SMEAR W/DIFF WBC COUNT: CPT | Performed by: EMERGENCY MEDICINE

## 2024-01-01 PROCEDURE — 85027 COMPLETE CBC AUTOMATED: CPT | Performed by: STUDENT IN AN ORGANIZED HEALTH CARE EDUCATION/TRAINING PROGRAM

## 2024-01-01 PROCEDURE — 86901 BLOOD TYPING SEROLOGIC RH(D): CPT

## 2024-01-01 PROCEDURE — 83880 ASSAY OF NATRIURETIC PEPTIDE: CPT

## 2024-01-01 PROCEDURE — 93306 TTE W/DOPPLER COMPLETE: CPT

## 2024-01-01 PROCEDURE — 83540 ASSAY OF IRON: CPT | Performed by: STUDENT IN AN ORGANIZED HEALTH CARE EDUCATION/TRAINING PROGRAM

## 2024-01-01 PROCEDURE — 93010 ELECTROCARDIOGRAM REPORT: CPT | Performed by: STUDENT IN AN ORGANIZED HEALTH CARE EDUCATION/TRAINING PROGRAM

## 2024-01-01 PROCEDURE — 85025 COMPLETE CBC W/AUTO DIFF WBC: CPT | Performed by: STUDENT IN AN ORGANIZED HEALTH CARE EDUCATION/TRAINING PROGRAM

## 2024-01-01 PROCEDURE — 99152 MOD SED SAME PHYS/QHP 5/>YRS: CPT | Mod: RIGHT SIDE | Performed by: RADIOLOGY

## 2024-01-01 PROCEDURE — 3430000001 HC RX 343 DIAGNOSTIC RADIOPHARMACEUTICALS: Performed by: INTERNAL MEDICINE

## 2024-01-01 PROCEDURE — 88305 TISSUE EXAM BY PATHOLOGIST: CPT | Mod: TC,SUR | Performed by: STUDENT IN AN ORGANIZED HEALTH CARE EDUCATION/TRAINING PROGRAM

## 2024-01-01 PROCEDURE — 87340 HEPATITIS B SURFACE AG IA: CPT | Performed by: INTERNAL MEDICINE

## 2024-01-01 PROCEDURE — 93279 PRGRMG DEV EVAL PM/LDLS PM: CPT

## 2024-01-01 PROCEDURE — 33234 REMOVAL OF PACEMAKER SYSTEM: CPT | Performed by: INTERNAL MEDICINE

## 2024-01-01 PROCEDURE — 80197 ASSAY OF TACROLIMUS: CPT | Mod: PARLAB | Performed by: STUDENT IN AN ORGANIZED HEALTH CARE EDUCATION/TRAINING PROGRAM

## 2024-01-01 PROCEDURE — 82330 ASSAY OF CALCIUM: CPT | Mod: 91

## 2024-01-01 PROCEDURE — 82947 ASSAY GLUCOSE BLOOD QUANT: CPT | Mod: 91 | Performed by: EMERGENCY MEDICINE

## 2024-01-01 PROCEDURE — 0TB03ZX EXCISION OF RIGHT KIDNEY, PERCUTANEOUS APPROACH, DIAGNOSTIC: ICD-10-PCS | Performed by: STUDENT IN AN ORGANIZED HEALTH CARE EDUCATION/TRAINING PROGRAM

## 2024-01-01 PROCEDURE — 1111F DSCHRG MED/CURRENT MED MERGE: CPT | Performed by: STUDENT IN AN ORGANIZED HEALTH CARE EDUCATION/TRAINING PROGRAM

## 2024-01-01 PROCEDURE — 93505 ENDOMYOCARDIAL BIOPSY: CPT | Performed by: INTERNAL MEDICINE

## 2024-01-01 PROCEDURE — 93279 PRGRMG DEV EVAL PM/LDLS PM: CPT | Performed by: INTERNAL MEDICINE

## 2024-01-01 PROCEDURE — 99285 EMERGENCY DEPT VISIT HI MDM: CPT | Performed by: EMERGENCY MEDICINE

## 2024-01-01 PROCEDURE — 1210000001 HC SEMI-PRIVATE ROOM DAILY

## 2024-01-01 PROCEDURE — 2500000004 HC RX 250 GENERAL PHARMACY W/ HCPCS (ALT 636 FOR OP/ED): Performed by: RADIOLOGY

## 2024-01-01 PROCEDURE — 82248 BILIRUBIN DIRECT: CPT | Performed by: INTERNAL MEDICINE

## 2024-01-01 PROCEDURE — 76942 ECHO GUIDE FOR BIOPSY: CPT | Mod: RIGHT SIDE | Performed by: RADIOLOGY

## 2024-01-01 PROCEDURE — 33249 INSJ/RPLCMT DEFIB W/LEAD(S): CPT | Performed by: INTERNAL MEDICINE

## 2024-01-01 PROCEDURE — 83036 HEMOGLOBIN GLYCOSYLATED A1C: CPT | Performed by: STUDENT IN AN ORGANIZED HEALTH CARE EDUCATION/TRAINING PROGRAM

## 2024-01-01 PROCEDURE — 84166 PROTEIN E-PHORESIS/URINE/CSF: CPT | Mod: 91

## 2024-01-01 PROCEDURE — 96374 THER/PROPH/DIAG INJ IV PUSH: CPT

## 2024-01-01 PROCEDURE — 3077F SYST BP >= 140 MM HG: CPT | Performed by: INTERNAL MEDICINE

## 2024-01-01 PROCEDURE — 82947 ASSAY GLUCOSE BLOOD QUANT: CPT | Mod: 91

## 2024-01-01 PROCEDURE — 1150000001 HC HOSPICE PRIVATE ROOM DAILY

## 2024-01-01 PROCEDURE — 84156 ASSAY OF PROTEIN URINE: CPT

## 2024-01-01 PROCEDURE — 82435 ASSAY OF BLOOD CHLORIDE: CPT | Performed by: EMERGENCY MEDICINE

## 2024-01-01 PROCEDURE — 97535 SELF CARE MNGMENT TRAINING: CPT | Mod: GO

## 2024-01-01 PROCEDURE — 50200 RENAL BIOPSY PERQ: CPT

## 2024-01-01 PROCEDURE — 80048 BASIC METABOLIC PNL TOTAL CA: CPT | Performed by: INTERNAL MEDICINE

## 2024-01-01 PROCEDURE — 99496 TRANSJ CARE MGMT HIGH F2F 7D: CPT | Performed by: STUDENT IN AN ORGANIZED HEALTH CARE EDUCATION/TRAINING PROGRAM

## 2024-01-01 PROCEDURE — 82436 ASSAY OF URINE CHLORIDE: CPT

## 2024-01-01 PROCEDURE — 88342 IMHCHEM/IMCYTCHM 1ST ANTB: CPT | Performed by: STUDENT IN AN ORGANIZED HEALTH CARE EDUCATION/TRAINING PROGRAM

## 2024-01-01 PROCEDURE — 85610 PROTHROMBIN TIME: CPT | Performed by: INTERNAL MEDICINE

## 2024-01-01 PROCEDURE — 99222 1ST HOSP IP/OBS MODERATE 55: CPT | Performed by: STUDENT IN AN ORGANIZED HEALTH CARE EDUCATION/TRAINING PROGRAM

## 2024-01-01 PROCEDURE — 75561 CARDIAC MRI FOR MORPH W/DYE: CPT | Performed by: RADIOLOGY

## 2024-01-01 PROCEDURE — 85379 FIBRIN DEGRADATION QUANT: CPT | Performed by: INTERNAL MEDICINE

## 2024-01-01 PROCEDURE — 86706 HEP B SURFACE ANTIBODY: CPT | Performed by: INTERNAL MEDICINE

## 2024-01-01 PROCEDURE — 83986 ASSAY PH BODY FLUID NOS: CPT

## 2024-01-01 PROCEDURE — 50200 RENAL BIOPSY PERQ: CPT | Mod: RT

## 2024-01-01 PROCEDURE — 88305 TISSUE EXAM BY PATHOLOGIST: CPT | Performed by: STUDENT IN AN ORGANIZED HEALTH CARE EDUCATION/TRAINING PROGRAM

## 2024-01-01 PROCEDURE — A9526 NITROGEN N-13 AMMONIA: HCPCS | Performed by: INTERNAL MEDICINE

## 2024-01-01 PROCEDURE — 96375 TX/PRO/DX INJ NEW DRUG ADDON: CPT

## 2024-01-01 PROCEDURE — 99152 MOD SED SAME PHYS/QHP 5/>YRS: CPT | Performed by: RADIOLOGY

## 2024-01-01 PROCEDURE — 83615 LACTATE (LD) (LDH) ENZYME: CPT

## 2024-01-01 PROCEDURE — 0JPT0PZ REMOVAL OF CARDIAC RHYTHM RELATED DEVICE FROM TRUNK SUBCUTANEOUS TISSUE AND FASCIA, OPEN APPROACH: ICD-10-PCS | Performed by: STUDENT IN AN ORGANIZED HEALTH CARE EDUCATION/TRAINING PROGRAM

## 2024-01-01 PROCEDURE — C1898 LEAD, PMKR, OTHER THAN TRANS: HCPCS | Performed by: INTERNAL MEDICINE

## 2024-01-01 PROCEDURE — 82570 ASSAY OF URINE CREATININE: CPT | Performed by: STUDENT IN AN ORGANIZED HEALTH CARE EDUCATION/TRAINING PROGRAM

## 2024-01-01 PROCEDURE — 80197 ASSAY OF TACROLIMUS: CPT | Performed by: EMERGENCY MEDICINE

## 2024-01-01 PROCEDURE — 85014 HEMATOCRIT: CPT

## 2024-01-01 PROCEDURE — 80053 COMPREHEN METABOLIC PANEL: CPT | Performed by: STUDENT IN AN ORGANIZED HEALTH CARE EDUCATION/TRAINING PROGRAM

## 2024-01-01 PROCEDURE — 99223 1ST HOSP IP/OBS HIGH 75: CPT | Performed by: HOSPITALIST

## 2024-01-01 PROCEDURE — A9575 INJ GADOTERATE MEGLUMI 0.1ML: HCPCS

## 2024-01-01 PROCEDURE — A9540 TC99M MAA: HCPCS | Performed by: STUDENT IN AN ORGANIZED HEALTH CARE EDUCATION/TRAINING PROGRAM

## 2024-01-01 PROCEDURE — 99239 HOSP IP/OBS DSCHRG MGMT >30: CPT | Performed by: PHYSICIAN ASSISTANT

## 2024-01-01 PROCEDURE — 87340 HEPATITIS B SURFACE AG IA: CPT | Performed by: STUDENT IN AN ORGANIZED HEALTH CARE EDUCATION/TRAINING PROGRAM

## 2024-01-01 PROCEDURE — 81382 HLA II TYPING 1 LOC HR: CPT | Mod: 91,OUT | Performed by: TRANSPLANT SURGERY

## 2024-01-01 PROCEDURE — C1892 INTRO/SHEATH,FIXED,PEEL-AWAY: HCPCS | Performed by: INTERNAL MEDICINE

## 2024-01-01 PROCEDURE — C1777 LEAD, AICD, ENDO SINGLE COIL: HCPCS | Performed by: INTERNAL MEDICINE

## 2024-01-01 PROCEDURE — 0JH609Z INSERTION OF CARDIAC RESYNCHRONIZATION DEFIBRILLATOR PULSE GENERATOR INTO CHEST SUBCUTANEOUS TISSUE AND FASCIA, OPEN APPROACH: ICD-10-PCS | Performed by: STUDENT IN AN ORGANIZED HEALTH CARE EDUCATION/TRAINING PROGRAM

## 2024-01-01 PROCEDURE — A9552 F18 FDG: HCPCS | Mod: MUE | Performed by: INTERNAL MEDICINE

## 2024-01-01 PROCEDURE — 93325 DOPPLER ECHO COLOR FLOW MAPG: CPT

## 2024-01-01 PROCEDURE — 02H43KZ INSERTION OF DEFIBRILLATOR LEAD INTO CORONARY VEIN, PERCUTANEOUS APPROACH: ICD-10-PCS | Performed by: STUDENT IN AN ORGANIZED HEALTH CARE EDUCATION/TRAINING PROGRAM

## 2024-01-01 PROCEDURE — 169592 NO-PAY CLAIM PROCEDURE

## 2024-01-01 PROCEDURE — 88350 IMFLUOR EA ADDL 1ANTB STN PX: CPT | Mod: TC,SUR,91

## 2024-01-01 PROCEDURE — 89051 BODY FLUID CELL COUNT: CPT

## 2024-01-01 PROCEDURE — 99222 1ST HOSP IP/OBS MODERATE 55: CPT | Performed by: INTERNAL MEDICINE

## 2024-01-01 PROCEDURE — 76776 US EXAM K TRANSPL W/DOPPLER: CPT | Performed by: STUDENT IN AN ORGANIZED HEALTH CARE EDUCATION/TRAINING PROGRAM

## 2024-01-01 PROCEDURE — 82945 GLUCOSE OTHER FLUID: CPT

## 2024-01-01 DEVICE — LEAD 6935M62 DF4 ACTIVE SC US EN
Type: IMPLANTABLE DEVICE | Site: CHEST | Status: FUNCTIONAL
Brand: SPRINT QUATTRO SECURE S®

## 2024-01-01 DEVICE — CRTD DTPA2QQ COBALT XT HF QUAD MRI DF4
Type: IMPLANTABLE DEVICE | Site: CHEST  WALL | Status: FUNCTIONAL
Brand: COBALT™ XT HF QUAD CRT-D MRI SURESCAN™

## 2024-01-01 DEVICE — LEAD 429888 MRI CANT US
Type: IMPLANTABLE DEVICE | Site: HEART | Status: FUNCTIONAL
Brand: ATTAIN PERFORMA™ MRI SURESCAN™

## 2024-01-01 DEVICE — LEAD 5076-52 CAPSUREFIX NOVUS US EN
Type: IMPLANTABLE DEVICE | Site: CHEST | Status: FUNCTIONAL
Brand: CAPSUREFIX® NOVUS

## 2024-01-01 RX ORDER — DIPHENHYDRAMINE HYDROCHLORIDE 50 MG/ML
25 INJECTION INTRAMUSCULAR; INTRAVENOUS EVERY 6 HOURS PRN
Status: DISCONTINUED | OUTPATIENT
Start: 2024-01-01 | End: 2024-01-01 | Stop reason: HOSPADM

## 2024-01-01 RX ORDER — LORAZEPAM 2 MG/ML
2 INJECTION INTRAMUSCULAR EVERY 6 HOURS PRN
Status: DISCONTINUED | OUTPATIENT
Start: 2024-01-01 | End: 2024-01-01 | Stop reason: HOSPADM

## 2024-01-01 RX ORDER — ACETAMINOPHEN 650 MG/1
650 SUPPOSITORY RECTAL EVERY 4 HOURS PRN
Status: DISCONTINUED | OUTPATIENT
Start: 2024-01-01 | End: 2024-01-01 | Stop reason: HOSPADM

## 2024-01-01 RX ORDER — HEPARIN SODIUM 1000 [USP'U]/ML
1000 INJECTION, SOLUTION INTRAVENOUS; SUBCUTANEOUS ONCE
Status: CANCELLED | OUTPATIENT
Start: 2024-01-01 | End: 2024-01-01

## 2024-01-01 RX ORDER — FLUDEOXYGLUCOSE F 18 200 MCI/ML
13.6 INJECTION, SOLUTION INTRAVENOUS
Status: COMPLETED | OUTPATIENT
Start: 2024-01-01 | End: 2024-01-01

## 2024-01-01 RX ORDER — POLYETHYLENE GLYCOL 3350 17 G/17G
17 POWDER, FOR SOLUTION ORAL DAILY
Status: DISCONTINUED | OUTPATIENT
Start: 2024-01-01 | End: 2024-01-01 | Stop reason: HOSPADM

## 2024-01-01 RX ORDER — ALBUMIN HUMAN 250 G/1000ML
25 SOLUTION INTRAVENOUS
Status: DISCONTINUED | OUTPATIENT
Start: 2024-01-01 | End: 2024-01-01 | Stop reason: HOSPADM

## 2024-01-01 RX ORDER — VANCOMYCIN HYDROCHLORIDE 1 G/200ML
INJECTION, SOLUTION INTRAVENOUS CONTINUOUS PRN
Status: COMPLETED | OUTPATIENT
Start: 2024-01-01 | End: 2024-01-01

## 2024-01-01 RX ORDER — DIPHENHYDRAMINE HCL 25 MG
25 CAPSULE ORAL
Status: CANCELLED | OUTPATIENT
Start: 2024-01-01

## 2024-01-01 RX ORDER — LIDOCAINE AND PRILOCAINE 25; 25 MG/G; MG/G
1 CREAM TOPICAL AS NEEDED
Status: CANCELLED | OUTPATIENT
Start: 2024-01-01

## 2024-01-01 RX ORDER — TACROLIMUS 1 MG/1
2 CAPSULE ORAL 2 TIMES DAILY
Status: DISCONTINUED | OUTPATIENT
Start: 2024-01-01 | End: 2024-01-01

## 2024-01-01 RX ORDER — HEPARIN SODIUM 1000 [USP'U]/ML
1000 INJECTION, SOLUTION INTRAVENOUS; SUBCUTANEOUS ONCE
Status: COMPLETED | OUTPATIENT
Start: 2024-01-01 | End: 2024-01-01

## 2024-01-01 RX ORDER — ONDANSETRON HYDROCHLORIDE 2 MG/ML
4 INJECTION, SOLUTION INTRAVENOUS
Status: CANCELLED | OUTPATIENT
Start: 2024-01-01

## 2024-01-01 RX ORDER — MIDAZOLAM HYDROCHLORIDE 1 MG/ML
INJECTION INTRAMUSCULAR; INTRAVENOUS
Status: COMPLETED | OUTPATIENT
Start: 2024-01-01 | End: 2024-01-01

## 2024-01-01 RX ORDER — DIPHENHYDRAMINE HYDROCHLORIDE 50 MG/ML
50 INJECTION INTRAMUSCULAR; INTRAVENOUS ONCE
Status: DISCONTINUED | OUTPATIENT
Start: 2024-01-01 | End: 2024-01-01

## 2024-01-01 RX ORDER — ACETAMINOPHEN 325 MG/1
975 TABLET ORAL EVERY 8 HOURS PRN
Status: DISCONTINUED | OUTPATIENT
Start: 2024-01-01 | End: 2024-01-01

## 2024-01-01 RX ORDER — MIDODRINE HYDROCHLORIDE 10 MG/1
10 TABLET ORAL ONCE
Qty: 1 TABLET | Refills: 0 | Status: SHIPPED | OUTPATIENT
Start: 2024-01-01 | End: 2024-01-01 | Stop reason: HOSPADM

## 2024-01-01 RX ORDER — HEPARIN SODIUM 1000 [USP'U]/ML
1000 INJECTION, SOLUTION INTRAVENOUS; SUBCUTANEOUS ONCE
Status: DISCONTINUED | OUTPATIENT
Start: 2024-01-01 | End: 2024-01-01 | Stop reason: HOSPADM

## 2024-01-01 RX ORDER — FUROSEMIDE 10 MG/ML
28.25 INJECTION INTRAMUSCULAR; INTRAVENOUS ONCE
Status: COMPLETED | OUTPATIENT
Start: 2024-01-01 | End: 2024-01-01

## 2024-01-01 RX ORDER — SODIUM BICARBONATE 650 MG/1
650 TABLET ORAL DAILY
COMMUNITY
End: 2024-01-01 | Stop reason: HOSPADM

## 2024-01-01 RX ORDER — FENTANYL CITRATE 50 UG/ML
INJECTION, SOLUTION INTRAMUSCULAR; INTRAVENOUS AS NEEDED
Status: DISCONTINUED | OUTPATIENT
Start: 2024-01-01 | End: 2024-01-01 | Stop reason: HOSPADM

## 2024-01-01 RX ORDER — SODIUM BICARBONATE 650 MG/1
1300 TABLET ORAL 2 TIMES DAILY
Status: DISCONTINUED | OUTPATIENT
Start: 2024-01-01 | End: 2024-01-01

## 2024-01-01 RX ORDER — HYDROMORPHONE HYDROCHLORIDE 1 MG/ML
0.2 INJECTION, SOLUTION INTRAMUSCULAR; INTRAVENOUS; SUBCUTANEOUS
Status: DISCONTINUED | OUTPATIENT
Start: 2024-01-01 | End: 2024-01-01

## 2024-01-01 RX ORDER — BUMETANIDE 0.25 MG/ML
4 INJECTION INTRAMUSCULAR; INTRAVENOUS ONCE
Status: COMPLETED | OUTPATIENT
Start: 2024-01-01 | End: 2024-01-01

## 2024-01-01 RX ORDER — TACROLIMUS 1 MG/1
1 CAPSULE ORAL EVERY 24 HOURS
Status: DISCONTINUED | OUTPATIENT
Start: 2024-01-01 | End: 2024-01-01

## 2024-01-01 RX ORDER — MIDODRINE HYDROCHLORIDE 10 MG/1
10 TABLET ORAL DAILY
Status: DISCONTINUED | OUTPATIENT
Start: 2024-01-01 | End: 2024-01-01 | Stop reason: HOSPADM

## 2024-01-01 RX ORDER — CARVEDILOL 6.25 MG/1
TABLET ORAL
COMMUNITY
Start: 2024-01-01 | End: 2024-01-01 | Stop reason: ENTERED-IN-ERROR

## 2024-01-01 RX ORDER — FENTANYL CITRATE 50 UG/ML
INJECTION, SOLUTION INTRAMUSCULAR; INTRAVENOUS
Status: COMPLETED | OUTPATIENT
Start: 2024-01-01 | End: 2024-01-01

## 2024-01-01 RX ORDER — MIDODRINE HYDROCHLORIDE 10 MG/1
10 TABLET ORAL
Status: COMPLETED | OUTPATIENT
Start: 2024-01-01 | End: 2024-01-01

## 2024-01-01 RX ORDER — POLYETHYLENE GLYCOL 3350 17 G/17G
17 POWDER, FOR SOLUTION ORAL DAILY
Status: DISCONTINUED | OUTPATIENT
Start: 2024-01-01 | End: 2024-01-01

## 2024-01-01 RX ORDER — ONDANSETRON HYDROCHLORIDE 2 MG/ML
8 INJECTION, SOLUTION INTRAVENOUS EVERY 6 HOURS PRN
Status: DISCONTINUED | OUTPATIENT
Start: 2024-01-01 | End: 2024-01-01 | Stop reason: HOSPADM

## 2024-01-01 RX ORDER — GADOTERATE MEGLUMINE 376.9 MG/ML
23 INJECTION INTRAVENOUS
Status: COMPLETED | OUTPATIENT
Start: 2024-01-01 | End: 2024-01-01

## 2024-01-01 RX ORDER — ACETAMINOPHEN 325 MG/1
975 TABLET ORAL EVERY 8 HOURS PRN
Qty: 30 TABLET | Refills: 0 | Status: SHIPPED | OUTPATIENT
Start: 2024-01-01 | End: 2024-01-01 | Stop reason: HOSPADM

## 2024-01-01 RX ORDER — MORPHINE SULFATE IN 0.9 % NACL 30 MG/30ML
PATIENT CONTROLLED ANALGESIA SYRINGE INTRAVENOUS CONTINUOUS
Status: CANCELLED | OUTPATIENT
Start: 2024-01-01

## 2024-01-01 RX ORDER — MAGNESIUM SULFATE HEPTAHYDRATE 40 MG/ML
4 INJECTION, SOLUTION INTRAVENOUS ONCE
Status: DISCONTINUED | OUTPATIENT
Start: 2024-01-01 | End: 2024-01-01

## 2024-01-01 RX ORDER — GLYCOPYRROLATE 0.2 MG/ML
0.2 INJECTION INTRAMUSCULAR; INTRAVENOUS EVERY 4 HOURS PRN
Status: DISCONTINUED | OUTPATIENT
Start: 2024-01-01 | End: 2024-01-01

## 2024-01-01 RX ORDER — MIDAZOLAM HYDROCHLORIDE 1 MG/ML
INJECTION, SOLUTION INTRAMUSCULAR; INTRAVENOUS AS NEEDED
Status: DISCONTINUED | OUTPATIENT
Start: 2024-01-01 | End: 2024-01-01 | Stop reason: HOSPADM

## 2024-01-01 RX ORDER — BUMETANIDE 0.25 MG/ML
6 INJECTION INTRAMUSCULAR; INTRAVENOUS ONCE
Status: COMPLETED | OUTPATIENT
Start: 2024-01-01 | End: 2024-01-01

## 2024-01-01 RX ORDER — DIPHENHYDRAMINE HYDROCHLORIDE 50 MG/ML
25 INJECTION INTRAMUSCULAR; INTRAVENOUS EVERY 6 HOURS
Status: DISCONTINUED | OUTPATIENT
Start: 2024-01-01 | End: 2024-01-01

## 2024-01-01 RX ORDER — ALBUTEROL SULFATE 0.83 MG/ML
2.5 SOLUTION RESPIRATORY (INHALATION) EVERY 4 HOURS PRN
Status: DISCONTINUED | OUTPATIENT
Start: 2024-01-01 | End: 2024-01-01 | Stop reason: HOSPADM

## 2024-01-01 RX ORDER — FUROSEMIDE 20 MG/1
40 TABLET ORAL DAILY
Status: DISCONTINUED | OUTPATIENT
Start: 2024-01-01 | End: 2024-01-01 | Stop reason: HOSPADM

## 2024-01-01 RX ORDER — MIDODRINE HYDROCHLORIDE 10 MG/1
10 TABLET ORAL AS NEEDED
Status: DISCONTINUED | OUTPATIENT
Start: 2024-01-01 | End: 2024-01-01 | Stop reason: HOSPADM

## 2024-01-01 RX ORDER — METOPROLOL SUCCINATE 50 MG/1
50 TABLET, EXTENDED RELEASE ORAL DAILY
Qty: 90 TABLET | Refills: 3 | Status: SHIPPED | OUTPATIENT
Start: 2024-01-01 | End: 2024-01-01 | Stop reason: HOSPADM

## 2024-01-01 RX ORDER — GLYCOPYRROLATE 0.2 MG/ML
0.2 INJECTION INTRAMUSCULAR; INTRAVENOUS EVERY 4 HOURS PRN
Status: CANCELLED | OUTPATIENT
Start: 2024-01-01

## 2024-01-01 RX ORDER — BUMETANIDE 0.25 MG/ML
2 INJECTION INTRAMUSCULAR; INTRAVENOUS ONCE
Status: COMPLETED | OUTPATIENT
Start: 2024-01-01 | End: 2024-01-01

## 2024-01-01 RX ORDER — SODIUM BICARBONATE 650 MG/1
650 TABLET ORAL 2 TIMES DAILY
Status: DISCONTINUED | OUTPATIENT
Start: 2024-01-01 | End: 2024-01-01

## 2024-01-01 RX ORDER — METOPROLOL SUCCINATE 50 MG/1
50 TABLET, EXTENDED RELEASE ORAL DAILY
Status: DISCONTINUED | OUTPATIENT
Start: 2024-01-01 | End: 2024-01-01 | Stop reason: HOSPADM

## 2024-01-01 RX ORDER — DIPHENHYDRAMINE HYDROCHLORIDE 50 MG/ML
INJECTION INTRAMUSCULAR; INTRAVENOUS
Status: COMPLETED
Start: 2024-01-01 | End: 2024-01-01

## 2024-01-01 RX ORDER — BUPIVACAINE HYDROCHLORIDE 5 MG/ML
INJECTION, SOLUTION EPIDURAL; INTRACAUDAL AS NEEDED
Status: DISCONTINUED | OUTPATIENT
Start: 2024-01-01 | End: 2024-01-01 | Stop reason: HOSPADM

## 2024-01-01 RX ORDER — BISACODYL 10 MG/1
10 SUPPOSITORY RECTAL DAILY PRN
Status: DISCONTINUED | OUTPATIENT
Start: 2024-01-01 | End: 2024-01-01 | Stop reason: HOSPADM

## 2024-01-01 RX ORDER — TACROLIMUS 1 MG/1
3 CAPSULE ORAL EVERY MORNING
COMMUNITY
End: 2024-01-01 | Stop reason: HOSPADM

## 2024-01-01 RX ORDER — ACETAMINOPHEN 325 MG/1
650 TABLET ORAL EVERY 4 HOURS PRN
Status: CANCELLED
Start: 2024-01-01

## 2024-01-01 RX ORDER — MYCOPHENOLIC ACID 180 MG/1
180 TABLET, DELAYED RELEASE ORAL 2 TIMES DAILY
COMMUNITY
Start: 2024-01-01 | End: 2024-01-01 | Stop reason: HOSPADM

## 2024-01-01 RX ORDER — MAGNESIUM SULFATE 1 G/100ML
1 INJECTION INTRAVENOUS ONCE
Status: COMPLETED | OUTPATIENT
Start: 2024-01-01 | End: 2024-01-01

## 2024-01-01 RX ORDER — LORAZEPAM 2 MG/ML
0.5 INJECTION INTRAMUSCULAR EVERY 4 HOURS PRN
Status: CANCELLED | OUTPATIENT
Start: 2024-01-01

## 2024-01-01 RX ORDER — ACETAMINOPHEN 325 MG/1
650 TABLET ORAL EVERY 4 HOURS PRN
Status: DISCONTINUED | OUTPATIENT
Start: 2024-01-01 | End: 2024-01-01 | Stop reason: HOSPADM

## 2024-01-01 RX ORDER — BUMETANIDE 2 MG/1
2 TABLET ORAL EVERY 12 HOURS SCHEDULED
Status: DISCONTINUED | OUTPATIENT
Start: 2024-01-01 | End: 2024-01-01

## 2024-01-01 RX ORDER — ONDANSETRON HYDROCHLORIDE 2 MG/ML
4 INJECTION, SOLUTION INTRAVENOUS
Status: DISCONTINUED | OUTPATIENT
Start: 2024-01-01 | End: 2024-01-01 | Stop reason: HOSPADM

## 2024-01-01 RX ORDER — AMMONIA N-13 37.5 MCI/ML
10.5 INJECTION INTRAVENOUS
Status: COMPLETED | OUTPATIENT
Start: 2024-01-01 | End: 2024-01-01

## 2024-01-01 RX ORDER — LIDOCAINE HYDROCHLORIDE 20 MG/ML
INJECTION, SOLUTION INFILTRATION; PERINEURAL AS NEEDED
Status: DISCONTINUED | OUTPATIENT
Start: 2024-01-01 | End: 2024-01-01 | Stop reason: HOSPADM

## 2024-01-01 RX ORDER — MYCOPHENOLIC ACID 180 MG/1
180 TABLET, DELAYED RELEASE ORAL 2 TIMES DAILY
Status: DISCONTINUED | OUTPATIENT
Start: 2024-01-01 | End: 2024-01-01

## 2024-01-01 RX ORDER — BISACODYL 10 MG/1
10 SUPPOSITORY RECTAL DAILY PRN
Status: CANCELLED | OUTPATIENT
Start: 2024-01-01

## 2024-01-01 RX ORDER — SULFAMETHOXAZOLE AND TRIMETHOPRIM 400; 80 MG/1; MG/1
1 TABLET ORAL
Status: DISCONTINUED | OUTPATIENT
Start: 2024-01-01 | End: 2024-01-01

## 2024-01-01 RX ORDER — POTASSIUM CHLORIDE 20 MEQ/1
20 TABLET, EXTENDED RELEASE ORAL ONCE
Status: COMPLETED | OUTPATIENT
Start: 2024-01-01 | End: 2024-01-01

## 2024-01-01 RX ORDER — BUMETANIDE 2 MG/1
2 TABLET ORAL DAILY
Qty: 30 TABLET | Refills: 1 | Status: SHIPPED | OUTPATIENT
Start: 2024-01-01 | End: 2024-01-01 | Stop reason: HOSPADM

## 2024-01-01 RX ORDER — MYCOPHENOLIC ACID 360 MG/1
360 TABLET, DELAYED RELEASE ORAL 2 TIMES DAILY
Qty: 60 TABLET | Refills: 3 | Status: SHIPPED | OUTPATIENT
Start: 2024-01-01 | End: 2024-01-01 | Stop reason: HOSPADM

## 2024-01-01 RX ORDER — LIDOCAINE 560 MG/1
1 PATCH PERCUTANEOUS; TOPICAL; TRANSDERMAL DAILY
Status: DISCONTINUED | OUTPATIENT
Start: 2024-01-01 | End: 2024-01-01 | Stop reason: HOSPADM

## 2024-01-01 RX ORDER — BUMETANIDE 0.25 MG/ML
3 INJECTION INTRAMUSCULAR; INTRAVENOUS ONCE
Status: COMPLETED | OUTPATIENT
Start: 2024-01-01 | End: 2024-01-01

## 2024-01-01 RX ORDER — BUMETANIDE 2 MG/1
2 TABLET ORAL
Status: DISCONTINUED | OUTPATIENT
Start: 2024-01-01 | End: 2024-01-01 | Stop reason: HOSPADM

## 2024-01-01 RX ORDER — LORAZEPAM 2 MG/ML
1 INJECTION INTRAMUSCULAR ONCE
Status: COMPLETED | OUTPATIENT
Start: 2024-01-01 | End: 2024-01-01

## 2024-01-01 RX ORDER — TACROLIMUS 1 MG/1
2 CAPSULE ORAL
Status: DISCONTINUED | OUTPATIENT
Start: 2024-01-01 | End: 2024-01-01 | Stop reason: HOSPADM

## 2024-01-01 RX ORDER — SODIUM CHLORIDE 9 MG/ML
50 INJECTION, SOLUTION INTRAVENOUS CONTINUOUS
Status: ACTIVE | OUTPATIENT
Start: 2024-01-01 | End: 2024-01-01

## 2024-01-01 RX ORDER — DIPHENHYDRAMINE HYDROCHLORIDE 50 MG/ML
INJECTION INTRAMUSCULAR; INTRAVENOUS
Status: DISPENSED
Start: 2024-01-01 | End: 2024-01-01

## 2024-01-01 RX ORDER — NAPROXEN SODIUM 220 MG/1
324 TABLET, FILM COATED ORAL ONCE
Status: COMPLETED | OUTPATIENT
Start: 2024-01-01 | End: 2024-01-01

## 2024-01-01 RX ORDER — ACETAMINOPHEN 325 MG/1
650 TABLET ORAL EVERY 6 HOURS PRN
Status: DISCONTINUED | OUTPATIENT
Start: 2024-01-01 | End: 2024-01-01 | Stop reason: HOSPADM

## 2024-01-01 RX ORDER — DIPHENHYDRAMINE HYDROCHLORIDE 50 MG/ML
25 INJECTION INTRAMUSCULAR; INTRAVENOUS EVERY 6 HOURS
Status: CANCELLED | OUTPATIENT
Start: 2024-01-01

## 2024-01-01 RX ORDER — LORAZEPAM 2 MG/ML
0.5 INJECTION INTRAMUSCULAR EVERY 4 HOURS PRN
Status: DISCONTINUED | OUTPATIENT
Start: 2024-01-01 | End: 2024-01-01

## 2024-01-01 RX ORDER — DIPHENHYDRAMINE HYDROCHLORIDE 50 MG/ML
25 INJECTION INTRAMUSCULAR; INTRAVENOUS EVERY 6 HOURS
Status: DISCONTINUED | OUTPATIENT
Start: 2024-01-01 | End: 2024-01-01 | Stop reason: HOSPADM

## 2024-01-01 RX ORDER — HEPARIN SODIUM 5000 [USP'U]/ML
5000 INJECTION, SOLUTION INTRAVENOUS; SUBCUTANEOUS EVERY 8 HOURS SCHEDULED
Status: DISCONTINUED | OUTPATIENT
Start: 2024-01-01 | End: 2024-01-01

## 2024-01-01 RX ORDER — MIDAZOLAM HYDROCHLORIDE 1 MG/ML
INJECTION INTRAMUSCULAR; INTRAVENOUS AS NEEDED
Status: DISCONTINUED | OUTPATIENT
Start: 2024-01-01 | End: 2024-01-01 | Stop reason: HOSPADM

## 2024-01-01 RX ORDER — TAMSULOSIN HYDROCHLORIDE 0.4 MG/1
0.8 CAPSULE ORAL DAILY
Status: DISCONTINUED | OUTPATIENT
Start: 2024-01-01 | End: 2024-01-01

## 2024-01-01 RX ORDER — TAMSULOSIN HYDROCHLORIDE 0.4 MG/1
0.4 CAPSULE ORAL DAILY
Status: DISCONTINUED | OUTPATIENT
Start: 2024-01-01 | End: 2024-01-01 | Stop reason: HOSPADM

## 2024-01-01 RX ORDER — BETIATIDE 1 MG/1
10 INJECTION, POWDER, LYOPHILIZED, FOR SOLUTION INTRAVENOUS
Status: COMPLETED | OUTPATIENT
Start: 2024-01-01 | End: 2024-01-01

## 2024-01-01 RX ORDER — PANTOPRAZOLE SODIUM 40 MG/1
40 TABLET, DELAYED RELEASE ORAL EVERY MORNING
Status: DISCONTINUED | OUTPATIENT
Start: 2024-01-01 | End: 2024-01-01 | Stop reason: HOSPADM

## 2024-01-01 RX ORDER — MAGNESIUM SULFATE HEPTAHYDRATE 40 MG/ML
2 INJECTION, SOLUTION INTRAVENOUS ONCE
Status: COMPLETED | OUTPATIENT
Start: 2024-01-01 | End: 2024-01-01

## 2024-01-01 RX ORDER — CICLOPIROX OLAMINE 7.7 MG/100ML
SUSPENSION TOPICAL 2 TIMES DAILY
COMMUNITY
End: 2024-01-01 | Stop reason: ENTERED-IN-ERROR

## 2024-01-01 RX ORDER — ATROPINE SULFATE 10 MG/ML
1 SOLUTION/ DROPS OPHTHALMIC 3 TIMES DAILY PRN
Status: DISCONTINUED | OUTPATIENT
Start: 2024-01-01 | End: 2024-01-01 | Stop reason: HOSPADM

## 2024-01-01 RX ORDER — POTASSIUM CHLORIDE 14.9 MG/ML
20 INJECTION INTRAVENOUS
Status: ACTIVE | OUTPATIENT
Start: 2024-01-01 | End: 2024-01-01

## 2024-01-01 RX ORDER — SULFAMETHOXAZOLE AND TRIMETHOPRIM 400; 80 MG/1; MG/1
1 TABLET ORAL
Status: DISCONTINUED | OUTPATIENT
Start: 2024-01-01 | End: 2024-01-01 | Stop reason: HOSPADM

## 2024-01-01 RX ORDER — TACROLIMUS 1 MG/1
1 CAPSULE ORAL ONCE
Status: COMPLETED | OUTPATIENT
Start: 2024-01-01 | End: 2024-01-01

## 2024-01-01 RX ORDER — ALBUMIN HUMAN 250 G/1000ML
25 SOLUTION INTRAVENOUS
Status: DISCONTINUED | OUTPATIENT
Start: 2024-01-01 | End: 2024-01-01

## 2024-01-01 RX ORDER — DIPHENHYDRAMINE HYDROCHLORIDE 50 MG/ML
50 INJECTION INTRAMUSCULAR; INTRAVENOUS ONCE
Status: COMPLETED | OUTPATIENT
Start: 2024-01-01 | End: 2024-01-01

## 2024-01-01 RX ORDER — ACETAMINOPHEN 325 MG/1
975 TABLET ORAL EVERY 8 HOURS PRN
Status: DISCONTINUED | OUTPATIENT
Start: 2024-01-01 | End: 2024-01-01 | Stop reason: HOSPADM

## 2024-01-01 RX ORDER — BUMETANIDE 2 MG/1
2 TABLET ORAL DAILY
Status: DISCONTINUED | OUTPATIENT
Start: 2024-01-01 | End: 2024-01-01 | Stop reason: HOSPADM

## 2024-01-01 RX ORDER — TACROLIMUS 1 MG/1
2 CAPSULE ORAL
Status: DISCONTINUED | OUTPATIENT
Start: 2024-01-01 | End: 2024-01-01

## 2024-01-01 RX ORDER — FUROSEMIDE 10 MG/ML
40 INJECTION INTRAMUSCULAR; INTRAVENOUS ONCE
Status: COMPLETED | OUTPATIENT
Start: 2024-01-01 | End: 2024-01-01

## 2024-01-01 RX ORDER — HEPARIN SODIUM 5000 [USP'U]/ML
5000 INJECTION, SOLUTION INTRAVENOUS; SUBCUTANEOUS EVERY 8 HOURS
Status: DISCONTINUED | OUTPATIENT
Start: 2024-01-01 | End: 2024-01-01 | Stop reason: HOSPADM

## 2024-01-01 RX ORDER — LORAZEPAM 2 MG/ML
2 INJECTION INTRAMUSCULAR ONCE
Status: COMPLETED | OUTPATIENT
Start: 2024-01-01 | End: 2024-01-01

## 2024-01-01 RX ORDER — HEPARIN SODIUM 5000 [USP'U]/ML
5000 INJECTION, SOLUTION INTRAVENOUS; SUBCUTANEOUS EVERY 8 HOURS
Status: COMPLETED | OUTPATIENT
Start: 2024-01-01 | End: 2024-01-01

## 2024-01-01 RX ORDER — MIDODRINE HYDROCHLORIDE 10 MG/1
10 TABLET ORAL DAILY
Qty: 30 TABLET | Refills: 11 | Status: SHIPPED | OUTPATIENT
Start: 2024-01-01 | End: 2024-01-01 | Stop reason: HOSPADM

## 2024-01-01 RX ORDER — MYCOPHENOLIC ACID 360 MG/1
360 TABLET, DELAYED RELEASE ORAL 2 TIMES DAILY
Status: DISCONTINUED | OUTPATIENT
Start: 2024-01-01 | End: 2024-01-01 | Stop reason: HOSPADM

## 2024-01-01 RX ORDER — TAMSULOSIN HYDROCHLORIDE 0.4 MG/1
0.4 CAPSULE ORAL DAILY
Status: DISCONTINUED | OUTPATIENT
Start: 2024-01-01 | End: 2024-01-01

## 2024-01-01 RX ORDER — LORAZEPAM 2 MG/ML
2 INJECTION INTRAMUSCULAR EVERY 6 HOURS SCHEDULED
Status: DISCONTINUED | OUTPATIENT
Start: 2024-01-01 | End: 2024-01-01

## 2024-01-01 RX ORDER — HYDROMORPHONE HYDROCHLORIDE 1 MG/ML
0.4 INJECTION, SOLUTION INTRAMUSCULAR; INTRAVENOUS; SUBCUTANEOUS
Status: DISCONTINUED | OUTPATIENT
Start: 2024-01-01 | End: 2024-01-01

## 2024-01-01 RX ORDER — HYOSCYAMINE SULFATE 0.5 MG/ML
0.25 INJECTION, SOLUTION SUBCUTANEOUS EVERY 6 HOURS PRN
Status: DISCONTINUED | OUTPATIENT
Start: 2024-01-01 | End: 2024-01-01 | Stop reason: HOSPADM

## 2024-01-01 RX ORDER — SODIUM BICARBONATE 650 MG/1
650 TABLET ORAL DAILY
Status: DISCONTINUED | OUTPATIENT
Start: 2024-01-01 | End: 2024-01-01

## 2024-01-01 RX ORDER — MORPHINE SULFATE IN 0.9 % NACL 30 MG/30ML
PATIENT CONTROLLED ANALGESIA SYRINGE INTRAVENOUS CONTINUOUS
Status: DISCONTINUED | OUTPATIENT
Start: 2024-01-01 | End: 2024-01-01 | Stop reason: HOSPADM

## 2024-01-01 RX ORDER — MIDODRINE HYDROCHLORIDE 10 MG/1
10 TABLET ORAL ONCE
OUTPATIENT
Start: 2024-01-01

## 2024-01-01 RX ORDER — SODIUM BICARBONATE 650 MG/1
TABLET ORAL
COMMUNITY
Start: 2023-01-01 | End: 2024-01-01 | Stop reason: ENTERED-IN-ERROR

## 2024-01-01 RX ORDER — TACROLIMUS 1 MG/1
2 CAPSULE ORAL
Qty: 120 CAPSULE | Refills: 1 | Status: SHIPPED | OUTPATIENT
Start: 2024-01-01 | End: 2024-01-01 | Stop reason: HOSPADM

## 2024-01-01 RX ORDER — ASPIRIN 81 MG/1
81 TABLET ORAL EVERY MORNING
Status: DISCONTINUED | OUTPATIENT
Start: 2024-01-01 | End: 2024-01-01 | Stop reason: HOSPADM

## 2024-01-01 RX ORDER — HEPARIN SODIUM 5000 [USP'U]/ML
60 INJECTION, SOLUTION INTRAVENOUS; SUBCUTANEOUS ONCE
Status: COMPLETED | OUTPATIENT
Start: 2024-01-01 | End: 2024-01-01

## 2024-01-01 RX ORDER — CHLOROTHIAZIDE SODIUM 500 MG/1
500 INJECTION INTRAVENOUS ONCE
Status: COMPLETED | OUTPATIENT
Start: 2024-01-01 | End: 2024-01-01

## 2024-01-01 RX ORDER — CLOTRIMAZOLE 1 %
CREAM (GRAM) TOPICAL 2 TIMES DAILY
Status: DISCONTINUED | OUTPATIENT
Start: 2024-01-01 | End: 2024-01-01 | Stop reason: HOSPADM

## 2024-01-01 RX ORDER — BUMETANIDE 2 MG/1
TABLET ORAL EVERY 12 HOURS
COMMUNITY
Start: 2023-03-08 | End: 2024-01-01 | Stop reason: HOSPADM

## 2024-01-01 RX ORDER — ALBUTEROL SULFATE 0.83 MG/ML
2.5 SOLUTION RESPIRATORY (INHALATION) EVERY 4 HOURS PRN
Status: CANCELLED | OUTPATIENT
Start: 2024-01-01

## 2024-01-01 RX ORDER — HEPARIN SODIUM 10000 [USP'U]/100ML
0-4000 INJECTION, SOLUTION INTRAVENOUS CONTINUOUS
Status: DISCONTINUED | OUTPATIENT
Start: 2024-01-01 | End: 2024-01-01 | Stop reason: HOSPADM

## 2024-01-01 RX ORDER — ONDANSETRON HYDROCHLORIDE 2 MG/ML
8 INJECTION, SOLUTION INTRAVENOUS EVERY 6 HOURS PRN
Status: CANCELLED | OUTPATIENT
Start: 2024-01-01

## 2024-01-01 RX ORDER — LORAZEPAM 2 MG/ML
0.5 INJECTION INTRAMUSCULAR EVERY 4 HOURS PRN
Status: DISCONTINUED | OUTPATIENT
Start: 2024-01-01 | End: 2024-01-01 | Stop reason: HOSPADM

## 2024-01-01 RX ORDER — HYDRALAZINE HYDROCHLORIDE 25 MG/1
25 TABLET, FILM COATED ORAL 3 TIMES DAILY
Status: DISCONTINUED | OUTPATIENT
Start: 2024-01-01 | End: 2024-01-01 | Stop reason: HOSPADM

## 2024-01-01 RX ORDER — MIDODRINE HYDROCHLORIDE 5 MG/1
5 TABLET ORAL ONCE
Status: COMPLETED | OUTPATIENT
Start: 2024-01-01 | End: 2024-01-01

## 2024-01-01 RX ORDER — METOPROLOL SUCCINATE 25 MG/1
25 TABLET, EXTENDED RELEASE ORAL DAILY
Qty: 30 TABLET | Refills: 2 | Status: SHIPPED | OUTPATIENT
Start: 2024-01-01 | End: 2024-01-01 | Stop reason: SDUPTHER

## 2024-01-01 RX ORDER — LIDOCAINE HYDROCHLORIDE 20 MG/ML
1 JELLY TOPICAL ONCE
Status: COMPLETED | OUTPATIENT
Start: 2024-01-01 | End: 2024-01-01

## 2024-01-01 RX ORDER — MYCOPHENOLIC ACID 180 MG/1
180 TABLET, DELAYED RELEASE ORAL 2 TIMES DAILY
Status: DISCONTINUED | OUTPATIENT
Start: 2024-01-01 | End: 2024-01-01 | Stop reason: HOSPADM

## 2024-01-01 RX ORDER — MIDODRINE HYDROCHLORIDE 5 MG/1
10 TABLET ORAL ONCE
Status: COMPLETED | OUTPATIENT
Start: 2024-01-01 | End: 2024-01-01

## 2024-01-01 RX ORDER — MYCOPHENOLIC ACID 180 MG/1
180 TABLET, DELAYED RELEASE ORAL 2 TIMES DAILY
COMMUNITY
Start: 2023-01-01 | End: 2024-01-01 | Stop reason: HOSPADM

## 2024-01-01 RX ORDER — POTASSIUM CHLORIDE 14.9 MG/ML
20 INJECTION INTRAVENOUS ONCE
Status: DISCONTINUED | OUTPATIENT
Start: 2024-01-01 | End: 2024-01-01

## 2024-01-01 RX ORDER — CLOTRIMAZOLE 1 %
CREAM (GRAM) TOPICAL
Qty: 30 G | Refills: 0 | Status: SHIPPED | OUTPATIENT
Start: 2024-01-01 | End: 2024-01-01 | Stop reason: HOSPADM

## 2024-01-01 RX ORDER — CEFAZOLIN SODIUM 1 G/50ML
SOLUTION INTRAVENOUS CONTINUOUS PRN
Status: COMPLETED | OUTPATIENT
Start: 2024-01-01 | End: 2024-01-01

## 2024-01-01 RX ORDER — POTASSIUM CHLORIDE 14.9 MG/ML
20 INJECTION INTRAVENOUS
Status: DISPENSED | OUTPATIENT
Start: 2024-01-01 | End: 2024-01-01

## 2024-01-01 RX ORDER — GLYCOPYRROLATE 0.2 MG/ML
0.4 INJECTION INTRAMUSCULAR; INTRAVENOUS EVERY 4 HOURS PRN
Status: DISCONTINUED | OUTPATIENT
Start: 2024-01-01 | End: 2024-01-01 | Stop reason: HOSPADM

## 2024-01-01 RX ORDER — MIDODRINE HYDROCHLORIDE 5 MG/1
5 TABLET ORAL ONCE
Status: DISCONTINUED | OUTPATIENT
Start: 2024-01-01 | End: 2024-01-01 | Stop reason: HOSPADM

## 2024-01-01 RX ORDER — AMLODIPINE BESYLATE 5 MG/1
5 TABLET ORAL DAILY
Qty: 90 TABLET | Refills: 3 | Status: ON HOLD | OUTPATIENT
Start: 2024-01-01 | End: 2024-01-01 | Stop reason: ALTCHOICE

## 2024-01-01 RX ORDER — FUROSEMIDE 40 MG/1
40 TABLET ORAL DAILY
Qty: 30 TABLET | Refills: 2
Start: 2024-01-01 | End: 2024-01-01 | Stop reason: HOSPADM

## 2024-01-01 RX ORDER — TACROLIMUS 1 MG/1
2 CAPSULE ORAL NIGHTLY
Status: DISCONTINUED | OUTPATIENT
Start: 2024-01-01 | End: 2024-01-01

## 2024-01-01 RX ORDER — TACROLIMUS 1 MG/1
3 CAPSULE ORAL DAILY
Status: DISCONTINUED | OUTPATIENT
Start: 2024-01-01 | End: 2024-01-01

## 2024-01-01 RX ORDER — HEPARIN SODIUM 5000 [USP'U]/ML
INJECTION, SOLUTION INTRAVENOUS; SUBCUTANEOUS EVERY 4 HOURS PRN
Status: DISCONTINUED | OUTPATIENT
Start: 2024-01-01 | End: 2024-01-01 | Stop reason: HOSPADM

## 2024-01-01 RX ORDER — TACROLIMUS 1 MG/1
2 CAPSULE ORAL
Qty: 120 CAPSULE | Refills: 3 | Status: SHIPPED | OUTPATIENT
Start: 2024-01-01 | End: 2024-01-01 | Stop reason: SDUPTHER

## 2024-01-01 RX ORDER — TAMSULOSIN HYDROCHLORIDE 0.4 MG/1
0.4 CAPSULE ORAL DAILY
Qty: 30 CAPSULE | Refills: 3 | Status: SHIPPED | OUTPATIENT
Start: 2024-01-01 | End: 2024-01-01 | Stop reason: HOSPADM

## 2024-01-01 RX ORDER — TACROLIMUS 1 MG/1
2 CAPSULE ORAL EVERY EVENING
Status: DISCONTINUED | OUTPATIENT
Start: 2024-01-01 | End: 2024-01-01

## 2024-01-01 RX ORDER — MAGNESIUM SULFATE HEPTAHYDRATE 40 MG/ML
4 INJECTION, SOLUTION INTRAVENOUS ONCE
Status: COMPLETED | OUTPATIENT
Start: 2024-01-01 | End: 2024-01-01

## 2024-01-01 RX ORDER — ACETAMINOPHEN 160 MG/5ML
650 SOLUTION ORAL EVERY 4 HOURS PRN
Status: DISCONTINUED | OUTPATIENT
Start: 2024-01-01 | End: 2024-01-01 | Stop reason: HOSPADM

## 2024-01-01 RX ORDER — MIDODRINE HYDROCHLORIDE 5 MG/1
5 TABLET ORAL ONCE
Qty: 1 TABLET | Refills: 0 | Status: SHIPPED | OUTPATIENT
Start: 2024-01-01 | End: 2024-01-01 | Stop reason: ENTERED-IN-ERROR

## 2024-01-01 RX ORDER — BUMETANIDE 2 MG/1
2 TABLET ORAL 2 TIMES DAILY
Qty: 60 TABLET | Refills: 0 | Status: SHIPPED | OUTPATIENT
Start: 2024-01-01 | End: 2024-01-01 | Stop reason: HOSPADM

## 2024-01-01 RX ORDER — TACROLIMUS 1 MG/1
3 CAPSULE ORAL EVERY MORNING
Status: DISCONTINUED | OUTPATIENT
Start: 2024-01-01 | End: 2024-01-01

## 2024-01-01 RX ORDER — HEPARIN SODIUM 5000 [USP'U]/ML
5000 INJECTION, SOLUTION INTRAVENOUS; SUBCUTANEOUS EVERY 8 HOURS SCHEDULED
Status: DISCONTINUED | OUTPATIENT
Start: 2024-01-01 | End: 2024-01-01 | Stop reason: HOSPADM

## 2024-01-01 RX ORDER — METOPROLOL SUCCINATE 50 MG/1
50 TABLET, EXTENDED RELEASE ORAL NIGHTLY
Status: DISCONTINUED | OUTPATIENT
Start: 2024-01-01 | End: 2024-01-01 | Stop reason: HOSPADM

## 2024-01-01 RX ORDER — GLYCOPYRROLATE 0.2 MG/ML
0.2 INJECTION INTRAMUSCULAR; INTRAVENOUS EVERY 4 HOURS PRN
Status: DISCONTINUED | OUTPATIENT
Start: 2024-01-01 | End: 2024-01-01 | Stop reason: HOSPADM

## 2024-01-01 RX ORDER — LORAZEPAM 2 MG/ML
INJECTION INTRAMUSCULAR
Status: COMPLETED
Start: 2024-01-01 | End: 2024-01-01

## 2024-01-01 RX ADMIN — MIDAZOLAM HYDROCHLORIDE 1 MG: 1 INJECTION, SOLUTION INTRAMUSCULAR; INTRAVENOUS at 15:00

## 2024-01-01 RX ADMIN — MYCOPHENOLIC ACID 360 MG: 360 TABLET, DELAYED RELEASE ORAL at 21:39

## 2024-01-01 RX ADMIN — ASPIRIN 81 MG: 81 TABLET, COATED ORAL at 08:46

## 2024-01-01 RX ADMIN — HYDRALAZINE HYDROCHLORIDE 25 MG: 25 TABLET ORAL at 20:49

## 2024-01-01 RX ADMIN — PANTOPRAZOLE SODIUM 40 MG: 40 TABLET, DELAYED RELEASE ORAL at 08:21

## 2024-01-01 RX ADMIN — ASPIRIN 81 MG: 81 TABLET, COATED ORAL at 15:42

## 2024-01-01 RX ADMIN — CLOTRIMAZOLE: 1 CREAM TOPICAL at 21:03

## 2024-01-01 RX ADMIN — TACROLIMUS 2 MG: 1 CAPSULE ORAL at 22:47

## 2024-01-01 RX ADMIN — BUMETANIDE 2 MG: 2 TABLET ORAL at 15:42

## 2024-01-01 RX ADMIN — SULFAMETHOXAZOLE AND TRIMETHOPRIM 1 TABLET: 400; 80 TABLET ORAL at 08:46

## 2024-01-01 RX ADMIN — TACROLIMUS 2 MG: 1 CAPSULE ORAL at 18:06

## 2024-01-01 RX ADMIN — ASPIRIN 81 MG: 81 TABLET, COATED ORAL at 10:36

## 2024-01-01 RX ADMIN — GLYCOPYRROLATE 0.4 MG: 0.2 INJECTION, SOLUTION INTRAMUSCULAR; INTRAVENOUS at 12:10

## 2024-01-01 RX ADMIN — MYCOPHENOLIC ACID 360 MG: 360 TABLET, DELAYED RELEASE ORAL at 08:21

## 2024-01-01 RX ADMIN — DESMOPRESSIN ACETATE 20 MCG: 4 INJECTION, SOLUTION INTRAVENOUS; SUBCUTANEOUS at 12:31

## 2024-01-01 RX ADMIN — MYCOPHENOLIC ACID 180 MG: 180 TABLET, DELAYED RELEASE ORAL at 20:34

## 2024-01-01 RX ADMIN — SODIUM BICARBONATE 650 MG TABLET 1300 MG: at 08:17

## 2024-01-01 RX ADMIN — MYCOPHENOLIC ACID 180 MG: 180 TABLET, DELAYED RELEASE ORAL at 20:31

## 2024-01-01 RX ADMIN — TACROLIMUS 1.5 MG: 0.5 CAPSULE ORAL at 09:11

## 2024-01-01 RX ADMIN — MYCOPHENOLIC ACID 360 MG: 360 TABLET, DELAYED RELEASE ORAL at 20:58

## 2024-01-01 RX ADMIN — TACROLIMUS 2 MG: 1 CAPSULE ORAL at 11:03

## 2024-01-01 RX ADMIN — TACROLIMUS 3 MG: 1 CAPSULE ORAL at 06:03

## 2024-01-01 RX ADMIN — SODIUM BICARBONATE 650 MG: 650 TABLET ORAL at 09:09

## 2024-01-01 RX ADMIN — TAMSULOSIN HYDROCHLORIDE 0.4 MG: 0.4 CAPSULE ORAL at 08:20

## 2024-01-01 RX ADMIN — TAMSULOSIN HYDROCHLORIDE 0.8 MG: 0.4 CAPSULE ORAL at 08:20

## 2024-01-01 RX ADMIN — HEPARIN SODIUM 5000 UNITS: 5000 INJECTION INTRAVENOUS; SUBCUTANEOUS at 09:00

## 2024-01-01 RX ADMIN — PANTOPRAZOLE SODIUM 40 MG: 40 TABLET, DELAYED RELEASE ORAL at 09:27

## 2024-01-01 RX ADMIN — MYCOPHENOLIC ACID 180 MG: 180 TABLET, DELAYED RELEASE ORAL at 08:46

## 2024-01-01 RX ADMIN — SULFAMETHOXAZOLE AND TRIMETHOPRIM 1 TABLET: 400; 80 TABLET ORAL at 09:09

## 2024-01-01 RX ADMIN — TACROLIMUS 1.5 MG: 1 CAPSULE ORAL at 06:03

## 2024-01-01 RX ADMIN — TACROLIMUS 1.5 MG: 1 CAPSULE ORAL at 06:06

## 2024-01-01 RX ADMIN — PANTOPRAZOLE SODIUM 40 MG: 40 TABLET, DELAYED RELEASE ORAL at 08:54

## 2024-01-01 RX ADMIN — TAMSULOSIN HYDROCHLORIDE 0.4 MG: 0.4 CAPSULE ORAL at 09:09

## 2024-01-01 RX ADMIN — SODIUM BICARBONATE 650 MG TABLET 650 MG: at 08:55

## 2024-01-01 RX ADMIN — SODIUM BICARBONATE 650 MG TABLET 1300 MG: at 21:32

## 2024-01-01 RX ADMIN — MYCOPHENOLIC ACID 360 MG: 360 TABLET, DELAYED RELEASE ORAL at 09:10

## 2024-01-01 RX ADMIN — LORAZEPAM 1 MG: 2 INJECTION INTRAMUSCULAR; INTRAVENOUS at 11:05

## 2024-01-01 RX ADMIN — TAMSULOSIN HYDROCHLORIDE 0.4 MG: 0.4 CAPSULE ORAL at 20:32

## 2024-01-01 RX ADMIN — MYCOPHENOLIC ACID 360 MG: 360 TABLET, DELAYED RELEASE ORAL at 21:58

## 2024-01-01 RX ADMIN — HEPARIN SODIUM 5000 UNITS: 5000 INJECTION INTRAVENOUS; SUBCUTANEOUS at 00:55

## 2024-01-01 RX ADMIN — ASPIRIN 81 MG: 81 TABLET, COATED ORAL at 08:47

## 2024-01-01 RX ADMIN — MIDODRINE HYDROCHLORIDE 10 MG: 10 TABLET ORAL at 17:48

## 2024-01-01 RX ADMIN — CHLOROTHIAZIDE SODIUM 500 MG: 500 INJECTION, POWDER, LYOPHILIZED, FOR SOLUTION INTRAVENOUS at 06:06

## 2024-01-01 RX ADMIN — TAMSULOSIN HYDROCHLORIDE 0.4 MG: 0.4 CAPSULE ORAL at 09:32

## 2024-01-01 RX ADMIN — HEPARIN SODIUM 5000 UNITS: 5000 INJECTION INTRAVENOUS; SUBCUTANEOUS at 23:41

## 2024-01-01 RX ADMIN — ASPIRIN 81 MG: 81 TABLET, COATED ORAL at 14:36

## 2024-01-01 RX ADMIN — MAGNESIUM SULFATE HEPTAHYDRATE 2 G: 40 INJECTION, SOLUTION INTRAVENOUS at 15:51

## 2024-01-01 RX ADMIN — MAGNESIUM SULFATE HEPTAHYDRATE 2 G: 40 INJECTION, SOLUTION INTRAVENOUS at 09:54

## 2024-01-01 RX ADMIN — LORAZEPAM 0.5 MG: 2 INJECTION INTRAMUSCULAR; INTRAVENOUS at 03:27

## 2024-01-01 RX ADMIN — MYCOPHENOLIC ACID 180 MG: 180 TABLET, DELAYED RELEASE ORAL at 08:17

## 2024-01-01 RX ADMIN — GLYCOPYRROLATE 0.2 MG: 0.2 INJECTION, SOLUTION INTRAMUSCULAR; INTRAVENOUS at 15:42

## 2024-01-01 RX ADMIN — FENTANYL CITRATE 50 MCG: 50 INJECTION, SOLUTION INTRAMUSCULAR; INTRAVENOUS at 12:35

## 2024-01-01 RX ADMIN — MYCOPHENOLIC ACID 360 MG: 360 TABLET, DELAYED RELEASE ORAL at 08:43

## 2024-01-01 RX ADMIN — DIPHENHYDRAMINE HYDROCHLORIDE 25 MG: 50 INJECTION INTRAMUSCULAR; INTRAVENOUS at 19:08

## 2024-01-01 RX ADMIN — TAMSULOSIN HYDROCHLORIDE 0.4 MG: 0.4 CAPSULE ORAL at 15:16

## 2024-01-01 RX ADMIN — CLOTRIMAZOLE: 1 CREAM TOPICAL at 20:32

## 2024-01-01 RX ADMIN — SODIUM BICARBONATE 650 MG TABLET 1300 MG: at 20:49

## 2024-01-01 RX ADMIN — MIDODRINE HYDROCHLORIDE 10 MG: 5 TABLET ORAL at 07:30

## 2024-01-01 RX ADMIN — ASPIRIN 81 MG 324 MG: 81 TABLET ORAL at 16:27

## 2024-01-01 RX ADMIN — MYCOPHENOLIC ACID 180 MG: 180 TABLET, DELAYED RELEASE ORAL at 22:47

## 2024-01-01 RX ADMIN — HEPARIN SODIUM 5000 UNITS: 5000 INJECTION INTRAVENOUS; SUBCUTANEOUS at 15:42

## 2024-01-01 RX ADMIN — ASPIRIN 81 MG: 81 TABLET, COATED ORAL at 08:04

## 2024-01-01 RX ADMIN — HEPARIN SODIUM 5000 UNITS: 5000 INJECTION INTRAVENOUS; SUBCUTANEOUS at 08:17

## 2024-01-01 RX ADMIN — TACROLIMUS 1.5 MG: 0.5 CAPSULE ORAL at 12:17

## 2024-01-01 RX ADMIN — SULFAMETHOXAZOLE AND TRIMETHOPRIM 1 TABLET: 400; 80 TABLET ORAL at 22:01

## 2024-01-01 RX ADMIN — CLOTRIMAZOLE: 1 CREAM TOPICAL at 23:33

## 2024-01-01 RX ADMIN — MYCOPHENOLIC ACID 360 MG: 360 TABLET, DELAYED RELEASE ORAL at 09:32

## 2024-01-01 RX ADMIN — TACROLIMUS 2 MG: 1 CAPSULE ORAL at 18:12

## 2024-01-01 RX ADMIN — DIPHENHYDRAMINE HYDROCHLORIDE 50 MG: 50 INJECTION INTRAMUSCULAR; INTRAVENOUS at 14:31

## 2024-01-01 RX ADMIN — BETIATIDE 10 MILLICURIE: 1 INJECTION, POWDER, LYOPHILIZED, FOR SOLUTION INTRAVENOUS at 10:22

## 2024-01-01 RX ADMIN — MYCOPHENOLIC ACID 360 MG: 360 TABLET, DELAYED RELEASE ORAL at 21:00

## 2024-01-01 RX ADMIN — TACROLIMUS 2 MG: 1 CAPSULE ORAL at 06:25

## 2024-01-01 RX ADMIN — HEPARIN SODIUM 5000 UNITS: 5000 INJECTION INTRAVENOUS; SUBCUTANEOUS at 21:31

## 2024-01-01 RX ADMIN — MYCOPHENOLIC ACID 180 MG: 180 TABLET, DELAYED RELEASE ORAL at 08:37

## 2024-01-01 RX ADMIN — Medication: at 09:07

## 2024-01-01 RX ADMIN — PANTOPRAZOLE SODIUM 40 MG: 40 TABLET, DELAYED RELEASE ORAL at 08:20

## 2024-01-01 RX ADMIN — PANTOPRAZOLE SODIUM 40 MG: 40 TABLET, DELAYED RELEASE ORAL at 08:46

## 2024-01-01 RX ADMIN — TACROLIMUS 1 MG: 1 CAPSULE ORAL at 20:28

## 2024-01-01 RX ADMIN — MYCOPHENOLIC ACID 360 MG: 360 TABLET, DELAYED RELEASE ORAL at 21:48

## 2024-01-01 RX ADMIN — SULFAMETHOXAZOLE AND TRIMETHOPRIM 1 TABLET: 400; 80 TABLET ORAL at 06:21

## 2024-01-01 RX ADMIN — TACROLIMUS 1.5 MG: 1 CAPSULE ORAL at 18:22

## 2024-01-01 RX ADMIN — DIPHENHYDRAMINE HYDROCHLORIDE 25 MG: 50 INJECTION INTRAMUSCULAR; INTRAVENOUS at 01:31

## 2024-01-01 RX ADMIN — BUMETANIDE 2 MG: 2 TABLET ORAL at 09:10

## 2024-01-01 RX ADMIN — HEPARIN SODIUM 5000 UNITS: 5000 INJECTION INTRAVENOUS; SUBCUTANEOUS at 16:59

## 2024-01-01 RX ADMIN — KIT FOR THE PREPARATION OF TECHNETIUM TC 99M ALBUMIN AGGREGATED 4 MILLICURIE: 2.5 INJECTION, POWDER, FOR SOLUTION INTRAVENOUS at 07:55

## 2024-01-01 RX ADMIN — HYDRALAZINE HYDROCHLORIDE 25 MG: 25 TABLET ORAL at 08:17

## 2024-01-01 RX ADMIN — MYCOPHENOLIC ACID 360 MG: 360 TABLET, DELAYED RELEASE ORAL at 12:17

## 2024-01-01 RX ADMIN — PANTOPRAZOLE SODIUM 40 MG: 40 TABLET, DELAYED RELEASE ORAL at 08:27

## 2024-01-01 RX ADMIN — HEPARIN SODIUM 5000 UNITS: 5000 INJECTION INTRAVENOUS; SUBCUTANEOUS at 15:11

## 2024-01-01 RX ADMIN — TACROLIMUS 1.5 MG: 1 CAPSULE ORAL at 06:38

## 2024-01-01 RX ADMIN — TACROLIMUS 2 MG: 1 CAPSULE ORAL at 08:43

## 2024-01-01 RX ADMIN — SODIUM BICARBONATE 650 MG: 650 TABLET ORAL at 08:29

## 2024-01-01 RX ADMIN — MAGNESIUM SULFATE HEPTAHYDRATE 1 G: 1 INJECTION, SOLUTION INTRAVENOUS at 10:17

## 2024-01-01 RX ADMIN — PANTOPRAZOLE SODIUM 40 MG: 40 TABLET, DELAYED RELEASE ORAL at 08:55

## 2024-01-01 RX ADMIN — MYCOPHENOLIC ACID 180 MG: 180 TABLET, DELAYED RELEASE ORAL at 08:30

## 2024-01-01 RX ADMIN — METHYLPREDNISOLONE SODIUM SUCCINATE 40 MG: 125 INJECTION, POWDER, FOR SOLUTION INTRAMUSCULAR; INTRAVENOUS at 19:26

## 2024-01-01 RX ADMIN — TACROLIMUS 1.5 MG: 1 CAPSULE ORAL at 18:39

## 2024-01-01 RX ADMIN — HYDRALAZINE HYDROCHLORIDE 25 MG: 25 TABLET ORAL at 21:05

## 2024-01-01 RX ADMIN — TACROLIMUS 1.5 MG: 0.5 CAPSULE ORAL at 08:49

## 2024-01-01 RX ADMIN — ASPIRIN 81 MG: 81 TABLET, COATED ORAL at 09:02

## 2024-01-01 RX ADMIN — MYCOPHENOLIC ACID 180 MG: 180 TABLET, DELAYED RELEASE ORAL at 20:49

## 2024-01-01 RX ADMIN — PANTOPRAZOLE SODIUM 40 MG: 40 TABLET, DELAYED RELEASE ORAL at 09:09

## 2024-01-01 RX ADMIN — CLOTRIMAZOLE: 1 CREAM TOPICAL at 21:00

## 2024-01-01 RX ADMIN — ASPIRIN 81 MG: 81 TABLET, COATED ORAL at 08:55

## 2024-01-01 RX ADMIN — SODIUM BICARBONATE 650 MG: 650 TABLET ORAL at 20:31

## 2024-01-01 RX ADMIN — MYCOPHENOLIC ACID 180 MG: 180 TABLET, DELAYED RELEASE ORAL at 09:09

## 2024-01-01 RX ADMIN — TACROLIMUS 1.5 MG: 1 CAPSULE ORAL at 18:08

## 2024-01-01 RX ADMIN — AMMONIA N-13 10.5 MILLICURIE: 37.5 INJECTION INTRAVENOUS at 11:33

## 2024-01-01 RX ADMIN — TAMSULOSIN HYDROCHLORIDE 0.4 MG: 0.4 CAPSULE ORAL at 08:29

## 2024-01-01 RX ADMIN — PANTOPRAZOLE SODIUM 40 MG: 40 TABLET, DELAYED RELEASE ORAL at 09:02

## 2024-01-01 RX ADMIN — SULFAMETHOXAZOLE AND TRIMETHOPRIM 1 TABLET: 400; 80 TABLET ORAL at 09:32

## 2024-01-01 RX ADMIN — HEPARIN SODIUM 5000 UNITS: 5000 INJECTION INTRAVENOUS; SUBCUTANEOUS at 15:35

## 2024-01-01 RX ADMIN — TACROLIMUS 2 MG: 1 CAPSULE ORAL at 17:58

## 2024-01-01 RX ADMIN — FUROSEMIDE 40 MG: 10 INJECTION, SOLUTION INTRAMUSCULAR; INTRAVENOUS at 17:26

## 2024-01-01 RX ADMIN — TACROLIMUS 1.5 MG: 0.5 CAPSULE ORAL at 21:31

## 2024-01-01 RX ADMIN — MIDODRINE HYDROCHLORIDE 10 MG: 10 TABLET ORAL at 08:04

## 2024-01-01 RX ADMIN — HEPARIN SODIUM 5000 UNITS: 5000 INJECTION INTRAVENOUS; SUBCUTANEOUS at 06:20

## 2024-01-01 RX ADMIN — MYCOPHENOLIC ACID 360 MG: 360 TABLET, DELAYED RELEASE ORAL at 14:45

## 2024-01-01 RX ADMIN — SODIUM BICARBONATE 650 MG: 650 TABLET ORAL at 21:05

## 2024-01-01 RX ADMIN — ASPIRIN 81 MG: 81 TABLET, COATED ORAL at 08:37

## 2024-01-01 RX ADMIN — LORAZEPAM 0.5 MG: 2 INJECTION INTRAMUSCULAR; INTRAVENOUS at 10:31

## 2024-01-01 RX ADMIN — SODIUM BICARBONATE 650 MG TABLET 1300 MG: at 20:32

## 2024-01-01 RX ADMIN — TACROLIMUS 2 MG: 1 CAPSULE ORAL at 18:54

## 2024-01-01 RX ADMIN — HEPARIN SODIUM 3750 UNITS: 5000 INJECTION INTRAVENOUS; SUBCUTANEOUS at 17:55

## 2024-01-01 RX ADMIN — PANTOPRAZOLE SODIUM 40 MG: 40 TABLET, DELAYED RELEASE ORAL at 10:34

## 2024-01-01 RX ADMIN — CHLOROTHIAZIDE SODIUM 500 MG: 500 INJECTION, POWDER, LYOPHILIZED, FOR SOLUTION INTRAVENOUS at 06:48

## 2024-01-01 RX ADMIN — ASPIRIN 81 MG: 81 TABLET, COATED ORAL at 08:43

## 2024-01-01 RX ADMIN — METOPROLOL SUCCINATE 50 MG: 50 TABLET, FILM COATED, EXTENDED RELEASE ORAL at 21:31

## 2024-01-01 RX ADMIN — MYCOPHENOLIC ACID 360 MG: 360 TABLET, DELAYED RELEASE ORAL at 09:27

## 2024-01-01 RX ADMIN — BUMETANIDE 4 MG: 0.25 INJECTION, SOLUTION INTRAMUSCULAR; INTRAVENOUS at 05:27

## 2024-01-01 RX ADMIN — LORAZEPAM 0.5 MG: 2 INJECTION INTRAMUSCULAR; INTRAVENOUS at 07:42

## 2024-01-01 RX ADMIN — BUMETANIDE 2 MG: 0.25 INJECTION INTRAMUSCULAR; INTRAVENOUS at 06:10

## 2024-01-01 RX ADMIN — HEPARIN SODIUM 5000 UNITS: 5000 INJECTION INTRAVENOUS; SUBCUTANEOUS at 23:23

## 2024-01-01 RX ADMIN — TAMSULOSIN HYDROCHLORIDE 0.4 MG: 0.4 CAPSULE ORAL at 08:16

## 2024-01-01 RX ADMIN — TACROLIMUS 2 MG: 1 CAPSULE ORAL at 06:27

## 2024-01-01 RX ADMIN — ACETAMINOPHEN 650 MG: 325 TABLET ORAL at 04:30

## 2024-01-01 RX ADMIN — MIDODRINE HYDROCHLORIDE 10 MG: 5 TABLET ORAL at 07:21

## 2024-01-01 RX ADMIN — PANTOPRAZOLE SODIUM 40 MG: 40 TABLET, DELAYED RELEASE ORAL at 08:16

## 2024-01-01 RX ADMIN — HEPARIN SODIUM 800 UNITS/HR: 10000 INJECTION, SOLUTION INTRAVENOUS at 17:56

## 2024-01-01 RX ADMIN — TACROLIMUS 2 MG: 1 CAPSULE ORAL at 18:04

## 2024-01-01 RX ADMIN — SULFAMETHOXAZOLE AND TRIMETHOPRIM 1 TABLET: 400; 80 TABLET ORAL at 21:31

## 2024-01-01 RX ADMIN — HYOSCYAMINE SULFATE 0.25 MG: 0.5 INJECTION, SOLUTION SUBCUTANEOUS at 17:44

## 2024-01-01 RX ADMIN — MYCOPHENOLIC ACID 360 MG: 360 TABLET, DELAYED RELEASE ORAL at 21:31

## 2024-01-01 RX ADMIN — PANTOPRAZOLE SODIUM 40 MG: 40 TABLET, DELAYED RELEASE ORAL at 09:10

## 2024-01-01 RX ADMIN — ATROPINE SULFATE 1 DROP: 10 SOLUTION/ DROPS OPHTHALMIC at 09:27

## 2024-01-01 RX ADMIN — TAMSULOSIN HYDROCHLORIDE 0.4 MG: 0.4 CAPSULE ORAL at 10:16

## 2024-01-01 RX ADMIN — ACETAMINOPHEN 650 MG: 325 TABLET ORAL at 15:48

## 2024-01-01 RX ADMIN — SODIUM BICARBONATE 650 MG: 650 TABLET ORAL at 21:04

## 2024-01-01 RX ADMIN — MYCOPHENOLIC ACID 180 MG: 180 TABLET, DELAYED RELEASE ORAL at 21:32

## 2024-01-01 RX ADMIN — TAMSULOSIN HYDROCHLORIDE 0.4 MG: 0.4 CAPSULE ORAL at 08:27

## 2024-01-01 RX ADMIN — POTASSIUM CHLORIDE 20 MEQ: 14.9 INJECTION, SOLUTION INTRAVENOUS at 09:21

## 2024-01-01 RX ADMIN — TACROLIMUS 2 MG: 1 CAPSULE ORAL at 17:53

## 2024-01-01 RX ADMIN — HEPARIN SODIUM 5000 UNITS: 5000 INJECTION INTRAVENOUS; SUBCUTANEOUS at 23:36

## 2024-01-01 RX ADMIN — HEPARIN SODIUM 5000 UNITS: 5000 INJECTION INTRAVENOUS; SUBCUTANEOUS at 22:36

## 2024-01-01 RX ADMIN — PANTOPRAZOLE SODIUM 40 MG: 40 TABLET, DELAYED RELEASE ORAL at 09:05

## 2024-01-01 RX ADMIN — SULFAMETHOXAZOLE AND TRIMETHOPRIM 1 TABLET: 400; 80 TABLET ORAL at 08:55

## 2024-01-01 RX ADMIN — MYCOPHENOLIC ACID 360 MG: 360 TABLET, DELAYED RELEASE ORAL at 08:49

## 2024-01-01 RX ADMIN — ACETAMINOPHEN 650 MG: 325 TABLET ORAL at 15:19

## 2024-01-01 RX ADMIN — TACROLIMUS 1.5 MG: 1 CAPSULE ORAL at 06:01

## 2024-01-01 RX ADMIN — TACROLIMUS 2 MG: 1 CAPSULE ORAL at 18:09

## 2024-01-01 RX ADMIN — TAMSULOSIN HYDROCHLORIDE 0.4 MG: 0.4 CAPSULE ORAL at 08:49

## 2024-01-01 RX ADMIN — SULFAMETHOXAZOLE AND TRIMETHOPRIM 1 TABLET: 400; 80 TABLET ORAL at 08:27

## 2024-01-01 RX ADMIN — HYDRALAZINE HYDROCHLORIDE 25 MG: 25 TABLET ORAL at 08:46

## 2024-01-01 RX ADMIN — ACETAMINOPHEN 650 MG: 325 TABLET ORAL at 18:35

## 2024-01-01 RX ADMIN — HEPARIN SODIUM 1000 UNITS: 1000 INJECTION, SOLUTION INTRAVENOUS; SUBCUTANEOUS at 07:30

## 2024-01-01 RX ADMIN — MIDODRINE HYDROCHLORIDE 5 MG: 5 TABLET ORAL at 12:58

## 2024-01-01 RX ADMIN — TACROLIMUS 1.5 MG: 1 CAPSULE ORAL at 06:18

## 2024-01-01 RX ADMIN — ASPIRIN 81 MG: 81 TABLET, COATED ORAL at 15:11

## 2024-01-01 RX ADMIN — HYOSCYAMINE SULFATE 0.25 MG: 0.5 INJECTION, SOLUTION SUBCUTANEOUS at 06:14

## 2024-01-01 RX ADMIN — MYCOPHENOLIC ACID 180 MG: 180 TABLET, DELAYED RELEASE ORAL at 21:05

## 2024-01-01 RX ADMIN — MYCOPHENOLIC ACID 360 MG: 360 TABLET, DELAYED RELEASE ORAL at 10:34

## 2024-01-01 RX ADMIN — HEPARIN SODIUM 5000 UNITS: 5000 INJECTION INTRAVENOUS; SUBCUTANEOUS at 16:00

## 2024-01-01 RX ADMIN — HEPARIN SODIUM 5000 UNITS: 5000 INJECTION INTRAVENOUS; SUBCUTANEOUS at 06:32

## 2024-01-01 RX ADMIN — HEPARIN SODIUM 5000 UNITS: 5000 INJECTION INTRAVENOUS; SUBCUTANEOUS at 14:36

## 2024-01-01 RX ADMIN — Medication: at 04:26

## 2024-01-01 RX ADMIN — ASPIRIN 81 MG: 81 TABLET, COATED ORAL at 08:29

## 2024-01-01 RX ADMIN — CLOTRIMAZOLE: 1 CREAM TOPICAL at 08:18

## 2024-01-01 RX ADMIN — HEPARIN SODIUM 5000 UNITS: 5000 INJECTION INTRAVENOUS; SUBCUTANEOUS at 08:55

## 2024-01-01 RX ADMIN — MYCOPHENOLIC ACID 360 MG: 360 TABLET, DELAYED RELEASE ORAL at 08:27

## 2024-01-01 RX ADMIN — HYDRALAZINE HYDROCHLORIDE 25 MG: 25 TABLET ORAL at 21:32

## 2024-01-01 RX ADMIN — HYDRALAZINE HYDROCHLORIDE 25 MG: 25 TABLET ORAL at 21:04

## 2024-01-01 RX ADMIN — MIDODRINE HYDROCHLORIDE 10 MG: 10 TABLET ORAL at 07:20

## 2024-01-01 RX ADMIN — MIDAZOLAM HYDROCHLORIDE 1 MG: 1 INJECTION, SOLUTION INTRAMUSCULAR; INTRAVENOUS at 12:37

## 2024-01-01 RX ADMIN — HEPARIN SODIUM 5000 UNITS: 5000 INJECTION INTRAVENOUS; SUBCUTANEOUS at 23:35

## 2024-01-01 RX ADMIN — MYCOPHENOLIC ACID 180 MG: 180 TABLET, DELAYED RELEASE ORAL at 21:45

## 2024-01-01 RX ADMIN — HEPARIN SODIUM 5000 UNITS: 5000 INJECTION INTRAVENOUS; SUBCUTANEOUS at 08:46

## 2024-01-01 RX ADMIN — HEPARIN SODIUM 5000 UNITS: 5000 INJECTION INTRAVENOUS; SUBCUTANEOUS at 06:46

## 2024-01-01 RX ADMIN — BUMETANIDE 1 MG/HR: 0.25 INJECTION INTRAMUSCULAR; INTRAVENOUS at 12:18

## 2024-01-01 RX ADMIN — HYDRALAZINE HYDROCHLORIDE 25 MG: 25 TABLET ORAL at 14:50

## 2024-01-01 RX ADMIN — HEPARIN SODIUM 5000 UNITS: 5000 INJECTION INTRAVENOUS; SUBCUTANEOUS at 08:37

## 2024-01-01 RX ADMIN — TACROLIMUS 2 MG: 1 CAPSULE ORAL at 06:46

## 2024-01-01 RX ADMIN — MYCOPHENOLIC ACID 180 MG: 180 TABLET, DELAYED RELEASE ORAL at 08:05

## 2024-01-01 RX ADMIN — PANTOPRAZOLE SODIUM 40 MG: 40 TABLET, DELAYED RELEASE ORAL at 10:15

## 2024-01-01 RX ADMIN — METOPROLOL SUCCINATE 50 MG: 50 TABLET, FILM COATED, EXTENDED RELEASE ORAL at 21:01

## 2024-01-01 RX ADMIN — HYDRALAZINE HYDROCHLORIDE 25 MG: 25 TABLET ORAL at 08:47

## 2024-01-01 RX ADMIN — MAGNESIUM SULFATE 4 G: 4 INJECTION INTRAVENOUS at 01:09

## 2024-01-01 RX ADMIN — ASPIRIN 81 MG: 81 TABLET, COATED ORAL at 08:17

## 2024-01-01 RX ADMIN — ASPIRIN 81 MG: 81 TABLET, COATED ORAL at 09:09

## 2024-01-01 RX ADMIN — METOPROLOL SUCCINATE 50 MG: 50 TABLET, FILM COATED, EXTENDED RELEASE ORAL at 22:25

## 2024-01-01 RX ADMIN — HYDRALAZINE HYDROCHLORIDE 25 MG: 25 TABLET ORAL at 15:34

## 2024-01-01 RX ADMIN — MYCOPHENOLIC ACID 360 MG: 360 TABLET, DELAYED RELEASE ORAL at 21:16

## 2024-01-01 RX ADMIN — MYCOPHENOLIC ACID 360 MG: 360 TABLET, DELAYED RELEASE ORAL at 10:41

## 2024-01-01 RX ADMIN — ACETAMINOPHEN 650 MG: 325 TABLET ORAL at 12:36

## 2024-01-01 RX ADMIN — ASPIRIN 81 MG: 81 TABLET, COATED ORAL at 14:43

## 2024-01-01 RX ADMIN — SODIUM BICARBONATE 650 MG TABLET 1300 MG: at 10:10

## 2024-01-01 RX ADMIN — SODIUM BICARBONATE 650 MG TABLET 1300 MG: at 08:46

## 2024-01-01 RX ADMIN — ASPIRIN 81 MG: 81 TABLET, COATED ORAL at 09:04

## 2024-01-01 RX ADMIN — HEPARIN SODIUM 5000 UNITS: 5000 INJECTION INTRAVENOUS; SUBCUTANEOUS at 14:43

## 2024-01-01 RX ADMIN — TACROLIMUS 2 MG: 1 CAPSULE ORAL at 18:31

## 2024-01-01 RX ADMIN — GLYCOPYRROLATE 0.4 MG: 0.2 INJECTION, SOLUTION INTRAMUSCULAR; INTRAVENOUS at 05:10

## 2024-01-01 RX ADMIN — SODIUM BICARBONATE 650 MG: 650 TABLET ORAL at 08:54

## 2024-01-01 RX ADMIN — ACETAMINOPHEN 650 MG: 325 TABLET ORAL at 13:55

## 2024-01-01 RX ADMIN — GLYCOPYRROLATE 0.2 MG: 0.2 INJECTION, SOLUTION INTRAMUSCULAR; INTRAVENOUS at 20:15

## 2024-01-01 RX ADMIN — MYCOPHENOLIC ACID 360 MG: 360 TABLET, DELAYED RELEASE ORAL at 20:31

## 2024-01-01 RX ADMIN — MYCOPHENOLIC ACID 360 MG: 360 TABLET, DELAYED RELEASE ORAL at 20:55

## 2024-01-01 RX ADMIN — LORAZEPAM 2 MG: 2 INJECTION INTRAMUSCULAR; INTRAVENOUS at 06:56

## 2024-01-01 RX ADMIN — TACROLIMUS 3 MG: 1 CAPSULE ORAL at 08:20

## 2024-01-01 RX ADMIN — MYCOPHENOLIC ACID 360 MG: 360 TABLET, DELAYED RELEASE ORAL at 09:12

## 2024-01-01 RX ADMIN — HEPARIN SODIUM 5000 UNITS: 5000 INJECTION INTRAVENOUS; SUBCUTANEOUS at 17:09

## 2024-01-01 RX ADMIN — ASPIRIN 81 MG: 81 TABLET, COATED ORAL at 09:27

## 2024-01-01 RX ADMIN — TACROLIMUS 2 MG: 1 CAPSULE ORAL at 06:24

## 2024-01-01 RX ADMIN — TACROLIMUS 2 MG: 1 CAPSULE ORAL at 06:30

## 2024-01-01 RX ADMIN — MAGNESIUM SULFATE HEPTAHYDRATE 1 G: 1 INJECTION, SOLUTION INTRAVENOUS at 07:22

## 2024-01-01 RX ADMIN — HYDROCORTISONE SODIUM SUCCINATE 200 MG: 250 INJECTION, POWDER, FOR SOLUTION INTRAMUSCULAR; INTRAVENOUS at 10:09

## 2024-01-01 RX ADMIN — SODIUM BICARBONATE 650 MG: 650 TABLET ORAL at 20:34

## 2024-01-01 RX ADMIN — TAMSULOSIN HYDROCHLORIDE 0.4 MG: 0.4 CAPSULE ORAL at 08:47

## 2024-01-01 RX ADMIN — HYDRALAZINE HYDROCHLORIDE 25 MG: 25 TABLET ORAL at 11:04

## 2024-01-01 RX ADMIN — TAMSULOSIN HYDROCHLORIDE 0.4 MG: 0.4 CAPSULE ORAL at 10:34

## 2024-01-01 RX ADMIN — HEPARIN SODIUM 5000 UNITS: 5000 INJECTION INTRAVENOUS; SUBCUTANEOUS at 06:36

## 2024-01-01 RX ADMIN — MYCOPHENOLIC ACID 180 MG: 180 TABLET, DELAYED RELEASE ORAL at 08:47

## 2024-01-01 RX ADMIN — FUROSEMIDE 28.25 MG: 10 INJECTION, SOLUTION INTRAMUSCULAR; INTRAVENOUS at 11:45

## 2024-01-01 RX ADMIN — ACETAMINOPHEN 650 MG: 650 SUPPOSITORY RECTAL at 11:58

## 2024-01-01 RX ADMIN — MYCOPHENOLIC ACID 360 MG: 360 TABLET, DELAYED RELEASE ORAL at 21:08

## 2024-01-01 RX ADMIN — GLYCOPYRROLATE 0.4 MG: 0.2 INJECTION, SOLUTION INTRAMUSCULAR; INTRAVENOUS at 01:07

## 2024-01-01 RX ADMIN — TAMSULOSIN HYDROCHLORIDE 0.4 MG: 0.4 CAPSULE ORAL at 11:03

## 2024-01-01 RX ADMIN — TACROLIMUS 2 MG: 1 CAPSULE ORAL at 06:32

## 2024-01-01 RX ADMIN — TACROLIMUS 2 MG: 1 CAPSULE ORAL at 22:25

## 2024-01-01 RX ADMIN — BUMETANIDE 2 MG: 0.25 INJECTION INTRAMUSCULAR; INTRAVENOUS at 02:01

## 2024-01-01 RX ADMIN — ASPIRIN 81 MG: 81 TABLET, COATED ORAL at 08:27

## 2024-01-01 RX ADMIN — ACETAMINOPHEN 650 MG: 325 TABLET ORAL at 17:14

## 2024-01-01 RX ADMIN — MYCOPHENOLIC ACID 360 MG: 360 TABLET, DELAYED RELEASE ORAL at 20:50

## 2024-01-01 RX ADMIN — TAMSULOSIN HYDROCHLORIDE 0.4 MG: 0.4 CAPSULE ORAL at 09:12

## 2024-01-01 RX ADMIN — ASPIRIN 81 MG: 81 TABLET, COATED ORAL at 09:10

## 2024-01-01 RX ADMIN — ASPIRIN 81 MG: 81 TABLET, COATED ORAL at 08:16

## 2024-01-01 RX ADMIN — ACETAMINOPHEN 650 MG: 325 TABLET ORAL at 09:10

## 2024-01-01 RX ADMIN — Medication: at 18:37

## 2024-01-01 RX ADMIN — TACROLIMUS 1.5 MG: 1 CAPSULE ORAL at 06:00

## 2024-01-01 RX ADMIN — HYDRALAZINE HYDROCHLORIDE 25 MG: 25 TABLET ORAL at 15:35

## 2024-01-01 RX ADMIN — CLOTRIMAZOLE: 1 CREAM TOPICAL at 20:34

## 2024-01-01 RX ADMIN — PANTOPRAZOLE SODIUM 40 MG: 40 TABLET, DELAYED RELEASE ORAL at 08:17

## 2024-01-01 RX ADMIN — ASPIRIN 81 MG: 81 TABLET, COATED ORAL at 10:34

## 2024-01-01 RX ADMIN — MYCOPHENOLIC ACID 360 MG: 360 TABLET, DELAYED RELEASE ORAL at 20:53

## 2024-01-01 RX ADMIN — HEPARIN SODIUM 5000 UNITS: 5000 INJECTION INTRAVENOUS; SUBCUTANEOUS at 13:07

## 2024-01-01 RX ADMIN — TACROLIMUS 2 MG: 1 CAPSULE ORAL at 17:55

## 2024-01-01 RX ADMIN — TAMSULOSIN HYDROCHLORIDE 0.4 MG: 0.4 CAPSULE ORAL at 08:43

## 2024-01-01 RX ADMIN — TAMSULOSIN HYDROCHLORIDE 0.8 MG: 0.4 CAPSULE ORAL at 09:02

## 2024-01-01 RX ADMIN — ASPIRIN 81 MG: 81 TABLET, COATED ORAL at 09:32

## 2024-01-01 RX ADMIN — MIDODRINE HYDROCHLORIDE 10 MG: 5 TABLET ORAL at 11:56

## 2024-01-01 RX ADMIN — PANTOPRAZOLE SODIUM 40 MG: 40 TABLET, DELAYED RELEASE ORAL at 10:10

## 2024-01-01 RX ADMIN — SODIUM BICARBONATE 650 MG TABLET 1300 MG: at 08:37

## 2024-01-01 RX ADMIN — BUMETANIDE 6 MG: 0.25 INJECTION INTRAMUSCULAR; INTRAVENOUS at 17:41

## 2024-01-01 RX ADMIN — PANTOPRAZOLE SODIUM 40 MG: 40 TABLET, DELAYED RELEASE ORAL at 08:04

## 2024-01-01 RX ADMIN — DARBEPOETIN ALFA 80 MCG: 100 INJECTION, SOLUTION INTRAVENOUS; SUBCUTANEOUS at 15:39

## 2024-01-01 RX ADMIN — HEPARIN SODIUM 5000 UNITS: 5000 INJECTION INTRAVENOUS; SUBCUTANEOUS at 21:23

## 2024-01-01 RX ADMIN — CLOTRIMAZOLE: 1 CREAM TOPICAL at 08:21

## 2024-01-01 RX ADMIN — TACROLIMUS 2 MG: 1 CAPSULE ORAL at 06:33

## 2024-01-01 RX ADMIN — SULFAMETHOXAZOLE AND TRIMETHOPRIM 1 TABLET: 400; 80 TABLET ORAL at 20:50

## 2024-01-01 RX ADMIN — ASPIRIN 81 MG: 81 TABLET, COATED ORAL at 08:20

## 2024-01-01 RX ADMIN — LIDOCAINE 1 PATCH: 4 PATCH TOPICAL at 08:16

## 2024-01-01 RX ADMIN — TAMSULOSIN HYDROCHLORIDE 0.4 MG: 0.4 CAPSULE ORAL at 12:17

## 2024-01-01 RX ADMIN — PANTOPRAZOLE SODIUM 40 MG: 40 TABLET, DELAYED RELEASE ORAL at 09:32

## 2024-01-01 RX ADMIN — TAMSULOSIN HYDROCHLORIDE 0.4 MG: 0.4 CAPSULE ORAL at 10:06

## 2024-01-01 RX ADMIN — TACROLIMUS 1.5 MG: 1 CAPSULE ORAL at 06:10

## 2024-01-01 RX ADMIN — MYCOPHENOLIC ACID 360 MG: 360 TABLET, DELAYED RELEASE ORAL at 21:25

## 2024-01-01 RX ADMIN — POLYETHYLENE GLYCOL 3350 17 G: 17 POWDER, FOR SOLUTION ORAL at 10:17

## 2024-01-01 RX ADMIN — HEPARIN SODIUM 5000 UNITS: 5000 INJECTION INTRAVENOUS; SUBCUTANEOUS at 16:56

## 2024-01-01 RX ADMIN — CLOTRIMAZOLE: 1 CREAM TOPICAL at 23:35

## 2024-01-01 RX ADMIN — TACROLIMUS 1.5 MG: 1 CAPSULE ORAL at 18:13

## 2024-01-01 RX ADMIN — ASPIRIN 81 MG: 81 TABLET, COATED ORAL at 08:54

## 2024-01-01 RX ADMIN — GADOTERATE MEGLUMINE 23 ML: 376.9 INJECTION INTRAVENOUS at 14:32

## 2024-01-01 RX ADMIN — HEPARIN SODIUM 5000 UNITS: 5000 INJECTION INTRAVENOUS; SUBCUTANEOUS at 21:48

## 2024-01-01 RX ADMIN — HEPARIN SODIUM 5000 UNITS: 5000 INJECTION INTRAVENOUS; SUBCUTANEOUS at 22:46

## 2024-01-01 RX ADMIN — PANTOPRAZOLE SODIUM 40 MG: 40 TABLET, DELAYED RELEASE ORAL at 08:47

## 2024-01-01 RX ADMIN — HYDRALAZINE HYDROCHLORIDE 25 MG: 25 TABLET ORAL at 20:31

## 2024-01-01 RX ADMIN — PANTOPRAZOLE SODIUM 40 MG: 40 TABLET, DELAYED RELEASE ORAL at 12:17

## 2024-01-01 RX ADMIN — TACROLIMUS 1.5 MG: 1 CAPSULE ORAL at 19:44

## 2024-01-01 RX ADMIN — MYCOPHENOLIC ACID 180 MG: 180 TABLET, DELAYED RELEASE ORAL at 10:10

## 2024-01-01 RX ADMIN — PANTOPRAZOLE SODIUM 40 MG: 40 TABLET, DELAYED RELEASE ORAL at 08:29

## 2024-01-01 RX ADMIN — MYCOPHENOLIC ACID 180 MG: 180 TABLET, DELAYED RELEASE ORAL at 08:54

## 2024-01-01 RX ADMIN — TAMSULOSIN HYDROCHLORIDE 0.4 MG: 0.4 CAPSULE ORAL at 10:10

## 2024-01-01 RX ADMIN — LORAZEPAM 2 MG: 2 INJECTION INTRAMUSCULAR at 06:56

## 2024-01-01 RX ADMIN — TACROLIMUS 1.5 MG: 1 CAPSULE ORAL at 17:35

## 2024-01-01 RX ADMIN — DARBEPOETIN ALFA 80 MCG: 100 INJECTION, SOLUTION INTRAVENOUS; SUBCUTANEOUS at 09:12

## 2024-01-01 RX ADMIN — MYCOPHENOLIC ACID 180 MG: 180 TABLET, DELAYED RELEASE ORAL at 08:55

## 2024-01-01 RX ADMIN — METOPROLOL SUCCINATE 50 MG: 50 TABLET, FILM COATED, EXTENDED RELEASE ORAL at 21:35

## 2024-01-01 RX ADMIN — LIDOCAINE HYDROCHLORIDE 1 APPLICATION: 20 JELLY TOPICAL at 04:09

## 2024-01-01 RX ADMIN — METOPROLOL SUCCINATE 50 MG: 50 TABLET, FILM COATED, EXTENDED RELEASE ORAL at 21:48

## 2024-01-01 RX ADMIN — SODIUM BICARBONATE 650 MG TABLET 1300 MG: at 20:31

## 2024-01-01 RX ADMIN — TACROLIMUS 2 MG: 1 CAPSULE ORAL at 06:38

## 2024-01-01 RX ADMIN — PANTOPRAZOLE SODIUM 40 MG: 40 TABLET, DELAYED RELEASE ORAL at 14:45

## 2024-01-01 RX ADMIN — MYCOPHENOLIC ACID 360 MG: 360 TABLET, DELAYED RELEASE ORAL at 10:16

## 2024-01-01 RX ADMIN — METOPROLOL SUCCINATE 50 MG: 50 TABLET, FILM COATED, EXTENDED RELEASE ORAL at 20:51

## 2024-01-01 RX ADMIN — MYCOPHENOLIC ACID 360 MG: 360 TABLET, DELAYED RELEASE ORAL at 20:18

## 2024-01-01 RX ADMIN — TAMSULOSIN HYDROCHLORIDE 0.8 MG: 0.4 CAPSULE ORAL at 08:54

## 2024-01-01 RX ADMIN — FUROSEMIDE 28.25 MG: 10 INJECTION, SOLUTION INTRAMUSCULAR; INTRAVENOUS at 11:15

## 2024-01-01 RX ADMIN — TACROLIMUS 1.5 MG: 0.5 CAPSULE ORAL at 20:57

## 2024-01-01 RX ADMIN — MYCOPHENOLIC ACID 360 MG: 360 TABLET, DELAYED RELEASE ORAL at 01:09

## 2024-01-01 RX ADMIN — METOPROLOL SUCCINATE 50 MG: 50 TABLET, FILM COATED, EXTENDED RELEASE ORAL at 20:57

## 2024-01-01 RX ADMIN — MYCOPHENOLIC ACID 360 MG: 360 TABLET, DELAYED RELEASE ORAL at 20:35

## 2024-01-01 RX ADMIN — TACROLIMUS 2 MG: 1 CAPSULE ORAL at 06:20

## 2024-01-01 RX ADMIN — HEPARIN SODIUM 5000 UNITS: 5000 INJECTION INTRAVENOUS; SUBCUTANEOUS at 08:47

## 2024-01-01 RX ADMIN — BUMETANIDE 2 MG/HR: 0.25 INJECTION INTRAMUSCULAR; INTRAVENOUS at 03:51

## 2024-01-01 RX ADMIN — HEPARIN SODIUM 5000 UNITS: 5000 INJECTION INTRAVENOUS; SUBCUTANEOUS at 15:07

## 2024-01-01 RX ADMIN — TACROLIMUS 1.5 MG: 1 CAPSULE ORAL at 18:00

## 2024-01-01 RX ADMIN — HEPARIN SODIUM 5000 UNITS: 5000 INJECTION INTRAVENOUS; SUBCUTANEOUS at 07:22

## 2024-01-01 RX ADMIN — FUROSEMIDE 40 MG: 10 INJECTION, SOLUTION INTRAMUSCULAR; INTRAVENOUS at 11:10

## 2024-01-01 RX ADMIN — DARBEPOETIN ALFA 80 MCG: 100 INJECTION, SOLUTION INTRAVENOUS; SUBCUTANEOUS at 12:13

## 2024-01-01 RX ADMIN — TACROLIMUS 1.5 MG: 1 CAPSULE ORAL at 18:16

## 2024-01-01 RX ADMIN — POTASSIUM CHLORIDE 20 MEQ: 1500 TABLET, EXTENDED RELEASE ORAL at 15:50

## 2024-01-01 RX ADMIN — HYDROMORPHONE HYDROCHLORIDE 0.4 MG: 1 INJECTION, SOLUTION INTRAMUSCULAR; INTRAVENOUS; SUBCUTANEOUS at 08:06

## 2024-01-01 RX ADMIN — TAMSULOSIN HYDROCHLORIDE 0.4 MG: 0.4 CAPSULE ORAL at 09:10

## 2024-01-01 RX ADMIN — DIPHENHYDRAMINE HYDROCHLORIDE 50 MG: 50 INJECTION INTRAMUSCULAR; INTRAVENOUS at 10:20

## 2024-01-01 RX ADMIN — TACROLIMUS 2 MG: 1 CAPSULE ORAL at 18:21

## 2024-01-01 RX ADMIN — HYDRALAZINE HYDROCHLORIDE 25 MG: 25 TABLET ORAL at 20:34

## 2024-01-01 RX ADMIN — TACROLIMUS 1.5 MG: 0.5 CAPSULE ORAL at 20:51

## 2024-01-01 RX ADMIN — BUMETANIDE 2 MG: 0.25 INJECTION, SOLUTION INTRAMUSCULAR; INTRAVENOUS at 01:00

## 2024-01-01 RX ADMIN — HYDRALAZINE HYDROCHLORIDE 25 MG: 25 TABLET ORAL at 20:30

## 2024-01-01 RX ADMIN — DIPHENHYDRAMINE HYDROCHLORIDE 25 MG: 50 INJECTION INTRAMUSCULAR; INTRAVENOUS at 07:42

## 2024-01-01 RX ADMIN — MYCOPHENOLIC ACID 360 MG: 360 TABLET, DELAYED RELEASE ORAL at 11:03

## 2024-01-01 RX ADMIN — HYDRALAZINE HYDROCHLORIDE 25 MG: 25 TABLET ORAL at 09:09

## 2024-01-01 RX ADMIN — HYDRALAZINE HYDROCHLORIDE 25 MG: 25 TABLET ORAL at 08:29

## 2024-01-01 RX ADMIN — TACROLIMUS 2 MG: 1 CAPSULE ORAL at 06:34

## 2024-01-01 RX ADMIN — SODIUM CHLORIDE, SODIUM LACTATE, POTASSIUM CHLORIDE, AND CALCIUM CHLORIDE 500 ML: 600; 310; 30; 20 INJECTION, SOLUTION INTRAVENOUS at 18:45

## 2024-01-01 RX ADMIN — HYDRALAZINE HYDROCHLORIDE 25 MG: 25 TABLET ORAL at 15:07

## 2024-01-01 RX ADMIN — HYDRALAZINE HYDROCHLORIDE 25 MG: 25 TABLET ORAL at 10:10

## 2024-01-01 RX ADMIN — TACROLIMUS 3 MG: 1 CAPSULE ORAL at 05:49

## 2024-01-01 RX ADMIN — HYDRALAZINE HYDROCHLORIDE 25 MG: 25 TABLET ORAL at 15:42

## 2024-01-01 RX ADMIN — HYOSCYAMINE SULFATE 0.25 MG: 0.5 INJECTION, SOLUTION SUBCUTANEOUS at 00:12

## 2024-01-01 RX ADMIN — ASPIRIN 81 MG: 81 TABLET, COATED ORAL at 10:10

## 2024-01-01 RX ADMIN — PANTOPRAZOLE SODIUM 40 MG: 40 TABLET, DELAYED RELEASE ORAL at 08:49

## 2024-01-01 RX ADMIN — PERFLUTREN 10 ML OF DILUTION: 6.52 INJECTION, SUSPENSION INTRAVENOUS at 11:06

## 2024-01-01 RX ADMIN — BUMETANIDE 4 MG: 0.25 INJECTION, SOLUTION INTRAMUSCULAR; INTRAVENOUS at 06:15

## 2024-01-01 RX ADMIN — TACROLIMUS 1.5 MG: 0.5 CAPSULE ORAL at 10:15

## 2024-01-01 RX ADMIN — PANTOPRAZOLE SODIUM 40 MG: 40 TABLET, DELAYED RELEASE ORAL at 09:12

## 2024-01-01 RX ADMIN — MYCOPHENOLIC ACID 360 MG: 360 TABLET, DELAYED RELEASE ORAL at 20:06

## 2024-01-01 RX ADMIN — PANTOPRAZOLE SODIUM 40 MG: 40 TABLET, DELAYED RELEASE ORAL at 08:43

## 2024-01-01 RX ADMIN — SULFAMETHOXAZOLE AND TRIMETHOPRIM 1 TABLET: 400; 80 TABLET ORAL at 09:02

## 2024-01-01 RX ADMIN — HEPARIN SODIUM 5000 UNITS: 5000 INJECTION INTRAVENOUS; SUBCUTANEOUS at 15:38

## 2024-01-01 RX ADMIN — MYCOPHENOLIC ACID 360 MG: 360 TABLET, DELAYED RELEASE ORAL at 08:16

## 2024-01-01 RX ADMIN — MYCOPHENOLIC ACID 360 MG: 360 TABLET, DELAYED RELEASE ORAL at 09:03

## 2024-01-01 RX ADMIN — PANTOPRAZOLE SODIUM 40 MG: 40 TABLET, DELAYED RELEASE ORAL at 08:37

## 2024-01-01 RX ADMIN — TAMSULOSIN HYDROCHLORIDE 0.4 MG: 0.4 CAPSULE ORAL at 09:04

## 2024-01-01 RX ADMIN — FENTANYL CITRATE 50 MCG: 50 INJECTION, SOLUTION INTRAMUSCULAR; INTRAVENOUS at 15:00

## 2024-01-01 RX ADMIN — GLYCOPYRROLATE 0.2 MG: 0.2 INJECTION, SOLUTION INTRAMUSCULAR; INTRAVENOUS at 11:23

## 2024-01-01 RX ADMIN — SULFAMETHOXAZOLE AND TRIMETHOPRIM 1 TABLET: 400; 80 TABLET ORAL at 08:47

## 2024-01-01 RX ADMIN — TACROLIMUS 1.5 MG: 0.5 CAPSULE ORAL at 21:24

## 2024-01-01 RX ADMIN — TAMSULOSIN HYDROCHLORIDE 0.4 MG: 0.4 CAPSULE ORAL at 09:27

## 2024-01-01 RX ADMIN — HYDRALAZINE HYDROCHLORIDE 25 MG: 25 TABLET ORAL at 08:37

## 2024-01-01 RX ADMIN — HEPARIN SODIUM 5000 UNITS: 5000 INJECTION INTRAVENOUS; SUBCUTANEOUS at 00:43

## 2024-01-01 RX ADMIN — SODIUM BICARBONATE 650 MG: 650 TABLET ORAL at 08:47

## 2024-01-01 RX ADMIN — BUMETANIDE 3 MG: 0.25 INJECTION INTRAMUSCULAR; INTRAVENOUS at 01:08

## 2024-01-01 RX ADMIN — SODIUM CHLORIDE 50 ML/HR: 9 INJECTION, SOLUTION INTRAVENOUS at 15:50

## 2024-01-01 RX ADMIN — POTASSIUM CHLORIDE 20 MEQ: 1500 TABLET, EXTENDED RELEASE ORAL at 06:33

## 2024-01-01 RX ADMIN — MYCOPHENOLIC ACID 180 MG: 180 TABLET, DELAYED RELEASE ORAL at 21:04

## 2024-01-01 RX ADMIN — PANTOPRAZOLE SODIUM 40 MG: 40 TABLET, DELAYED RELEASE ORAL at 11:03

## 2024-01-01 RX ADMIN — TACROLIMUS 2 MG: 1 CAPSULE ORAL at 06:21

## 2024-01-01 RX ADMIN — TACROLIMUS 1.5 MG: 1 CAPSULE ORAL at 06:33

## 2024-01-01 RX ADMIN — MYCOPHENOLIC ACID 180 MG: 180 TABLET, DELAYED RELEASE ORAL at 08:20

## 2024-01-01 RX ADMIN — FLUDEOXYGLUCOSE F 18 13.6 MILLICURIE: 200 INJECTION, SOLUTION INTRAVENOUS at 12:01

## 2024-01-01 RX ADMIN — CLOTRIMAZOLE: 1 CREAM TOPICAL at 08:47

## 2024-01-01 RX ADMIN — PANTOPRAZOLE SODIUM 40 MG: 40 TABLET, DELAYED RELEASE ORAL at 09:55

## 2024-01-01 SDOH — HEALTH STABILITY: PHYSICAL HEALTH: PHYSICAL EXERCISE: 10

## 2024-01-01 SDOH — SOCIAL STABILITY: SOCIAL NETWORK: IN A TYPICAL WEEK, HOW MANY TIMES DO YOU TALK ON THE PHONE WITH FAMILY, FRIENDS, OR NEIGHBORS?: ONCE A WEEK

## 2024-01-01 SDOH — SOCIAL STABILITY: SOCIAL INSECURITY: DO YOU FEEL UNSAFE GOING BACK TO THE PLACE WHERE YOU ARE LIVING?: NO

## 2024-01-01 SDOH — SOCIAL STABILITY: SOCIAL INSECURITY: ABUSE: ADULT

## 2024-01-01 SDOH — HEALTH STABILITY: PHYSICAL HEALTH: PHYSICAL EXERCISE: SITTING AND STANDING

## 2024-01-01 SDOH — SOCIAL STABILITY: SOCIAL INSECURITY: DO YOU FEEL ANYONE HAS EXPLOITED OR TAKEN ADVANTAGE OF YOU FINANCIALLY OR OF YOUR PERSONAL PROPERTY?: UNABLE TO ASSESS

## 2024-01-01 SDOH — HEALTH STABILITY: PHYSICAL HEALTH: EXERCISE ACTIVITIES SETS: 1

## 2024-01-01 SDOH — SOCIAL STABILITY: SOCIAL INSECURITY: HAS ANYONE EVER THREATENED TO HURT YOUR FAMILY OR YOUR PETS?: NO

## 2024-01-01 SDOH — ECONOMIC STABILITY: HOUSING INSECURITY
IN THE LAST 12 MONTHS, WAS THERE A TIME WHEN YOU DID NOT HAVE A STEADY PLACE TO SLEEP OR SLEPT IN A SHELTER (INCLUDING NOW)?: NO

## 2024-01-01 SDOH — SOCIAL STABILITY: SOCIAL INSECURITY: ARE YOU OR HAVE YOU BEEN THREATENED OR ABUSED PHYSICALLY, EMOTIONALLY, OR SEXUALLY BY ANYONE?: NO

## 2024-01-01 SDOH — ECONOMIC STABILITY: INCOME INSECURITY: HOW HARD IS IT FOR YOU TO PAY FOR THE VERY BASICS LIKE FOOD, HOUSING, MEDICAL CARE, AND HEATING?: NOT HARD AT ALL

## 2024-01-01 SDOH — HEALTH STABILITY: PHYSICAL HEALTH: PHYSICAL EXERCISE: SITTING

## 2024-01-01 SDOH — SOCIAL STABILITY: SOCIAL INSECURITY: ARE THERE ANY APPARENT SIGNS OF INJURIES/BEHAVIORS THAT COULD BE RELATED TO ABUSE/NEGLECT?: NO

## 2024-01-01 SDOH — SOCIAL STABILITY: SOCIAL INSECURITY: WERE YOU ABLE TO COMPLETE ALL THE BEHAVIORAL HEALTH SCREENINGS?: NO

## 2024-01-01 SDOH — SOCIAL STABILITY: SOCIAL INSECURITY: DO YOU FEEL UNSAFE GOING BACK TO THE PLACE WHERE YOU ARE LIVING?: UNABLE TO ASSESS

## 2024-01-01 SDOH — ECONOMIC STABILITY: TRANSPORTATION INSECURITY
IN THE PAST 12 MONTHS, HAS LACK OF TRANSPORTATION KEPT YOU FROM MEETINGS, WORK, OR FROM GETTING THINGS NEEDED FOR DAILY LIVING?: NO

## 2024-01-01 SDOH — SOCIAL STABILITY: SOCIAL INSECURITY: DO YOU FEEL ANYONE HAS EXPLOITED OR TAKEN ADVANTAGE OF YOU FINANCIALLY OR OF YOUR PERSONAL PROPERTY?: NO

## 2024-01-01 SDOH — SOCIAL STABILITY: SOCIAL INSECURITY: DOES ANYONE TRY TO KEEP YOU FROM HAVING/CONTACTING OTHER FRIENDS OR DOING THINGS OUTSIDE YOUR HOME?: UNABLE TO ASSESS

## 2024-01-01 SDOH — SOCIAL STABILITY: SOCIAL INSECURITY: ARE THERE ANY APPARENT SIGNS OF INJURIES/BEHAVIORS THAT COULD BE RELATED TO ABUSE/NEGLECT?: UNABLE TO ASSESS

## 2024-01-01 SDOH — SOCIAL STABILITY: SOCIAL NETWORK: ARE YOU MARRIED, WIDOWED, DIVORCED, SEPARATED, NEVER MARRIED, OR LIVING WITH A PARTNER?: MARRIED

## 2024-01-01 SDOH — SOCIAL STABILITY: SOCIAL INSECURITY: HAVE YOU HAD ANY THOUGHTS OF HARMING ANYONE ELSE?: UNABLE TO ASSESS

## 2024-01-01 SDOH — SOCIAL STABILITY: SOCIAL INSECURITY: HAVE YOU HAD THOUGHTS OF HARMING ANYONE ELSE?: UNABLE TO ASSESS

## 2024-01-01 SDOH — ECONOMIC STABILITY: HOUSING INSECURITY: IN THE LAST 12 MONTHS, HOW MANY PLACES HAVE YOU LIVED?: 1

## 2024-01-01 SDOH — ECONOMIC STABILITY: FOOD INSECURITY: WITHIN THE PAST 12 MONTHS, YOU WORRIED THAT YOUR FOOD WOULD RUN OUT BEFORE YOU GOT MONEY TO BUY MORE.: NEVER TRUE

## 2024-01-01 SDOH — HEALTH STABILITY: MENTAL HEALTH: HOW OFTEN DO YOU HAVE A DRINK CONTAINING ALCOHOL?: NEVER

## 2024-01-01 SDOH — SOCIAL STABILITY: SOCIAL INSECURITY: WERE YOU ABLE TO COMPLETE ALL THE BEHAVIORAL HEALTH SCREENINGS?: YES

## 2024-01-01 SDOH — ECONOMIC STABILITY: FOOD INSECURITY
WITHIN THE PAST 12 MONTHS, THE FOOD YOU BOUGHT JUST DIDN'T LAST AND YOU DIDN'T HAVE MONEY TO GET MORE.: PATIENT UNABLE TO ANSWER

## 2024-01-01 SDOH — SOCIAL STABILITY: SOCIAL INSECURITY: HAVE YOU HAD THOUGHTS OF HARMING ANYONE ELSE?: NO

## 2024-01-01 SDOH — ECONOMIC STABILITY: FOOD INSECURITY: WITHIN THE PAST 12 MONTHS, THE FOOD YOU BOUGHT JUST DIDN'T LAST AND YOU DIDN'T HAVE MONEY TO GET MORE.: NEVER TRUE

## 2024-01-01 SDOH — ECONOMIC STABILITY: FOOD INSECURITY: WITHIN THE PAST 12 MONTHS, YOU WORRIED THAT YOUR FOOD WOULD RUN OUT BEFORE YOU GOT MONEY TO BUY MORE.: PATIENT DECLINED

## 2024-01-01 SDOH — SOCIAL STABILITY: SOCIAL INSECURITY: HAVE YOU HAD ANY THOUGHTS OF HARMING ANYONE ELSE?: NO

## 2024-01-01 SDOH — HEALTH STABILITY: PHYSICAL HEALTH: EXERCISE TYPE: STRENGTHENING, CIRCULATORY

## 2024-01-01 SDOH — HEALTH STABILITY: PHYSICAL HEALTH
ON AVERAGE, HOW MANY DAYS PER WEEK DO YOU ENGAGE IN MODERATE TO STRENUOUS EXERCISE (LIKE A BRISK WALK)?: PATIENT DECLINED

## 2024-01-01 SDOH — ECONOMIC STABILITY: INCOME INSECURITY: IN THE LAST 12 MONTHS, WAS THERE A TIME WHEN YOU WERE NOT ABLE TO PAY THE MORTGAGE OR RENT ON TIME?: NO

## 2024-01-01 SDOH — HEALTH STABILITY: PHYSICAL HEALTH: EXERCISE TYPE: STRENGTHENING AND CIRCULATORY

## 2024-01-01 SDOH — HEALTH STABILITY: PHYSICAL HEALTH: EXERCISE TYPE: STRENGTHENING AND BALANCE

## 2024-01-01 SDOH — SOCIAL STABILITY: SOCIAL INSECURITY: DOES ANYONE TRY TO KEEP YOU FROM HAVING/CONTACTING OTHER FRIENDS OR DOING THINGS OUTSIDE YOUR HOME?: NO

## 2024-01-01 SDOH — ECONOMIC STABILITY: TRANSPORTATION INSECURITY
IN THE PAST 12 MONTHS, HAS THE LACK OF TRANSPORTATION KEPT YOU FROM MEDICAL APPOINTMENTS OR FROM GETTING MEDICATIONS?: NO

## 2024-01-01 SDOH — HEALTH STABILITY: PHYSICAL HEALTH: EXERCISE ACTIVITY: ANKLE PUMPS, QUAD AND GLUT SETS, HIP FLEX/ABD, BRIDGING

## 2024-01-01 SDOH — SOCIAL STABILITY: SOCIAL INSECURITY: ARE YOU OR HAVE YOU BEEN THREATENED OR ABUSED PHYSICALLY, EMOTIONALLY, OR SEXUALLY BY ANYONE?: UNABLE TO ASSESS

## 2024-01-01 SDOH — HEALTH STABILITY: MENTAL HEALTH
STRESS IS WHEN SOMEONE FEELS TENSE, NERVOUS, ANXIOUS, OR CAN'T SLEEP AT NIGHT BECAUSE THEIR MIND IS TROUBLED. HOW STRESSED ARE YOU?: NOT AT ALL

## 2024-01-01 SDOH — HEALTH STABILITY: MENTAL HEALTH: HOW OFTEN DO YOU HAVE 6 OR MORE DRINKS ON ONE OCCASION?: NEVER

## 2024-01-01 SDOH — HEALTH STABILITY: PHYSICAL HEALTH: PHYSICAL EXERCISE: SUPINE

## 2024-01-01 SDOH — SOCIAL STABILITY: SOCIAL INSECURITY: HAS ANYONE EVER THREATENED TO HURT YOUR FAMILY OR YOUR PETS?: UNABLE TO ASSESS

## 2024-01-01 SDOH — HEALTH STABILITY: PHYSICAL HEALTH: ON AVERAGE, HOW MANY MINUTES DO YOU ENGAGE IN EXERCISE AT THIS LEVEL?: PATIENT DECLINED

## 2024-01-01 SDOH — HEALTH STABILITY: PHYSICAL HEALTH: EXERCISE ACTIVITY: ANKLE PUMPS, LAQ, HIP FLEX AND ABD

## 2024-01-01 SDOH — SOCIAL STABILITY: SOCIAL INSECURITY: HAVE YOU HAD THOUGHTS OF HARMING ANYONE ELSE?: YES

## 2024-01-01 SDOH — HEALTH STABILITY: MENTAL HEALTH: HOW MANY STANDARD DRINKS CONTAINING ALCOHOL DO YOU HAVE ON A TYPICAL DAY?: PATIENT DOES NOT DRINK

## 2024-01-01 SDOH — HEALTH STABILITY: PHYSICAL HEALTH: EXERCISE ACTIVITY: BALANCE AND STRENGTHENING

## 2024-01-01 ASSESSMENT — ENCOUNTER SYMPTOMS
DENIES PAIN: 1
ABDOMINAL PAIN: 0
DENIES PAIN: 1
DIZZINESS: 0
DYSPNEA ON EXERTION: 1
FREQUENCY: 0
PERSON REPORTING PAIN: PATIENT
PAIN: 1
PERSON REPORTING PAIN: PATIENT
VOMITING: 0
OCCASIONAL FEELINGS OF UNSTEADINESS: 0
COUGH: 1
HYPERTENSION: 1
APPETITE CHANGE: 1
FATIGUES EASILY: 1
PAIN SEVERITY GOAL: 0/10
DENIES PAIN: 1
EYES NEGATIVE: 1
OCCASIONAL FEELINGS OF UNSTEADINESS: 1
DENIES PAIN: 1
PERSON REPORTING PAIN: PATIENT
WEAKNESS: 1
DENIES PAIN: 1
PERSON REPORTING PAIN: PATIENT
DENIES PAIN: 1
PERSON REPORTING PAIN: PATIENT
DENIES PAIN: 1
MUSCULOSKELETAL NEGATIVE: 1
SUBJECTIVE PAIN PROGRESSION: GRADUALLY IMPROVING
DENIES PAIN: 1
UNEXPECTED WEIGHT CHANGE: 1
OCCASIONAL FEELINGS OF UNSTEADINESS: 1
HYPOTENSION: 1
OCCASIONAL FEELINGS OF UNSTEADINESS: 0
ABDOMINAL DISTENTION: 1
DENIES PAIN: 1
PERSON REPORTING PAIN: PATIENT
PERSON REPORTING PAIN: PATIENT
NAUSEA: 0
MUSCLE WEAKNESS: 1
PERSON REPORTING PAIN: PATIENT
CONFUSION: 1
DYSPNEA ON EXERTION: 1
FATIGUE: 1
PERSON REPORTING PAIN: PATIENT
DENIES PAIN: 1
MUSCLE WEAKNESS: 1
SHORTNESS OF BREATH: 1
HEMATOLOGIC/LYMPHATIC NEGATIVE: 1
PERSON REPORTING PAIN: PATIENT
DIARRHEA: 1
ENDOCRINE NEGATIVE: 1
PAIN LOCATION: CHEST
DENIES PAIN: 1
NUMBNESS: 0
HYPERTENSION: 1
CHILLS: 0
PERSON REPORTING PAIN: PATIENT
MUSCLE WEAKNESS: 1
HEADACHES: 0
AGITATION: 1
LOWEST PAIN SEVERITY IN PAST 24 HOURS: 2/10
ALLERGIC/IMMUNOLOGIC NEGATIVE: 1
PALPITATIONS: 1
CONSTIPATION: 0
PERSON REPORTING PAIN: PATIENT
PALPITATIONS: 0
PERSON REPORTING PAIN: PATIENT
HIGHEST PAIN SEVERITY IN PAST 24 HOURS: 4/10
LIGHT-HEADEDNESS: 1
PERSON REPORTING PAIN: PATIENT
FEVER: 0

## 2024-01-01 ASSESSMENT — PAIN - FUNCTIONAL ASSESSMENT
PAIN_FUNCTIONAL_ASSESSMENT: 0-10
PAIN_FUNCTIONAL_ASSESSMENT: 0-10
PAIN_FUNCTIONAL_ASSESSMENT: NO/DENIES PAIN
PAIN_FUNCTIONAL_ASSESSMENT: 0-10
PAIN_FUNCTIONAL_ASSESSMENT: 0-10
PAIN_FUNCTIONAL_ASSESSMENT: NO/DENIES PAIN
PAIN_FUNCTIONAL_ASSESSMENT: 0-10
PAIN_FUNCTIONAL_ASSESSMENT: NO/DENIES PAIN
PAIN_FUNCTIONAL_ASSESSMENT: 0-10
PAIN_FUNCTIONAL_ASSESSMENT: NO/DENIES PAIN
PAIN_FUNCTIONAL_ASSESSMENT: 0-10
PAIN_FUNCTIONAL_ASSESSMENT: WONG-BAKER FACES
PAIN_FUNCTIONAL_ASSESSMENT: 0-10
PAIN_FUNCTIONAL_ASSESSMENT: UNABLE TO ASSESS
PAIN_FUNCTIONAL_ASSESSMENT: NO/DENIES PAIN
PAIN_FUNCTIONAL_ASSESSMENT: 0-10
PAIN_FUNCTIONAL_ASSESSMENT: NO/DENIES PAIN
PAIN_FUNCTIONAL_ASSESSMENT: 0-10
PAIN_FUNCTIONAL_ASSESSMENT: 0-10
PAIN_FUNCTIONAL_ASSESSMENT: NO/DENIES PAIN
PAIN_FUNCTIONAL_ASSESSMENT: NO/DENIES PAIN
PAIN_FUNCTIONAL_ASSESSMENT: 0-10
PAIN_FUNCTIONAL_ASSESSMENT: 0-10
PAIN_FUNCTIONAL_ASSESSMENT: WONG-BAKER FACES
PAIN_FUNCTIONAL_ASSESSMENT: 0-10
PAIN_FUNCTIONAL_ASSESSMENT: NO/DENIES PAIN
PAIN_FUNCTIONAL_ASSESSMENT: 0-10
PAIN_FUNCTIONAL_ASSESSMENT: 0-10
PAIN_FUNCTIONAL_ASSESSMENT: UNABLE TO ASSESS
PAIN_FUNCTIONAL_ASSESSMENT: 0-10
PAIN_FUNCTIONAL_ASSESSMENT: NO/DENIES PAIN
PAIN_FUNCTIONAL_ASSESSMENT: NO/DENIES PAIN
PAIN_FUNCTIONAL_ASSESSMENT: 0-10
PAIN_FUNCTIONAL_ASSESSMENT: NO/DENIES PAIN
PAIN_FUNCTIONAL_ASSESSMENT: 0-10
PAIN_FUNCTIONAL_ASSESSMENT: NO/DENIES PAIN
PAIN_FUNCTIONAL_ASSESSMENT: NO/DENIES PAIN
PAIN_FUNCTIONAL_ASSESSMENT: 0-10
PAIN_FUNCTIONAL_ASSESSMENT: NO/DENIES PAIN
PAIN_FUNCTIONAL_ASSESSMENT: 0-10
PAIN_FUNCTIONAL_ASSESSMENT: 0-10
PAIN_FUNCTIONAL_ASSESSMENT: NO/DENIES PAIN
PAIN_FUNCTIONAL_ASSESSMENT: 0-10
PAIN_FUNCTIONAL_ASSESSMENT: 0-10
PAIN_FUNCTIONAL_ASSESSMENT: NO/DENIES PAIN
PAIN_FUNCTIONAL_ASSESSMENT: NO/DENIES PAIN
PAIN_FUNCTIONAL_ASSESSMENT: 0-10
PAIN_FUNCTIONAL_ASSESSMENT: 0-10
PAIN_FUNCTIONAL_ASSESSMENT: NO/DENIES PAIN
PAIN_FUNCTIONAL_ASSESSMENT: NO/DENIES PAIN

## 2024-01-01 ASSESSMENT — COGNITIVE AND FUNCTIONAL STATUS - GENERAL
STANDING UP FROM CHAIR USING ARMS: A LITTLE
HELP NEEDED FOR BATHING: A LITTLE
STANDING UP FROM CHAIR USING ARMS: A LITTLE
MOBILITY SCORE: 20
DRESSING REGULAR LOWER BODY CLOTHING: A LITTLE
MOBILITY SCORE: 22
PATIENT BASELINE BEDBOUND: UNABLE TO ASSESS AT THIS TIME
MOBILITY SCORE: 22
MOBILITY SCORE: 22
CLIMB 3 TO 5 STEPS WITH RAILING: A LITTLE
STANDING UP FROM CHAIR USING ARMS: A LITTLE
DRESSING REGULAR UPPER BODY CLOTHING: A LITTLE
WALKING IN HOSPITAL ROOM: A LITTLE
MOVING TO AND FROM BED TO CHAIR: TOTAL
CLIMB 3 TO 5 STEPS WITH RAILING: A LITTLE
STANDING UP FROM CHAIR USING ARMS: A LITTLE
WALKING IN HOSPITAL ROOM: A LITTLE
PERSONAL GROOMING: A LITTLE
STANDING UP FROM CHAIR USING ARMS: A LITTLE
MOBILITY SCORE: 18
TOILETING: A LITTLE
MOBILITY SCORE: 20
MOVING TO AND FROM BED TO CHAIR: A LITTLE
MOVING FROM LYING ON BACK TO SITTING ON SIDE OF FLAT BED WITH BEDRAILS: A LITTLE
MOBILITY SCORE: 23
DAILY ACTIVITIY SCORE: 24
MOVING FROM LYING ON BACK TO SITTING ON SIDE OF FLAT BED WITH BEDRAILS: A LITTLE
HELP NEEDED FOR BATHING: TOTAL
MOBILITY SCORE: 18
CLIMB 3 TO 5 STEPS WITH RAILING: A LITTLE
TOILETING: A LITTLE
CLIMB 3 TO 5 STEPS WITH RAILING: A LITTLE
PERSONAL GROOMING: A LITTLE
WALKING IN HOSPITAL ROOM: A LITTLE
MOVING FROM LYING ON BACK TO SITTING ON SIDE OF FLAT BED WITH BEDRAILS: A LITTLE
WALKING IN HOSPITAL ROOM: A LITTLE
CLIMB 3 TO 5 STEPS WITH RAILING: A LITTLE
TURNING FROM BACK TO SIDE WHILE IN FLAT BAD: A LITTLE
DRESSING REGULAR LOWER BODY CLOTHING: A LITTLE
DAILY ACTIVITIY SCORE: 24
PERSONAL GROOMING: A LITTLE
DAILY ACTIVITIY SCORE: 19
CLIMB 3 TO 5 STEPS WITH RAILING: A LITTLE
TOILETING: A LITTLE
TURNING FROM BACK TO SIDE WHILE IN FLAT BAD: A LITTLE
MOBILITY SCORE: 17
CLIMB 3 TO 5 STEPS WITH RAILING: A LITTLE
DRESSING REGULAR UPPER BODY CLOTHING: TOTAL
STANDING UP FROM CHAIR USING ARMS: A LITTLE
HELP NEEDED FOR BATHING: A LITTLE
HELP NEEDED FOR BATHING: A LOT
WALKING IN HOSPITAL ROOM: TOTAL
MOBILITY SCORE: 24
WALKING IN HOSPITAL ROOM: A LITTLE
DRESSING REGULAR LOWER BODY CLOTHING: A LITTLE
STANDING UP FROM CHAIR USING ARMS: A LITTLE
TOILETING: A LITTLE
DAILY ACTIVITIY SCORE: 24
CLIMB 3 TO 5 STEPS WITH RAILING: A LITTLE
DRESSING REGULAR LOWER BODY CLOTHING: A LITTLE
DAILY ACTIVITIY SCORE: 19
EATING MEALS: TOTAL
WALKING IN HOSPITAL ROOM: A LITTLE
MOBILITY SCORE: 23
STANDING UP FROM CHAIR USING ARMS: A LITTLE
CLIMB 3 TO 5 STEPS WITH RAILING: A LITTLE
STANDING UP FROM CHAIR USING ARMS: A LITTLE
CLIMB 3 TO 5 STEPS WITH RAILING: A LITTLE
DAILY ACTIVITIY SCORE: 21
DRESSING REGULAR UPPER BODY CLOTHING: A LITTLE
MOBILITY SCORE: 19
DRESSING REGULAR LOWER BODY CLOTHING: A LITTLE
DAILY ACTIVITIY SCORE: 24
CLIMB 3 TO 5 STEPS WITH RAILING: A LITTLE
HELP NEEDED FOR BATHING: A LITTLE
CLIMB 3 TO 5 STEPS WITH RAILING: A LITTLE
TURNING FROM BACK TO SIDE WHILE IN FLAT BAD: A LITTLE
MOBILITY SCORE: 18
DAILY ACTIVITIY SCORE: 24
MOVING TO AND FROM BED TO CHAIR: A LITTLE
CLIMB 3 TO 5 STEPS WITH RAILING: A LITTLE
WALKING IN HOSPITAL ROOM: A LITTLE
MOBILITY SCORE: 18
CLIMB 3 TO 5 STEPS WITH RAILING: A LITTLE
EATING MEALS: TOTAL
DAILY ACTIVITIY SCORE: 19
DAILY ACTIVITIY SCORE: 6
DAILY ACTIVITIY SCORE: 21
DAILY ACTIVITIY SCORE: 21
DAILY ACTIVITIY SCORE: 24
PERSONAL GROOMING: A LITTLE
MOBILITY SCORE: 22
TOILETING: A LITTLE
WALKING IN HOSPITAL ROOM: A LITTLE
TURNING FROM BACK TO SIDE WHILE IN FLAT BAD: TOTAL
PATIENT BASELINE BEDBOUND: NO
STANDING UP FROM CHAIR USING ARMS: A LITTLE
WALKING IN HOSPITAL ROOM: A LITTLE
STANDING UP FROM CHAIR USING ARMS: A LITTLE
DAILY ACTIVITIY SCORE: 24
DAILY ACTIVITIY SCORE: 19
DRESSING REGULAR UPPER BODY CLOTHING: A LITTLE
HELP NEEDED FOR BATHING: A LITTLE
DRESSING REGULAR LOWER BODY CLOTHING: TOTAL
MOBILITY SCORE: 22
HELP NEEDED FOR BATHING: A LITTLE
HELP NEEDED FOR BATHING: TOTAL
MOVING FROM LYING ON BACK TO SITTING ON SIDE OF FLAT BED WITH BEDRAILS: TOTAL
CLIMB 3 TO 5 STEPS WITH RAILING: A LOT
PERSONAL GROOMING: A LITTLE
HELP NEEDED FOR BATHING: A LITTLE
DAILY ACTIVITIY SCORE: 24
MOVING TO AND FROM BED TO CHAIR: A LITTLE
MOVING FROM LYING ON BACK TO SITTING ON SIDE OF FLAT BED WITH BEDRAILS: A LITTLE
MOVING TO AND FROM BED TO CHAIR: A LITTLE
MOVING TO AND FROM BED TO CHAIR: A LITTLE
WALKING IN HOSPITAL ROOM: A LITTLE
HELP NEEDED FOR BATHING: A LITTLE
TOILETING: A LITTLE
MOBILITY SCORE: 24
STANDING UP FROM CHAIR USING ARMS: A LITTLE
STANDING UP FROM CHAIR USING ARMS: A LITTLE
DRESSING REGULAR UPPER BODY CLOTHING: A LITTLE
HELP NEEDED FOR BATHING: A LITTLE
DRESSING REGULAR LOWER BODY CLOTHING: A LITTLE
MOVING TO AND FROM BED TO CHAIR: A LITTLE
TOILETING: A LITTLE
DAILY ACTIVITIY SCORE: 19
MOBILITY SCORE: 19
DAILY ACTIVITIY SCORE: 19
DRESSING REGULAR LOWER BODY CLOTHING: A LITTLE
DRESSING REGULAR LOWER BODY CLOTHING: A LITTLE
MOVING TO AND FROM BED TO CHAIR: A LITTLE
TOILETING: A LITTLE
DRESSING REGULAR LOWER BODY CLOTHING: A LITTLE
MOVING TO AND FROM BED TO CHAIR: A LITTLE
WALKING IN HOSPITAL ROOM: A LITTLE
TOILETING: A LITTLE
STANDING UP FROM CHAIR USING ARMS: A LITTLE
DRESSING REGULAR LOWER BODY CLOTHING: A LITTLE
TOILETING: A LITTLE
DAILY ACTIVITIY SCORE: 19
MOBILITY SCORE: 20
DAILY ACTIVITIY SCORE: 24
STANDING UP FROM CHAIR USING ARMS: TOTAL
TOILETING: A LITTLE
HELP NEEDED FOR BATHING: A LITTLE
MOBILITY SCORE: 19
STANDING UP FROM CHAIR USING ARMS: A LITTLE
CLIMB 3 TO 5 STEPS WITH RAILING: A LITTLE
DAILY ACTIVITIY SCORE: 24
MOVING TO AND FROM BED TO CHAIR: A LITTLE
STANDING UP FROM CHAIR USING ARMS: A LITTLE
MOBILITY SCORE: 20
DRESSING REGULAR UPPER BODY CLOTHING: A LITTLE
STANDING UP FROM CHAIR USING ARMS: A LITTLE
DRESSING REGULAR LOWER BODY CLOTHING: TOTAL
MOVING TO AND FROM BED TO CHAIR: A LITTLE
MOVING TO AND FROM BED TO CHAIR: A LITTLE
DRESSING REGULAR LOWER BODY CLOTHING: A LITTLE
CLIMB 3 TO 5 STEPS WITH RAILING: A LITTLE
MOBILITY SCORE: 18
HELP NEEDED FOR BATHING: A LITTLE
MOBILITY SCORE: 24
CLIMB 3 TO 5 STEPS WITH RAILING: A LITTLE
MOVING FROM LYING ON BACK TO SITTING ON SIDE OF FLAT BED WITH BEDRAILS: A LITTLE
WALKING IN HOSPITAL ROOM: A LITTLE
MOBILITY SCORE: 20
WALKING IN HOSPITAL ROOM: A LITTLE
MOBILITY SCORE: 24
DRESSING REGULAR UPPER BODY CLOTHING: A LITTLE
WALKING IN HOSPITAL ROOM: A LITTLE
TURNING FROM BACK TO SIDE WHILE IN FLAT BAD: A LITTLE
MOVING TO AND FROM BED TO CHAIR: A LITTLE
CLIMB 3 TO 5 STEPS WITH RAILING: A LITTLE
CLIMB 3 TO 5 STEPS WITH RAILING: A LOT
PERSONAL GROOMING: TOTAL
DAILY ACTIVITIY SCORE: 19
WALKING IN HOSPITAL ROOM: TOTAL
MOBILITY SCORE: 22
PERSONAL GROOMING: A LITTLE
DAILY ACTIVITIY SCORE: 19
CLIMB 3 TO 5 STEPS WITH RAILING: A LITTLE
DRESSING REGULAR UPPER BODY CLOTHING: A LITTLE
TURNING FROM BACK TO SIDE WHILE IN FLAT BAD: A LITTLE
WALKING IN HOSPITAL ROOM: A LITTLE
DAILY ACTIVITIY SCORE: 24
PERSONAL GROOMING: A LITTLE
TOILETING: TOTAL
PATIENT BASELINE BEDBOUND: NO
MOBILITY SCORE: 23
MOBILITY SCORE: 20
PERSONAL GROOMING: A LITTLE
HELP NEEDED FOR BATHING: A LITTLE
DRESSING REGULAR LOWER BODY CLOTHING: A LITTLE
WALKING IN HOSPITAL ROOM: A LITTLE
CLIMB 3 TO 5 STEPS WITH RAILING: TOTAL
DAILY ACTIVITIY SCORE: 19
WALKING IN HOSPITAL ROOM: A LITTLE
PATIENT BASELINE BEDBOUND: NO
DRESSING REGULAR UPPER BODY CLOTHING: A LITTLE
CLIMB 3 TO 5 STEPS WITH RAILING: A LITTLE
TURNING FROM BACK TO SIDE WHILE IN FLAT BAD: A LITTLE
TURNING FROM BACK TO SIDE WHILE IN FLAT BAD: A LITTLE
MOBILITY SCORE: 22
WALKING IN HOSPITAL ROOM: A LITTLE
TURNING FROM BACK TO SIDE WHILE IN FLAT BAD: TOTAL
WALKING IN HOSPITAL ROOM: A LITTLE
DAILY ACTIVITIY SCORE: 24
DAILY ACTIVITIY SCORE: 24
HELP NEEDED FOR BATHING: A LITTLE
DAILY ACTIVITIY SCORE: 21
TURNING FROM BACK TO SIDE WHILE IN FLAT BAD: A LITTLE
DRESSING REGULAR UPPER BODY CLOTHING: A LITTLE
DAILY ACTIVITIY SCORE: 19
PERSONAL GROOMING: A LITTLE
TURNING FROM BACK TO SIDE WHILE IN FLAT BAD: A LITTLE
HELP NEEDED FOR BATHING: A LITTLE
CLIMB 3 TO 5 STEPS WITH RAILING: A LITTLE
STANDING UP FROM CHAIR USING ARMS: A LITTLE
DRESSING REGULAR LOWER BODY CLOTHING: A LOT
MOBILITY SCORE: 18
MOBILITY SCORE: 19
MOVING TO AND FROM BED TO CHAIR: A LITTLE
STANDING UP FROM CHAIR USING ARMS: A LITTLE
PERSONAL GROOMING: TOTAL
MOBILITY SCORE: 24
HELP NEEDED FOR BATHING: A LITTLE
WALKING IN HOSPITAL ROOM: A LITTLE
DRESSING REGULAR UPPER BODY CLOTHING: A LITTLE
HELP NEEDED FOR BATHING: A LITTLE
HELP NEEDED FOR BATHING: A LITTLE
STANDING UP FROM CHAIR USING ARMS: A LITTLE
TOILETING: A LITTLE
CLIMB 3 TO 5 STEPS WITH RAILING: A LITTLE
MOVING TO AND FROM BED TO CHAIR: A LITTLE
DRESSING REGULAR LOWER BODY CLOTHING: A LITTLE
PERSONAL GROOMING: A LITTLE
MOVING FROM LYING ON BACK TO SITTING ON SIDE OF FLAT BED WITH BEDRAILS: A LITTLE
CLIMB 3 TO 5 STEPS WITH RAILING: A LOT
MOBILITY SCORE: 18
MOVING TO AND FROM BED TO CHAIR: A LITTLE
MOVING TO AND FROM BED TO CHAIR: A LITTLE
DRESSING REGULAR LOWER BODY CLOTHING: A LITTLE
DRESSING REGULAR LOWER BODY CLOTHING: A LITTLE
TURNING FROM BACK TO SIDE WHILE IN FLAT BAD: A LITTLE
WALKING IN HOSPITAL ROOM: A LITTLE
TOILETING: A LITTLE
STANDING UP FROM CHAIR USING ARMS: A LITTLE
TOILETING: A LITTLE
TURNING FROM BACK TO SIDE WHILE IN FLAT BAD: A LITTLE
HELP NEEDED FOR BATHING: A LITTLE
TURNING FROM BACK TO SIDE WHILE IN FLAT BAD: A LITTLE
MOBILITY SCORE: 19
DRESSING REGULAR LOWER BODY CLOTHING: A LITTLE
MOVING TO AND FROM BED TO CHAIR: A LITTLE
CLIMB 3 TO 5 STEPS WITH RAILING: A LITTLE
MOBILITY SCORE: 22
WALKING IN HOSPITAL ROOM: A LITTLE
DRESSING REGULAR UPPER BODY CLOTHING: A LITTLE
CLIMB 3 TO 5 STEPS WITH RAILING: A LITTLE
TOILETING: TOTAL
PERSONAL GROOMING: A LITTLE
WALKING IN HOSPITAL ROOM: A LITTLE
DRESSING REGULAR UPPER BODY CLOTHING: A LITTLE
MOBILITY SCORE: 6
MOBILITY SCORE: 20
HELP NEEDED FOR BATHING: A LITTLE
MOVING TO AND FROM BED TO CHAIR: A LITTLE
MOBILITY SCORE: 20
MOVING TO AND FROM BED TO CHAIR: A LITTLE
TOILETING: A LITTLE
CLIMB 3 TO 5 STEPS WITH RAILING: TOTAL
MOVING FROM LYING ON BACK TO SITTING ON SIDE OF FLAT BED WITH BEDRAILS: TOTAL
MOVING TO AND FROM BED TO CHAIR: A LITTLE
MOBILITY SCORE: 24
MOVING FROM LYING ON BACK TO SITTING ON SIDE OF FLAT BED WITH BEDRAILS: A LITTLE
WALKING IN HOSPITAL ROOM: A LITTLE
CLIMB 3 TO 5 STEPS WITH RAILING: A LITTLE
MOBILITY SCORE: 20
HELP NEEDED FOR BATHING: A LITTLE
WALKING IN HOSPITAL ROOM: A LITTLE
MOBILITY SCORE: 18
MOVING TO AND FROM BED TO CHAIR: A LITTLE
DAILY ACTIVITIY SCORE: 24
DAILY ACTIVITIY SCORE: 6
DAILY ACTIVITIY SCORE: 19
WALKING IN HOSPITAL ROOM: A LITTLE
PERSONAL GROOMING: A LITTLE
DRESSING REGULAR LOWER BODY CLOTHING: A LITTLE
CLIMB 3 TO 5 STEPS WITH RAILING: TOTAL
CLIMB 3 TO 5 STEPS WITH RAILING: A LITTLE
WALKING IN HOSPITAL ROOM: A LITTLE
TURNING FROM BACK TO SIDE WHILE IN FLAT BAD: A LITTLE
HELP NEEDED FOR BATHING: A LITTLE
MOVING TO AND FROM BED TO CHAIR: TOTAL
DRESSING REGULAR LOWER BODY CLOTHING: A LITTLE
DAILY ACTIVITIY SCORE: 22
WALKING IN HOSPITAL ROOM: A LITTLE
DRESSING REGULAR UPPER BODY CLOTHING: A LITTLE
CLIMB 3 TO 5 STEPS WITH RAILING: A LITTLE
MOBILITY SCORE: 20
DRESSING REGULAR LOWER BODY CLOTHING: A LITTLE
DRESSING REGULAR UPPER BODY CLOTHING: TOTAL
HELP NEEDED FOR BATHING: A LITTLE
MOVING TO AND FROM BED TO CHAIR: A LITTLE
MOBILITY SCORE: 24
MOBILITY SCORE: 6
PERSONAL GROOMING: A LITTLE
DAILY ACTIVITIY SCORE: 21
TOILETING: A LITTLE
WALKING IN HOSPITAL ROOM: A LITTLE
MOBILITY SCORE: 24
DRESSING REGULAR UPPER BODY CLOTHING: A LITTLE
STANDING UP FROM CHAIR USING ARMS: A LITTLE
DAILY ACTIVITIY SCORE: 24
DAILY ACTIVITIY SCORE: 24
MOVING TO AND FROM BED TO CHAIR: A LITTLE
MOVING TO AND FROM BED TO CHAIR: A LITTLE
DAILY ACTIVITIY SCORE: 22
DRESSING REGULAR LOWER BODY CLOTHING: A LITTLE
STANDING UP FROM CHAIR USING ARMS: A LITTLE
CLIMB 3 TO 5 STEPS WITH RAILING: A LITTLE
DAILY ACTIVITIY SCORE: 22
CLIMB 3 TO 5 STEPS WITH RAILING: A LITTLE
DAILY ACTIVITIY SCORE: 24
DRESSING REGULAR LOWER BODY CLOTHING: A LITTLE
DAILY ACTIVITIY SCORE: 22
WALKING IN HOSPITAL ROOM: A LITTLE
TOILETING: A LITTLE
MOBILITY SCORE: 20
CLIMB 3 TO 5 STEPS WITH RAILING: A LITTLE
WALKING IN HOSPITAL ROOM: A LITTLE
DAILY ACTIVITIY SCORE: 24
MOBILITY SCORE: 22
STANDING UP FROM CHAIR USING ARMS: TOTAL
DAILY ACTIVITIY SCORE: 19
STANDING UP FROM CHAIR USING ARMS: A LITTLE
DAILY ACTIVITIY SCORE: 24
DRESSING REGULAR UPPER BODY CLOTHING: A LITTLE
MOVING TO AND FROM BED TO CHAIR: A LITTLE
CLIMB 3 TO 5 STEPS WITH RAILING: A LITTLE
WALKING IN HOSPITAL ROOM: A LITTLE

## 2024-01-01 ASSESSMENT — ACTIVITIES OF DAILY LIVING (ADL)
PHYSICAL TRANSFERS ASSESSED: 1
LACK_OF_TRANSPORTATION: NO
BATHING: UNABLE TO ASSESS
DRESSING YOURSELF: INDEPENDENT
PATIENT'S MEMORY ADEQUATE TO SAFELY COMPLETE DAILY ACTIVITIES?: UNABLE TO ASSESS
JUDGMENT_ADEQUATE_SAFELY_COMPLETE_DAILY_ACTIVITIES: UNABLE TO ASSESS
FEEDING YOURSELF: INDEPENDENT
HEARING - RIGHT EAR: FUNCTIONAL
PATIENT'S MEMORY ADEQUATE TO SAFELY COMPLETE DAILY ACTIVITIES?: YES
BATHING: UNABLE TO ASSESS
FEEDING YOURSELF: INDEPENDENT
AMBULATION ASSISTANCE ON FLAT SURFACES: 1
ADL_ASSISTANCE: INDEPENDENT
JUDGMENT_ADEQUATE_SAFELY_COMPLETE_DAILY_ACTIVITIES: YES
HEARING - RIGHT EAR: FUNCTIONAL
ADEQUATE_TO_COMPLETE_ADL: YES
DRESSING_LB_CURRENT_FUNCTION: SUPERVISION
AMBULATION ASSISTANCE: STAND BY ASSIST
FEEDING YOURSELF: UNABLE TO ASSESS
BATHING_ASSISTANCE: MINIMAL
ENTERING_EXITING_HOME: MINIMUM ASSIST
GROOMING ASSESSED: 1
JUDGMENT_ADEQUATE_SAFELY_COMPLETE_DAILY_ACTIVITIES: YES
HEARING - RIGHT EAR: UNABLE TO ASSESS
DRESSING_LB_CURRENT_FUNCTION: MINIMUM ASSIST
AMBULATION ASSISTANCE: 1
BATHING ASSESSED: 1
TOILETING: SUPERVISION
LACK_OF_TRANSPORTATION: NO
DRESSING_LB_CURRENT_FUNCTION: MODERATE ASSIST
AMBULATION_DISTANCE/DURATION_TOLERATED: 50 FEET
HEARING - LEFT EAR: FUNCTIONAL
ENTERING_EXITING_HOME: MINIMUM ASSIST
TOILETING: 1
BATHING: NEEDS ASSISTANCE
AMBULATION_DISTANCE/DURATION_TOLERATED: 30 FEET X2
OASIS_M1830: 02
BATHING_CURRENT_FUNCTION: STAND BY ASSIST
ADEQUATE_TO_COMPLETE_ADL: UNABLE TO ASSESS
ADEQUATE_TO_COMPLETE_ADL: YES
FEEDING YOURSELF: INDEPENDENT
WALKS IN HOME: INDEPENDENT
TOILETING: UNABLE TO ASSESS
ADL_ASSISTANCE: INDEPENDENT
GROOMING_WITHIN_DEFINED_LIMITS: 1
HEARING - LEFT EAR: UNABLE TO ASSESS
HEARING - RIGHT EAR: FUNCTIONAL
OASIS_M1830: 03
LACK_OF_TRANSPORTATION: PATIENT DECLINED
GROOMING_CURRENT_FUNCTION: STAND BY ASSIST
DRESSING_UB_CURRENT_FUNCTION: SUPERVISION
TOILETING: NEEDS ASSISTANCE
ASSISTIVE_DEVICE: OTHER (COMMENT)
DRESSING YOURSELF: INDEPENDENT
OASIS_M1830: 05
GROOMING: UNABLE TO ASSESS
WALKS IN HOME: NEEDS ASSISTANCE
ADL_ASSISTANCE: INDEPENDENT
HEARING - RIGHT EAR: FUNCTIONAL
BATHING_CURRENT_FUNCTION: MODERATE ASSIST
PATIENT'S MEMORY ADEQUATE TO SAFELY COMPLETE DAILY ACTIVITIES?: YES
TOILETING: 1
PREPARING MEALS: DEPENDENT
TOILETING: SUPERVISION
ENTERING_EXITING_HOME: NEEDS ASSISTANCE
BATHING_CURRENT_FUNCTION: SUPERVISION
DRESSING_LB_CURRENT_FUNCTION: SUPERVISION
GROOMING: UNABLE TO ASSESS
DRESSING YOURSELF: UNABLE TO ASSESS
ADL_ASSISTANCE: INDEPENDENT
HEARING - LEFT EAR: FUNCTIONAL
BATHING ASSESSED: 1
BATHING: INDEPENDENT
WALKS IN HOME: INDEPENDENT
OASIS_M1830: 05
GROOMING: INDEPENDENT
JUDGMENT_ADEQUATE_SAFELY_COMPLETE_DAILY_ACTIVITIES: YES
PATIENT'S MEMORY ADEQUATE TO SAFELY COMPLETE DAILY ACTIVITIES?: YES
ASSISTIVE_DEVICE: CANE;WALKER
BATHING: INDEPENDENT
LACK_OF_TRANSPORTATION: PATIENT DECLINED
LACK_OF_TRANSPORTATION: NO
WASHING_LB_CURRENT_FUNCTION: MODERATE ASSIST
TOILETING: INDEPENDENT
JUDGMENT_ADEQUATE_SAFELY_COMPLETE_DAILY_ACTIVITIES: YES
AMBULATION ASSISTANCE ON FLAT SURFACES: 1
CURRENT_FUNCTION: ONE PERSON
ADEQUATE_TO_COMPLETE_ADL: YES
HEARING - LEFT EAR: FUNCTIONAL
TOILETING: INDEPENDENT
BATHING ASSESSED: 1
DRESSING YOURSELF: INDEPENDENT
WALKS IN HOME: UNABLE TO ASSESS
DRESSING YOURSELF: UNABLE TO ASSESS
WALKS IN HOME: UNABLE TO ASSESS
LACK_OF_TRANSPORTATION: PATIENT DECLINED
HOME_HEALTH_OASIS: 01
LACK_OF_TRANSPORTATION: NO
GROOMING: INDEPENDENT
HEARING - LEFT EAR: FUNCTIONAL
ADEQUATE_TO_COMPLETE_ADL: YES
HOME_MANAGEMENT_TIME_ENTRY: 13
PATIENT'S MEMORY ADEQUATE TO SAFELY COMPLETE DAILY ACTIVITIES?: YES
WASHING_UPB_CURRENT_FUNCTION: MINIMUM ASSIST
CURRENT_FUNCTION: STAND BY ASSIST
AMBULATION ASSISTANCE ON FLAT SURFACES: 1
TOILETING: UNABLE TO ASSESS
FEEDING YOURSELF: UNABLE TO ASSESS
DRESSING_UB_CURRENT_FUNCTION: SUPERVISION
AMBULATION ASSISTANCE: ONE PERSON
GROOMING: INDEPENDENT
FEEDING_WITHIN_DEFINED_LIMITS: 1

## 2024-01-01 ASSESSMENT — RESPIRATORY DISTRESS OBSERVATION SCALE (RDOS)
INVOLUNTARY NASAL FLARING: 2 - PRESENT
RESPIRATORY RATE PER MINUTE: 1 - 19-30 BREATHS
RESPIRATORY RATE PER MINUTE: 1 - 19-30 BREATHS
RESTLESS NONPURPOSEFUL MOVEMENTS: 1 - OCCASIONAL, SLIGHT MOVEMENTS
RDOS TOTAL SCORE: 10
RDOS TOTAL SCORE: 10
LOOK OF FEAR: 0 - NONE
GRUNTING AT END OF EXPIRATION: 2 - PRESENT
HEART RATE PER MINUTE: 0 - <90 BEATS
ACCESSORY MUSCLE RISE IN CLAVICLE DURING INSPIRATION: 2 - PRONOUNCED RISE
RESTLESS NONPURPOSEFUL MOVEMENTS: 1 - OCCASIONAL, SLIGHT MOVEMENTS
LOOK OF FEAR: 0 - NONE
PARADOXICAL BREATHING PATTERN: 2 - PRESENT
PARADOXICAL BREATHING PATTERN: 2 - PRESENT
RDOS TOTAL SCORE: 10
LOOK OF FEAR: 0 - NONE
PARADOXICAL BREATHING PATTERN: 2 - PRESENT
RESTLESS NONPURPOSEFUL MOVEMENTS: 1 - OCCASIONAL, SLIGHT MOVEMENTS
RESPIRATORY RATE PER MINUTE: 1 - 19-30 BREATHS
ACCESSORY MUSCLE RISE IN CLAVICLE DURING INSPIRATION: 2 - PRONOUNCED RISE
INVOLUNTARY NASAL FLARING: 2 - PRESENT
INVOLUNTARY NASAL FLARING: 2 - PRESENT
PARADOXICAL BREATHING PATTERN: 2 - PRESENT
RESPIRATORY RATE PER MINUTE: 1 - 19-30 BREATHS
HEART RATE PER MINUTE: 0 - <90 BEATS
RDOS TOTAL SCORE: 10
HEART RATE PER MINUTE: 0 - <90 BEATS
HEART RATE PER MINUTE: 0 - <90 BEATS
RESTLESS NONPURPOSEFUL MOVEMENTS: 1 - OCCASIONAL, SLIGHT MOVEMENTS
GRUNTING AT END OF EXPIRATION: 2 - PRESENT
RESPIRATORY RATE PER MINUTE: 1 - 19-30 BREATHS
RESTLESS NONPURPOSEFUL MOVEMENTS: 1 - OCCASIONAL, SLIGHT MOVEMENTS
GRUNTING AT END OF EXPIRATION: 2 - PRESENT
INVOLUNTARY NASAL FLARING: 2 - PRESENT
RESPIRATORY RATE PER MINUTE: 1 - 19-30 BREATHS
ACCESSORY MUSCLE RISE IN CLAVICLE DURING INSPIRATION: 2 - PRONOUNCED RISE
RDOS TOTAL SCORE: 10
HEART RATE PER MINUTE: 0 - <90 BEATS
INVOLUNTARY NASAL FLARING: 2 - PRESENT
RESTLESS NONPURPOSEFUL MOVEMENTS: 1 - OCCASIONAL, SLIGHT MOVEMENTS
ACCESSORY MUSCLE RISE IN CLAVICLE DURING INSPIRATION: 2 - PRONOUNCED RISE
RESPIRATORY RATE PER MINUTE: 1 - 19-30 BREATHS
GRUNTING AT END OF EXPIRATION: 2 - PRESENT
RESPIRATORY RATE PER MINUTE: 1 - 19-30 BREATHS
PARADOXICAL BREATHING PATTERN: 2 - PRESENT
RDOS TOTAL SCORE: 10
PARADOXICAL BREATHING PATTERN: 2 - PRESENT
LOOK OF FEAR: 0 - NONE
PARADOXICAL BREATHING PATTERN: 2 - PRESENT
LOOK OF FEAR: 0 - NONE
GRUNTING AT END OF EXPIRATION: 2 - PRESENT
GRUNTING AT END OF EXPIRATION: 2 - PRESENT
HEART RATE PER MINUTE: 0 - <90 BEATS
RESPIRATORY RATE PER MINUTE: 1 - 19-30 BREATHS
HEART RATE PER MINUTE: 0 - <90 BEATS
RESTLESS NONPURPOSEFUL MOVEMENTS: 1 - OCCASIONAL, SLIGHT MOVEMENTS
INVOLUNTARY NASAL FLARING: 2 - PRESENT
PARADOXICAL BREATHING PATTERN: 2 - PRESENT
HEART RATE PER MINUTE: 0 - <90 BEATS
RDOS TOTAL SCORE: 10
PARADOXICAL BREATHING PATTERN: 2 - PRESENT
LOOK OF FEAR: 0 - NONE
ACCESSORY MUSCLE RISE IN CLAVICLE DURING INSPIRATION: 2 - PRONOUNCED RISE
INVOLUNTARY NASAL FLARING: 2 - PRESENT
ACCESSORY MUSCLE RISE IN CLAVICLE DURING INSPIRATION: 2 - PRONOUNCED RISE
INVOLUNTARY NASAL FLARING: 2 - PRESENT
GRUNTING AT END OF EXPIRATION: 2 - PRESENT
ACCESSORY MUSCLE RISE IN CLAVICLE DURING INSPIRATION: 2 - PRONOUNCED RISE
ACCESSORY MUSCLE RISE IN CLAVICLE DURING INSPIRATION: 2 - PRONOUNCED RISE
HEART RATE PER MINUTE: 0 - <90 BEATS
GRUNTING AT END OF EXPIRATION: 2 - PRESENT
LOOK OF FEAR: 0 - NONE
RESTLESS NONPURPOSEFUL MOVEMENTS: 1 - OCCASIONAL, SLIGHT MOVEMENTS
GRUNTING AT END OF EXPIRATION: 2 - PRESENT
ACCESSORY MUSCLE RISE IN CLAVICLE DURING INSPIRATION: 2 - PRONOUNCED RISE
LOOK OF FEAR: 0 - NONE
HEART RATE PER MINUTE: 0 - <90 BEATS
RESPIRATORY RATE PER MINUTE: 1 - 19-30 BREATHS
ACCESSORY MUSCLE RISE IN CLAVICLE DURING INSPIRATION: 2 - PRONOUNCED RISE
LOOK OF FEAR: 0 - NONE
RDOS TOTAL SCORE: 10
RESTLESS NONPURPOSEFUL MOVEMENTS: 1 - OCCASIONAL, SLIGHT MOVEMENTS
RDOS TOTAL SCORE: 10
LOOK OF FEAR: 0 - NONE
GRUNTING AT END OF EXPIRATION: 2 - PRESENT
PARADOXICAL BREATHING PATTERN: 2 - PRESENT
RESTLESS NONPURPOSEFUL MOVEMENTS: 1 - OCCASIONAL, SLIGHT MOVEMENTS
RDOS TOTAL SCORE: 10

## 2024-01-01 ASSESSMENT — PAIN SCALES - GENERAL
PAINLEVEL_OUTOF10: 0 - NO PAIN
PAINLEVEL_OUTOF10: 5 - MODERATE PAIN
PAINLEVEL_OUTOF10: 0 - NO PAIN
PAINLEVEL_OUTOF10: 3
PAINLEVEL_OUTOF10: 3
PAINLEVEL_OUTOF10: 0 - NO PAIN
PAINLEVEL_OUTOF10: 4
PAINLEVEL_OUTOF10: 0 - NO PAIN
PAINLEVEL_OUTOF10: 3
PAINLEVEL_OUTOF10: 0 - NO PAIN
PAINLEVEL_OUTOF10: 3
PAINLEVEL_OUTOF10: 0 - NO PAIN
PAINLEVEL_OUTOF10: 0 - NO PAIN
PAINLEVEL_OUTOF10: 3
PAINLEVEL_OUTOF10: 0 - NO PAIN
PAINLEVEL_OUTOF10: 3
PAINLEVEL_OUTOF10: 4
PAINLEVEL_OUTOF10: 0 - NO PAIN
PAINLEVEL_OUTOF10: 4
PAINLEVEL_OUTOF10: 0 - NO PAIN
PAINLEVEL_OUTOF10: 8
PAINLEVEL_OUTOF10: 0 - NO PAIN
PAINLEVEL_OUTOF10: 5 - MODERATE PAIN
PAINLEVEL_OUTOF10: 0 - NO PAIN
PAINLEVEL: 0-NO PAIN
PAINLEVEL_OUTOF10: 0 - NO PAIN
PAINLEVEL_OUTOF10: 3
PAINLEVEL_OUTOF10: 0 - NO PAIN
PAINLEVEL: 0-NO PAIN
PAINLEVEL_OUTOF10: 5 - MODERATE PAIN
PAINLEVEL_OUTOF10: 0 - NO PAIN

## 2024-01-01 ASSESSMENT — GAIT ASSESSMENTS
INITIATION OF GAIT IMMEDIATELY AFTER GO: 1 - NO HESITANCY
PATH SCORE: 1
PATH SCORE: 1
GAIT SCORE: 8
PATH: 1 - MILD/MODERATE DEVIATION OR USES WALKING AID
TRUNK SCORE: 0
GAIT SCORE: 9
STEP CONTINUITY: 1 - STEPS APPEAR CONTINUOUS
TRUNK SCORE: 1
BALANCE AND GAIT SCORE: 19
TRUNK: 0 - MARKED SWAY OR USES WALKING AID
TRUNK: 1 - NO SWAY BUT FLEXION OF KNEES OR BACK OR SPREADS ARMS WHILE WALKING
TRUNK SCORE: 1
BALANCE AND GAIT SCORE: 18
WALKING STANCE: 0 - HEELS APART
STEP SYMMETRY: 1 - RIGHT AND LEFT STEP LENGTH APPEAR EQUAL
INITIATION OF GAIT IMMEDIATELY AFTER GO: 1 - NO HESITANCY
PATH: 1 - MILD/MODERATE DEVIATION OR USES WALKING AID
WALKING STANCE: 0 - HEELS APART
STEP CONTINUITY: 1 - STEPS APPEAR CONTINUOUS
PATH SCORE: 1
WALKING STANCE: 0 - HEELS APART
GAIT SCORE: 9
STEP SYMMETRY: 1 - RIGHT AND LEFT STEP LENGTH APPEAR EQUAL
STEP CONTINUITY: 1 - STEPS APPEAR CONTINUOUS
INITIATION OF GAIT IMMEDIATELY AFTER GO: 1 - NO HESITANCY
STEP SYMMETRY: 1 - RIGHT AND LEFT STEP LENGTH APPEAR EQUAL
BALANCE AND GAIT SCORE: 20
PATH: 1 - MILD/MODERATE DEVIATION OR USES WALKING AID
TRUNK: 1 - NO SWAY BUT FLEXION OF KNEES OR BACK OR SPREADS ARMS WHILE WALKING

## 2024-01-01 ASSESSMENT — BALANCE ASSESSMENTS
SITTING BALANCE: 1 - STEADY, SAFE
EYES CLOSED AT MAXIMUM POSITION NUDGED: 0 - UNSTEADY
ARISES: 1 - ABLE, USES ARMS TO HELP
ARISES: 1 - ABLE, USES ARMS TO HELP
STANDING BALANCE: 1 - STEADY BUT WIDE STANCE AND USES CANE OR OTHER SUPPORT
NUDGED: 1 - STAGGERS, GRABS, CATCHES SELF
SITTING BALANCE: 1 - STEADY, SAFE
EYES CLOSED AT MAXIMUM POSITION NUDGED: 0 - UNSTEADY
BALANCE SCORE: 11
ATTEMPTS TO ARISE: 2 - ABLE TO RISE, ONE ATTEMPT
NUDGED SCORE: 1
IMMEDIATE STANDING BALANCE FIRST 5 SECONDS: 0 - UNSTEADY (STAGGERS, MOVES FEET, TRUNK SWAY)
ARISING SCORE: 1
TURNING 360 DEGREES STEPS: 1 - CONTINUOUS STEPS
SITTING BALANCE: 1 - STEADY, SAFE
STANDING BALANCE: 1 - STEADY BUT WIDE STANCE AND USES CANE OR OTHER SUPPORT
IMMEDIATE STANDING BALANCE FIRST 5 SECONDS: 1 - STEADY BUT USES WALKER OR OTHER SUPPORT
BALANCE SCORE: 9
ARISING SCORE: 1
TURNING 360 DEGREES STEPS: 1 - CONTINUOUS STEPS
NUDGED SCORE: 1
SITTING DOWN: 1 - USES ARMS OR NOT SMOOTH MOTION
ARISES: 1 - ABLE, USES ARMS TO HELP
ATTEMPTS TO ARISE: 2 - ABLE TO RISE, ONE ATTEMPT
ATTEMPTS TO ARISE: 2 - ABLE TO RISE, ONE ATTEMPT
NUDGED: 1 - STAGGERS, GRABS, CATCHES SELF
STANDING BALANCE: 1 - STEADY BUT WIDE STANCE AND USES CANE OR OTHER SUPPORT
NUDGED: 1 - STAGGERS, GRABS, CATCHES SELF
NUDGED SCORE: 1
TURNING 360 DEGREES STEPS: 1 - CONTINUOUS STEPS
SITTING DOWN: 1 - USES ARMS OR NOT SMOOTH MOTION
ARISING SCORE: 1
SITTING DOWN: 1 - USES ARMS OR NOT SMOOTH MOTION
EYES CLOSED AT MAXIMUM POSITION NUDGED: 1 - STEADY
BALANCE SCORE: 11
IMMEDIATE STANDING BALANCE FIRST 5 SECONDS: 2 - STEADY WITHOUT WALKER OR OTHER SUPPORT

## 2024-01-01 ASSESSMENT — LIFESTYLE VARIABLES
HOW OFTEN DO YOU HAVE A DRINK CONTAINING ALCOHOL: NEVER
EVER HAD A DRINK FIRST THING IN THE MORNING TO STEADY YOUR NERVES TO GET RID OF A HANGOVER: NO
HOW OFTEN DO YOU HAVE A DRINK CONTAINING ALCOHOL: NEVER
AUDIT-C TOTAL SCORE: 0
HAVE PEOPLE ANNOYED YOU BY CRITICIZING YOUR DRINKING: NO
AUDIT-C TOTAL SCORE: 0
AUDIT-C TOTAL SCORE: -1
EVER HAD A DRINK FIRST THING IN THE MORNING TO STEADY YOUR NERVES TO GET RID OF A HANGOVER: NO
HOW MANY STANDARD DRINKS CONTAINING ALCOHOL DO YOU HAVE ON A TYPICAL DAY: PATIENT DOES NOT DRINK
HAVE YOU EVER FELT YOU SHOULD CUT DOWN ON YOUR DRINKING: NO
HOW OFTEN DO YOU HAVE A DRINK CONTAINING ALCOHOL: NEVER
EVER FELT BAD OR GUILTY ABOUT YOUR DRINKING: NO
HAVE YOU EVER FELT YOU SHOULD CUT DOWN ON YOUR DRINKING: NO
AUDIT-C TOTAL SCORE: -1
EVER FELT BAD OR GUILTY ABOUT YOUR DRINKING: NO
HOW OFTEN DO YOU HAVE A DRINK CONTAINING ALCOHOL: PATIENT UNABLE TO ANSWER
AUDIT-C TOTAL SCORE: 0
HAVE YOU EVER FELT YOU SHOULD CUT DOWN ON YOUR DRINKING: NO
HOW MANY STANDARD DRINKS CONTAINING ALCOHOL DO YOU HAVE ON A TYPICAL DAY: PATIENT DOES NOT DRINK
TOTAL SCORE: 0
SKIP TO QUESTIONS 9-10: 1
SKIP TO QUESTIONS 9-10: 1
HOW MANY STANDARD DRINKS CONTAINING ALCOHOL DO YOU HAVE ON A TYPICAL DAY: PATIENT DOES NOT DRINK
HOW OFTEN DO YOU HAVE 6 OR MORE DRINKS ON ONE OCCASION: NEVER
SUBSTANCE_ABUSE_PAST_12_MONTHS: NO
AUDIT-C TOTAL SCORE: 0
HOW OFTEN DO YOU HAVE A DRINK CONTAINING ALCOHOL: NEVER
SKIP TO QUESTIONS 9-10: 1
AUDIT-C TOTAL SCORE: 0
HAVE PEOPLE ANNOYED YOU BY CRITICIZING YOUR DRINKING: NO
HOW MANY STANDARD DRINKS CONTAINING ALCOHOL DO YOU HAVE ON A TYPICAL DAY: PATIENT DOES NOT DRINK
HAVE PEOPLE ANNOYED YOU BY CRITICIZING YOUR DRINKING: NO
EVER HAD A DRINK FIRST THING IN THE MORNING TO STEADY YOUR NERVES TO GET RID OF A HANGOVER: NO
HOW OFTEN DO YOU HAVE 6 OR MORE DRINKS ON ONE OCCASION: PATIENT UNABLE TO ANSWER
HOW MANY STANDARD DRINKS CONTAINING ALCOHOL DO YOU HAVE ON A TYPICAL DAY: PATIENT UNABLE TO ANSWER
HOW OFTEN DO YOU HAVE 6 OR MORE DRINKS ON ONE OCCASION: NEVER
AUDIT-C TOTAL SCORE: 0
AUDIT-C TOTAL SCORE: 0
SKIP TO QUESTIONS 9-10: 1
HOW OFTEN DO YOU HAVE 6 OR MORE DRINKS ON ONE OCCASION: NEVER
EVER FELT BAD OR GUILTY ABOUT YOUR DRINKING: NO
HOW OFTEN DO YOU HAVE 6 OR MORE DRINKS ON ONE OCCASION: NEVER
PRESCIPTION_ABUSE_PAST_12_MONTHS: NO
AUDIT-C TOTAL SCORE: 0
TOTAL SCORE: 0
SKIP TO QUESTIONS 9-10: 1
PRESCIPTION_ABUSE_PAST_12_MONTHS: NO
SUBSTANCE_ABUSE_PAST_12_MONTHS: NO
SKIP TO QUESTIONS 9-10: 0
AUDIT-C TOTAL SCORE: 0

## 2024-01-01 ASSESSMENT — PAIN DESCRIPTION - DESCRIPTORS
DESCRIPTORS: ACHING

## 2024-01-01 ASSESSMENT — COLUMBIA-SUICIDE SEVERITY RATING SCALE - C-SSRS
1. IN THE PAST MONTH, HAVE YOU WISHED YOU WERE DEAD OR WISHED YOU COULD GO TO SLEEP AND NOT WAKE UP?: NO
2. HAVE YOU ACTUALLY HAD ANY THOUGHTS OF KILLING YOURSELF?: NO
2. HAVE YOU ACTUALLY HAD ANY THOUGHTS OF KILLING YOURSELF?: NO
6. HAVE YOU EVER DONE ANYTHING, STARTED TO DO ANYTHING, OR PREPARED TO DO ANYTHING TO END YOUR LIFE?: NO
2. HAVE YOU ACTUALLY HAD ANY THOUGHTS OF KILLING YOURSELF?: NO
2. HAVE YOU ACTUALLY HAD ANY THOUGHTS OF KILLING YOURSELF?: NO
6. HAVE YOU EVER DONE ANYTHING, STARTED TO DO ANYTHING, OR PREPARED TO DO ANYTHING TO END YOUR LIFE?: NO
6. HAVE YOU EVER DONE ANYTHING, STARTED TO DO ANYTHING, OR PREPARED TO DO ANYTHING TO END YOUR LIFE?: NO
1. IN THE PAST MONTH, HAVE YOU WISHED YOU WERE DEAD OR WISHED YOU COULD GO TO SLEEP AND NOT WAKE UP?: NO
1. IN THE PAST MONTH, HAVE YOU WISHED YOU WERE DEAD OR WISHED YOU COULD GO TO SLEEP AND NOT WAKE UP?: NO
6. HAVE YOU EVER DONE ANYTHING, STARTED TO DO ANYTHING, OR PREPARED TO DO ANYTHING TO END YOUR LIFE?: NO
6. HAVE YOU EVER DONE ANYTHING, STARTED TO DO ANYTHING, OR PREPARED TO DO ANYTHING TO END YOUR LIFE?: NO
1. IN THE PAST MONTH, HAVE YOU WISHED YOU WERE DEAD OR WISHED YOU COULD GO TO SLEEP AND NOT WAKE UP?: NO
1. IN THE PAST MONTH, HAVE YOU WISHED YOU WERE DEAD OR WISHED YOU COULD GO TO SLEEP AND NOT WAKE UP?: NO
2. HAVE YOU ACTUALLY HAD ANY THOUGHTS OF KILLING YOURSELF?: NO

## 2024-01-01 ASSESSMENT — PATIENT HEALTH QUESTIONNAIRE - PHQ9
SUM OF ALL RESPONSES TO PHQ9 QUESTIONS 1 & 2: 0
2. FEELING DOWN, DEPRESSED OR HOPELESS: NOT AT ALL
1. LITTLE INTEREST OR PLEASURE IN DOING THINGS: NOT AT ALL
2. FEELING DOWN, DEPRESSED OR HOPELESS: NOT AT ALL
SUM OF ALL RESPONSES TO PHQ9 QUESTIONS 1 & 2: 0
2. FEELING DOWN, DEPRESSED OR HOPELESS: NOT AT ALL
1. LITTLE INTEREST OR PLEASURE IN DOING THINGS: NOT AT ALL
1. LITTLE INTEREST OR PLEASURE IN DOING THINGS: NOT AT ALL
SUM OF ALL RESPONSES TO PHQ9 QUESTIONS 1 & 2: 0
SUM OF ALL RESPONSES TO PHQ9 QUESTIONS 1 & 2: 0
2. FEELING DOWN, DEPRESSED OR HOPELESS: NOT AT ALL
1. LITTLE INTEREST OR PLEASURE IN DOING THINGS: NOT AT ALL
2. FEELING DOWN, DEPRESSED OR HOPELESS: NOT AT ALL
SUM OF ALL RESPONSES TO PHQ9 QUESTIONS 1 & 2: 0
1. LITTLE INTEREST OR PLEASURE IN DOING THINGS: NOT AT ALL

## 2024-01-01 ASSESSMENT — PAIN SCALES - WONG BAKER
WONGBAKER_NUMERICALRESPONSE: NO HURT
WONGBAKER_NUMERICALRESPONSE: HURTS LITTLE BIT
WONGBAKER_NUMERICALRESPONSE: HURTS LITTLE MORE
WONGBAKER_NUMERICALRESPONSE: NO HURT
WONGBAKER_NUMERICALRESPONSE: NO HURT
WONGBAKER_NUMERICALRESPONSE: HURTS WHOLE LOT
WONGBAKER_NUMERICALRESPONSE: NO HURT

## 2024-01-01 ASSESSMENT — PAIN DESCRIPTION - LOCATION
LOCATION: HEAD
LOCATION: INCISION

## 2024-01-01 ASSESSMENT — PAIN SCALES - PAIN ASSESSMENT IN ADVANCED DEMENTIA (PAINAD)
TOTALSCORE: 0
TOTALSCORE: 1
BODYLANGUAGE: 0
NEGVOCALIZATION: 1 - OCCASIONAL MOAN OR GROAN. LOW-LEVEL SPEECH WITH A NEGATIVE OR DISAPPROVING QUALITY.
BODYLANGUAGE: 0 - RELAXED.
CONSOLABILITY: 0 - NO NEED TO CONSOLE.
CONSOLABILITY: NO NEED TO CONSOLE
FACIALEXPRESSION: 0 - SMILING OR INEXPRESSIVE.
FACIALEXPRESSION: SMILING OR INEXPRESSIVE
FACIALEXPRESSION: 0
BREATHING: NORMAL
BREATHING: 0
NEGVOCALIZATION: 1
BODYLANGUAGE: RELAXED
CONSOLABILITY: 0

## 2024-01-01 ASSESSMENT — PAIN DESCRIPTION - ORIENTATION: ORIENTATION: RIGHT

## 2024-01-03 NOTE — PROGRESS NOTES
Very weak and has sob, 3xweeks and swollen penis.  Had kidney transport in march and pacemaker done in October    Subjective   Patient ID: Reynaldo Francisco is a 68 y.o. male who presents for very weak (Has sob, 3xweeks) and Groin Swelling.    HPI     Presents for follow-up.  Over the last few weeks has been feeling more short of breath.  Has been working to increase his fluid intake after labs done through his renal transplant team showed a slightly elevated creatinine.  Has noted some lower extremity edema.    Also notes swelling around his penis and foreskin and has been more difficult to urinate with pulling back of the foreskin.    Review of Systems    8 point review of systems is otherwise negative unless mentioned on HPI      Objective   /69   Wt 68.9 kg (152 lb)   BMI 23.11 kg/m²     Physical Exam    General: No acute distress  HEENT: EOMI  CV: Regular rate and rhythm, normal S1 and S2, no murmurs  Pulm: Clear to auscultation bilaterally, no wheezings, rales or rhonchi  Abd: Nondistended  MSK: 5/5 strength in all extremities  Skin: 2+ lower extremity edema. Penis swollen and slightly difficulty to move back foreskin. No erythema or drainage   Lymphatic: No lymphadenopathy      Assessment/Plan       #Shortness of breath, lower extremity edema, penile swelling  -Also had associated weight gain in the setting of increasing water intake over the last few weeks.  -Reviewed recent lab work done through [facility]  -Order proBNP as well as chest x-ray.  Prior imaging did show known small pleural effusion  -Has been referred to cardiac rehab after pacemaker placement but due to holidays has not started to go yet    #Possible balanitis  -Start clotrimazole cream    #Hx insulinoma with mets to liver s/p chemo, pancreatic surgery & liver transplant 2018     #Type A aortic dissection s/p repair 2020  #h/o complete heart block (2/2 coreg)  #Renovascular HTN  #HLD  #Aortic regurgitation  -Follows with cardiology Dr. Barber,  continue amlodipine, coreg, baby asa     #Hx ESRD on HD now s/p kidney transplant & off HD: follows with transplant team at [facility], managing immunosuppressants      #Anemia: follows with hematology Dr. Mina     #HM: Last colonoscopy in 2019, next due in 5 years. Received both pna shots (last 2/23), Shingrix, flu shot. Vaccinated against COVID-19 & boosters. Tetanus ~2011, will double check records to make sure this is accurate.     RTC 1-2 months    This note was dictated by speech recognition. Minor errors in transcription may be present.

## 2024-01-25 NOTE — PROGRESS NOTES
Subjective   Patient ID: Reynaldo Francisco is a 68 y.o. male who presents for Hospital Follow-up (Follow up ([place])).    [facility]   Hospital fu.  Accompanied by his wife.    He was hospitalized recently at [facility] for volume overload, decompensated systolic HF. He was supposed to undergo kidney biopsy, but this was not performed 2/2 thrombocytopenia and elevated INR.    During the hospitalization, echo showed EF of 39%. Cardiac stress test did not show any evidence of ischemia, per patient. Patient improved with diuresis. He was discharged 1 week ago on 1/18/24. Amlodipine and carvedilol were stopped in the hospital, he remains off of these meds. He was started on furosemide 40mg the he is taking most days. He is weighing himself daily.    He feels much better currently, SoB has improved. Edema has improved. Most recent creatinine improved from 2.7-->2.3.      Review of Systems  12-point ROS reviewed and was negative unless otherwise noted in [facility].    Objective   /70   Wt 59 kg (130 lb)   BMI 19.77 kg/m²     Physical Exam  GEN: conversant, NAD  HEENT: PERRL, EOMI, MMM  NECK: supple  CV: S1, S2, RRR  PULM: CTAB  ABD: soft, NT, ND  NEURO: no new gross focal deficits  EXT: no sig LE edema  PSYCH: appropriate affect    Assessment/Plan     Hospital discharge follow-up. Available hospital records reviewed. Inpatient and outpatient medications reconciled.    #HFrEF: recent hospitalization with decompensated systolic HF that improved with diuresis. He is weighing himself daily, taking furosemide most days. He has upcoming appointment with Dr. Barber his cardiologist. Recent creatinine improved from 2.7-->2.3, labs are being checked weekly by [facility] transplant team.    #Sick Sinus syndrome - Pacemaker placed 10/2023. Continue following with cardiology.     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 Mina     #HM: Last colonoscopy in 2019, next due in 5 years. Received pneumonia vaccines, Shingrix, flu shot. Advised RSV vaccine. Vaccinated against COVID-19 & boosters. Advised Tdap.     RTC 1-2 mo

## 2024-02-07 PROBLEM — Z86.79 HISTORY OF HYPERTENSION: Status: ACTIVE | Noted: 2024-01-01

## 2024-02-07 PROBLEM — R19.7 DIARRHEA: Status: ACTIVE | Noted: 2024-01-01

## 2024-02-07 PROBLEM — I71.21 ASCENDING AORTIC ANEURYSM (CMS-HCC): Status: RESOLVED | Noted: 2023-01-01 | Resolved: 2024-01-01

## 2024-02-07 PROBLEM — Z85.07 HISTORY OF MALIGNANT NEOPLASM OF PANCREAS: Status: ACTIVE | Noted: 2024-01-01

## 2024-02-07 PROBLEM — I50.21 ACUTE SYSTOLIC (CONGESTIVE) HEART FAILURE (MULTI): Status: ACTIVE | Noted: 2024-01-01

## 2024-02-07 PROBLEM — Z86.79 HISTORY OF HYPERTENSION: Status: RESOLVED | Noted: 2024-01-01 | Resolved: 2024-01-01

## 2024-02-07 PROBLEM — N48.1 BALANITIS: Status: ACTIVE | Noted: 2024-01-01

## 2024-02-07 PROBLEM — I87.1: Status: ACTIVE | Noted: 2022-03-28

## 2024-02-07 PROBLEM — R07.89 CHEST HEAVINESS: Status: RESOLVED | Noted: 2022-09-27 | Resolved: 2024-01-01

## 2024-02-07 PROBLEM — I42.0 DILATED CARDIOMYOPATHY (MULTI): Status: ACTIVE | Noted: 2023-05-10

## 2024-02-07 PROBLEM — I71.00 DISSECTION OF AORTA, UNSPECIFIED PORTION OF AORTA (MULTI): Status: RESOLVED | Noted: 2023-05-10 | Resolved: 2024-01-01

## 2024-02-07 PROBLEM — Z86.79 H/O REPAIR OF DISSECTING ANEURYSM OF ASCENDING THORACIC AORTA: Status: RESOLVED | Noted: 2023-01-01 | Resolved: 2024-01-01

## 2024-02-07 PROBLEM — R06.02 SHORTNESS OF BREATH: Status: ACTIVE | Noted: 2024-01-01

## 2024-02-07 PROBLEM — C79.9 METASTATIC MALIGNANT NEOPLASM, UNSPECIFIED SITE (MULTI): Status: RESOLVED | Noted: 2023-05-10 | Resolved: 2024-01-01

## 2024-02-07 PROBLEM — Z86.79: Status: ACTIVE | Noted: 2024-01-01

## 2024-02-07 PROBLEM — N18.5 STAGE 5 CHRONIC KIDNEY DISEASE (MULTI): Status: RESOLVED | Noted: 2019-06-04 | Resolved: 2024-01-01

## 2024-02-07 PROBLEM — Z98.890 H/O REPAIR OF DISSECTING ANEURYSM OF ASCENDING THORACIC AORTA: Status: RESOLVED | Noted: 2023-01-01 | Resolved: 2024-01-01

## 2024-02-07 PROBLEM — E11.69 TYPE 2 DIABETES MELLITUS WITH OTHER SPECIFIED COMPLICATION, UNSPECIFIED WHETHER LONG TERM INSULIN USE (MULTI): Status: RESOLVED | Noted: 2023-05-10 | Resolved: 2024-01-01

## 2024-02-07 PROBLEM — I77.0 ARTERIOVENOUS FISTULA (CMS-HCC): Status: ACTIVE | Noted: 2024-01-01

## 2024-02-07 PROBLEM — E11.9 DIABETES MELLITUS, TYPE 2 (MULTI): Status: ACTIVE | Noted: 2023-05-10

## 2024-02-07 PROBLEM — Z86.79: Status: RESOLVED | Noted: 2024-01-01 | Resolved: 2024-01-01

## 2024-02-07 NOTE — PROGRESS NOTES
Subjective   Reynaldo Francisco is a 68 y.o. male.    Chief Complaint:  Follow-up congestive heart failure.    HPI:    He developed increasing shortness of breath dyspnea with exertion and abdominal swelling.  He was seen at [facility] where he had his transplant.  He is found to be in congestive heart failure.  A PET scan showed an ejection fraction of 24% at rest which increased to 40% with stress.  An echo study showed an ejection fraction of 40%.  He had a right pleural effusion.  He also had some ascites.  He did not undergo thoracentesis.  He is diuretics were restarted.  He was treated with IV Lasix.  He is here for follow-up.  He feels better after treatment.    Permanent pacer insertion was performed on October 23, 2023.  The patient had a single-chamber VVI device placed.     He was hospitalized on March 28, 2023 when he presented with a syncopal event. He was found to be in complete heart block. His beta-blockers were stopped and his heart block resolved. He did require a temporary pacemaker. At the time of his admission he was taken carvedilol 100 mg twice daily.     The patient suffered a type a aortic dissection in 2020 in [place]. He had emergent surgery.     Past medical history significant for a liver transplant. He developed end-stage renal disease on dialysis after his liver transplant.      Allergies  Medication    · No Known Drug Allergies   Recorded By: Ruperto Bustos; 3/17/2022 8:47:23 AM  NonMedication    · IV Contrast Dye   Allergy; Edema; Swelling; Recorded By: Ruperto Bustos; 3/17/2022 8:47:23 AM     Family History  Mother    · Family history of diabetes mellitus (V18.0) (Z83.3)   · Family history of hypertension (V17.49) (Z82.49)     Social History  Problems    · No alcohol use   · No illicit drug use   · Non-smoker (V49.89) (Z78.9)        Review of Systems   Cardiovascular:  Positive for shortness of breath.      Visit Vitals  /70 (BP Location: Left arm, Patient Position: Sitting, BP Cuff Size:  "Adult)   Pulse 79   Ht 1.702 m (5' 7\")   Wt 61.7 kg (136 lb)   SpO2 98%   BMI 21.30 kg/m²   Smoking Status Never   BSA 1.71 m²        Objective     Constitutional:       Appearance: Not in distress.   Neck:      Vascular: JVD normal.   Pulmonary:      Breath sounds: Examination of the right-lower field reveals decreased breath sounds. Decreased breath sounds present.   Cardiovascular:      Normal rate. Regular rhythm. S1 with normal intensity. S2 with normal intensity.       Murmurs: There is no murmur.      No gallop.    Pulses:     Intact distal pulses.   Edema:     Peripheral edema absent.   Abdominal:      General: Bowel sounds are normal.   Neurological:      Mental Status: Alert and oriented to person, place and time.         Lab Review:   Lab Results   Component Value Date     02/05/2024    K 4.6 02/05/2024     (H) 02/05/2024    CO2 18 (L) 02/05/2024    BUN 49 (H) 02/05/2024    CREATININE 2.20 (H) 02/05/2024    GLUCOSE 104 (H) 02/05/2024    CALCIUM 9.3 02/05/2024       Assessment:    1.  Systolic congestive heart failure.  Left ventricular ejection fraction around 40% by echo study.  Resting PET scan showed a 24% ejection fraction.  Had evidence of congestive heart failure with right pleural effusion.  He is resting comfortably.  Oxygen saturation is 98%.  Does have some dullness at the right base consistent with a right pleural effusion.  He has been taking his Lasix on an as-needed basis.  We want to make sure he is taking Lasix 40 mg daily as he is likely to go back on the heart failure.  We are going to repeat his echo study in 2 months.  If he still demonstrates significant left ventricular dysfunction and has class III systolic congestive heart failure, we will refer him for a possible CCM optimizer device.    2.  Status post pacer.  Has a single-chamber VVI device.    3.  Type a aortic dissection.    4.  Status post liver transplant.  "

## 2024-02-07 NOTE — PATIENT INSTRUCTIONS
Take one furosemide 40 mg daily    We are going to repeat your echocardiogram in 6 weeks to see if your heart function has improved.    If your heart function has not improved, we are going to upgrade your device to a CCM Optimizer by [employer].

## 2024-02-12 NOTE — TELEPHONE ENCOUNTER
Received a vm from Cardiac Rehab to notify Dr. Barber that pt does not have any qualifying diagnoses for a referral to cardiac rehab.    Referral dx: Acute systolic CHF, Dilated CM, hx repair of type A dissecting aneurysm of the thoracic  aorta.    Please advise. Thanks.

## 2024-02-12 NOTE — PROGRESS NOTES
EMMANUEL HOUSTON is a 67 year old Male        Interval History:    The patient was referred to me for further evaluation and management of multifactorial anemia with a complex medical history. The patient had come for a follow up  on 09/29/2021.      The patient is a 65 year old with a past medical history significant for insulinoma with liver mets s/p resection of the pancreatic mass and liver transplant, kidney failure s/p hemodialysis for 3.5 years and immunosuppressants for antirejection, aortic  dissection, HTN and GERD. The patient has developed anemia secondary to his complex medical history. The patient was diagnosed with an insulinoma in 2008 which had metastasized to the liver. The patient was treated accordingly for progressive liver failure  and 1/3 of the liver was resected and pathology indicated malignancy in 2010. The patient  then under went a liver transplant in 2017 and tolerated well but has been on immunosuppressive and anti rejection medications using Tacrolimus 1 mg PO QD and Mycophenolic  acid 360mg PO BID. While the patient tolerated the transplant well, he then developed kidney failure and was then advised to seek fistula placement and initiate hemodialysis which he has been on for 3.5 years now. In the meantime the patient also had  an aortic dissection and required surgical correcction. During those 3.5 years, the patient has slowly started developing anemia and has required several; blood transfusions. CBC obtained on 09/10/2021 revealed WBC 4.5, HGB 6.6, HCT 21.3, , Neut  3.30, , K 4.3, , BUN 19, CREAT 4.96. The patient was consequently referred to me for further evaluation and management.      At interview on 09/29/2021 the patient was accompanied by his wife and they both narrated his  past medical history. The patient denies any history of fevers, night sweats, unexplained weight loss, shortness of breath, chest pain, constipation, confusion,  nausea, vomiting,  diarrhea, hemoptysis, hematemesis, hematuria and hematochezia. No history of localized or systemic bleeding and infection.      The patient had come for a follow up on 1/11/23 regarding his history of multifactorial anemia secondary to his past medical history of insulinoma with liver mets s/p resection of the pancreatic mass and liver transplant and hemodialysis for kidney failure.  The patient has received packed RBC transfusions and reported derived benefit. he was then placed on  Procrit therapy at 40,000 units in conjunction with hemodialysis. The patient had become COVID-19 positive in 06/2022 and has become transfusion dependent.  In the month of June, the patient has received 6 transfusions when in a month, he only receives 2 a month. The patient is now starting to feel better but did have a longer recovery time, as he is immunocompromised. He is requesting assistance.      The patient and his wife had come for follow-up regarding multifactorial anemia and anemia of chronic kidney disease.  In the past the patient was receiving hemodialysis for last 5 years and was placed on Procrit because of progressive anemia.  In March 2023 the patient received a renal transplant at [facility].  He received hemodialysis for 2 weeks posttransplant and is being followed clinically.  Procrit was discontinued.  The patient does feel weak and tired but has recovered from surgery  he continues to follow-up closely at [facility].     The patient and his wife had come for follow-up regarding history of multifactorial anemia, history of liver and kidney transplant.  The patient is currently not receiving any KIRA or any supplements.  He is on immunosuppressive's and being followed at  [facility].  Hemoglobin ranging around 9 g/dL and creatinine around 1.3 to 1.4 mg/dL.  The patient did develop shingles for which she received acyclovir.  He developed diarrhea after but is now feeling  better.    The patient and his wife had come for follow-up on February 12, 2024 regarding history of multifactorial anemia, history of liver and kidney transplant.  Since last evaluation the patient underwent a pacemaker insertion in October 2023.  The patient was then admitted to [facility] for possible fluid retention.  Further evaluation revealed ejection fraction 39%.  The patient was placed on diuretics.  He does feel weak and tired but able to carry on daily activities.     Past Medical History:   1. Multifactorial anemia   2. Insulinoma with liver mets s/p resection of the pancreatic mass and liver transplant and kidney failure s/p hemodialysis for 3.5 years and immunosuppressants for antirejection  3. Aortic dissection  4. HTN   5. GERD   6.  Pacemaker placement in October 2023.  Ejection fraction down to 39%.  Past Surgical History:   1. Resection of insulinoma   2. Liver transplant   3. Fistula placement for hemodialysis   4. Surgical correction of aortic dissection   5.  Status post kidney transplant on March 5, 2023 at [facility].  Family Medical History:   1. Father with cardiac problems   2. Mother with HLD     Last colonoscopy was in 2014.     Review of Systems:   ·  System Review All Systems:  Negative            Allergies and Intolerances:       Allergies:         NKDA: Active         contrast (specific type unknown): Contrast, Swelling/Edema, Active         Shell Fish: Food, Facial Swelling, Hives/Urticaria, Active       Intolerances:         Milk: Food, Diarrhea, Active     Outpatient Medication Profile:  * Patient Currently Takes Medications as of 09-Aug-2023 11:20 documented in Structured Notes         amLODIPine 10 mg oral tablet : Last Dose Taken:  , 1 tab(s) orally once a day (in the evening), Start Date: 18-Mar-2022         tacrolimus 1 mg oral capsule: Last Dose Taken:  , 2 cap(s) orally once  a day (in the morning)         Aspirin Enteric Coated 81 mg oral delayed  release tablet: Last Dose Taken:   , 1 tab(s) orally once a day (in the morning)         Protonix 40 mg oral delayed release tablet: Last Dose Taken:  , 1 tab(s)  orally once a day         tacrolimus 1 mg oral capsule: Last Dose Taken:  , 1 cap(s) orally once  a day (in the evening)         CellCept 250 mg oral capsule: Last Dose Taken:  , 4 cap(s) orally 2 times  a day         Bactrim 400 mg-80 mg oral tablet: Last Dose Taken:  , 2 tab(s) orally  every 12 hours         carvedilol 6.25 mg oral tablet: Last Dose Taken:  , 1 tab(s) orally 2  times a day         magnesium oxide 400 mg oral tablet: Last Dose Taken:  , 1 tab(s) orally  once a day         Veltassa 8.4 g oral powder for reconstitution: Last Dose Taken:  , One  a day.             Medical History:         Neutropenia: ICD-10: D70.9, Status: Active         Uncontrolled hypertension: ICD-10: I10, Status: Active       Surg History:         History of hemodialysis: ICD-10: Z92.89, Status: Active         History of liver transplant: ICD-10: Z94.4, Status: Active         Metastatic insulinoma: ICD-10: C25.4, Status: Active         Malignant insulinoma: ICD-10: C25.4, Status: Active         History of anemia: ICD-10: Z86.2, Status: Active         History of anemia due to chronic kidney disease: ICD-10:  N18.9, Status: Active       Chronic:         Chest heaviness: ICD-10: R07.89, Status: Active     Family History: No Family History items are recorded  in the problem list.      Social History:   Social Substance History:  ·  Social History denies smoking, alcohol and drug use   ·  Smoking Status never smoker (1)   ·  Tobacco Use denies   ·  Alcohol Use denies   ·  Drug Use denies   ·  Additional History     65 years old    with 2 children   Works as a researcher   No substance hx (1)           Vitals and Measurements:   Vitals: Temp: 36.4  HR: 57  RR: 16  BP: 162/71  SPO2%:   99   Measurements: HT(cm): 170.4  WT(kg): 62.8  BSA: 1.72   BMI:  21.6      Physical  Exam:      Constitutional: Well developed, awake/alert/oriented  x3, no distress, alert and cooperative   Eyes: PERRL, EOMI, clear sclera   ENMT: mucous membranes moist, no apparent injury,  no lesions seen   Head/Neck: Neck supple, no apparent injury, thyroid  without mass or tenderness, No JVD, trachea midline, no bruits   Respiratory/Thorax: Patent airways, CTAB, normal  breath sounds with good chest expansion, thorax symmetric   Cardiovascular: Regular, rate and rhythm, no murmurs,  2+ equal pulses of the extremities, normal S 1and S 2   Gastrointestinal: Nondistended, soft, non-tender,  no rebound tenderness or guarding, no masses palpable, no organomegaly, +BS, no bruits   Genitourinary: No Discharge, vesicles or other abnormalities   Musculoskeletal: ROM intact, no joint swelling, normal  strength   Extremities: normal extremities, no cyanosis edema,  contusions or wounds, no clubbing   Neurological: alert and oriented x3, intact senses,  motor, response and reflexes, normal strength   Breast: No masses, tenderness, no discharge or discoloration   Lymphatic: No significant lymphadenopathy   Psychological: Appropriate mood and behavior   Skin: Warm and dry, no lesions, no rashes         Lab Results:           Assessment and Plan:         1. Multifactorial anemia-     The patient was referred to me for further evaluation and management of multifactorial anemia with a complex medical history. The patient had come for a follow up on 09/29/2021.      The patient has developed anemia secondary to his complex medical history. The patient was diagnosed with an insulinoma which had metastasized to the liver. The patient was treated accordingly for progressive liver failure and 1/3 of the liver was resected  and pathology indicated malignancy. The patient  then under went a liver transplant and tolerated well but has been on immunosuppressive and anti rejection medications using Tacrolimus 1 mg PO QD and Mycophenolic  acid 360mg PO BID. While the patient tolerated  the transplant well, he then developed kidney failure and was then advised to seek fistula placement and initiate hemodialysis which he has been on for 3.5 years now. In the meantime the patient also had an aortic dissection and required surgical resection.  During those 3.5 years, the patient has slowly started developing anemia and has required several; blood transfusions.  Physical exam was within normal limits. I reviewed the lab data with the patient. Repeat CBC and CMP obtained on 09/23/2021 revealed  WBC 4.1, HGB 6.3, HCT 20.3, , Neut 2.95, , K 3.6, CL 98, BUN 28 and CREAT 6. I had a detailed discussion with the patient and explained to the patient the significance of the roles of RBC's , hemoglobin and iron in transporting oxygen throughout  the body. Differential diagnosis of anemia is wide and I explained how his complex history has influenced developing anemia. I explained how CKD and immunosuppressants in particular influence anemia. Generally, it would be the most convenient if we could  coordinate procrit and blood transfusions with  hemodialysis. I will attempt to facilitate this with the patient's other health care teams. In the meantime, I have recommended that the patient receive 2 units of packed RBC's over a 1 hour transfusion  period in conjunction with hemodialysis.      The patient had come for a follow up on 1/11/23.  Physical exam was within normal limits. I reviewed the lab data with the patient. The patient had become COVID-19 positive in 06/2022 and has become transfusion dependent. In the month of June, the  patient has received 6 transfusions when in a normal month, he only receives 2 a month. The patient is now starting to feel better but did have a longer recovery time, as he is immunocompromised. He is requesting assistance.      The patient was advised to hold  iron infusions.  If necessary consider Procrit 40,000 units  subcu q. 2 to 4 weeks to maintain hemoglobin at 10 g/dL until then hold Procrit and iron.     The patient and his wife had come for follow-up on May 10, 2023 regarding history of anemia of multifactorial etiology, ESRD on hemodialysis.  The patient was receiving hemodialysis for last 5 years and had progressive anemia.  He was placed on Procrit  with a very good response.  He underwent a kidney transplant on March 5, 2023 at [facility].  The patient is on immunosuppressive's.  He did receive hemodialysis for 2 more weeks and then discontinued.  Procrit was discontinued as well.   He is being followed by transplant services.  A CBC on May 10, 2023 revealed hemoglobin 8.8 g/dL and creatinine 1.57 mg/dL.  Procrit is held the patient is being followed clinically he will return in 3 months and follow more closely with [facility].  Hold Procrit going forward.     The patient and his wife had come for follow-up of anemia of multifactorial etiology, s/p liver transplant status post kidney transplant patient on immunosuppressive's.  The patient is following closely with [facility].  Creatinine at 1.3  mg/dL hemoglobin 9.3 g/dL.  Follow clinically return in 6 months.  No therapeutic recommendations.    The patient and his wife had come for follow-up on February 12, 2024 regarding history of multifactorial anemia, history of liver and kidney transplant.  Since last evaluation the patient underwent a pacemaker insertion in October 2023.  The patient was then admitted to [facility] for possible fluid retention.  Further evaluation revealed ejection fraction 39%.  The patient was placed on diuretics.  He does feel weak and tired but able to carry on daily activities.  Reviewed lab data with the patient.  Creatinine increased up to 2.2 mg/dL on February 2, 2024, hemoglobin 10.4 g/dL.  I had a detailed discussion with the patient and explained to her that I have elected to  follow clinically.  Follow closely with liver transplant team, nephrology, renal transplant team and cardiology services return in 6 months.        2. Insulinoma with liver mets s/p resection of the pancreatic mass and liver transplant and kidney failure s/p hemodialysis for 3.5 years and immunosuppressants for antirejection  - Followed clinically and monitored closely by nephrology transplant team and hemodialysis team   - Tacrolimus 1 mg PO QD- immunosuppressant   - Mycophenolic acid 250mg PO BID- antirejection     3. Aortic dissection  - S/p surgical repair   - Aspirin 81 mg PO QD      4. HTN   - Carvedilol 6.25mg PO BID   Amlodipine 5 mg p.o. daily     5. GERD   - Pantoprazole 40 mg PO QD

## 2024-02-15 NOTE — TELEPHONE ENCOUNTER
Denied for cardiovascular therapy.  Can you put in for physical therapy.  He is very weak and losing all muscle

## 2024-02-21 NOTE — TELEPHONE ENCOUNTER
Patient's wife calling to report nephrologist decreased lasix to every other day due to increasing Creatinine. Now nephrologist would like patient off lasix- per wife creatinine now 3.32. Unable to locate any results since 2/6/24. Wife concerned d/t patient with 3# weight gain this week.  Next OV 3/26.

## 2024-02-29 PROBLEM — I50.9 HEART FAILURE (MULTI): Status: ACTIVE | Noted: 2024-01-01

## 2024-02-29 NOTE — Clinical Note
Patient Clipped and Prepped: right chest. Prepped with ChloraPrep, a minimum of 3 minute dry time, longer if needed, no pooling noted, patient draped in sterile fashion and duraprep x1.

## 2024-02-29 NOTE — Clinical Note
[facility] Johns catheter left in place. Sheath sutured. Dressing applied. Site WNL  [facility] Johns placed at 70

## 2024-02-29 NOTE — ED PROCEDURE NOTE
Procedure    Performed by: Russ Jon MD  Authorized by: Landry Donohue MD                Respiratory Indications: shortness of breath  Procedure: Cardiac Ultrasound    Findings:   Views: parasternal long, parasternal short, apical four and subxiphoid  The pericardial space was visualized and was NEGATIVE for a significant pericardial effusion.  Activity: Ventricular contractions were visualized.  LV: LV systolic function was DECREASED.  RV: RV size was NORMAL.    Impression:  Cardiac: The focused cardiac ultrasound exam had ABNORMAL findings as specified.  Procedure: Thoracic Ultrasound    Findings:  R Lung Sliding: The RIGHT chest was evaluated and LUNG SLIDING was visualized.  L Lung Sliding: The LEFT chest was evaluated and LUNG SLIDING was visualized.  R Effusion: The RIGHT chest was evaluated and there was NO PLEURAL EFFUSION.  L Effusion: The LEFT chest was evaluated and there was NO PLEURAL EFFUSION.  A-lines: The RIGHT chest was evaluated and there were NO A-LINES visualized  B-lines: The RIGHT chest was evaluated and multiple B-LINES were visualized  R Consolidation: The RIGHT chest was evaluated and there was NO RIGHT CONSOLIDATION.  L Consolidation: The LEFT chest was evaluated and there was NO LEFT CONSOLIDATION.    Impression:  Thorax: The focused thoracic ultrasound exam had ABNORMAL findings as specified.        Point-of-care ultrasound shows severely reduced ejection fraction without any evidence of pericardial effusion or right heart strain.  There is B-line positivity present in 2 lung fields bilaterally.  The IVC is plethoric with minimal respiratory variation    Discussed with Dr. Donohue who agrees.    Isaak Jon MD  Emergency Medicine, PGY3           Russ Jon MD  Resident  02/29/24 [id number]

## 2024-02-29 NOTE — ED PROVIDER NOTES
HPI   Chief Complaint   Patient presents with    Shortness of Breath     Pt arrivs private auto from home. States increasing SOB over past few days. Hx of CHF, liver transplant, kidney transplant. States took 40mg of lasix yesterday with minimal help. Sent in by dr Barber. Idania denies CP.        HPI  The patient is a 68-year-old male with past medical history of dilated cardiomyopathy resulting in congestive heart failure with reduced ejection fraction (last EF 29% in mid January, status post pacemaker placement), liver transplant, kidney transplant (on tacrolimus and mycophenolate) who presents emergency department chief complaint of shortness of breath.  Has had several days of escalating shortness of breath.  He typically struggles to sleep on his back however has started to struggle sleeping on his side lying flat as well.  He has woken up in the middle of the night short of breath.  Denies any chest pain, palpitations or syncope.  No fevers or cough.  Follows with Dr. Melgoza of cardiology.    PMH:as above.  Meds:reviewed in EMR.  PSH:reviewed in EMR.  allergies:reviewed in EMR.  social:Denies X3.  Family History: non-contributory to acute presentation.    A full 10 point Review of Systems was reviewed with the patient and is negative unless stated in the HPI.                  [place] Coma Scale Score: 15                     Patient History   Past Medical History:   Diagnosis Date    Arteriovenous fistula, acquired (CMS/HCC) 06/17/2022    AV fistula    Lung nodule     Nutritional anemia, unspecified     Anemia, deficiency    Personal history of malignant neoplasm of pancreas 06/17/2022    History of malignant neoplasm of pancreas     Past Surgical History:   Procedure Laterality Date    CARDIAC ELECTROPHYSIOLOGY PROCEDURE Right 10/23/2023    Procedure: PPM Single Ventricle Implant;  Surgeon: Cosme Pina MD;  Location: [facility] Cardiac Cath Lab;  Service: Electrophysiology;  Laterality: Right;  medtronic  notified    LIVER TRANSPLANTATION      OTHER SURGICAL HISTORY  03/17/2022    Pancreatic surgery    OTHER SURGICAL HISTORY  06/17/2022    Liver surgery    OTHER SURGICAL HISTORY  06/17/2022    Liver transplantation    US KIDNEY TRANSPLANT       Family History   Problem Relation Name Age of Onset    Diabetes Mother      Hypertension Mother       Social History     Tobacco Use    Smoking status: Never    Smokeless tobacco: Never   Vaping Use    Vaping Use: Never used   Substance Use Topics    Alcohol use: Never    Drug use: Not Currently       Physical Exam   ED Triage Vitals   Temperature Heart Rate Respirations BP   02/29/24 1538 02/29/24 1538 02/29/24 1538 02/29/24 1615   36.7 °C (98.1 °F) 66 20 140/75      Pulse Ox Temp src Heart Rate Source Patient Position   02/29/24 1538 -- -- --   99 %         BP Location FiO2 (%)     -- --             Physical Exam    Physical Exam:    Appearance: Alert, oriented , cooperative,  in no acute distress. Well nourished & well hydrated.    Skin: Intact,  dry skin, no lesions, rash, petechiae or purpura.     Eyes: PERRLA, EOMs intact,  No scleral injection. No scleral icterus.     ENT: Hearing grossly intact. External auditory canals patent. Nares patent, mucus membranes moist. Dentition without lesions.     Neck: There is jugular venous distention to the level of the angle of the mandible.Supple, without meningismus. Trachea at midline.     Pulmonary: Mildly tachypneic with inspiratory crackles heard from the level of the mid lung fields down bilaterally.  No rhonchi or wheezes.  No stridor.    Cardiac: Normal S1, S2 without murmur, rub, gallop or extrasystole. No JVD, Carotids without bruits.    Abdomen: Soft, nontender.  No palpable organomegaly.  No rebound or guarding.      Genitourinary: Exam deferred.    Musculoskeletal:  no edema, or deformity. Pulses full and equal. No cyanosis or clubbing.    Neurological:  Moving all 4 extremities equally, no focal findings  identified    Psychiatric: Appropriate mood and affect.     ED Course & MDM   ED Course as of 02/29/24 1834   Thu Feb 29, 2024   1624 68-year-old male history of congestive heart failure status post liver transplant kidney transplant took Lasix today has been feeling short of breath and sent into the emergency department for evaluation by his cardiologist Dr. Kaufman.  Patient denies any active chest pain.  Initial EKG showed ST depressions in the anterior lateral leads with no reciprocal elevation posterior EKG showed elevations in the posterior segments thus STEMI code was activated.  Case was discussed with  canceled STEMI activation.  Patient will be given aspirin differential diagnosis includes congestive heart failure ACS NSTEMI.  Patient will have workup including EKG CBC CMP troponin chest x-ray and BNP peptide have been ordered along with viral swabs [ZS]      ED Course User Index  [ZS] Landry Dnoohue MD         Diagnoses as of 02/29/24 1834   NSTEMI, initial episode of care (CMS/HCC)   Acute on chronic systolic congestive heart failure (CMS/HCC)   Initial EKG showed ventricular paced complexes and bigeminy pattern at a rate of 67 bpm. There is right axis deviation present. There are ST depressions most pronounced in V3 through V6.  When compared to EKG done October/24/2024, sinus rhythm first-degree AV block has been replaced by ventricularly paced rhythm with above ST changes.    Repeat EKG With posterior lead placement:Showed ventricular paced rhythm with frequent PVCs.  There is left axis deviation present.  Questionable ST elevation in P1 through P3.  No other ST segment or T wave changes concerning for ischemia.    Medical Decision Making  Patient is a 68-year-old male with past medical history of dilated cardiomyopathy with congestive heart failure status post pacemaker placement, liver transplant and kidney transplant (on tacrolimus and mycophenolate) who presented to the ED with a few  days of worsening dyspnea, orthopnea and paroxysmal nocturnal dyspnea.  She was hemodynamically stable and afebrile on arrival.  Did have JVD to the level of the angle of the mandible with some crackles bilaterally at the lung bases.  His initial EKG showed very deep ST depressions in the lateral precordial leads that were concerning for potential posterior STEMI (see above for formal EKG interpretation) and repeat EKG with posterior lead placement demonstrated borderline ST elevations in posterior leads I through III that would be concerning for posterior STEMI and so STEMI was activated at this time and cardiology was contacted. Given full dose aspirin at this time. discussed the case with cardiologist Dr. Pulliam who said that with a borderline EKG and lack of chest pain we could cancel the STEMI.  Basic labs and cardiac labs were sent and point-of-care ultrasound and chest x-ray were obtained as well.      Point-of-care ultrasound revealed B-lines bilaterally and very poor systolic function however no pericardial effusion or right heart strain.  (See procedure note for further details).  His creatinine found to be in the mid 3.0s which is a full point up from his most recent creatinine in his chart.  Patient's initial troponin elevated in the 600s and repeat was in the 800s.  Patient was started on low intensity heparin.  BNP also elevated with chest x-ray showing a mild to moderate pulmonary edema burden.  The underlying cause of the patient's presentation appears to be NSTEMI with acute heart failure exacerbation.  Patient given 40 of IV Lasix. due to the fact that he has a complex transplant history and is on multiple antirejection medications that require careful titration patient was accepted at the [facility] CICU in transfer.     Discussed with Dr. Donohue who agrees.      Isaak Jon MD  Emergency Medicine, PGY3    Procedure  Procedures     Russ Jon MD  Resident  02/29/24 9997

## 2024-02-29 NOTE — Clinical Note
The DIFIBULATOR, CRTD COBALT XT HF QUAD MRI IS4 DF4 - [id number] device was inserted. The leads were placed into the connector and visually verified to be in correct position.

## 2024-02-29 NOTE — Clinical Note
Diagnostic wire removed.  Good nutrition is important when healing from an illness, injury, or surgery. Follow any nutrition recommendations given to you during your hospital stay.  If you were given an oral nutrition supplement while in the hospital, continue to take this supplement at home. You can take it with meals, in-between meals, and/or before bedtime. These supplements can be purchased at most local grocery stores, pharmacies, and chain super-stores.  If you have any questions about your diet or nutrition, call the hospital and ask for the dietitian.     Diabetic diet--  5 carbs per meal

## 2024-02-29 NOTE — TELEPHONE ENCOUNTER
Late entry: Called pt's wife to discuss pt's symptoms. Per Abdiel, pt with weight increase approx 9 lbs since discharge in January, abdomen is distended and hard to touch, increased SOB w/minimal exertion and pt cannot lay flat.    Per Abdiel, she spoke with our on-call doctor last night and was instructed for pt to take Lasix 40mg.    Lasix was to be stopped for 2 weeks at last ov with Dr. Barber.     BP's: 137/75 HR 80, 121/77 HR 78, 140/75 HR 78. Pulse ox 96%.    Recent labs CMP 2/20/24: B/C 77, 3.32, K+5.1    I reviewed message and labs with Dr. Barber.    Per Dr. Barber, called pt's wife and informed her pt should go to the ER for evaluation. Explained to Abdiel that we are unable to manage outpatient because of pt's current kidney function. Informed Abdiel the importance of going to the ER due to pt's symptoms which indicate exacerbation of CHF. Reviewed red flag symptoms and need to go to the ER and potential consequences of not receiving treatment. Abdiel verbalizes understanding of all instructions and information provided. Abdiel will take pt to the ER. All questions and concerns addressed at this time.

## 2024-02-29 NOTE — PROGRESS NOTES
Pharmacy Medication History Review    Reynaldo Francisco is a 68 y.o. male admitted for No Principal Problem: There is no principal problem currently on the Problem List. Please update the Problem List and refresh.. Pharmacy reviewed the patient's [id number] medications and allergies for accuracy.    The list below reflectives the updated PTA list. Please review each medication in order reconciliation for additional clarification and justification.  Prior to Admission medications    Medication Sig Start Date End Date Taking? Authorizing Provider   aspirin 81 mg EC tablet Take 1 tablet (81 mg) by mouth once daily in the morning.    Historical Provider, MD   furosemide (Lasix) 40 mg tablet Take 1 tablet (40 mg) by mouth once daily.  Patient taking differently: Take 1 tablet (40 mg) by mouth every other day. 1/25/24 4/24/24  John Manrique MD   mycophenolate (Myfortic) 180 mg EC tablet Take 1 tablet (180 mg) by mouth 2 times a day. 12/20/23   Historical Provider, MD   Protonix 40 mg EC tablet Take 1 tablet (40 mg) by mouth once daily in the morning. 3/21/18   Historical Provider, MD   sodium bicarbonate 650 mg tablet Take 1 tablet (650 mg) by mouth once daily. In the afternoon    Historical Provider, MD   sulfamethoxazole-trimethoprim (Bactrim) 400-80 mg tablet Take 1 tablet by mouth once a day on Monday, Wednesday, and Friday.    Historical Provider, MD   tacrolimus (Prograf) 1 mg capsule Take 2 capsules (2 mg) by mouth once daily in the evening.    Historical Provider, MD   tacrolimus (Prograf) 1 mg capsule Take 3 capsules (3 mg) by mouth once daily in the morning.    Historical Provider, MD        The list below reflectives the updated allergy list. Please review each documented allergy for additional clarification and justification.  Allergies  Reviewed by Wilman Garnica LPN on 2/29/2024        Severity Reactions Comments    Milk Medium Diarrhea, GI Upset (Skim milk) diarrhea    Shellfish Derived Medium  Hives, Itching     Iodinated Contrast Media Low Nausea/vomiting             Below are additional concerns with the patient's PTA list.      Ruth Rey CPhT

## 2024-02-29 NOTE — Clinical Note
Patient Clipped and Prepped: right chest. Prepped with ChloraPrep, a minimum of 3 minute dry time, longer if needed, no pooling noted, patient draped in sterile fashion and Betadine and draped in sterile fashion.

## 2024-02-29 NOTE — Clinical Note
Inserted  [facility]          Initial 60 Minute Nutrition Visit    Date: 2023   Patient Name: Kelly Waldrop  : 1949   MRN: [id number]   Referring Provider: Hernan Hoover MD    Reason for Visit: Malnutrition, kidney stones  Visit Format: In person    Nutrition Assessment       Social History:   Social History     Socioeconomic History    Marital status:    Tobacco Use    Smoking status: Never    Smokeless tobacco: Never   Substance and Sexual Activity    Alcohol use: No    Drug use: No    Sexual activity: Defer     Active Problem List:   Patient Active Problem List    Diagnosis     Medicare annual wellness visit, subsequent [Z00.00]     At moderate risk for fall [Z91.81]     Renal stone [N20.0]     Impaired fasting glucose [R73.01]     Hyperlipidemia LDL goal <100 [E78.5]     Osteoporosis of vertebra [M81.0]     Premature heartbeats [I49.40]     Dermatitis [L30.9]     Anxiety disorder [F41.9]     Urinary tract infectious disease [N39.0]     Irritable bowel syndrome without diarrhea [K58.9]       Current Medications:   Current Outpatient Medications:     B Complex Vitamins (Vitamin B Complex) tablet, Take 0.5 tablets by mouth 3 (Three) Times a Day., Disp: , Rfl:     busPIRone (BUSPAR) 15 MG tablet, Take 1 tablet by mouth 2 (Two) Times a Day. Pt states she takes 1/3 of a 15 mg tab bid, Disp: , Rfl:     CALCIUM CITRATE PO, Take 1 tablet by mouth Daily., Disp: , Rfl:     Cholecalciferol (Vitamin D3) 25 MCG (1000 UT) capsule, Take 1 capsule by mouth Daily., Disp: , Rfl:     estradiol (ESTRACE) 0.1 MG/GM vaginal cream, Insert 2 g into the vagina 2 (Two) Times a Week., Disp: , Rfl:     fluticasone (CUTIVATE) 0.05 % cream, Apply 1 application  topically to the appropriate area as directed Daily As Needed., Disp: , Rfl:     fluticasone (CUTIVATE) 0.05 % cream, Apply 1 application  topically to the appropriate area as directed 2 (Two) Times a Day., Disp: 30 g, Rfl: 0     multivitamin (THERAGRAN) tablet tablet, Take 1 tablet by mouth Daily. Delphine, Disp: , Rfl:     oxazepam (SERAX) 10 MG capsule, Take 1 capsule by mouth 3 (Three) Times a Day. 1 q am,  1 q afternoon, 2 qhs, Disp: , Rfl:     PARoxetine (PAXIL) 10 MG tablet, Take 1 tablet by mouth Every Morning. Take 1/4 every other day, Disp: , Rfl:     triamcinolone (KENALOG) 0.1 % ointment, Apply 1 application  topically to the appropriate area as directed 2 (Two) Times a Day., Disp: 15 g, Rfl: 0    VITAMIN E PO, Take 1 tablet by mouth 2 (Two) Times a Day., Disp: , Rfl:     zolpidem (AMBIEN) 10 MG tablet, Take 1 tablet by mouth At Night As Needed., Disp: , Rfl:     Labs: HgbA1c: 6.4%, Chol 209, , HDL 78    Hunger Vital Sign Food Insecurity Assessment:  Within the past 12 months I/we worried whether our food would run out before I/we got money to buy more: No   Within the past 12 months the food I/we bought just didn't last and I/we didn't have money to get more: No   Use of food assistance programs (WIC, food stamps, food edward) No       Food & Nutrition Related History       Food Allergies: None  Food Intolerances: Patient suspects gluten intolerance  Food Behavior: None  Nutrition Impact Symptoms: diarrhea, abdominal pain  Gastrointestinal conditions that impact intake or food choices: IBS  Details at home: Lives with   Who prepares most meals: Self  Who does grocery shopping: Self  How many meals are purchased from fast food/sit down restaurants per week: 1-2 x/month  Difficulty chewin - Normal  Difficulty swallowin - Normal  Diet requirement related to personal preference or cultural belief: None  History of eating disorder/disordered eating habits: None  Language/communication details: English  Barriers to learning: None    24 Hour Recall:   Time Food/beverages consumed   Breakfast Hot water with lemon juice    Oatmeal with yogurt and berries    2 egg whites with toast and fruit   Lunch Quinoa, vegetables,  "chickpeas, chicken   Snack Apple   Dinner Fish/chicken/turkey with cauliflower and vegetables    Cottage cheese with fruit   Meds Applesauce or yogurt     Additional comments: Patient was told not to eat carbs after noon    Anthropometrics      Height:   Ht Readings from Last 1 Encounters:   08/24/23 157.5 cm (62.01\")     Weight:   Wt Readings from Last 3 Encounters:   08/24/23 43.1 kg (95 lb)   08/22/22 46.7 kg (103 lb)   03/21/22 45.8 kg (101 lb)     BMI: There is no height or weight on file to calculate BMI.   Weight Change: Patient reports that she has lost 5-6 lbs this year     Physical Activity     Barriers to physical activity: None    Physical activity comments: Did not discuss     Estimated Needs     Estimated Energy Needs: 8160-2230 kcal/day    Estimated Protein Needs: 45-50 g/day     Estimated Fluid Needs: At least 64 oz/water per day     Discussion / Education      Patient was referred for malnutrition and elevated fasting glucose. Her initial concerns were glucose management, cholesterol, calcium oxalate kidney stones, gluten, and managing her IBS. Due to her low weight and recent weight loss, RD primarily addressed strategies to increase protein and calorie intake during this appointment. Her primary GI symptoms have been abdominal cramping and diarrhea. Patient wanted to be proactive about managing her blood glucose. RD reassured her that older adults can have higher fasting glucose and HgbA1c and that it isn't always a cause for concern. Per patient, she was told not to eat carbohydrates after 12pm. RD encouraged her to incorporate carbohydrates with each meal to help increase overall calorie intake and explained the importance of balance and consistency throughout the day to avoid large spikes or dips in blood glucose. RD also explained that she should consume a protein with each carbohydrate snack during the day and patient identified several protein sources she could incorporate. Patient also " asked about oxalate content of foods for preventing kidney stones, and RD explained the importance of hydration and consuming calcium rich foods with high oxalate foods. RD encouraged patient to reach out with any additional questions or concerns.    Assessment of patient engagement: Engaged    Measurement of understanding: Patient verbalized understanding, Patient able to demonstrate understanding with teach back     Resources Provided:  Protein supplement list  High calorie/high protein tipsheet  Oxalate rich foods list  Protein rich snack list and breakfast ideas    Goal (s)      Goal 1: Consume a protein with afternoon snack daily.     Goal 2: Incorporate a carbohydrate with dinner daily.      Plan of Care     PES Statement:   Underweight related to inadequate calorie intake and recent metabolic stress as evidenced by BMI or 17.37.    Follow Up Visit      Follow Up: patient to call to schedule later this week after checking her schedule.    Total of 60 minutes spent with patient on nutrition counseling. Education based on Academy of Nutrition and Dietetics guidelines. Patient was provided with RD's contact information. Thank you for this referral.

## 2024-02-29 NOTE — Clinical Note
The DIFIBULATOR, CRTD COBALT XT HF QUAD MRI IS4 DF4 - [id number] device was inserted. The leads were placed into the connector and visually verified to be in correct position.

## 2024-02-29 NOTE — H&P
ICU:  Admission History:   Reynaldo Francisco is a 68 y.o. 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  ICU consulted for patient's dilated cardiomyopathy who comes in for worsening heart failure and KIA.      Upon arrival vitals are 98.1 temp, 66 HR, 20 RR, 90% O2 on RA.  Pertinent labs include 863 BNP, 664 troponin, 10.6 hemoglobin, 3.3 WBC.  INR pending.  POCUS performed by ED showed severely reduced EF and B-lines positivity present into lung fields bilaterally.    Past Medical History: As noted above  Past Surgical History:  Family History: noncontributory   Allergies: reviewed  Social history: Denies tobacco or EtOH use.     Current Medications: see above    Review of Systems:   A 12 point ROS was performed with the patient denying any complaint at this time aside from those listed in the HPI above.    Vital Signs: Reviewed  Input/Output: Reviewed  Oxygen requirements: Reviewed  Ventilator Information: Reviewed     Vitals:    02/29/24 1726   BP: 121/76   Pulse: 61   Resp: 20   Temp:    SpO2: 99%      Physical Exam:   General:  Pleasant and cooperative. No apparent distress.  HEENT:  Normocephalic, atraumatic  Chest:  Clear to auscultation bilaterally. No wheezes, rales, or rhonchi.  CV:  Regular rate and rhythm.    Abdomen: Abdomen is soft, non-tender, non-distended. BS +   Extremities:  No lower extremity edema or cyanosis.   Neurological:  AAOx3. No focal deficits.  Skin:  Warm and dry.      Investigations:  Labs, radiological imaging and cardiac work up were reviewed    Assessment & Plan:    Daily Progress:    Neurological System:  -Baseline mentation AO x3, will reasess  -Avoid excessive caths/ lines, monitor for ICU delirium    Cardiovascular System:  Last echo   -Monitor hemodynamics closely to maintain MAP >65    Respiratory System:    Gastrointestinal  System:  -NPO for now, advance as tolerated    Endocrine System:  -Accu-checks     Renal System:  -Trend BMP, monitor UOP, optimize electrolytes     Hematological System:    Infection Disease System:  -Monitor for signs of worsening infection     Antimicrobials:   Oxygen:   Drips:   Feeding/Fluids:   Analgesia:   Sedation:   Thromboprophylaxis:   Ulcer prophylaxis:   Glycemic control:   Bowel care:   Indwelling catheters:   Lines:   Restraints:   Dispo:   Code Status:     Sean Kinney DO   Internal Medicine PGY-1

## 2024-02-29 NOTE — Clinical Note
Sheath was exchanged in the vein with ACCESS KIT, S-REID MINI, 4FR 10CM 0.018IN 40CM, NT/PT, ECHO ENHANCE NEEDLE.

## 2024-03-01 NOTE — PROGRESS NOTES
Occupational Therapy    Evaluation    Patient Name: Reynaldo Francisco  MRN: [id number]  Today's Date: 3/1/2024  Time Calculation  Start Time: 0926  Stop Time: 0945  Time Calculation (min): 19 min    Assessment  IP OT Assessment  OT Assessment: Pt demo's overall decreased stength and endurance limiting full Indep with functional task completion.  Prognosis: Excellent  Evaluation/Treatment Tolerance: Patient tolerated treatment well  Medical Staff Made Aware: Yes  End of Session Communication: Bedside nurse  End of Session Patient Position:  (Seated at toilet with PT)  Plan:  Treatment Interventions: ADL retraining, Functional transfer training, Endurance training, Neuromuscular reeducation, Compensatory technique education  OT Frequency: 2 times per week  OT Discharge Recommendations: Low intensity level of continued care  Equipment Recommended upon Discharge:  (None)  OT Recommended Transfer Status:  (CGA)  OT - OK to Discharge: Yes (Per OT POC)    Subjective   Current Problem:  1. Heart failure (CMS/HCC)  Transthoracic Echo (TTE) Complete    Transthoracic Echo (TTE) Complete      2. Acute systolic (congestive) heart failure (CMS/HCC)  Transthoracic Echo (TTE) Limited    Transthoracic Echo (TTE) Limited      3. Shortness of breath  Cardiac device check - Inpatient    Cardiac device check - Inpatient      4. Liver transplanted (CMS/HCC)        5. Heart failure, acute systolic (CMS/HCC)  Transthoracic Echo (TTE) Complete    Transthoracic Echo (TTE) Complete        General:  Reason for Referral: 69 y/o male admitted for  management of ADHF and KIA.  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  General Comment: Pt is pleasant and cooperatve. He puts forth good effort towards task completion.   Precautions:  Medical Precautions: Cardiac precautions, Fall precautions  Vital Signs:  Heart Rate: 63  Resp: 19  SpO2: 100 %  BP: 147/72 (128/65)  MAP (mmHg): 91 (81)  BP Location: Left arm  BP Method: Automatic  Pain:  Pain Assessment  Pain Assessment: 0-10  Pain Score: 0 - No pain  Lines/Tubes/Drains:         Objective   Cognition:  Overall Cognitive Status: Within Functional Limits  Following Commands: Follows multistep commands without difficulty  Attention: Within Functional Limits     Confusion Assessment Method (CAM)  Acute Onset and Fluctuating Course (1A): No  Jaramillo Agitation Sedation Scale  Jaramillo Agitation Sedation Scale (RASS): Alert and calm  Home Living:  Type of Home: House  Lives With: Spouse  Home Adaptive Equipment: None  Home Layout: One level  Home Access: Stairs to enter without rails  Entrance Stairs-Number of Steps: 2-3  Bathroom Shower/Tub: Tub/shower unit  Bathroom Equipment: Shower chair with back   Prior Function:  Level of [place]: Independent with ADLs and functional transfers, Needs assistance with homemaking  Receives Help From: Family  ADL Assistance: Independent  Homemaking Assistance: Independent  Ambulatory Assistance: Independent  Vocational: Retired  Prior Function Comments: Admits to decreased endurance and activity tolerance recently. One fall d/t weakness in Oct.  IADL History:     ADL:  Eating Assistance: Stand by  Eating Deficit: Setup  Grooming Assistance: Stand by  Grooming Deficit: Setup  Bathing Assistance: Minimal (Anticipate)  UE Dressing Assistance: Stand by  UE  Dressing Deficit: Setup  LE Dressing Assistance: Stand by  LE Dressing Deficit: Setup  Toileting Assistance with Device: Minimal  Activity Tolerance:  Endurance: Decreased tolerance for upright activites  Early Mobility/Exercise Safety Screen: Proceed with mobilization - No exclusion criteria met  Balance:  Dynamic Sitting Balance  Dynamic Sitting-Comments: Good  Static Sitting Balance  Static Sitting-Level of Assistance: Close supervision  Static Standing Balance  Static Standing-Level of Assistance: Contact guard  Bed Mobility/Transfers: Bed Mobility  Bed Mobility: Yes  Bed Mobility 1  Bed Mobility 1: Supine to sitting  Level of Assistance 1: Close supervision   and Transfers  Transfer: Yes  Transfer 1  Transfer From 1: Sit to  Transfer to 1: Stand  Technique 1: Sit to stand, Stand to sit  Transfer Level of Assistance 1: Contact guard  IADL's:      Vision:     and Vision - Complex Assessment  Ocular Range of Motion: Within Functional Limits  Sensation:  Light Touch: No apparent deficits  Strength:  Strength Comments: BUE strength WFL  Perception:  Inattention/Neglect: Appears intact  Coordination:      Hand Function:  Hand Function  Gross Grasp: Functional  Coordination: Functional  Extremities:  ,  ,  , and      Outcome Measures: [facility] Daily Activity  Putting on and taking off regular lower body clothing: A little  Bathing (including washing, rinsing, drying): A little  Putting on and taking off regular upper body clothing: None  Toileting, which includes using toilet, bedpan or urinal: A little  Taking care of personal grooming such as brushing teeth: None  Eating Meals: None  Daily Activity - Total Score: 21    Confusion Assessment Method-ICU (CAM-ICU)  Feature 3: Altered Level of Consciousness: Negative   ICU Mobility Screen  Early Mobility/Exercise Safety Screen: Proceed with mobilization - No exclusion criteria met,   Jaramillo Agitation Sedation Scale  Jaramillo Agitation Sedation Scale (RASS): Alert and calm       Education Documentation  Body Mechanics, taught by Bee Gutierrez OT at 3/1/2024 11:02 AM.  Learner: Patient  Readiness: Acceptance  Method: Explanation, Demonstration  Response: Verbalizes Understanding, Demonstrated Understanding    Precautions, taught by Bee Gutierrez OT at 3/1/2024 11:02 AM.  Learner: Patient  Readiness: Acceptance  Method: Explanation, Demonstration  Response: Verbalizes Understanding, Demonstrated Understanding    Home Exercise Program, taught by Bee Gutierrez OT at 3/1/2024 11:02 AM.  Learner: Patient  Readiness: Acceptance  Method: Explanation, Demonstration  Response: Verbalizes Understanding, Demonstrated Understanding    ADL Training, taught by Bee Gutierrez OT at 3/1/2024 11:02 AM.  Learner: Patient  Readiness: Acceptance  Method: Explanation, Demonstration  Response: Verbalizes Understanding, Demonstrated Understanding    Education Comments  No comments found.        Goals:     Encounter Problems       Encounter Problems (Active)       ADLs       Patient will complete toileting, including clothing management and hygiene, with MOD I in order to maximize functional Indep with task completion.        Start:  03/01/24    Expected End:  03/15/24               COGNITION/SAFETY       Pt will identify and incorporate 3 EC/WS strategies into daily routines for increased safety and Indep.        Start:  03/01/24    Expected End:  03/15/24               EXERCISE/STRENGTHENING       Pt will increase endurance to tolerate 15-20min of activity with no more than 1 rest break in order to increase ability to engage in ADL completion.        Start:  03/01/24    Expected End:  03/15/24               MOBILITY       Pt will demo increased functional mobility to tolerate tasks necessary to complete ADL routine with Sup and no LOB.       Start:  03/01/24    Expected End:  03/15/24                   03/01/24 at 11:03 AM   Bee Gutierrez OT   Rehab Office: [phone]

## 2024-03-01 NOTE — H&P
History Of Present Illness  Reynaldo Francisco is a 68 y.o. male presenting for management of ADHF and KIA.   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 Patient AOX3 however history largely obtained from wife (accompanying him) as he does not speak English. Over the past 1 week patient with increasing weight gain (primarily in the abdomen), ALONZO, orthopnea and PND. Denies chest pain, fever, sick contact, or cough. Prior to his symptoms patient stopped his daily diuretic at the instruction of his nephrologist in the setting of worsening renal injury (additional hx below). Presented to [facility] with vitals are 98.1 temp, 66 HR, 20 RR, 90% O2 on RA.  Pertinent labs include 863 BNP, 664 troponin, 10.6 hemoglobin, 3.3 WBC.  Initial EKG showed ST depressions in the anterior lateral leads with no reciprocal elevation posterior EKG showed elevations in the posterior segments thus STEMI code was activated. Notably patient is V-paced. Case was discussed with  canceled STEMI activation. Subsequent CXR and POCUS suggestive of volume overlaoad. Given 40 IV lasix (unclear response). Due to the fact that he has a complex transplant history and is on multiple antirejection medications that require careful titration patient was [facility] CICU in transfer.       Notable recent events (all occurring at [facility] in [place] including Transplant care):   -Liver transplantation in 2018 (requiring bypass) following unsuccesful first attempt 8 months prior. Course complicated by renal failure requiring RRT until renal transplantation in 03/2023.   -[employer]tronic Dual Chamber PM placed 10/2023 for SSS  -Hospitalized 01/2024  with SOB/ALONZO/orthopnea/PND/abdominal distention and KIA . Initial plan was to work-up injury of transplanted kidney however symptoms and KIA improved with diuresis. Notably CMV TTE then demonstrated newly reduced EF 40% and severely hypokinetic apex. Completed stress PET (as part of ischemic evaluation) - negative. Concern that new cardiomyopathy was due to high pacing burden (unable to find documentation of device interrogation) however no device changes made. Discharged with Lasix 40 every day. Scr 2.1-2.2 on discharge.  -CMV and renal U/S negative  -symptoms continued to improve on DC with lowest weight achieved 128 lb (daily weight checks).   -On 2/6, outpatient CMP demonstrating increase in renal function. Nephrologist instructed patient to switch diuretic to EOD. Scr continued to uptrended to 3.3 such that patient instructed to hold diuretic  for the past week. Patient weight prior to presenting to [facility] 137 lb.       On arrival, VSS (breathing well on RA). EKG V-paced. Exam notable for volume overload (abdominal ascites, + JVD to level of the mandible). POCUS/bedside echo notable for dilated IVC unchanged with inspiration. Challenged with Bumex 2mg IV with 150 ml UOP. Bladder scan + 700 however on hortno insertion with minimal return. Rebolused 2mg IV bumex followed by Diuril 500 mg at 530 AM.  Device interrogation -97% V-paced.      PMHx/PSHx: as above  Allergies: contrast    Medications:   Prior to Admission medications    Medication Sig Start Date End Date Taking? Authorizing Provider   aspirin 81 mg EC tablet Take 1 tablet (81 mg) by mouth once daily in the morning.       Historical Provider, MD   furosemide (Lasix) 40 mg tablet Take 1 tablet (40 mg) by mouth once daily.  Patient taking differently: Take 1 tablet (40 mg) by mouth every other day. 1/25/24 4/24/24   John Manrique MD   mycophenolate (Myfortic) 180 mg EC tablet Take 1 tablet (180 mg) by mouth 2 times a day. 12/20/23     Historical Provider,  MD   Protonix 40 mg EC tablet Take 1 tablet (40 mg) by mouth once daily in the morning. 3/21/18     Historical Provider, MD   sodium bicarbonate 650 mg tablet Take 1 tablet (650 mg) by mouth once daily. In the afternoon       Historical Provider, MD   sulfamethoxazole-trimethoprim (Bactrim) 400-80 mg tablet Take 1 tablet by mouth once a day on Monday, Wednesday, and Friday.       Historical Provider, MD   tacrolimus (Prograf) 1 mg capsule Take 2 capsules (2 mg) by mouth once daily in the evening.       Historical Provider, MD   tacrolimus (Prograf) 1 mg capsule Take 3 capsules (3 mg) by mouth once daily in the morning.       Historical Provider, MD        Past Medical History  Past Medical History:   Diagnosis Date    Arteriovenous fistula, acquired (CMS/HCC) 06/17/2022    AV fistula    Lung nodule     Nutritional anemia, unspecified     Anemia, deficiency    Personal history of malignant neoplasm of pancreas 06/17/2022    History of malignant neoplasm of pancreas       Surgical History  Past Surgical History:   Procedure Laterality Date    CARDIAC ELECTROPHYSIOLOGY PROCEDURE Right 10/23/2023    Procedure: PPM Single Ventricle Implant;  Surgeon: Cosme Pina MD;  Location: [facility] Cardiac Cath Lab;  Service: Electrophysiology;  Laterality: Right;  medtronic notified    LIVER TRANSPLANTATION      OTHER SURGICAL HISTORY  03/17/2022    Pancreatic surgery    OTHER SURGICAL HISTORY  06/17/2022    Liver surgery    OTHER SURGICAL HISTORY  06/17/2022    Liver transplantation    US KIDNEY TRANSPLANT          Social History  He reports that he has never smoked. He has never used smokeless tobacco. He reports that he does not currently use drugs. He reports that he does not drink alcohol.    Family History  Family History   Problem Relation Name Age of Onset    Diabetes Mother      Hypertension Mother          Allergies  Milk, Shellfish derived, and Iodinated contrast media    Review of Systems    Negative unless stated above     "  Physical Exam     Constitutional: chronically ill appearing  Eyes: EOMI, clear sclera  HENT: MMM  Head/neck: + JVD to mandible   Resp/thorax: decreased breath sounds at RLL  CV: +S1/S2, RRR, no m/r/g  GI: distended, dull to percussion   Ext: no edema/asymmetry  Neuro: AOx4, pleasant, conversational, following commands, no gross focal neuro deficits  Skin: warm and dry  Psych: mood/affect appropriate    Last Recorded Vitals  Blood pressure 124/67, pulse 60, temperature 36 °C (96.8 °F), temperature source Temporal, resp. rate 18, height 1.727 m (5' 8\"), weight 61.8 kg (136 lb 3.9 oz), SpO2 98 %.    Relevant Results           Assessment/Plan   Principal Problem:    Heart failure (CMS/HCC)      Reynaldo Francisco is a 68 y.o. 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      CV  #ADHF  #Stage C systolic heart failure/NICM/HFrEF (40% TTE 01/2024)  #SSS s/p dual-chamber PM  :: Patient appeared to have develop symptoms after holding diuretic in the setting of worsening renal injury. I suspect renal injury is due to cardiomyopathy which progressed to HF syndrome when the diuretics were held. Evidence include reduced EF, grossly overloaded on exam and previous unremarkable renal testing (CMV, US) albeit no biopsy completed. As to the cause of the cardiomyopathy would appear to be PM - mediated.  :: overall is warm and wet    -Bumex 2 mg and IV Diuril; follow up UOP  -Consider SGC placement for better volume assessment (moderate MR noted on fellow echo)  -Formal TTE in the AM  -Formal device interrogation   -Daily RFP and Mg for K>4, Mg >2  -iron studies, BNP, trop pending "     #Type A aortic dissection  #HTN  :: No outpatient anti-hypertensive.     RENAL  #KIA on CKD (baseline Scr 2.1-2.2), non-oliguric   #ESRD s/p kidney transplantation (on tacromlimus and mycophenolate)  :: Suspect cardiorenal syndrome. Not responsive to Lasix 40 or Bumex 2 IV.   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       Fluids: no  Access: IV  Antibiotics: none   Electrolytes: none  Nutrition: NPO then cardiac   PPI: none  DVT: Hep 5000 TID  CODE: FULL CODE  Abdiel (wife) [phone]  Vlad Mandujano MD

## 2024-03-01 NOTE — ED NOTES
Report given to Physicians Transport Team. Nursing report called to CARRIE Javier at [facility] CICU at [phone]. Patient is in fair, stable condition upon leaving the ED by stretcher with Physician's Transport Team.      Priscilla Angulo RN  02/29/24 [id number]

## 2024-03-01 NOTE — PROGRESS NOTES
Social Work Transitional Care Note: (Chart review)  - ICU TREATMENT PLAN: The patient was brought into [place] ED from home via personal vehicle with SOB. He was transferred to CICU 2/29 for NSTEMI and AFH exacerbation.  Influenza and covid test are pending, PMH of Heart failure, s/p pacemaker placement in January, Liver and Kidney transplant.    Insurance coverage: Medicare and AARP   - Support system: The patients' spouse Abdiel Abdalla and daughters Jose David are listed as NOK  - Planned Disposition: Pending medical outcome and rehab recommendations   - Barriers to Discharge: None noted at this time. SW will continue to monitor  -Anticipated Date of Discharge:  3/10  DG Mccall, SANTO  UPDATE:   SW attempted to see the patient. The doctor was in the room.   Sw reviewed the SDOH and the Social Work discharge planning assessment that was initiated by the nurse. SW to complete the assessment.  SANTO Mccall

## 2024-03-01 NOTE — CARE PLAN
Problem: Heart Failure  Goal: Improved gas exchange this shift  Outcome: Progressing  Goal: Improved urinary output this shift  Outcome: Progressing  Goal: Reduction in peripheral edema within 24 hours  Outcome: Progressing  Goal: Report improvement of dyspnea/breathlessness this shift  Outcome: Progressing  Goal: Weight from fluid excess reduced over 2-3 days, then stabilize  Outcome: Progressing  Goal: Increase self care and/or family involvement in 24 hours  Outcome: Progressing     Problem: Pain  Goal: My pain/discomfort is manageable  Outcome: Progressing     Problem: Safety  Goal: Patient will be injury free during hospitalization  Outcome: Progressing  Goal: I will remain free of falls  Outcome: Progressing     Problem: Daily Care  Goal: Daily care needs are met  Outcome: Progressing     Problem: Psychosocial Needs  Goal: Demonstrates ability to cope with hospitalization/illness  Outcome: Progressing  Goal: Collaborate with me, my family, and caregiver to identify my specific goals  Outcome: Progressing     Problem: Discharge Barriers  Goal: My discharge needs are met  Outcome: Progressing      The clinical goals for the shift include pt will have adequate urine output throughout shift

## 2024-03-01 NOTE — PROGRESS NOTES
"Reynaldo Francisco is a 68 y.o. male on day 1 of admission presenting with Heart failure (CMS/HCC).    Subjective   Complains of abdominal distension. Pedal edema and SOB improved.        Objective     Physical Exam     Constitutional: chronically ill appearing  Eyes: EOMI, clear sclera  HENT: MMM  Head/neck: + JVD to mandible   Resp/thorax: decreased breath sounds at RLL  CV: +S1/S2, RRR, no m/r/g  GI: distended, dull to percussion   Ext: no edema/asymmetry  Neuro: AOx4, pleasant, conversational, following commands, no gross focal neuro deficits  Skin: warm and dry  Psych: mood/affect appropriate    Last Recorded Vitals  Blood pressure 119/57, pulse 60, temperature 36.4 °C (97.5 °F), temperature source Temporal, resp. rate 19, height 1.727 m (5' 8\"), weight 61.6 kg (135 lb 12.9 oz), SpO2 100 %.  Intake/Output last 3 Shifts:  I/O last 3 completed shifts:  In: 50 (0.8 mL/kg) [P.O.:50]  Out: 850 (13.8 mL/kg) [Urine:850 (0.4 mL/kg/hr)]  Weight: 61.6 kg     Relevant Results  Scheduled medications  aspirin, 81 mg, oral, q AM  clotrimazole, , Topical, BID  heparin (porcine), 5,000 Units, subcutaneous, q8h KESHIA  hydrALAZINE, 25 mg, oral, TID  mycophenolate, 180 mg, oral, BID  pantoprazole, 40 mg, oral, q AM  perflutren protein A microsphere, 0.5 mL, intravenous, Once in imaging  sodium bicarbonate, 650 mg, oral, Daily  sulfamethoxazole-trimethoprim, 1 tablet, oral, Every Mon/Wed/Fri  sulfur hexafluoride microsphr, 2 mL, intravenous, Once in imaging  tacrolimus, 2 mg, oral, q PM  tacrolimus, 3 mg, oral, q AM      Continuous medications     PRN medications    Results for orders placed or performed during the hospital encounter of 02/29/24 (from the past 24 hour(s))   Electrocardiogram, 12-lead PRN ACS symptoms   Result Value Ref Range    Ventricular Rate 67 BPM    Atrial Rate 75 BPM    QRS Duration 186 ms    QT Interval 486 ms    QTC Calculation(Bazett) 513 ms    R Axis -76 degrees    T Axis 100 degrees    QRS Count 11 beats    Q " Onset 201 ms    T Offset 444 ms    QTC Fredericia 504 ms   CBC   Result Value Ref Range    WBC 2.8 (L) 4.4 - 11.3 x10*3/uL    nRBC 0.0 0.0 - 0.0 /100 WBCs    RBC 2.83 (L) 4.50 - 5.90 x10*6/uL    Hemoglobin 9.6 (L) 13.5 - 17.5 g/dL    Hematocrit 25.7 (L) 41.0 - 52.0 %    MCV 91 80 - 100 fL    MCH 33.9 26.0 - 34.0 pg    MCHC 37.4 (H) 32.0 - 36.0 g/dL    RDW 14.2 11.5 - 14.5 %    Platelets 103 (L) 150 - 450 x10*3/uL   Comprehensive metabolic panel   Result Value Ref Range    Glucose 104 (H) 74 - 99 mg/dL    Sodium 140 136 - 145 mmol/L    Potassium 4.8 3.5 - 5.3 mmol/L    Chloride 114 (H) 98 - 107 mmol/L    Bicarbonate 13 (L) 21 - 32 mmol/L    Anion Gap 18 10 - 20 mmol/L    Urea Nitrogen 81 (H) 6 - 23 mg/dL    Creatinine 3.53 (H) 0.50 - 1.30 mg/dL    eGFR 18 (L) >60 mL/min/1.73m*2    Calcium 8.9 8.6 - 10.6 mg/dL    Albumin 4.3 3.4 - 5.0 g/dL    Alkaline Phosphatase 62 33 - 136 U/L    Total Protein 6.7 6.4 - 8.2 g/dL    AST 14 9 - 39 U/L    Bilirubin, Total 1.0 0.0 - 1.2 mg/dL    ALT 5 (L) 10 - 52 U/L   Type and Screen   Result Value Ref Range    ABO TYPE O     Rh TYPE POS     ANTIBODY SCREEN NEG    B-type natriuretic peptide   Result Value Ref Range     (H) 0 - 99 pg/mL   Troponin I, High Sensitivity   Result Value Ref Range    Troponin I, High Sensitivity 845 (HH) 0 - 53 ng/L   Blood Gas Venous Full Panel   Result Value Ref Range    POCT pH, Venous 7.28 (L) 7.33 - 7.43 pH    POCT pCO2, Venous 32 (L) 41 - 51 mm Hg    POCT pO2, Venous 41 35 - 45 mm Hg    POCT SO2, Venous 57 45 - 75 %    POCT Oxy Hemoglobin, Venous 56.1 45.0 - 75.0 %    POCT Hematocrit Calculated, Venous 31.0 (L) 41.0 - 52.0 %    POCT Sodium, Venous 138 136 - 145 mmol/L    POCT Potassium, Venous 4.8 3.5 - 5.3 mmol/L    POCT Chloride, Venous 111 (H) 98 - 107 mmol/L    POCT Ionized Calicum, Venous 1.21 1.10 - 1.33 mmol/L    POCT Glucose, Venous 106 (H) 74 - 99 mg/dL    POCT Lactate, Venous 1.0 0.4 - 2.0 mmol/L    POCT Base Excess, Venous -10.7 (L)  -2.0 - 3.0 mmol/L    POCT HCO3 Calculated, Venous 15.0 (L) 22.0 - 26.0 mmol/L    POCT Hemoglobin, Venous 10.2 (L) 13.5 - 17.5 g/dL    POCT Anion Gap, Venous 17.0 10.0 - 25.0 mmol/L    Patient Temperature 37.0 degrees Celsius    FiO2 21 %   Iron and TIBC   Result Value Ref Range    Iron 75 35 - 150 ug/dL    UIBC 177 110 - 370 ug/dL    TIBC 252 240 - 445 ug/dL    % Saturation 30 25 - 45 %   Ferritin   Result Value Ref Range    Ferritin 764 (H) 20 - 300 ng/mL   Magnesium   Result Value Ref Range    Magnesium 1.87 1.60 - 2.40 mg/dL   Coagulation Screen   Result Value Ref Range    Protime 14.6 (H) 9.8 - 12.8 seconds    INR 1.3 (H) 0.9 - 1.1    aPTT 51 (H) 27 - 38 seconds   Tacrolimus level   Result Value Ref Range    Tacrolimus  14.2 <=15.0 ng/mL   Urinalysis with Reflex Microscopic   Result Value Ref Range    Color, Urine Colorless (N) Light-Yellow, Yellow, Dark-Yellow    Appearance, Urine Clear Clear    Specific Gravity, Urine 1.006 1.005 - 1.035    pH, Urine 5.0 5.0, 5.5, 6.0, 6.5, 7.0, 7.5, 8.0    Protein, Urine NEGATIVE NEGATIVE, 10 (TRACE), 20 (TRACE) mg/dL    Glucose, Urine Normal Normal mg/dL    Blood, Urine 0.1 (1+) (A) NEGATIVE    Ketones, Urine NEGATIVE NEGATIVE mg/dL    Bilirubin, Urine NEGATIVE NEGATIVE    Urobilinogen, Urine Normal Normal mg/dL    Nitrite, Urine NEGATIVE NEGATIVE    Leukocyte Esterase, Urine NEGATIVE NEGATIVE   Urine electrolytes   Result Value Ref Range    Sodium, Urine Random 127 mmol/L    Sodium/Creatinine Ratio 672 Not established. mmol/g Creat    Potassium, Urine Random 8 mmol/L    Potassium/Creatinine Ratio 42 Not established mmol/g Creat    Chloride, Urine Random 138 mmol/L    Chloride/Creatinine Ratio 730 (H) 23 - 275 mmol/g creat    Creatinine, Urine Random 18.9 (L) 20.0 - 370.0 mg/dL   Osmolality, urine   Result Value Ref Range    Osmolality, Urine Random 328 200 - 1,200 mOsm/kg   Microscopic Only, Urine   Result Value Ref Range    WBC, Urine NONE 1-5, NONE /HPF    RBC, Urine 3-5  NONE, 1-2, 3-5 /HPF   Sars-CoV-2 PCR   Result Value Ref Range    Coronavirus 2019, PCR Not Detected Not Detected   Influenza A, and B PCR   Result Value Ref Range    Flu A Result Not Detected Not Detected    Flu B Result Not Detected Not Detected   Transthoracic Echo (TTE) Complete   Result Value Ref Range    AV pk cornelia 1.04 m/s    LVOT diam 2.10 cm    LV biplane EF 33 %    LA vol index A/L 49.1 ml/m2    Tricuspid annular plane systolic excursion 1.0 cm    RV free wall pk S' 7.38 cm/s    LVIDd 6.30 cm    RVSP 59.4 mmHg    Aortic Valve Area by Continuity of Peak Velocity 2.97 cm2    AV pk grad 4.3 mmHg    LV A4C EF 20.9      XR chest 1 view    Result Date: 3/1/2024  Interpreted By:  Shankar Patterson, STUDY: XR CHEST 1 VIEW;  3/1/2024 7:12 am   INDICATION: Signs/Symptoms:shortness of breath.   COMPARISON: Chest radiograph dated 2/29/2024   ACCESSION NUMBER(S): [id number]   ORDERING CLINICIAN: XUAN EDWARDS   FINDINGS: AP radiograph of the chest   Pacemaker electrode appears in satisfactory position within the floor of the right ventricle. Sternal sutures are noted from previous median sternotomy. There is moderate cardiomegaly and atheromatous ectasia of the thoracic aorta. Right parahilar and lower lobe interstitial opacities appear increased in degree in comparison to previous study. Right pleural effusion subsegmental atelectasis and fluid tracking along the right horizontal fissure is similar. The left lung is essentially well aerated. Vascular clips are noted beneath the left diaphragm.       1. Subsegmental atelectasis and right pleural effusion appears slightly increased in degree in comparison to previous study. 2. Moderate cardiomegaly       Signed by: Shankar Patterson 3/1/2024 12:25 PM Dictation workstation:   [id number]    Transthoracic Echo (TTE) Complete    Result Date: 3/1/2024   CONCLUSIONS:    1. Left ventricular systolic function is moderately to severely decreased with a 30-35% estimated ejection  fraction.    2. There is severe eccentric left ventricular hypertrophy.    3. There is reduced right ventricular systolic function.    4. The left atrium is severely dilated.    5. The right atrium is severely dilated.    6. There is a moderate pleural effusion.    7. Mild to moderate mitral valve regurgitation.    8. Moderate to severe tricuspid regurgitation visualized.    9. Severely elevated right ventricular systolic pressure.   10. Moderate aortic valve regurgitation.   11. Left ventricular cavity size is severely dilated.   12. There is no evidence of a patent foramen ovale.   13. There is global hypokinesis of the left ventricle with minor regional variations.   14. Compared with study from 10/6/2023, LV dilatation and reduced LVEF is new. Previously LV systolic function was normal. Biatrial dilatation is know with possible some worsening. AR is known.             Assessment/Plan   Principal Problem:    Heart failure (CMS/HCC)  Active Problems:    Acute systolic (congestive) heart failure (CMS/HCC)    Reynaldo Francisco is a 68 y.o. 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 I suspect renal injury is due to cardiomyopathy which progressed to HF syndrome when the diuretics were held. Evidence include reduced EF, grossly overloaded on exam and previous unremarkable renal testing (CMV, US) albeit no biopsy  completed. As to the cause of the cardiomyopathy would appear to be PM - mediated.  :: CT PET at OSH did not show ischemia  :: overall is warm and wet    -Bumex 2 mg and IV Diuril; follow up UOP. Further diuresis to be base on swan measurements  -Colton narayan placement for diuresis. To be placed in cath lab due to difficult vascular access  -Consider CRTD upgrade when stable  -Daily RFP and Mg for K>4, Mg >2     #Type A aortic dissection  #HTN  :: No outpatient anti-hypertensive.   -Hydralazine 25mg tid started     RENAL  #KIA on CKD (baseline Scr 2.1-2.2), non-oliguric   #ESRD s/p kidney transplantation (on tacromlimus and mycophenolate)  :: Suspect cardiorenal syndrome. Not responsive to Lasix 40 or Bumex 2 IV.   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   -iron studies: 75, TIBC 252, ferritin 764     Fluids: no  Access: IV  Antibiotics: none   Electrolytes: none  Nutrition: NPO then cardiac   PPI: none  DVT: Hep 5000 TID  CODE: FULL CODE  Abdiel (wife) [phone]       Philipp Ashley MD

## 2024-03-01 NOTE — PROGRESS NOTES
Physical Therapy    Physical Therapy Evaluation/Treatment    Patient Name: Reynaldo Francisco  MRN: [id number]  Today's Date: 3/1/2024   Time Calculation  Start Time: 0926  Stop Time: 0949  Time Calculation (min): 23 min    Assessment/Plan   PT Assessment  PT Assessment Results: Decreased strength, Decreased mobility, Impaired balance, Decreased endurance  Rehab Prognosis: Good  Barriers to Discharge: medical acuity  Evaluation/Treatment Tolerance: Patient tolerated treatment well  End of Session Communication: Bedside nurse  Assessment Comment: Patient with good tolerance to PT evaluation. Patient is grossly mobilizing at a CGA to min assist level with no AD. Recommend low intensity therapy at discharge with family assistance as needed.  End of Session Patient Position: Up in chair, Alarm off, not on at start of session, Alarm off, caregiver present  IP OR SWING BED PT PLAN  Inpatient or Swing Bed: Inpatient  PT Plan  Treatment/Interventions: Bed mobility, Transfer training, Stair training, Gait training, Balance training, Endurance training, Strengthening, Therapeutic exercise, Therapeutic activity, Range of motion, Home exercise program  PT Plan: Skilled PT  PT Frequency: 3 times per week  PT Discharge Recommendations: Low intensity level of continued care  PT Recommended Transfer Status: Assist x1, Contact guard  PT - OK to Discharge: Yes      Subjective   General Visit Information:  Reason for Referral: 67 y/o male admitted for  management of ADHF and KIA.  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  General Comment: Patient up in bed upon arrival. Agreeable to session.   Home Living:  Home Living  Type of Home: House  Lives With: Spouse  Home Adaptive Equipment: None  Home Layout: One level  Home Access: Stairs to enter without rails  Entrance Stairs-Number of Steps: 2-3  Bathroom Shower/Tub: Tub/shower unit  Prior Level of Function:  Prior Function Per Pt/Caregiver Report  Level of [place]: Independent with ADLs and functional transfers, Needs assistance with homemaking  Receives Help From: Family  ADL Assistance: Independent  Homemaking Assistance: Independent  Ambulatory Assistance: Independent  Vocational: Retired  Prior Function Comments: one recent fall  Precautions:  Precautions  Medical Precautions: Cardiac precautions, Fall precautions  Vital Signs:  Vital Signs  Heart Rate:  (pre 62 post 83)  Heart Rate Source: Monitor  Resp:  (pre 21 post 18)  SpO2:  (pre 100 post 100)  BP:  (pre 147/72 (91) post 128/65 (80()    Objective       Pain:  Pain Assessment  Pain Assessment: 0-10  Pain Score: 0 - No pain  Cognition:  Cognition  Overall Cognitive Status: Within Functional Limits      Extremity/Trunk Assessments:  Strength:  Strength Comments: LUKE strength WFL       General Assessments:  Strength  Strength Comments: LUKE strength WFL      Activity Tolerance  Endurance: Decreased tolerance for upright activites  Early Mobility/Exercise Safety Screen: Proceed with mobilization - No exclusion criteria met  Sensation  Light Touch: No apparent deficits     Static Sitting Balance  Static Sitting-Balance Support: Feet supported  Static Sitting-Level of Assistance: Contact guard  Static Standing Balance  Static Standing-Balance Support: No  upper extremity supported  Static Standing-Level of Assistance: Contact guard    Functional Assessments:  Bed Mobility  Bed Mobility: Yes  Bed Mobility 1  Bed Mobility 1: Supine to sitting  Level of Assistance 1: Close supervision  Transfers  Transfer: Yes  Transfer 1  Transfer From 1: Sit to, Stand to  Transfer to 1: Stand, Sit  Technique 1: Stand to sit, Sit to stand  Transfer Level of Assistance 1: Contact guard  Transfers 2  Transfer From 2: Bed to  Transfer to 2: Chair with arms  Technique 2:  (stand step transfer)  Transfer Level of Assistance 2: Contact guard    Treatments  Transfers 3  Transfer From 3: Stand to  Transfer to 3: Toilet  Technique 3: Stand to sit  Transfer Level of Assistance 3: Contact guard  Ambulation/Gait Training  Ambulation/Gait Training Performed: Yes  Ambulation/Gait Training 1  Surface 1: Level tile  Device 1: No device  Assistance 1: Contact guard, Minimum assistance  Quality of Gait 1: Wide base of support, Decreased step length (decreased cadenece, mildy unsteady with several therapist corrected episodes of instability requiring min assist to correct)  Comments/Distance (ft) 1: 300          Outcome Measures:  [facility] Basic Mobility  Turning from your back to your side while in a flat bed without using bedrails: A little  Moving from lying on your back to sitting on the side of a flat bed without using bedrails: A little  Moving to and from bed to chair (including a wheelchair): A little  Standing up from a chair using your arms (e.g. wheelchair or bedside chair): A little  To walk in hospital room: A little  Climbing 3-5 steps with railing: A little  Basic Mobility - Total Score: 18           FSS-ICU  Ambulation: Walks >/ or equal to 150 feet with minimal assistance x1  Rolling: Supervision or set-up only  Sitting: Supervision or set-up only  Transfer Sit-to-Stand: Minimal assistance (performs 75% or more of task)  Transfer Supine-to-Sit: Supervision or set-up only  Total Score:  23  ICU Mobility Screen  Early Mobility/Exercise Safety Screen: Proceed with mobilization - No exclusion criteria met                Encounter Problems       Encounter Problems (Active)       PT Problem       Patient will ambulate 300 ft with LRAD and modified independence.         Start:  03/01/24    Expected End:  03/15/24            Patient will transfer bed to/from chair with LRAD and modified independence.        Start:  03/01/24    Expected End:  03/15/24            Patient will score >24 on the Tinetti test indicating low falls risk for homegoing.         Start:  03/01/24    Expected End:  03/15/24            Patient will negotiate 3 OTIS with LRAD and modified independence.         Start:  03/01/24    Expected End:  03/15/24                   Education Documentation  Mobility Training, taught by Sri Avalos PT at 3/1/2024 11:15 AM.  Learner: Patient  Readiness: Acceptance  Method: Explanation  Response: Needs Reinforcement    Education Comments  No comments found.            03/01/24 at 11:17 AM   SRI AVALOS PT   Rehab Office: [phone]

## 2024-03-01 NOTE — CONSULTS
NEPHROLOGY NEW CONSULT NOTE   Reynaldo Francisco   68 y.o.      MRN/Room: [id number]/06/06-A    Reason for consult: KIA    HPI:  Reynaldo Francisco is a 68 y.o. 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  Patient admitted for SOB. Transplant Nephrology consulted for further management.     Patient baseline creatinine 1.4. Follows with [facility] for management of transplant. Had KIA with volume overload in October 2023 related to PPM issue per records and creatinine improved with diuresis from 2 -> 1.4. Since December, creatinine has increased to 2-> 2.7 -> 3. During this time lasix was reduced to PRN and eventually resumed daily lasix 40 mg 2/7. Held 2/21 due to increasing creatinine on OP labs (3.2). After lasix held, patient was having increasing weight gain, ALONZO, orthopnea. Of note, tacro  dosing was last increased 2/12.    Presented to [facility] with vitals are 98.1 temp, 66 HR, 20 RR, 90% O2 on RA.  Pertinent labs include 863 BNP, 664 troponin, 10.6 hemoglobin, 3.3 WBC.  CXR and POCUS suggestive of volume overlaoad. Given 40 IV lasix (unclear response). Was given 2mg Bumex x 2 IV overnight as well as diuril 500 mg IV.     Creatinine at the time of admission was 3.43 and remains elevated to 3.5.  Blood pressure remained stable. Urine output was 525 ml in the last 24hrs though possibly underestimate of urine OP    Transplant Details  Native Kidney Disease: anuric post OLT  Donor: Donation after Circulatory Death Other - Non-Biological Age: KDPI: 58%  High Risk Donor: No  HLA/PRA/Crossmatch: 3 AB Mismatch and 1 DR Mismatch ; PRA: Class I/II: 9%/0% ; Cross Match: T/B negative  CIT: Kidney: 1,419 min.  Recipient Calculated PRA (CPRA): 39    Yasmeen-Transplant Immunosuppression  Induction  "Antibody:Yes, Thymoglobulin, given 4mg/kg thymo induction dose, slightly lower than goal given history of persistent cmv viremia  CNI Inhibitor: Yes, Tacrolimus Antimetabolites: Yes, Mycophenolate Mofetil Steroids: Rapid Taper  Virology: CMV: R+; EBV: R+  Prophylaxis  Viral: Valcyte Anti-bacterial: bactrim    Inpatient History:  Delayed Graft Function (dialysis within 1 week post tx): Yes   Acute Rejection (Inpatient): No        In The ER: /57 (BP Location: Right leg, Patient Position: Lying)   Pulse 60   Temp 36.4 °C (97.5 °F) (Temporal)   Resp 19   Ht 1.727 m (5' 8\")   Wt 61.6 kg (135 lb 12.9 oz)   SpO2 100%   BMI 20.65 kg/m²      Past Medical History:   Diagnosis Date    Arteriovenous fistula, acquired (CMS/HCC) 06/17/2022    AV fistula    Lung nodule     Nutritional anemia, unspecified     Anemia, deficiency    Personal history of malignant neoplasm of pancreas 06/17/2022    History of malignant neoplasm of pancreas      Past Surgical History:   Procedure Laterality Date    CARDIAC ELECTROPHYSIOLOGY PROCEDURE Right 10/23/2023    Procedure: PPM Single Ventricle Implant;  Surgeon: Cosme Pina MD;  Location: [facility] Cardiac Cath Lab;  Service: Electrophysiology;  Laterality: Right;  medtronic notified    LIVER TRANSPLANTATION      OTHER SURGICAL HISTORY  03/17/2022    Pancreatic surgery    OTHER SURGICAL HISTORY  06/17/2022    Liver surgery    OTHER SURGICAL HISTORY  06/17/2022    Liver transplantation    US KIDNEY TRANSPLANT        Family History   Problem Relation Name Age of Onset    Diabetes Mother      Hypertension Mother       Social History     Socioeconomic History    Marital status:      Spouse name: Not on file    Number of children: Not on file    Years of education: Not on file    Highest education level: Not on file   Occupational History    Not on file   Tobacco Use    Smoking status: Never    Smokeless tobacco: Never   Vaping Use    Vaping Use: Never used   Substance and Sexual " Activity    Alcohol use: Never    Drug use: Not Currently    Sexual activity: Not on file   Other Topics Concern    Not on file   Social History Narrative    Not on file     Social Determinants of Health     Financial Resource Strain: Low Risk  (3/1/2024)    Overall Financial Resource Strain (CARDIA)     Difficulty of Paying Living Expenses: Not hard at all   Food Insecurity: Unknown (3/1/2024)    Hunger Vital Sign     Worried About Running Out of Food in the Last Year: Patient declined     Ran Out of Food in the Last Year: Patient unable to answer   Transportation Needs: No Transportation Needs (3/1/2024)    PRAPARE - Transportation     Lack of Transportation (Medical): No     Lack of Transportation (Non-Medical): No   Physical Activity: Patient Declined (3/1/2024)    Exercise Vital Sign     Days of Exercise per Week: Patient declined     Minutes of Exercise per Session: Patient declined   Stress: No Stress Concern Present (3/1/2024)    [employer] [place] - Occupational Stress Questionnaire     Feeling of Stress : Not at all   Social Connections: Unknown (3/1/2024)    Social Connection and Isolation Panel [NHANES]     Frequency of Communication with Friends and Family: Once a week     Frequency of Social Gatherings with Friends and Family: Not on file     Attends Anabaptist Services: Not on file     Active Member of Clubs or Organizations: Not on file     Attends Club or Organization Meetings: Not on file     Marital Status: Not on file   Intimate Partner Violence: Not on file   Housing Stability: Low Risk  (3/1/2024)    Housing Stability Vital Sign     Unable to Pay for Housing in the Last Year: No     Number of Places Lived in the Last Year: 1     Unstable Housing in the Last Year: No       Allergies   Allergen Reactions    Milk Diarrhea and GI Upset     (Skim milk) diarrhea    Shellfish Derived Hives and Itching    Iodinated Contrast Media Nausea/vomiting        Medications Prior to  Admission   Medication Sig Dispense Refill Last Dose    tacrolimus (Prograf) 1 mg capsule Take 2 capsules (2 mg) by mouth once daily in the evening.   2/29/2024    aspirin 81 mg EC tablet Take 1 tablet (81 mg) by mouth once daily in the morning.       clotrimazole (Lotrimin) 1 % cream Apply to penis and foreskin twice a day for 2 weeks (Patient not taking: Reported on 2/29/2024) 30 g 0     furosemide (Lasix) 40 mg tablet Take 1 tablet (40 mg) by mouth once daily. (Patient taking differently: Take 1 tablet (40 mg) by mouth every other day.) 30 tablet 2     mycophenolate (Myfortic) 180 mg EC tablet Take 1 tablet (180 mg) by mouth 2 times a day.       Protonix 40 mg EC tablet Take 1 tablet (40 mg) by mouth once daily in the morning.       sodium bicarbonate 650 mg tablet Take 1 tablet (650 mg) by mouth once daily. In the afternoon       sulfamethoxazole-trimethoprim (Bactrim) 400-80 mg tablet Take 1 tablet by mouth once a day on Monday, Wednesday, and Friday.       tacrolimus (Prograf) 1 mg capsule Take 3 capsules (3 mg) by mouth once daily in the morning.   2/29/2024        Meds:   aspirin, 81 mg, q AM  clotrimazole, , BID  heparin (porcine), 5,000 Units, q8h KESHIA  hydrALAZINE, 25 mg, TID  mycophenolate, 180 mg, BID  pantoprazole, 40 mg, q AM  perflutren protein A microsphere, 0.5 mL, Once in imaging  sodium bicarbonate, 650 mg, Daily  sulfamethoxazole-trimethoprim, 1 tablet, Every Mon/Wed/Fri  sulfur hexafluoride microsphr, 2 mL, Once in imaging  tacrolimus, 2 mg, q PM  tacrolimus, 3 mg, q AM              Vitals:    03/01/24 1200   BP: 119/57   Pulse: 60   Resp: 19   Temp: 36.4 °C (97.5 °F)   SpO2: 100%        02/28 1900 - 03/01 0659  In: 50 [P.O.:50]  Out: 850 [Urine:850]   Weight change:      General appearance: AAOx3. No distress  Eyes: non-icteric  Skin: no apparent rash  Heart: JVD + RRR  Lungs: decreased breath sounds in bases, on RA, no tachypnea or conversational dyspnea  Abdomen: soft, distedned  Extremities:  no edema bilat  : no Salgado  Neuro: No FND   ACCESS: LUE AV fistula      Blood Labs:  Results for orders placed or performed during the hospital encounter of 02/29/24 (from the past 24 hour(s))   Electrocardiogram, 12-lead PRN ACS symptoms   Result Value Ref Range    Ventricular Rate 67 BPM    Atrial Rate 75 BPM    QRS Duration 186 ms    QT Interval 486 ms    QTC Calculation(Bazett) 513 ms    R Axis -76 degrees    T Axis 100 degrees    QRS Count 11 beats    Q Onset 201 ms    T Offset 444 ms    QTC Fredericia 504 ms   CBC   Result Value Ref Range    WBC 2.8 (L) 4.4 - 11.3 x10*3/uL    nRBC 0.0 0.0 - 0.0 /100 WBCs    RBC 2.83 (L) 4.50 - 5.90 x10*6/uL    Hemoglobin 9.6 (L) 13.5 - 17.5 g/dL    Hematocrit 25.7 (L) 41.0 - 52.0 %    MCV 91 80 - 100 fL    MCH 33.9 26.0 - 34.0 pg    MCHC 37.4 (H) 32.0 - 36.0 g/dL    RDW 14.2 11.5 - 14.5 %    Platelets 103 (L) 150 - 450 x10*3/uL   Comprehensive metabolic panel   Result Value Ref Range    Glucose 104 (H) 74 - 99 mg/dL    Sodium 140 136 - 145 mmol/L    Potassium 4.8 3.5 - 5.3 mmol/L    Chloride 114 (H) 98 - 107 mmol/L    Bicarbonate 13 (L) 21 - 32 mmol/L    Anion Gap 18 10 - 20 mmol/L    Urea Nitrogen 81 (H) 6 - 23 mg/dL    Creatinine 3.53 (H) 0.50 - 1.30 mg/dL    eGFR 18 (L) >60 mL/min/1.73m*2    Calcium 8.9 8.6 - 10.6 mg/dL    Albumin 4.3 3.4 - 5.0 g/dL    Alkaline Phosphatase 62 33 - 136 U/L    Total Protein 6.7 6.4 - 8.2 g/dL    AST 14 9 - 39 U/L    Bilirubin, Total 1.0 0.0 - 1.2 mg/dL    ALT 5 (L) 10 - 52 U/L   Type and Screen   Result Value Ref Range    ABO TYPE O     Rh TYPE POS     ANTIBODY SCREEN NEG    B-type natriuretic peptide   Result Value Ref Range     (H) 0 - 99 pg/mL   Troponin I, High Sensitivity   Result Value Ref Range    Troponin I, High Sensitivity 845 (HH) 0 - 53 ng/L   Blood Gas Venous Full Panel   Result Value Ref Range    POCT pH, Venous 7.28 (L) 7.33 - 7.43 pH    POCT pCO2, Venous 32 (L) 41 - 51 mm Hg    POCT pO2, Venous 41 35 - 45 mm Hg     POCT SO2, Venous 57 45 - 75 %    POCT Oxy Hemoglobin, Venous 56.1 45.0 - 75.0 %    POCT Hematocrit Calculated, Venous 31.0 (L) 41.0 - 52.0 %    POCT Sodium, Venous 138 136 - 145 mmol/L    POCT Potassium, Venous 4.8 3.5 - 5.3 mmol/L    POCT Chloride, Venous 111 (H) 98 - 107 mmol/L    POCT Ionized Calicum, Venous 1.21 1.10 - 1.33 mmol/L    POCT Glucose, Venous 106 (H) 74 - 99 mg/dL    POCT Lactate, Venous 1.0 0.4 - 2.0 mmol/L    POCT Base Excess, Venous -10.7 (L) -2.0 - 3.0 mmol/L    POCT HCO3 Calculated, Venous 15.0 (L) 22.0 - 26.0 mmol/L    POCT Hemoglobin, Venous 10.2 (L) 13.5 - 17.5 g/dL    POCT Anion Gap, Venous 17.0 10.0 - 25.0 mmol/L    Patient Temperature 37.0 degrees Celsius    FiO2 21 %   Iron and TIBC   Result Value Ref Range    Iron 75 35 - 150 ug/dL    UIBC 177 110 - 370 ug/dL    TIBC 252 240 - 445 ug/dL    % Saturation 30 25 - 45 %   Ferritin   Result Value Ref Range    Ferritin 764 (H) 20 - 300 ng/mL   Magnesium   Result Value Ref Range    Magnesium 1.87 1.60 - 2.40 mg/dL   Coagulation Screen   Result Value Ref Range    Protime 14.6 (H) 9.8 - 12.8 seconds    INR 1.3 (H) 0.9 - 1.1    aPTT 51 (H) 27 - 38 seconds   Tacrolimus level   Result Value Ref Range    Tacrolimus  14.2 <=15.0 ng/mL   Urinalysis with Reflex Microscopic   Result Value Ref Range    Color, Urine Colorless (N) Light-Yellow, Yellow, Dark-Yellow    Appearance, Urine Clear Clear    Specific Gravity, Urine 1.006 1.005 - 1.035    pH, Urine 5.0 5.0, 5.5, 6.0, 6.5, 7.0, 7.5, 8.0    Protein, Urine NEGATIVE NEGATIVE, 10 (TRACE), 20 (TRACE) mg/dL    Glucose, Urine Normal Normal mg/dL    Blood, Urine 0.1 (1+) (A) NEGATIVE    Ketones, Urine NEGATIVE NEGATIVE mg/dL    Bilirubin, Urine NEGATIVE NEGATIVE    Urobilinogen, Urine Normal Normal mg/dL    Nitrite, Urine NEGATIVE NEGATIVE    Leukocyte Esterase, Urine NEGATIVE NEGATIVE   Urine electrolytes   Result Value Ref Range    Sodium, Urine Random 127 mmol/L    Sodium/Creatinine Ratio 672 Not  established. mmol/g Creat    Potassium, Urine Random 8 mmol/L    Potassium/Creatinine Ratio 42 Not established mmol/g Creat    Chloride, Urine Random 138 mmol/L    Chloride/Creatinine Ratio 730 (H) 23 - 275 mmol/g creat    Creatinine, Urine Random 18.9 (L) 20.0 - 370.0 mg/dL   Osmolality, urine   Result Value Ref Range    Osmolality, Urine Random 328 200 - 1,200 mOsm/kg   Microscopic Only, Urine   Result Value Ref Range    WBC, Urine NONE 1-5, NONE /HPF    RBC, Urine 3-5 NONE, 1-2, 3-5 /HPF   Sars-CoV-2 PCR   Result Value Ref Range    Coronavirus 2019, PCR Not Detected Not Detected   Influenza A, and B PCR   Result Value Ref Range    Flu A Result Not Detected Not Detected    Flu B Result Not Detected Not Detected   Transthoracic Echo (TTE) Complete   Result Value Ref Range    AV pk cornelia 1.04 m/s    LVOT diam 2.10 cm    LV biplane EF 33 %    LA vol index A/L 49.1 ml/m2    Tricuspid annular plane systolic excursion 1.0 cm    RV free wall pk S' 7.38 cm/s    LVIDd 6.30 cm    RVSP 59.4 mmHg    Aortic Valve Area by Continuity of Peak Velocity 2.97 cm2    AV pk grad 4.3 mmHg    LV A4C EF 20.9        ASSESSMENT:  Reynaldo Francisco is a 68 y.o. 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  Patient admitted for SOB. Transplant Nephrology consulted for further management.     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 FeUrea not checked  - transplant US pending  - Volume: TTE with ventricular and atrial dilation, moderate reduction in EF with severe tricuspid regurg   - Electrolytes WNL  - Acid base status: NAGMA    - Etiology: Cardiorenal syndrome secondary to  volume overload. Tacrolimus toxicity in differential though unsure if tacrolimus level of 14 is accurate, unclear timing of previous dose.    Plan:  - continue diuresis, goal urine OP 1-1.5L net negative in next 24 hours  - increase sodium bicarb to 1300 mg BID  - renal US pending  - check FeUrea  - tacrolimus trough level at 6 am prior to am 6:30 tacrolimus dose.     Immunosuppression  - on myfortic 180 mg BID, tacrolimus every 12 hours 3 mg am and 2 mg pm. Not on prednisone  - Tacrolimus of 14, unsure if true trough, no dose given as inpatient yesterday.   - continue IS meds at current dosages for now    Infection prophylaxis  - on bactrim    BMD  - calcium and phos WNL    Hemodynamics  - WNL    Yasmany Tracey MD  Nephrology Fellow

## 2024-03-01 NOTE — CONSULTS
[facility]   [facility]  INITIAL CONSULT NOTE       Reason For Consult: history of liver transplant on immunosuppression.    SUBJECTIVE     History Of Present Illness  Reynaldo Francisco is a 68 y.o. 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 Hepatology is consulted for history of liver transplant.    Transplant history:  Mr. Reynaldo Francisco is a 68 year old male who presented initially in October 2008 with syncopal episodes and hypoglycemia. He was eventually diagnosed with an insulinoma of the pancreas. He underwent surgical resection and then soon thereafter was noted to have liver metastases and underwent a right lobe resection in May 2010. Pathology revealed that the primary tumor was from the pancreas. The liver tumors were well differentiated. There was angiolymphatic invasion present.    Undergone surgery and ablation as well as at least 10 rounds of chemoembolization and unfortunately has continued to have evidence of progression. The patient eventually underwent a liver transplant in January of 2018. His explant revealed a 30cm tumor. There was progressive underlying hepatic fibrosis (3/4).     Pathology from explant:  ALLOGRAFT LIVER, NEEDLE CORE BIOPSY:  A. FOCAL MILD PORTAL TRACT INFLAMMATION, INDETERMINANT FOR ACUTE T-CELL MEDIATED REJECTION (RAO SCORE: 1/9).  B. 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 (SEE COMMENT).  C. INCREASED IRON DEPOSITS PREDOMINANTLY IN KUPFFER CELLS 3+ (IN A SCALE 0-4). (SEE COMMENT).  D. C4d ON FORMALIN FIXED TISSUE: FOCAL IN PORTAL MICROVASCULATURE. CORRELATION WITH DSA LEVEL IS RECOMMENDED.  E. MILD PORTAL/PERIPORTAL AND CENTRAL FIBROSIS.  F. NO PREVIOUS BIOPSY AVAILABLE FOR COMPARISON.  COMMENT:  The  clinical history of metastatic neuroendocrine tumor, status post liver transplant on January 2018 is noted.  The presence of nodular regenerative hyperplasia (NRH) with associated shunt vessels may be seen with hepatic inflow/outflow vascular abnormalities.  Given the histopathologic findings of ductular reaction and elevated gamma glutamyl transpeptidase, biliary workup was performed. CK 7 is negative for ductular metaplasia of periportal hepatocytes and copper is negative.  Therefore chronic cholestatic injury is unlikely.  The moderately increased iron stores present primarily in Kupffer cells and macrophages can be associated with renal failure/hemolysis, transfusions, and other sources of exogenous iron overload.    Patient was not feeling well with increasing shortness of breath and weight gain with leg swelling. Diuretics were held per [facility] physicians due to worsening kidney function.  Since his liver transplant, he denies jaundice, pruritus, melena, hematemesis, pale stool, dark urine. He has not required paracentesis. He has no history of EV.     Review of Systems: negative except as per HPI    Past Medical History:    Past Medical History:   Diagnosis Date    Arteriovenous fistula, acquired (CMS/HCC) 06/17/2022    AV fistula    Lung nodule     Nutritional anemia, unspecified     Anemia, deficiency    Personal history of malignant neoplasm of pancreas 06/17/2022    History of malignant neoplasm of pancreas       Home Medications  Medications Prior to Admission   Medication Sig Dispense Refill Last Dose    tacrolimus (Prograf) 1 mg capsule Take 2 capsules (2 mg) by mouth once daily in the evening.   2/29/2024    aspirin 81 mg EC tablet Take 1 tablet (81 mg) by mouth once daily in the morning.       clotrimazole (Lotrimin) 1 % cream Apply to penis and foreskin twice a day for 2 weeks (Patient not taking: Reported on 2/29/2024) 30 g 0     furosemide (Lasix) 40 mg tablet Take 1 tablet (40 mg) by mouth once  daily. (Patient taking differently: Take 1 tablet (40 mg) by mouth every other day.) 30 tablet 2     mycophenolate (Myfortic) 180 mg EC tablet Take 1 tablet (180 mg) by mouth 2 times a day.       Protonix 40 mg EC tablet Take 1 tablet (40 mg) by mouth once daily in the morning.       sodium bicarbonate 650 mg tablet Take 1 tablet (650 mg) by mouth once daily. In the afternoon       sulfamethoxazole-trimethoprim (Bactrim) 400-80 mg tablet Take 1 tablet by mouth once a day on Monday, Wednesday, and Friday.       tacrolimus (Prograf) 1 mg capsule Take 3 capsules (3 mg) by mouth once daily in the morning.   2/29/2024         Surgical History:    Past Surgical History:   Procedure Laterality Date    CARDIAC ELECTROPHYSIOLOGY PROCEDURE Right 10/23/2023    Procedure: PPM Single Ventricle Implant;  Surgeon: Cosme Pina MD;  Location: [facility] Cardiac Cath Lab;  Service: Electrophysiology;  Laterality: Right;  medtronic notified    LIVER TRANSPLANTATION      OTHER SURGICAL HISTORY  03/17/2022    Pancreatic surgery    OTHER SURGICAL HISTORY  06/17/2022    Liver surgery    OTHER SURGICAL HISTORY  06/17/2022    Liver transplantation    US KIDNEY TRANSPLANT         Allergies:    Allergies   Allergen Reactions    Milk Diarrhea and GI Upset     (Skim milk) diarrhea    Shellfish Derived Hives and Itching    Iodinated Contrast Media Nausea/vomiting       Social History:    Social History     Socioeconomic History    Marital status:      Spouse name: Not on file    Number of children: Not on file    Years of education: Not on file    Highest education level: Not on file   Occupational History    Not on file   Tobacco Use    Smoking status: Never    Smokeless tobacco: Never   Vaping Use    Vaping Use: Never used   Substance and Sexual Activity    Alcohol use: Never    Drug use: Not Currently    Sexual activity: Not on file   Other Topics Concern    Not on file   Social History Narrative    Not on file     Social Determinants of  Health     Financial Resource Strain: Low Risk  (3/1/2024)    Overall Financial Resource Strain (CARDIA)     Difficulty of Paying Living Expenses: Not hard at all   Food Insecurity: Unknown (3/1/2024)    Hunger Vital Sign     Worried About Running Out of Food in the Last Year: Patient declined     Ran Out of Food in the Last Year: Patient unable to answer   Transportation Needs: No Transportation Needs (3/1/2024)    PRAPARE - Transportation     Lack of Transportation (Medical): No     Lack of Transportation (Non-Medical): No   Physical Activity: Patient Declined (3/1/2024)    Exercise Vital Sign     Days of Exercise per Week: Patient declined     Minutes of Exercise per Session: Patient declined   Stress: No Stress Concern Present (3/1/2024)    [employer] - Occupational Stress Questionnaire     Feeling of Stress : Not at all   Social Connections: Unknown (3/1/2024)    Social Connection and Isolation Panel [NHANES]     Frequency of Communication with Friends and Family: Once a week     Frequency of Social Gatherings with Friends and Family: Not on file     Attends Judaism Services: Not on file     Active Member of Clubs or Organizations: Not on file     Attends Club or Organization Meetings: Not on file     Marital Status: Not on file   Intimate Partner Violence: Not on file   Housing Stability: Low Risk  (3/1/2024)    Housing Stability Vital Sign     Unable to Pay for Housing in the Last Year: No     Number of Places Lived in the Last Year: 1     Unstable Housing in the Last Year: No       Family History:    Family History   Problem Relation Name Age of Onset    Diabetes Mother      Hypertension Mother         EXAM     Vitals:    Vitals:    03/01/24 0610 03/01/24 0700 03/01/24 0800 03/01/24 0900   BP: 148/79 154/76 (!) 164/99 147/72   BP Location: Right leg  Right leg    Patient Position: Lying  Lying    Pulse: 66 60 61 61   Resp: 17 19 20 20   Temp:   36.1 °C (97 °F)    TempSrc:    Temporal    SpO2: 98% 100% 100% 99%   Weight:  61.6 kg (135 lb 12.9 oz)     Height:         Failed to redirect to the Timeline [facility] the [facility].    Intake/Output Summary (Last 24 hours) at 3/1/2024 1014  Last data filed at 3/1/2024 0900  Gross per 24 hour   Intake 130 ml   Output 1150 ml   Net -1020 ml         Physical Exam  General: chronically ill appearing, no acute distress  HEENT: EOMI. Moist mucosa  Respiratory: nonlabored breathing on room air  Cardiovascular: RRR, no murmurs/rubs/gallops  Abdomen: Soft, nontender, nondistended, bowel sounds present. No masses palpated  Extremities: pitting edema of lower extremities no asterixis  Neuro: alert and oriented, CNII-XII grossly intact, moves all 4 extremities with no focal deficits    OBJECTIVE                                                                              Medications       Current Facility-Administered Medications:     aspirin EC tablet 81 mg, 81 mg, oral, q AM, Vlad Mandujano MD, 81 mg at 03/01/24 0855    clotrimazole (Lotrimin) 1 % cream, , Topical, BID, Vlad Mandujano MD    heparin (porcine) injection 5,000 Units, 5,000 Units, subcutaneous, q8h KESHIA, Vlad Mandujano MD, 5,000 Units at 03/01/24 0855    hydrALAZINE (Apresoline) tablet 25 mg, 25 mg, oral, TID, Mei P MD Dhruv    mycophenolate (Myfortic) EC tablet 180 mg, 180 mg, oral, BID, Vlad Mandujano MD, 180 mg at 03/01/24 0855    pantoprazole (ProtoNix) EC tablet 40 mg, 40 mg, oral, q AM, Vlad Mandujano MD, 40 mg at 03/01/24 0855    sodium bicarbonate tablet 650 mg, 650 mg, oral, Daily, Vlad Mandujano MD, 650 mg at 03/01/24 0855    sulfamethoxazole-trimethoprim (Bactrim) 400-80 mg per tablet 1 tablet, 1 tablet, oral, Every Mon/Wed/Fri, Vlad Mandujano MD, 1 tablet at 03/01/24 0855    tacrolimus (Prograf) capsule 2 mg, 2 mg, oral, q PM, Vlad Mandujano MD    tacrolimus (Prograf) capsule 3 mg, 3 mg, oral, q AM, Vlad Mandujano MD, 3 mg at 03/01/24 0603                                                                             Labs     Results for orders placed or performed during the hospital encounter of 02/29/24 (from the past 24 hour(s))   Electrocardiogram, 12-lead PRN ACS symptoms   Result Value Ref Range    Ventricular Rate 67 BPM    Atrial Rate 75 BPM    QRS Duration 186 ms    QT Interval 486 ms    QTC Calculation(Bazett) 513 ms    R Axis -76 degrees    T Axis 100 degrees    QRS Count 11 beats    Q Onset 201 ms    T Offset 444 ms    QTC Fredericia 504 ms   CBC   Result Value Ref Range    WBC 2.8 (L) 4.4 - 11.3 x10*3/uL    nRBC 0.0 0.0 - 0.0 /100 WBCs    RBC 2.83 (L) 4.50 - 5.90 x10*6/uL    Hemoglobin 9.6 (L) 13.5 - 17.5 g/dL    Hematocrit 25.7 (L) 41.0 - 52.0 %    MCV 91 80 - 100 fL    MCH 33.9 26.0 - 34.0 pg    MCHC 37.4 (H) 32.0 - 36.0 g/dL    RDW 14.2 11.5 - 14.5 %    Platelets 103 (L) 150 - 450 x10*3/uL   Comprehensive metabolic panel   Result Value Ref Range    Glucose 104 (H) 74 - 99 mg/dL    Sodium 140 136 - 145 mmol/L    Potassium 4.8 3.5 - 5.3 mmol/L    Chloride 114 (H) 98 - 107 mmol/L    Bicarbonate 13 (L) 21 - 32 mmol/L    Anion Gap 18 10 - 20 mmol/L    Urea Nitrogen 81 (H) 6 - 23 mg/dL    Creatinine 3.53 (H) 0.50 - 1.30 mg/dL    eGFR 18 (L) >60 mL/min/1.73m*2    Calcium 8.9 8.6 - 10.6 mg/dL    Albumin 4.3 3.4 - 5.0 g/dL    Alkaline Phosphatase 62 33 - 136 U/L    Total Protein 6.7 6.4 - 8.2 g/dL    AST 14 9 - 39 U/L    Bilirubin, Total 1.0 0.0 - 1.2 mg/dL    ALT 5 (L) 10 - 52 U/L   Type and Screen   Result Value Ref Range    ABO TYPE O     Rh TYPE POS     ANTIBODY SCREEN NEG    B-type natriuretic peptide   Result Value Ref Range     (H) 0 - 99 pg/mL   Troponin I, High Sensitivity   Result Value Ref Range    Troponin I, High Sensitivity 845 (HH) 0 - 53 ng/L   Blood Gas Venous Full Panel   Result Value Ref Range    POCT pH, Venous 7.28 (L) 7.33 - 7.43 pH    POCT pCO2, Venous 32 (L) 41 - 51 mm Hg    POCT pO2, Venous 41 35 - 45 mm Hg    POCT SO2, Venous 57 45 - 75 %    POCT Oxy  Hemoglobin, Venous 56.1 45.0 - 75.0 %    POCT Hematocrit Calculated, Venous 31.0 (L) 41.0 - 52.0 %    POCT Sodium, Venous 138 136 - 145 mmol/L    POCT Potassium, Venous 4.8 3.5 - 5.3 mmol/L    POCT Chloride, Venous 111 (H) 98 - 107 mmol/L    POCT Ionized Calicum, Venous 1.21 1.10 - 1.33 mmol/L    POCT Glucose, Venous 106 (H) 74 - 99 mg/dL    POCT Lactate, Venous 1.0 0.4 - 2.0 mmol/L    POCT Base Excess, Venous -10.7 (L) -2.0 - 3.0 mmol/L    POCT HCO3 Calculated, Venous 15.0 (L) 22.0 - 26.0 mmol/L    POCT Hemoglobin, Venous 10.2 (L) 13.5 - 17.5 g/dL    POCT Anion Gap, Venous 17.0 10.0 - 25.0 mmol/L    Patient Temperature 37.0 degrees Celsius    FiO2 21 %   Iron and TIBC   Result Value Ref Range    Iron 75 35 - 150 ug/dL    UIBC 177 110 - 370 ug/dL    TIBC 252 240 - 445 ug/dL    % Saturation 30 25 - 45 %   Ferritin   Result Value Ref Range    Ferritin 764 (H) 20 - 300 ng/mL   Magnesium   Result Value Ref Range    Magnesium 1.87 1.60 - 2.40 mg/dL   Coagulation Screen   Result Value Ref Range    Protime 14.6 (H) 9.8 - 12.8 seconds    INR 1.3 (H) 0.9 - 1.1    aPTT 51 (H) 27 - 38 seconds   Tacrolimus level   Result Value Ref Range    Tacrolimus  14.2 <=15.0 ng/mL   Urinalysis with Reflex Microscopic   Result Value Ref Range    Color, Urine Colorless (N) Light-Yellow, Yellow, Dark-Yellow    Appearance, Urine Clear Clear    Specific Gravity, Urine 1.006 1.005 - 1.035    pH, Urine 5.0 5.0, 5.5, 6.0, 6.5, 7.0, 7.5, 8.0    Protein, Urine NEGATIVE NEGATIVE, 10 (TRACE), 20 (TRACE) mg/dL    Glucose, Urine Normal Normal mg/dL    Blood, Urine 0.1 (1+) (A) NEGATIVE    Ketones, Urine NEGATIVE NEGATIVE mg/dL    Bilirubin, Urine NEGATIVE NEGATIVE    Urobilinogen, Urine Normal Normal mg/dL    Nitrite, Urine NEGATIVE NEGATIVE    Leukocyte Esterase, Urine NEGATIVE NEGATIVE   Microscopic Only, Urine   Result Value Ref Range    WBC, Urine NONE 1-5, NONE /HPF    RBC, Urine 3-5 NONE, 1-2, 3-5 /HPF                                                                               Imaging           XR Chest 3/1/24  IMPRESSION:  1. Subsegmental atelectasis and right pleural effusion appears slightly increased in degree in comparison to previous study.  2. Moderate cardiomegaly    MR elastography January 2024  Markedly decreased T2 signal intensity suggests iron deposition; this  also likely accounts for irregular, patchy diminished signal  intensity throughout the liver at in and out of phase imaging. Iron  deposition also likely accounts for nondiagnostic MR elastography. In  any event, manifestations of confounding right heart dysfunction  shown at Doppler interrogation would make elastography assessment of fibrosis problematic.                                                                          GI Procedures     Normal EGD and colonoscopy prior to kidney transplant per patient    ASSESSMENT / PLAN                  ASSESSMENT/PLAN:  Reynaldo Francisco is a 68 y.o. male presenting with with shortness of breath, acute on chronic heart failure and KIA.  Hepatology is consulted for immunosuppression in the setting of transplant. At this time, his liver function is appropriate and immunosuppression is best guided by the needs of his more recent kidney transplant.  Current IS regimen is tacrolimus 2mg BID,  mg BIDwith bactrim for PJP ppx.    Recommendations:  -Consider contacting transplant team at [facility]  -Immunosuppression with ppx per transplant nephrology    Patient was seen and discussed with Dr. Upton.    Recommendations communicated with the primary team via telephone.  Thank you for the consultation. Hepatology will sign off.    - During weekday hours of 7am- 5pm, please do not hesitate to contact me on [employer] Chat or page [phone] if there are any further questions   - After hours, on weekends, and on holidays, please page the on-call GI fellow at [phone]. Thank you.       Lata Harris MD  PGY4 Gastroenterology Fellow  [facility]

## 2024-03-01 NOTE — POST-PROCEDURE NOTE
Physician Transition of Care Summary  Invasive Cardiovascular Lab    Procedure Date: 3/1/2024  Attending:    * Pedro Pantoja - Primary  Resident/Fellow/Other Assistant: Surgeon(s) and Role:     * Juan Eduardo MD MPH - Fellow     * Sixto Christopher MD - Fellow    Indications:   Pre-op Diagnosis     * Acute systolic (congestive) heart failure (CMS/HCC) [I50.21]    Post-procedure diagnosis:   Post-op Diagnosis     * Acute systolic (congestive) heart failure (CMS/HCC) [I50.21]    Procedure(s):   [place] Insertion  [id number] - CO INSERTION FLOW DIRECTED CATHETER FOR MONITORING    Procedure Findings:   RHC: elevated right- and left-sided filling pressures, normal cardiac output    RA: 18  RV: 46 (EDP 17)  PA: 51/25 (mean 36)  PCWP: 33 (large v-wave)    PA sat: 69%  CO/CI: 5.4/3.1     Description of the Procedure:   9Fr introducer via R CFV access with leave-in [place] catheter placement, locked at 72 cm.    Complications:   None.    Stents/Implants:   None.     Anticoagulation/Antiplatelet Plan:   Per primary team.     Estimated Blood Loss:   5 mL    Anesthesia: Moderate Sedation Anesthesia Staff: No anesthesia staff entered.    Any Specimen(s) Removed:   No specimens collected during this procedure.    Disposition:   Return to CICU.     Electronically signed by: Sixto Christopher MD, 3/1/2024 5:06 PM

## 2024-03-02 NOTE — PROGRESS NOTES
"Reynaldo Francisco is a 68 y.o. male on day 2 of admission presenting with Heart failure (CMS/HCC).    Subjective   No overnight events. Patient denies any chest pain, SOB, palpitations or abdominal pain. This morning he is endorsing nausea but no vomiting. He also complaining of low back pain.        Objective     Physical Exam    Constitutional: chronically ill appearing  Eyes: EOMI, clear sclera  HENT: MMM  Head/neck: + JVD to mandible   Resp/thorax: decreased breath sounds at RLL  CV: +S1/S2, RRR, no m/r/g  GI: distended, dull to percussion   Ext: no edema/asymmetry  Neuro: AOx4, pleasant, conversational, following commands, no gross focal neuro deficits  Skin: warm and dry  Psych: mood/affect appropriate        Last Recorded Vitals  Blood pressure 131/71, pulse 61, temperature 36.8 °C (98.2 °F), temperature source Temporal, resp. rate 15, height 1.727 m (5' 8\"), weight 61.6 kg (135 lb 12.9 oz), SpO2 96 %.  Intake/Output last 3 Shifts:  I/O last 3 completed shifts:  In: 130 (2.1 mL/kg) [P.O.:130]  Out: 2305 (37.4 mL/kg) [Urine:2300 (1 mL/kg/hr); Blood:5]  Weight: 61.6 kg     Relevant Results  Results for orders placed or performed during the hospital encounter of 02/29/24 (from the past 24 hour(s))   BLOOD GAS MIXED VENOUS FULL PANEL   Result Value Ref Range    POCT pH, Mixed 7.33 7.33 - 7.43 pH    POCT pCO2, Mixed 26 (L) 41 - 51 mm Hg    POCT pO2, Mixed 48 (H) 35 - 45 mm Hg    POCT SO2, Mixed 74 45 - 75 %    POCT Oxy Hemoglobin, Mixed 73.0 45.0 - 75.0 %    POCT Hematocrit Calculated, Mixed 33.0 (L) 41.0 - 52.0 %    POCT Sodium, Mixed 138 136 - 145 mmol/L    POCT Potassium, Mixed 4.5 3.5 - 5.3 mmol/L    POCT Chloride, Mixed 109 (H) 98 - 107 mmol/L    POCT Ionized Calcium, Mixed 1.26 1.10 - 1.33 mmol/L    POCT Glucose, Mixed 93 74 - 99 mg/dL    POCT Lactate, Mixed 0.7 0.4 - 2.0 mmol/L    POCT Base Excess, Mixed -10.8 (L) -2.0 - 3.0 mmol/L    POCT HCO3 Calculated, Mixed 13.7 (L) 22.0 - 26.0 mmol/L    POCT Hemoglobin, " Mixed 11.1 (L) 13.5 - 17.5 g/dL    POCT Anion Gap, Mixed 20 10 - 25 mmo/L    Patient Temperature 37.0 degrees Celsius    FiO2 21 %   BLOOD GAS MIXED VENOUS FULL PANEL   Result Value Ref Range    POCT pH, Mixed 7.38 7.33 - 7.43 pH    POCT pCO2, Mixed 28 (L) 41 - 51 mm Hg    POCT pO2, Mixed 50 (H) 35 - 45 mm Hg    POCT SO2, Mixed 76 (H) 45 - 75 %    POCT Oxy Hemoglobin, Mixed 74.9 45.0 - 75.0 %    POCT Hematocrit Calculated, Mixed 30.0 (L) 41.0 - 52.0 %    POCT Sodium, Mixed 136 136 - 145 mmol/L    POCT Potassium, Mixed 4.7 3.5 - 5.3 mmol/L    POCT Chloride, Mixed 110 (H) 98 - 107 mmol/L    POCT Ionized Calcium, Mixed 1.17 1.10 - 1.33 mmol/L    POCT Glucose, Mixed 129 (H) 74 - 99 mg/dL    POCT Lactate, Mixed 0.7 0.4 - 2.0 mmol/L    POCT Base Excess, Mixed -7.4 (L) -2.0 - 3.0 mmol/L    POCT HCO3 Calculated, Mixed 16.6 (L) 22.0 - 26.0 mmol/L    POCT Hemoglobin, Mixed 10.1 (L) 13.5 - 17.5 g/dL    POCT Anion Gap, Mixed 14 10 - 25 mmo/L    Patient Temperature 37.0 degrees Celsius    FiO2 21 %   Tacrolimus level   Result Value Ref Range    Tacrolimus  11.3 <=15.0 ng/mL   CBC and Auto Differential   Result Value Ref Range    WBC 3.8 (L) 4.4 - 11.3 x10*3/uL    nRBC 0.0 0.0 - 0.0 /100 WBCs    RBC 2.92 (L) 4.50 - 5.90 x10*6/uL    Hemoglobin 9.9 (L) 13.5 - 17.5 g/dL    Hematocrit 27.2 (L) 41.0 - 52.0 %    MCV 93 80 - 100 fL    MCH 33.9 26.0 - 34.0 pg    MCHC 36.4 (H) 32.0 - 36.0 g/dL    RDW 14.5 11.5 - 14.5 %    Platelets 103 (L) 150 - 450 x10*3/uL    Neutrophils % 71.0 40.0 - 80.0 %    Immature Granulocytes %, Automated 0.0 0.0 - 0.9 %    Lymphocytes % 15.4 13.0 - 44.0 %    Monocytes % 12.5 2.0 - 10.0 %    Eosinophils % 0.8 0.0 - 6.0 %    Basophils % 0.3 0.0 - 2.0 %    Neutrophils Absolute 2.68 1.20 - 7.70 x10*3/uL    Immature Granulocytes Absolute, Automated 0.00 0.00 - 0.70 x10*3/uL    Lymphocytes Absolute 0.58 (L) 1.20 - 4.80 x10*3/uL    Monocytes Absolute 0.47 0.10 - 1.00 x10*3/uL    Eosinophils Absolute 0.03 0.00 - 0.70  x10*3/uL    Basophils Absolute 0.01 0.00 - 0.10 x10*3/uL   Renal function panel   Result Value Ref Range    Glucose 113 (H) 74 - 99 mg/dL    Sodium 140 136 - 145 mmol/L    Potassium 4.7 3.5 - 5.3 mmol/L    Chloride 109 (H) 98 - 107 mmol/L    Bicarbonate 16 (L) 21 - 32 mmol/L    Anion Gap 20 10 - 20 mmol/L    Urea Nitrogen 83 (H) 6 - 23 mg/dL    Creatinine 3.70 (H) 0.50 - 1.30 mg/dL    eGFR 17 (L) >60 mL/min/1.73m*2    Calcium 9.5 8.6 - 10.6 mg/dL    Phosphorus 4.7 2.5 - 4.9 mg/dL    Albumin 4.7 3.4 - 5.0 g/dL   Magnesium   Result Value Ref Range    Magnesium 1.79 1.60 - 2.40 mg/dL   BLOOD GAS MIXED VENOUS FULL PANEL   Result Value Ref Range    POCT pH, Mixed 7.36 7.33 - 7.43 pH    POCT pCO2, Mixed 28 (L) 41 - 51 mm Hg    POCT pO2, Mixed 48 (H) 35 - 45 mm Hg    POCT SO2, Mixed 72 45 - 75 %    POCT Oxy Hemoglobin, Mixed 70.3 45.0 - 75.0 %    POCT Hematocrit Calculated, Mixed 30.0 (L) 41.0 - 52.0 %    POCT Sodium, Mixed 137 136 - 145 mmol/L    POCT Potassium, Mixed 4.8 3.5 - 5.3 mmol/L    POCT Chloride, Mixed 109 (H) 98 - 107 mmol/L    POCT Ionized Calcium, Mixed 1.21 1.10 - 1.33 mmol/L    POCT Glucose, Mixed 110 (H) 74 - 99 mg/dL    POCT Lactate, Mixed 0.7 0.4 - 2.0 mmol/L    POCT Base Excess, Mixed -8.5 (L) -2.0 - 3.0 mmol/L    POCT HCO3 Calculated, Mixed 15.8 (L) 22.0 - 26.0 mmol/L    POCT Hemoglobin, Mixed 10.1 (L) 13.5 - 17.5 g/dL    POCT Anion Gap, Mixed 17 10 - 25 mmo/L    Patient Temperature 37.0 degrees Celsius    FiO2 21 %           Assessment/Plan   Principal Problem:    Heart failure (CMS/[facility], located within [facility])  Active Problems:    Acute systolic (congestive) heart failure (CMS/HCC)    Reynaldo Francisco is a 68 y.o. 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 I suspect renal injury is due to cardiomyopathy which progressed to HF syndrome when the diuretics were held. Evidence include reduced EF, grossly overloaded on exam and previous unremarkable renal testing (CMV, US) albeit no biopsy completed. As to the cause of the cardiomyopathy would appear to be PM - mediated.  :: CT PET at OSH did not show ischemia  - Still awaiting Robert narayan placement for diuresis. To be placed in cath lab due to difficult vascular access  -Consider CRTD upgrade when stable  -Daily RFP and Mg for K>4, Mg >2  - Started Bumex gtt at 1 mg /hr      #Type A aortic dissection  #HTN  :: No outpatient anti-hypertensive.   -Hydralazine 25mg tid started     RENAL  #KIA on CKD (baseline Scr 2.1-2.2), non-oliguric   #ESRD s/p kidney transplantation (on tacromlimus and mycophenolate)  :: Suspect cardiorenal syndrome. Not responsive to Lasix 40 or Bumex 2 IV.   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   - Per recs consider contacting transplant team at [facility]      #anemia, multifactorial (baseline Hgb 10)   - at baseline.   -iron studies: 75, TIBC 252, ferritin 764     Fluids: no  Access: IV  Antibiotics: none   Electrolytes: none  Nutrition: cardiac   PPI: none  DVT: Hep 5000  TID  CODE: FULL CODE  [facility] (wife) [phone]              Devon Schmitz MD

## 2024-03-02 NOTE — CARE PLAN
Problem: Pain  Goal: My pain/discomfort is manageable  Outcome: Progressing  Flowsheets (Taken 3/2/2024 1014)  Resident's pain/discomfort is manageable:   Include resident/family/caregiver in decisions related to pain management   Administer pain medication prior to activities that may trigger pain   Offer non-pharmacological pain management interventions     Problem: Daily Care  Goal: Daily care needs are met  Outcome: Progressing  Flowsheets (Taken 3/2/2024 1014)  Daily care needs are met:   Assess and monitor ability to perform self care and identify potential discharge needs   Provide mouth care   Assist patient with activities of daily living as needed   Assess skin integrity/risk for skin breakdown   Encourage independent activity per ability     Problem: Psychosocial Needs  Goal: Demonstrates ability to cope with hospitalization/illness  Outcome: Progressing  Flowsheets (Taken 3/2/2024 1015)  Demonstrates ability to cope with hospitalization/illness:   Encourage verbalization of feelings/concerns/expectations   Encourage participation in diversional activities   Provide low-stimulation environment as needed   Assist resident to identify and practice own strengths and abilities  Goal: Collaborate with me, my family, and caregiver to identify my specific goals  Outcome: Progressing  Flowsheets (Taken 3/1/2024 0245 by Concepcion Carver RN)  Cultural Requests During Hospitalization: none  Spiritual Requests During Hospitalization: none     Problem: Discharge Barriers  Goal: My discharge needs are met  Outcome: Progressing  Flowsheets (Taken 3/2/2024 1015)  Resident's discharge needs are met:   Identify potential discharge barriers on admission and throughout stay   Involve resident/family/caregiver in discharge planning process     Problem: Skin  Goal: Decreased wound size/increased tissue granulation at next dressing change  Outcome: Progressing  Flowsheets (Taken 3/2/2024 1015)  Decreased wound  size/increased tissue granulation at next dressing change:   Promote sleep for wound healing   Protective dressings over bony prominences   Utilize specialty bed per algorithm  Goal: Participates in plan/prevention/treatment measures  Outcome: Progressing  Flowsheets (Taken 3/2/2024 1015)  Participates in plan/prevention/treatment measures:   Increase activity/out of bed for meals   Elevate heels   Discuss with provider PT/OT consult  Goal: Prevent/manage excess moisture  Outcome: Progressing  Flowsheets (Taken 3/2/2024 1015)  Prevent/manage excess moisture:   Moisturize dry skin   Cleanse incontinence/protect with barrier cream   Monitor for/manage infection if present   Follow provider orders for dressing changes  Goal: Prevent/minimize sheer/friction injuries  Outcome: Progressing  Flowsheets (Taken 3/2/2024 1015)  Prevent/minimize sheer/friction injuries:   Increase activity/out of bed for meals   HOB 30 degrees or less   Complete micro-shifts as needed if patient unable. Adjust patient position to relieve pressure points, not a full turn   Turn/reposition every 2 hours/use positioning/transfer devices  Goal: Promote/optimize nutrition  Outcome: Progressing  Flowsheets (Taken 3/2/2024 1015)  Promote/optimize nutrition:   Discuss with provider if NPO > 2 days   Monitor/record intake including meals   Consume > 50% meals/supplements   Assist with feeding  Goal: Promote skin healing  Outcome: Progressing  Flowsheets (Taken 3/2/2024 1015)  Promote skin healing:   Ensure correct size (line/device) and apply per  instructions   Turn/reposition every 2 hours/use positioning/transfer devices   Protective dressings over bony prominences   Rotate device position/do not position patient on device   Assess skin/pad under line(s)/device(s)    The patient's goals for the shift include  diuresing.     The clinical goals for the shift include Patient will remain HD stable throughout the shift.

## 2024-03-02 NOTE — CARE PLAN
The patient's goals for the shift include  manage back discomfort    The clinical goals for the shift include pt will have adequate urine output throughout shift      Problem: Heart Failure  Goal: Improved gas exchange this shift  Outcome: Progressing  Goal: Improved urinary output this shift  Outcome: Progressing  Goal: Reduction in peripheral edema within 24 hours  Outcome: Progressing  Goal: Report improvement of dyspnea/breathlessness this shift  Outcome: Progressing  Goal: Weight from fluid excess reduced over 2-3 days, then stabilize  Outcome: Progressing  Goal: Increase self care and/or family involvement in 24 hours  Outcome: Progressing     Problem: Pain  Goal: My pain/discomfort is manageable  Outcome: Progressing     Problem: Safety  Goal: Patient will be injury free during hospitalization  Outcome: Progressing  Goal: I will remain free of falls  Outcome: Progressing     Problem: Daily Care  Goal: Daily care needs are met  Outcome: Progressing     Problem: Psychosocial Needs  Goal: Demonstrates ability to cope with hospitalization/illness  Outcome: Progressing  Goal: Collaborate with me, my family, and caregiver to identify my specific goals  Outcome: Progressing     Problem: Discharge Barriers  Goal: My discharge needs are met  Outcome: Progressing

## 2024-03-03 NOTE — PROGRESS NOTES
"Reynaldo Francisco is a 68 y.o. male on day 3 of admission presenting with Heart failure (CMS/HCC).    Was on Bumex drip yest, net -2L/24h.   Cr worse this AM, 4.3.   Pt reports feeling continued low back discomfort  Nonoliguric.   PCWP 24 this am.    Scheduled medications  aspirin, 81 mg, oral, q AM  clotrimazole, , Topical, BID  heparin (porcine), 5,000 Units, subcutaneous, q8h KESHIA  hydrALAZINE, 25 mg, oral, TID  lidocaine, 1 patch, transdermal, Daily  mycophenolate, 180 mg, oral, BID  pantoprazole, 40 mg, oral, q AM  perflutren protein A microsphere, 0.5 mL, intravenous, Once in imaging  sodium bicarbonate, 1,300 mg, oral, BID  sulfamethoxazole-trimethoprim, 1 tablet, oral, Every Mon/Wed/Fri  sulfur hexafluoride microsphr, 2 mL, intravenous, Once in imaging  tacrolimus, 1.5 mg, oral, q12h KESHIA      Continuous medications      Last Recorded Vitals  Blood pressure 132/71, pulse 60, temperature 36.4 °C (97.5 °F), temperature source Temporal, resp. rate 18, height 1.727 m (5' 8\"), weight 61.6 kg (135 lb 12.9 oz), SpO2 95 %.  Intake/Output last 3 Shifts:  I/O last 3 completed shifts:  In: 553.8 (9 mL/kg) [P.O.:477; I.V.:76.8 (1.2 mL/kg)]  Out: 3600 (58.4 mL/kg) [Urine:3550 (1.6 mL/kg/hr); Emesis/NG output:50]  Weight: 61.6 kg      A&ox3, no distress, pleasant  Lungs with scattered basilar crackles, no distress  JVD+  Rrr, no r/g  Abd soft, nt, nd  No allograft tenderness  No edema b/l    Labs:  Results for orders placed or performed during the hospital encounter of 02/29/24 (from the past 24 hour(s))   BLOOD GAS MIXED VENOUS FULL PANEL   Result Value Ref Range    POCT pH, Mixed 7.41 7.33 - 7.43 pH    POCT pCO2, Mixed 30 (L) 41 - 51 mm Hg    POCT pO2, Mixed 46 (H) 35 - 45 mm Hg    POCT SO2, Mixed 71 45 - 75 %    POCT Oxy Hemoglobin, Mixed 69.2 45.0 - 75.0 %    POCT Hematocrit Calculated, Mixed 30.0 (L) 41.0 - 52.0 %    POCT Sodium, Mixed 138 136 - 145 mmol/L    POCT Potassium, Mixed 4.7 3.5 - 5.3 mmol/L    POCT Chloride, Mixed " 109 (H) 98 - 107 mmol/L    POCT Ionized Calcium, Mixed 1.20 1.10 - 1.33 mmol/L    POCT Glucose, Mixed 120 (H) 74 - 99 mg/dL    POCT Lactate, Mixed 0.6 0.4 - 2.0 mmol/L    POCT Base Excess, Mixed -4.8 (L) -2.0 - 3.0 mmol/L    POCT HCO3 Calculated, Mixed 19.0 (L) 22.0 - 26.0 mmol/L    POCT Hemoglobin, Mixed 10.1 (L) 13.5 - 17.5 g/dL    POCT Anion Gap, Mixed 15 10 - 25 mmo/L    Patient Temperature 37.0 degrees Celsius    FiO2 21 %   CBC   Result Value Ref Range    WBC 3.7 (L) 4.4 - 11.3 x10*3/uL    nRBC 0.0 0.0 - 0.0 /100 WBCs    RBC 3.02 (L) 4.50 - 5.90 x10*6/uL    Hemoglobin 9.9 (L) 13.5 - 17.5 g/dL    Hematocrit 27.3 (L) 41.0 - 52.0 %    MCV 90 80 - 100 fL    MCH 32.8 26.0 - 34.0 pg    MCHC 36.3 (H) 32.0 - 36.0 g/dL    RDW 14.0 11.5 - 14.5 %    Platelets 105 (L) 150 - 450 x10*3/uL   Renal Function Panel   Result Value Ref Range    Glucose 117 (H) 74 - 99 mg/dL    Sodium 141 136 - 145 mmol/L    Potassium 4.7 3.5 - 5.3 mmol/L    Chloride 109 (H) 98 - 107 mmol/L    Bicarbonate 18 (L) 21 - 32 mmol/L    Anion Gap 19 10 - 20 mmol/L    Urea Nitrogen 86 (H) 6 - 23 mg/dL    Creatinine 4.39 (H) 0.50 - 1.30 mg/dL    eGFR 14 (L) >60 mL/min/1.73m*2    Calcium 9.7 8.6 - 10.6 mg/dL    Phosphorus 4.7 2.5 - 4.9 mg/dL    Albumin 4.4 3.4 - 5.0 g/dL   Magnesium   Result Value Ref Range    Magnesium 2.17 1.60 - 2.40 mg/dL   Tacrolimus level   Result Value Ref Range    Tacrolimus  9.9 <=15.0 ng/mL   BLOOD GAS MIXED VENOUS FULL PANEL   Result Value Ref Range    POCT pH, Mixed 7.39 7.33 - 7.43 pH    POCT pCO2, Mixed 32 (L) 41 - 51 mm Hg    POCT pO2, Mixed 46 (H) 35 - 45 mm Hg    POCT SO2, Mixed 69 45 - 75 %    POCT Oxy Hemoglobin, Mixed 67.4 45.0 - 75.0 %    POCT Hematocrit Calculated, Mixed 31.0 (L) 41.0 - 52.0 %    POCT Sodium, Mixed 137 136 - 145 mmol/L    POCT Potassium, Mixed 4.7 3.5 - 5.3 mmol/L    POCT Chloride, Mixed 109 (H) 98 - 107 mmol/L    POCT Ionized Calcium, Mixed 1.21 1.10 - 1.33 mmol/L    POCT Glucose, Mixed 117 (H) 74 -  99 mg/dL    POCT Lactate, Mixed 0.6 0.4 - 2.0 mmol/L    POCT Base Excess, Mixed -4.8 (L) -2.0 - 3.0 mmol/L    POCT HCO3 Calculated, Mixed 19.4 (L) 22.0 - 26.0 mmol/L    POCT Hemoglobin, Mixed 10.2 (L) 13.5 - 17.5 g/dL    POCT Anion Gap, Mixed 13 10 - 25 mmo/L    Patient Temperature 37.0 degrees Celsius    FiO2 21 %   BLOOD GAS MIXED VENOUS FULL PANEL   Result Value Ref Range    POCT pH, Mixed 7.39 7.33 - 7.43 pH    POCT pCO2, Mixed 33 (L) 41 - 51 mm Hg    POCT pO2, Mixed 46 (H) 35 - 45 mm Hg    POCT SO2, Mixed 69 45 - 75 %    POCT Oxy Hemoglobin, Mixed 67.8 45.0 - 75.0 %    POCT Hematocrit Calculated, Mixed 30.0 (L) 41.0 - 52.0 %    POCT Sodium, Mixed 138 136 - 145 mmol/L    POCT Potassium, Mixed 4.4 3.5 - 5.3 mmol/L    POCT Chloride, Mixed 107 98 - 107 mmol/L    POCT Ionized Calcium, Mixed 1.24 1.10 - 1.33 mmol/L    POCT Glucose, Mixed 138 (H) 74 - 99 mg/dL    POCT Lactate, Mixed 0.6 0.4 - 2.0 mmol/L    POCT Base Excess, Mixed -4.3 (L) -2.0 - 3.0 mmol/L    POCT HCO3 Calculated, Mixed 20.0 (L) 22.0 - 26.0 mmol/L    POCT Hemoglobin, Mixed 10.0 (L) 13.5 - 17.5 g/dL    POCT Anion Gap, Mixed 15 10 - 25 mmo/L    Patient Temperature 37.0 degrees Celsius    FiO2 21 %     Renal US: 3/1/24  IMPRESSION:  1. Mild pelvicaliceal dilation of the renal transplant, which was  also noted on 10/19/2023 CT. Follow-up ultrasound with additional  postvoid scans is recommended.  2. Doppler evaluation of the renal transplant is within normal limits  as above.        Assessment/Plan     Reynaldo Francisco is a 68 y.o. 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  Patient admitted with ADHF after diuretics held for few days as outpt. Transplant Nephrology consulted for further management.      S/p DDKT 3/5/23. 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 Thymo induction, maintained on Tac, lower dose MMF.     Renal allograft (DDKT 3/5/2023), KIA, nonoliguric  - ESRD after liver transplant with anuric KIA, has LUE AVF  - baseline creatinine variable, 1.5-2. Cr has been gradually worsening past few weeks. UA unremarkable. Marlene checked post diuretics.    - KIA on CKD likely cardiorenal, supratherapeutic Fk contributing as well vs other. transplant US with mild-mod hydro, not new. Imaging reviewed with txp surgery who recommend NM scan to evalaute for functional obstruction. May need perc neph tube placement if obstruction is indeed noted.   - will likely need to consider renal biopsy to r/o rejection. Pt due for 12-month protocol biopsy on 3/5/24 per [facility] protocol.   - Volume: hypervolemic on exam. Continue bolus loop diuretics, titratre dose as tolerated. TTE with ventricular and atrial dilation, moderate reduction in EF with severe tricuspid regurg   - Electrolytes WNL  - Acid base status: NAGMA     Immunosuppression  - cont myfortic 180 mg BID (low dose sec to h/o leukopenia, CMV). Fk level this AM 9.9. dose reduced to 1.5 mg bid, monitor rpt Fk trough.   Not on prednisone.   - DSA neg 1/2024 ([facility])     Infection prophylaxis  - on bactrim    Will follow    Rubio Helms MD

## 2024-03-03 NOTE — PROGRESS NOTES
"Reynaldo Francisco is a 68 y.o. male on day 3 of admission presenting with Heart failure (CMS/HCC).    Subjective   NAEON. The patient reports feeling uncomfortable with being unable to move his leg due to femoral swan, but denies any chest pain, SOB, palpitations, abdominal pain or nausea.        Objective     Physical Exam    Constitutional: awake and alert, resting comfortably in NAD  Eyes: No scleral icterus or conjunctival injection, EOMI  ENMT: MMM  Head/Neck: NC/AT  Respiratory/Thorax: Breathing comfortably. No retractions or accessory muscle use. Good air entry into all lung fields. RLL crackles, otherwise CTAB.   Cardiovascular: RRR, +S1, S2, no m/r/g, (+) significiant JVD to clavicle  Gastrointestinal: (+) TTP of RLQ (pt reports tenderness over kidney), no pain at rest, no TTP to other quadrants normoactive BS, soft, no palpable masses, no organomegaly.  No rebound or guarding.  Extremities: warm and well perfused, no LE edema, 2+ radial and dorsalis pedis pulses b/l, capillary refill <2s  Neurological: motor and sensation grossly intact and symmetric  Psychological: Mood and affect appropriate for age    Last Recorded Vitals  Blood pressure 124/64, pulse 60, temperature 36.4 °C (97.5 °F), resp. rate 16, height 1.727 m (5' 8\"), weight 61.6 kg (135 lb 12.9 oz), SpO2 96 %.  Intake/Output last 3 Shifts:  I/O last 3 completed shifts:  In: 553.8 (9 mL/kg) [P.O.:477; I.V.:76.8 (1.2 mL/kg)]  Out: 3600 (58.4 mL/kg) [Urine:3550 (1.6 mL/kg/hr); Emesis/NG output:50]  Weight: 61.6 kg     Relevant Results  Results for orders placed or performed during the hospital encounter of 02/29/24 (from the past 24 hour(s))   BLOOD GAS MIXED VENOUS FULL PANEL   Result Value Ref Range    POCT pH, Mixed 7.37 7.33 - 7.43 pH    POCT pCO2, Mixed 30 (L) 41 - 51 mm Hg    POCT pO2, Mixed 48 (H) 35 - 45 mm Hg    POCT SO2, Mixed 72 45 - 75 %    POCT Oxy Hemoglobin, Mixed 70.2 45.0 - 75.0 %    POCT Hematocrit Calculated, Mixed 30.0 (L) 41.0 - 52.0 % "    POCT Sodium, Mixed 138 136 - 145 mmol/L    POCT Potassium, Mixed 4.7 3.5 - 5.3 mmol/L    POCT Chloride, Mixed 109 (H) 98 - 107 mmol/L    POCT Ionized Calcium, Mixed 1.21 1.10 - 1.33 mmol/L    POCT Glucose, Mixed 160 (H) 74 - 99 mg/dL    POCT Lactate, Mixed 0.6 0.4 - 2.0 mmol/L    POCT Base Excess, Mixed -7.0 (L) -2.0 - 3.0 mmol/L    POCT HCO3 Calculated, Mixed 17.3 (L) 22.0 - 26.0 mmol/L    POCT Hemoglobin, Mixed 10.0 (L) 13.5 - 17.5 g/dL    POCT Anion Gap, Mixed 16 10 - 25 mmo/L    Patient Temperature 37.0 degrees Celsius    FiO2 21 %   Renal function panel   Result Value Ref Range    Glucose 150 (H) 74 - 99 mg/dL    Sodium 140 136 - 145 mmol/L    Potassium 4.7 3.5 - 5.3 mmol/L    Chloride 109 (H) 98 - 107 mmol/L    Bicarbonate 16 (L) 21 - 32 mmol/L    Anion Gap 20 10 - 20 mmol/L    Urea Nitrogen 84 (H) 6 - 23 mg/dL    Creatinine 4.01 (H) 0.50 - 1.30 mg/dL    eGFR 16 (L) >60 mL/min/1.73m*2    Calcium 9.4 8.6 - 10.6 mg/dL    Phosphorus 4.7 2.5 - 4.9 mg/dL    Albumin 4.4 3.4 - 5.0 g/dL   Magnesium   Result Value Ref Range    Magnesium 2.28 1.60 - 2.40 mg/dL   BLOOD GAS MIXED VENOUS FULL PANEL   Result Value Ref Range    POCT pH, Mixed 7.41 7.33 - 7.43 pH    POCT pCO2, Mixed 30 (L) 41 - 51 mm Hg    POCT pO2, Mixed 46 (H) 35 - 45 mm Hg    POCT SO2, Mixed 71 45 - 75 %    POCT Oxy Hemoglobin, Mixed 69.2 45.0 - 75.0 %    POCT Hematocrit Calculated, Mixed 30.0 (L) 41.0 - 52.0 %    POCT Sodium, Mixed 138 136 - 145 mmol/L    POCT Potassium, Mixed 4.7 3.5 - 5.3 mmol/L    POCT Chloride, Mixed 109 (H) 98 - 107 mmol/L    POCT Ionized Calcium, Mixed 1.20 1.10 - 1.33 mmol/L    POCT Glucose, Mixed 120 (H) 74 - 99 mg/dL    POCT Lactate, Mixed 0.6 0.4 - 2.0 mmol/L    POCT Base Excess, Mixed -4.8 (L) -2.0 - 3.0 mmol/L    POCT HCO3 Calculated, Mixed 19.0 (L) 22.0 - 26.0 mmol/L    POCT Hemoglobin, Mixed 10.1 (L) 13.5 - 17.5 g/dL    POCT Anion Gap, Mixed 15 10 - 25 mmo/L    Patient Temperature 37.0 degrees Celsius    FiO2 21 %   CBC    Result Value Ref Range    WBC 3.7 (L) 4.4 - 11.3 x10*3/uL    nRBC 0.0 0.0 - 0.0 /100 WBCs    RBC 3.02 (L) 4.50 - 5.90 x10*6/uL    Hemoglobin 9.9 (L) 13.5 - 17.5 g/dL    Hematocrit 27.3 (L) 41.0 - 52.0 %    MCV 90 80 - 100 fL    MCH 32.8 26.0 - 34.0 pg    MCHC 36.3 (H) 32.0 - 36.0 g/dL    RDW 14.0 11.5 - 14.5 %    Platelets 105 (L) 150 - 450 x10*3/uL   BLOOD GAS MIXED VENOUS FULL PANEL   Result Value Ref Range    POCT pH, Mixed 7.39 7.33 - 7.43 pH    POCT pCO2, Mixed 32 (L) 41 - 51 mm Hg    POCT pO2, Mixed 46 (H) 35 - 45 mm Hg    POCT SO2, Mixed 69 45 - 75 %    POCT Oxy Hemoglobin, Mixed 67.4 45.0 - 75.0 %    POCT Hematocrit Calculated, Mixed 31.0 (L) 41.0 - 52.0 %    POCT Sodium, Mixed 137 136 - 145 mmol/L    POCT Potassium, Mixed 4.7 3.5 - 5.3 mmol/L    POCT Chloride, Mixed 109 (H) 98 - 107 mmol/L    POCT Ionized Calcium, Mixed 1.21 1.10 - 1.33 mmol/L    POCT Glucose, Mixed 117 (H) 74 - 99 mg/dL    POCT Lactate, Mixed 0.6 0.4 - 2.0 mmol/L    POCT Base Excess, Mixed -4.8 (L) -2.0 - 3.0 mmol/L    POCT HCO3 Calculated, Mixed 19.4 (L) 22.0 - 26.0 mmol/L    POCT Hemoglobin, Mixed 10.2 (L) 13.5 - 17.5 g/dL    POCT Anion Gap, Mixed 13 10 - 25 mmo/L    Patient Temperature 37.0 degrees Celsius    FiO2 21 %           Assessment/Plan   Principal Problem:    Heart failure (CMS/HCC)  Active Problems:    Acute systolic (congestive) heart failure (CMS/HCC)    Reynaldo Francisco is a 68 y.o. 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 Etiology of KIA appears to be most likely cardiorenal, however no significant improvement with aggressive diuresis thus far. Will discuss RLQ TTP with  transplant team.       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 I suspect renal injury is due to cardiomyopathy which progressed to HF syndrome when the diuretics were held. Evidence include reduced EF, grossly overloaded on exam and previous unremarkable renal testing (CMV, US) albeit no biopsy completed. As to the cause of the cardiomyopathy would appear to be PM - mediated.  :: CT PET at OSH did not show ischemia  - Still awaiting Troy narayan placement for diuresis. To be placed in cath lab due to difficult vascular access  -Consider CRTD upgrade when stable  -Daily RFP and Mg for K>4, Mg >2  - Bumex gtt inc to 2, with additional 6mg bumex IV + 500mg IV chlorothiazide total ovn--will continue to monitor I&Os     #Type A aortic dissection  #HTN  :: No outpatient anti-hypertensive  -Hydralazine 25mg tid started     RENAL  #KIA on CKD (baseline Scr 2.1-2.2), non-oliguric   #ESRD s/p kidney transplantation (on tacromlimus and mycophenolate)  :: Suspect cardiorenal syndrome. Not responsive to Lasix 40 or Bumex 2 IV.   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   -iron studies: 75, TIBC 252, ferritin 764     Fluids: no  Access: IV  Antibiotics: none   Electrolytes: none  Nutrition:  cardiac   PPI: none  DVT: Hep 5000 TID  CODE: FULL CODE  Abdiel (wife) [phone]     Patient to be seen and discussed with Attending Physician. Plan as above subject to change. Note not finalized until signed by Attending.    Lisa Ridley MD, MPH  Internal Medicine-Pediatrics, PGY-2  [facility]  Please note that voice recognition software was used to dictate this note. Please excuse any errors.

## 2024-03-03 NOTE — PROGRESS NOTES
"Reynaldo Francisco is a 68 y.o. male on day 2 of admission presenting with Heart failure (CMS/HCC).    Seen at bedside this am. S/p [place] placement.  Nonoliguric.   Last PCWP 21, mean PA 42  C/o low back pain likely positional.    Scheduled medications  aspirin, 81 mg, oral, q AM  clotrimazole, , Topical, BID  heparin (porcine), 5,000 Units, subcutaneous, q8h KESHIA  hydrALAZINE, 25 mg, oral, TID  lidocaine, 1 patch, transdermal, Daily  mycophenolate, 180 mg, oral, BID  pantoprazole, 40 mg, oral, q AM  perflutren protein A microsphere, 0.5 mL, intravenous, Once in imaging  sodium bicarbonate, 1,300 mg, oral, BID  sulfamethoxazole-trimethoprim, 1 tablet, oral, Every Mon/Wed/Fri  sulfur hexafluoride microsphr, 2 mL, intravenous, Once in imaging  tacrolimus, 1.5 mg, oral, q12h KESHIA      Continuous medications      Last Recorded Vitals  Blood pressure 117/68, pulse 61, temperature 36.3 °C (97.3 °F), resp. rate 20, height 1.727 m (5' 8\"), weight 61.6 kg (135 lb 12.9 oz), SpO2 99 %.  Intake/Output last 3 Shifts:  I/O last 3 completed shifts:  In: 629.8 (10.2 mL/kg) [P.O.:557; I.V.:72.8 (1.2 mL/kg)]  Out: 3155 (51.2 mL/kg) [Urine:3100 (1.4 mL/kg/hr); Emesis/NG output:50; Blood:5]  Weight: 61.6 kg      A&ox3, no distress, pleasant  Lungs with scattered basilar crackles, no distress  JVD+  Rrr, no r/g  Abd soft, nt, nd  No allograft tenderness  No edema b/l    Labs:  Results for orders placed or performed during the hospital encounter of 02/29/24 (from the past 24 hour(s))   Tacrolimus level   Result Value Ref Range    Tacrolimus  11.3 <=15.0 ng/mL   CBC and Auto Differential   Result Value Ref Range    WBC 3.8 (L) 4.4 - 11.3 x10*3/uL    nRBC 0.0 0.0 - 0.0 /100 WBCs    RBC 2.92 (L) 4.50 - 5.90 x10*6/uL    Hemoglobin 9.9 (L) 13.5 - 17.5 g/dL    Hematocrit 27.2 (L) 41.0 - 52.0 %    MCV 93 80 - 100 fL    MCH 33.9 26.0 - 34.0 pg    MCHC 36.4 (H) 32.0 - 36.0 g/dL    RDW 14.5 11.5 - 14.5 %    Platelets 103 (L) 150 - 450 x10*3/uL    Neutrophils " % 71.0 40.0 - 80.0 %    Immature Granulocytes %, Automated 0.0 0.0 - 0.9 %    Lymphocytes % 15.4 13.0 - 44.0 %    Monocytes % 12.5 2.0 - 10.0 %    Eosinophils % 0.8 0.0 - 6.0 %    Basophils % 0.3 0.0 - 2.0 %    Neutrophils Absolute 2.68 1.20 - 7.70 x10*3/uL    Immature Granulocytes Absolute, Automated 0.00 0.00 - 0.70 x10*3/uL    Lymphocytes Absolute 0.58 (L) 1.20 - 4.80 x10*3/uL    Monocytes Absolute 0.47 0.10 - 1.00 x10*3/uL    Eosinophils Absolute 0.03 0.00 - 0.70 x10*3/uL    Basophils Absolute 0.01 0.00 - 0.10 x10*3/uL   Renal function panel   Result Value Ref Range    Glucose 113 (H) 74 - 99 mg/dL    Sodium 140 136 - 145 mmol/L    Potassium 4.7 3.5 - 5.3 mmol/L    Chloride 109 (H) 98 - 107 mmol/L    Bicarbonate 16 (L) 21 - 32 mmol/L    Anion Gap 20 10 - 20 mmol/L    Urea Nitrogen 83 (H) 6 - 23 mg/dL    Creatinine 3.70 (H) 0.50 - 1.30 mg/dL    eGFR 17 (L) >60 mL/min/1.73m*2    Calcium 9.5 8.6 - 10.6 mg/dL    Phosphorus 4.7 2.5 - 4.9 mg/dL    Albumin 4.7 3.4 - 5.0 g/dL   Magnesium   Result Value Ref Range    Magnesium 1.79 1.60 - 2.40 mg/dL   Tacrolimus level   Result Value Ref Range    Tacrolimus  11.6 <=15.0 ng/mL   BLOOD GAS MIXED VENOUS FULL PANEL   Result Value Ref Range    POCT pH, Mixed 7.36 7.33 - 7.43 pH    POCT pCO2, Mixed 28 (L) 41 - 51 mm Hg    POCT pO2, Mixed 48 (H) 35 - 45 mm Hg    POCT SO2, Mixed 72 45 - 75 %    POCT Oxy Hemoglobin, Mixed 70.3 45.0 - 75.0 %    POCT Hematocrit Calculated, Mixed 30.0 (L) 41.0 - 52.0 %    POCT Sodium, Mixed 137 136 - 145 mmol/L    POCT Potassium, Mixed 4.8 3.5 - 5.3 mmol/L    POCT Chloride, Mixed 109 (H) 98 - 107 mmol/L    POCT Ionized Calcium, Mixed 1.21 1.10 - 1.33 mmol/L    POCT Glucose, Mixed 110 (H) 74 - 99 mg/dL    POCT Lactate, Mixed 0.7 0.4 - 2.0 mmol/L    POCT Base Excess, Mixed -8.5 (L) -2.0 - 3.0 mmol/L    POCT HCO3 Calculated, Mixed 15.8 (L) 22.0 - 26.0 mmol/L    POCT Hemoglobin, Mixed 10.1 (L) 13.5 - 17.5 g/dL    POCT Anion Gap, Mixed 17 10 - 25 mmo/L     Patient Temperature 37.0 degrees Celsius    FiO2 21 %   BLOOD GAS MIXED VENOUS FULL PANEL   Result Value Ref Range    POCT pH, Mixed 7.37 7.33 - 7.43 pH    POCT pCO2, Mixed 30 (L) 41 - 51 mm Hg    POCT pO2, Mixed 48 (H) 35 - 45 mm Hg    POCT SO2, Mixed 72 45 - 75 %    POCT Oxy Hemoglobin, Mixed 70.2 45.0 - 75.0 %    POCT Hematocrit Calculated, Mixed 30.0 (L) 41.0 - 52.0 %    POCT Sodium, Mixed 138 136 - 145 mmol/L    POCT Potassium, Mixed 4.7 3.5 - 5.3 mmol/L    POCT Chloride, Mixed 109 (H) 98 - 107 mmol/L    POCT Ionized Calcium, Mixed 1.21 1.10 - 1.33 mmol/L    POCT Glucose, Mixed 160 (H) 74 - 99 mg/dL    POCT Lactate, Mixed 0.6 0.4 - 2.0 mmol/L    POCT Base Excess, Mixed -7.0 (L) -2.0 - 3.0 mmol/L    POCT HCO3 Calculated, Mixed 17.3 (L) 22.0 - 26.0 mmol/L    POCT Hemoglobin, Mixed 10.0 (L) 13.5 - 17.5 g/dL    POCT Anion Gap, Mixed 16 10 - 25 mmo/L    Patient Temperature 37.0 degrees Celsius    FiO2 21 %   Renal function panel   Result Value Ref Range    Glucose 150 (H) 74 - 99 mg/dL    Sodium 140 136 - 145 mmol/L    Potassium 4.7 3.5 - 5.3 mmol/L    Chloride 109 (H) 98 - 107 mmol/L    Bicarbonate 16 (L) 21 - 32 mmol/L    Anion Gap 20 10 - 20 mmol/L    Urea Nitrogen 84 (H) 6 - 23 mg/dL    Creatinine 4.01 (H) 0.50 - 1.30 mg/dL    eGFR 16 (L) >60 mL/min/1.73m*2    Calcium 9.4 8.6 - 10.6 mg/dL    Phosphorus 4.7 2.5 - 4.9 mg/dL    Albumin 4.4 3.4 - 5.0 g/dL   Magnesium   Result Value Ref Range    Magnesium 2.28 1.60 - 2.40 mg/dL     Renal US: 3/1/24  IMPRESSION:  1. Mild pelvicaliceal dilation of the renal transplant, which was  also noted on 10/19/2023 CT. Follow-up ultrasound with additional  postvoid scans is recommended.  2. Doppler evaluation of the renal transplant is within normal limits  as above.        Assessment/Plan     Reynaldo Francisco is a 68 y.o. male with a past medical hx of liver transplant (1/8/2018, and DDKT (3/5/2023), NICM/HFrEF (40% TTE 01/2024), SSS s/p dual-chamber PM, Insulinoma w/ hepatic  metastasis requiring liver transplantation c/b renal failure requiring transplant 3/2023, type A aortic dissection, HTN.  Patient admitted with ADHF after diuretics held for few days as outpt. Transplant Nephrology consulted for further management.      Renal allograft (DDKT 3/2023), KIA, nonoliguric  - ESRD after liver transplant with anuric KIA, has LUE AVF  - baseline creatinine variable, 1.5-2. Cr has been gradually worsening past few weeks. UA unremarkable. Marlene checked post diuretics. FeUrea not checked  -   - KIA on CKD likely cardiorenal, supratherapeutic Fk contributing as well vs other. transplant US with mild-mod hydro, not new. Will pursue further eval if no improvement in Cr with diuresis  - Volume: hypervolemic on exam. Elevated wedge pressures. TTE with ventricular and atrial dilation, moderate reduction in EF with severe tricuspid regurg   - agree with Bumex drip for optimization of volume based on PA parameters  - Electrolytes WNL  - Acid base status: NAGMA     Immunosuppression  - cont myfortic 180 mg BID. Fk level this AM ~11. Change tac to 1.5mg bid. Not on prednisone.      Infection prophylaxis  - on bactrim      Will follow    Rubio Helms MD

## 2024-03-04 NOTE — PROGRESS NOTES
"Reynaldo Francisco is a 68 y.o. male on day 4 of admission presenting with Heart failure (CMS/HCC).    Subjective:  Was on Bumex drip yesterday until 9 am, was give 4 mg bumex IV. No PM diuretics noted. His urine O over the last 24 hours was 3.1 L. SOB overall improved but continues to endorse orthopnea. CVP of 12.     Scheduled medications  aspirin, 81 mg, oral, q AM  clotrimazole, , Topical, BID  heparin (porcine), 5,000 Units, subcutaneous, q8h KESHIA  hydrALAZINE, 25 mg, oral, TID  lidocaine, 1 patch, transdermal, Daily  mycophenolate, 180 mg, oral, BID  pantoprazole, 40 mg, oral, q AM  perflutren protein A microsphere, 0.5 mL, intravenous, Once in imaging  sodium bicarbonate, 1,300 mg, oral, BID  sulfamethoxazole-trimethoprim, 1 tablet, oral, Every Mon/Wed/Fri  sulfur hexafluoride microsphr, 2 mL, intravenous, Once in imaging  tacrolimus, 1.5 mg, oral, q12h KESHIA      Continuous medications      Last Recorded Vitals  Blood pressure 125/66, pulse 60, temperature 35.6 °C (96.1 °F), temperature source Temporal, resp. rate 18, height 1.727 m (5' 8\"), weight 56.5 kg (124 lb 9 oz), SpO2 97 %.  Intake/Output last 3 Shifts:  I/O last 3 completed shifts:  In: 116.9 (2.1 mL/kg) [I.V.:116.9 (2.1 mL/kg)]  Out: 4250 (75.2 mL/kg) [Urine:4250 (2.1 mL/kg/hr)]  Weight: 56.5 kg      General: A&ox3, no distress   Respiratory: Lungs with minimal basilar crackles   Cardiac: RRR, no rubs JVD   Abd: No allograft tenderness. Abd soft, no distention  Extremities: No edema in bilateral lower extremities  : external horton  LUE AVF with bruit and thrill    Labs:  Results for orders placed or performed during the hospital encounter of 02/29/24 (from the past 24 hour(s))   Renal function panel   Result Value Ref Range    Glucose 165 (H) 74 - 99 mg/dL    Sodium 139 136 - 145 mmol/L    Potassium 4.4 3.5 - 5.3 mmol/L    Chloride 106 98 - 107 mmol/L    Bicarbonate 19 (L) 21 - 32 mmol/L    Anion Gap 18 10 - 20 mmol/L    Urea Nitrogen 84 (H) 6 - 23 mg/dL    " Creatinine 4.33 (H) 0.50 - 1.30 mg/dL    eGFR 14 (L) >60 mL/min/1.73m*2    Calcium 9.7 8.6 - 10.6 mg/dL    Phosphorus 4.6 2.5 - 4.9 mg/dL    Albumin 4.5 3.4 - 5.0 g/dL   Magnesium   Result Value Ref Range    Magnesium 2.00 1.60 - 2.40 mg/dL   BLOOD GAS MIXED VENOUS FULL PANEL   Result Value Ref Range    POCT pH, Mixed 7.42 7.33 - 7.43 pH    POCT pCO2, Mixed 33 (L) 41 - 51 mm Hg    POCT pO2, Mixed 51 (H) 35 - 45 mm Hg    POCT SO2, Mixed 78 (H) 45 - 75 %    POCT Oxy Hemoglobin, Mixed 76.0 (H) 45.0 - 75.0 %    POCT Hematocrit Calculated, Mixed 31.0 (L) 41.0 - 52.0 %    POCT Sodium, Mixed 135 (L) 136 - 145 mmol/L    POCT Potassium, Mixed 4.4 3.5 - 5.3 mmol/L    POCT Chloride, Mixed 106 98 - 107 mmol/L    POCT Ionized Calcium, Mixed 1.19 1.10 - 1.33 mmol/L    POCT Glucose, Mixed 182 (H) 74 - 99 mg/dL    POCT Lactate, Mixed 0.8 0.4 - 2.0 mmol/L    POCT Base Excess, Mixed -2.5 (L) -2.0 - 3.0 mmol/L    POCT HCO3 Calculated, Mixed 21.4 (L) 22.0 - 26.0 mmol/L    POCT Hemoglobin, Mixed 10.2 (L) 13.5 - 17.5 g/dL    POCT Anion Gap, Mixed 12 10 - 25 mmo/L    Patient Temperature 37.0 degrees Celsius    FiO2 21 %   BLOOD GAS MIXED VENOUS FULL PANEL   Result Value Ref Range    POCT pH, Mixed 7.44 (H) 7.33 - 7.43 pH    POCT pCO2, Mixed 34 (L) 41 - 51 mm Hg    POCT pO2, Mixed 49 (H) 35 - 45 mm Hg    POCT SO2, Mixed 74 45 - 75 %    POCT Oxy Hemoglobin, Mixed 72.6 45.0 - 75.0 %    POCT Hematocrit Calculated, Mixed 31.0 (L) 41.0 - 52.0 %    POCT Sodium, Mixed 138 136 - 145 mmol/L    POCT Potassium, Mixed 4.5 3.5 - 5.3 mmol/L    POCT Chloride, Mixed 105 98 - 107 mmol/L    POCT Ionized Calcium, Mixed 1.19 1.10 - 1.33 mmol/L    POCT Glucose, Mixed 119 (H) 74 - 99 mg/dL    POCT Lactate, Mixed 0.6 0.4 - 2.0 mmol/L    POCT Base Excess, Mixed -0.7 -2.0 - 3.0 mmol/L    POCT HCO3 Calculated, Mixed 23.1 22.0 - 26.0 mmol/L    POCT Hemoglobin, Mixed 10.4 (L) 13.5 - 17.5 g/dL    POCT Anion Gap, Mixed 14 10 - 25 mmo/L    Patient Temperature 37.0  degrees Celsius    FiO2 21 %   CBC   Result Value Ref Range    WBC 3.5 (L) 4.4 - 11.3 x10*3/uL    nRBC 0.6 (H) 0.0 - 0.0 /100 WBCs    RBC 3.08 (L) 4.50 - 5.90 x10*6/uL    Hemoglobin 9.9 (L) 13.5 - 17.5 g/dL    Hematocrit 28.1 (L) 41.0 - 52.0 %    MCV 91 80 - 100 fL    MCH 32.1 26.0 - 34.0 pg    MCHC 35.2 32.0 - 36.0 g/dL    RDW 13.9 11.5 - 14.5 %    Platelets 107 (L) 150 - 450 x10*3/uL   Magnesium   Result Value Ref Range    Magnesium 2.05 1.60 - 2.40 mg/dL   Tacrolimus level   Result Value Ref Range    Tacrolimus  8.5 <=15.0 ng/mL   Hepatic function panel   Result Value Ref Range    Albumin 4.6 3.4 - 5.0 g/dL    Bilirubin, Total 1.1 0.0 - 1.2 mg/dL    Bilirubin, Direct 0.3 0.0 - 0.3 mg/dL    Alkaline Phosphatase 77 33 - 136 U/L    ALT 7 (L) 10 - 52 U/L    AST 15 9 - 39 U/L    Total Protein 7.6 6.4 - 8.2 g/dL   Phosphorus   Result Value Ref Range    Phosphorus 4.7 2.5 - 4.9 mg/dL   Basic Metabolic Panel   Result Value Ref Range    Glucose 112 (H) 74 - 99 mg/dL    Sodium 140 136 - 145 mmol/L    Potassium 4.2 3.5 - 5.3 mmol/L    Chloride 103 98 - 107 mmol/L    Bicarbonate 22 21 - 32 mmol/L    Anion Gap 19 10 - 20 mmol/L    Urea Nitrogen 83 (H) 6 - 23 mg/dL    Creatinine 4.03 (H) 0.50 - 1.30 mg/dL    eGFR 15 (L) >60 mL/min/1.73m*2    Calcium 9.9 8.6 - 10.6 mg/dL   BLOOD GAS MIXED VENOUS FULL PANEL   Result Value Ref Range    POCT pH, Mixed 7.42 7.33 - 7.43 pH    POCT pCO2, Mixed 37 (L) 41 - 51 mm Hg    POCT pO2, Mixed 46 (H) 35 - 45 mm Hg    POCT SO2, Mixed 70 45 - 75 %    POCT Oxy Hemoglobin, Mixed 68.5 45.0 - 75.0 %    POCT Hematocrit Calculated, Mixed 31.0 (L) 41.0 - 52.0 %    POCT Sodium, Mixed 139 136 - 145 mmol/L    POCT Potassium, Mixed 4.5 3.5 - 5.3 mmol/L    POCT Chloride, Mixed 106 98 - 107 mmol/L    POCT Ionized Calcium, Mixed 1.19 1.10 - 1.33 mmol/L    POCT Glucose, Mixed 120 (H) 74 - 99 mg/dL    POCT Lactate, Mixed 0.8 0.4 - 2.0 mmol/L    POCT Base Excess, Mixed -0.3 -2.0 - 3.0 mmol/L    POCT HCO3  Calculated, Mixed 24.0 22.0 - 26.0 mmol/L    POCT Hemoglobin, Mixed 10.3 (L) 13.5 - 17.5 g/dL    POCT Anion Gap, Mixed 14 10 - 25 mmo/L    Patient Temperature 37.0 degrees Celsius    FiO2 21 %     Renal US: 3/1/24  IMPRESSION:  1. Mild pelvicaliceal dilation of the renal transplant, which was  also noted on 10/19/2023 CT. Follow-up ultrasound with additional  postvoid scans is recommended.  2. Doppler evaluation of the renal transplant is within normal limits  as above.        Assessment/Plan   Reynaldo Francisco is a 68 y.o. 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  Patient admitted with ADHF after diuretics held for few days as outpt. Transplant Nephrology consulted for further management.      S/p DDKT 3/5/23. 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 Thymo induction, maintained on Tac, lower dose myfortic, no prednisone.     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 Cr has been gradually worsening past few weeks as OP in setting of decreased diuretics and weight gain.  Continued to increase as IP with diuretics with elevated tacrolimus level. No hemodynamic injury, contrast, peripheral eosinophilia, with bland UA.   - Renal US 3/1 showed mild hydro which was stable from imaging 10/2023, with NM lasix scan today (w/o horton in place) showed no mechanical obstruction.    - Etiology of KIA on CKD: cardiorenal, supratherapeutic tacrolimus. Rejection remains in the differential.   - previous TP/C ratio 0.12 11/2023 -> 0.08 2/2024    Plan:  - would start flomax 0.4 mg daily  - continue  PRN diuretics based off CVP, maintain MAPs > 65. Ongoing symptoms plus CVPs 12-14 suggest remains on volume positive side.   - check a TP/C urine ratio     - No need for kidney biopsy yet but will continue to evaluate based off continues workup and creatinine trend.     #electrolytes  WNL    #NAGMA  - improved with sodium bicarb 1300 mg BID and diuresis  - continue sodium bicarb      # Immunosuppression  - cont myfortic 180 mg BID (low dose sec to h/o leukopenia, CMV). Fk level 8.5 3/3. Continue reduced dose 1.5 mg bid, monitor rpt Fk trough daily.   Not on prednisone.   - DSA neg 1/2024 ([facility])    # anemia  - Iron saturation of 30%, iron stores adequate.   - hgb of 9.9, goal 10-11  - CTM    # BMD  - calcium and phos WNL  - check PTH     #Infection prophylaxis  - on bactrim    # Hemodynamics  - Bps at goal    Yasmany Tracey MD  Nephrology Fellow

## 2024-03-04 NOTE — CONSULTS
Inpatient consult to Electrophysiology  Consult performed by: Evan Haley MD  Consult ordered by: Milton Camilo MD        History Of Present Illness:    Reynaldo Francisco is a 68 y.o. 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 EP c/s for BIV upgrade eval.    [facility]:  “Notable recent events (all occurring at [facility] in [place] including Transplant care):   -Liver transplantation in 2018 (requiring bypass) following unsuccesful first attempt 8 months prior. Course complicated by renal failure requiring RRT until renal transplantation in 03/2023.   -[employer] Dual Chamber PM placed 10/2023 for SSS  -Hospitalized 01/2024 with SOB/ALONZO/orthopnea/PND/abdominal distention and KIA . Initial plan was to work-up injury of transplanted kidney however symptoms and KIA improved with diuresis. Notably CMV TTE then demonstrated newly reduced EF 40% and severely hypokinetic apex. Completed stress PET (as part of ischemic evaluation) - negative. Concern that new cardiomyopathy was due to high pacing burden (unable to find documentation of device interrogation) however no device changes made. Discharged with Lasix 40 every day. Scr 2.1-2.2 on discharge.  -CMV and renal U/S negative  -symptoms continued to improve on DC with lowest weight achieved 128 lb (daily weight checks).   -On 2/6, outpatient CMP demonstrating increase in renal function. Nephrologist instructed patient to switch diuretic to EOD. Scr continued to uptrended to 3.3 such that patient instructed to hold diuretic for the past week. Patient weight prior to presenting to [facility] 137 lb.”    Prior EKGs  reviewed. Pts native conduction QRS is 120s however when RV pacing QRS is 180. Per device interrogation 3/1/24, pt has 97%  burden. Presenting rhythm is CHB.     TTE performed this admission is now showing EF 30-35%. There is concern that pt may have pacemaker CM from high RV pacing burden. Pt had newly reduced EF since 1/2024, stress PET at that time was negative. Pt has been diuresed on bumex gtt and per primary team is likely near euvolemia.     Last Recorded Vitals:  Vitals:    03/04/24 0700 03/04/24 0800 03/04/24 0835 03/04/24 0900   BP: 136/71 133/68  125/66   BP Location:  Right arm     Patient Position:  Lying     Pulse: 60 74 60 60   Resp: 23 11 13 18   Temp:  35.6 °C (96.1 °F)     TempSrc:  Temporal     SpO2: 99% 98% 96% 97%   Weight:       Height:           Last Labs:  CBC - 3/4/2024:  5:31 AM  3.5 9.9 107    28.1      CMP - 3/4/2024:  5:31 AM  9.9 7.6 15 --- 1.1   4.7 4.6 7 77      PTT - 3/1/2024: 12:25 AM  1.3   14.6 51     Troponin I, High Sensitivity   Date/Time Value Ref Range Status   03/01/2024 12:03  (HH) 0 - 53 ng/L Final     Comment:     Previous result verified on 2/29/2024 1656 on specimen/case [id number] called with component TRPHS for procedure Troponin I, High Sensitivity, Initial with value 664 ng/L.   02/29/2024 05:08  (HH) 0 - 20 ng/L Final     Comment:     Previous result verified on 2/29/2024 1656 on specimen/case [id number] called with component TRPHS for procedure Troponin I, High Sensitivity, Initial with value 664 ng/L.   02/29/2024 04:18  (HH) 0 - 20 ng/L Final     BNP   Date/Time Value Ref Range Status   03/01/2024 12:03  (H) 0 - 99 pg/mL Final   02/29/2024 04:18  (H) 0 - 99 pg/mL Final     Hemoglobin A1C   Date/Time Value Ref Range Status   05/01/2023 06:22 AM 6.1 (H) <5.7 % Final   03/07/2023 03:24 AM 5.2 <5.7 % Final      Last I/O:  I/O last 3 completed shifts:  In: 116.9 (2.1 mL/kg) [I.V.:116.9 (2.1 mL/kg)]  Out: 4250 (75.2 mL/kg)  [Urine:4250 (2.1 mL/kg/hr)]  Weight: 56.5 kg     Past Cardiology Tests (Last 3 Years):  EKG:  Electrocardiogram, 12-lead PRN ACS symptoms 02/29/2024 (Preliminary)      ECG 12 lead 02/29/2024 (Preliminary)      ECG 12 lead 02/29/2024 (Preliminary)      ECG 12 lead 12/21/2023      ECG 12 lead tomorrow at 8 AM 10/28/2023      ECG 12 lead STAT 10/28/2023      Electrocardiogram, 12-lead PRN ACS symptoms 10/28/2023      Electrocardiogram, 12-lead PRN ACS symptoms 10/28/2023      Electrocardiogram, 12-lead PRN ACS symptoms 10/28/2023      ECG 12 Lead     Echo:  Transthoracic Echo (TTE) Complete 03/01/2024      Transthoracic Echo (TTE) Limited 03/01/2024      Onco-Echo Complete (Strain & 3D) 10/06/2023    Ejection Fractions:  EF   Date/Time Value Ref Range Status   03/01/2024 11:00 AM 33 %      Cath:  Cardiac Catheterization Procedure 03/01/2024    Stress Test:  No results found for this or any previous visit from the past 1095 days.    Cardiac Imaging:  No results found for this or any previous visit from the past 1095 days.      Past Medical History:  He has a past medical history of Arteriovenous fistula, acquired (CMS/HCC) (06/17/2022), Lung nodule, Nutritional anemia, unspecified, and Personal history of malignant neoplasm of pancreas (06/17/2022).    Past Surgical History:  He has a past surgical history that includes Other surgical history (03/17/2022); Other surgical history (06/17/2022); Other surgical history (06/17/2022); US kidney transplant; Liver transplantation; and Cardiac electrophysiology procedure (Right, 10/23/2023).      Social History:  He reports that he has never smoked. He has never used smokeless tobacco. He reports that he does not currently use drugs. He reports that he does not drink alcohol.    Family History:  Family History   Problem Relation Name Age of Onset    Diabetes Mother      Hypertension Mother          Allergies:  Milk, Shellfish derived, and Iodinated contrast media    Inpatient  Medications:  Scheduled medications   Medication Dose Route Frequency    aspirin  81 mg oral q AM    clotrimazole   Topical BID    heparin (porcine)  5,000 Units subcutaneous q8h KESHIA    hydrALAZINE  25 mg oral TID    lidocaine  1 patch transdermal Daily    mycophenolate  180 mg oral BID    pantoprazole  40 mg oral q AM    perflutren protein A microsphere  0.5 mL intravenous Once in imaging    sodium bicarbonate  1,300 mg oral BID    sulfamethoxazole-trimethoprim  1 tablet oral Every Mon/Wed/Fri    sulfur hexafluoride microsphr  2 mL intravenous Once in imaging    tacrolimus  1.5 mg oral q12h KESHIA     PRN medications   Medication     Continuous Medications   Medication Dose Last Rate     Outpatient Medications:  Current Outpatient Medications   Medication Instructions    aspirin 81 mg, oral, Every morning    clotrimazole (Lotrimin) 1 % cream Apply to penis and foreskin twice a day for 2 weeks    furosemide (LASIX) 40 mg, oral, Daily    mycophenolate (MYFORTIC) 180 mg, oral, 2 times daily    Protonix 40 mg, oral, Every morning    sodium bicarbonate 650 mg, oral, Daily, In the afternoon    sulfamethoxazole-trimethoprim (Bactrim) 400-80 mg tablet 1 tablet, oral, Every Mon/Wed/Fri    tacrolimus (Prograf) 1 mg capsule 3 capsules, oral, Every morning    tacrolimus (PROGRAF) 2 mg, oral, Every evening       Physical Exam:  GEN: NAD, pleasant  CV: RRR on monitor, JVD to midneck @ 45 deg w prominent waveform  PULM: no increased wob on RA  EXT: trace LE edema      Assessment/Plan   Reynaldo Francisco is a 68 y.o. 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 EP c/s for BIV upgrade  eval.    Pt has had multiple admissions for ADHF, EF has been declining with time now 30-35%. QRS duration 180ms when RV pacing with a 97%  burden. Would benefit from upgrade to BIV device.    Recommendations:  -Management of HF per primary CICU team  -Once ready for discharge, will plan for BIV upgrade, possibly CRT-D given currently EF is 30%    Staffed with attending Dr. Nelson.    Thank you for the consult. EP will continue to follow.    General Cardiology Consult Pager: [phone] (weekday 7AM-6PM and weekend 7AM-2PM) and other: [phone]  EP Consult Pager: [phone] (weekday 7AM-6PM and weekend 7AM-2PM) and other: [phone]  EP Device Nurse Pager: [phone] (weekday 7AM-4PM)  Advanced Heart Failure Consult Pager: [phone] anytime  CICU Fellow Pager: [phone] anytime  Endovascular/Limb Salvage Team Pager: [phone] for day coverage (8am-5pm)  Interventional Cardiology Fellow Pager: [phone] for night and weekend coverage  Structural Heart Team Pager: [phone] anytime  Night coverage: [id number]          Peripheral IV 02/29/24 Distal;Right;Upper;Anterior Arm (Active)   Site Assessment Clean;Dry;Intact 03/04/24 0800   Dressing Type Transparent 03/04/24 0800   Line Status Flushed 03/04/24 0800   Dressing Status Clean;Dry;Occlusive 03/04/24 0800   Number of days: 4       Introducer 03/01/24 Femoral Right (Active)   Specific Qualities Llewellyn-Livier in place 03/04/24 0800   Line Necessity Central venous pressure monitoring 03/04/24 0800   Tubing Change Tubing changed 03/04/24 0800   Dressing Type Transparent 03/04/24 0800   Dressing Status Clean;Dry;Occlusive 03/04/24 0800   Dressing Intervention New dressing 03/04/24 0800   Dressing Change Due 03/08/24 03/04/24 0800   Number of days: 3       Pulmonary Artery Catheter (Active)   Line Necessity Central venous pressure monitoring 03/04/24 0800   Site Assessment Intact;Bleeding 03/04/24 0800   Proximal Lumen Status Blood return noted;Flushed 03/04/24 0800   Medial 1 Lumen Status Blood return  noted;Flushed 03/04/24 0800   Distal Lumen Status Blood return noted;Flushed 03/04/24 0800   PAC Waveform Appropriate waveforms;Rezeroed 03/04/24 0800   PAC Interventions Zeroed and calibrated;Flushed per protocol;Connections checked and tightened;Leveled 03/04/24 0800   External Length (cm) - compare to insertion 75 cm 03/04/24 0800   Dressing Type Transparent 03/04/24 0800   Dressing Status Dry;Old drainage;Occlusive 03/04/24 0800   Drainage type Bloody 03/04/24 0800   Number of days: 3       Hemodialysis Arteriovenous Fistula 01/01/18 Left Upper arm (Active)   AV Fistula Present;Thrill;Bruit 03/04/24 0800   Site Assessment Clean;Dry;Intact 03/04/24 0800   Current State Inactive 03/04/24 0800   Status Deaccessed 03/04/24 0800   Dressing Status Other (Comment) 03/04/24 0800   Number of days: 2254       Code Status:  Full Code    I spent 30 minutes in the professional and overall care of this patient.        Evan Haley MD

## 2024-03-04 NOTE — PROGRESS NOTES
"Reynaldo Francisco is a 68 y.o. male on day 4 of admission presenting with Heart failure (CMS/HCC).    Subjective   NAEON. This morning, the patient reports feeling somewhat better than yesterday.  The patient expresses significant concern about and desire to have pacemaker upgraded. He denies chest pain, SOB, palpitations, abdominal pain or nausea.        Objective     Physical Exam    Constitutional: awake and alert, resting comfortably in NAD  Eyes: No scleral icterus or conjunctival injection, EOMI  ENMT: MMM  Head/Neck: NC/AT  Respiratory/Thorax: Breathing comfortably. No retractions or accessory muscle use. Good air entry into all lung fields. RLL crackles, otherwise CTAB.   Cardiovascular: RRR, +S1, S2, no m/r/g  Gastrointestinal: (+) present TTP of RLQ improved from prior (pt reports tenderness over kidney), no pain at rest, no TTP to other quadrants normoactive BS, soft, no palpable masses, no organomegaly.  No rebound or guarding.  Extremities: warm and well perfused, no LE edema, 2+ radial and dorsalis pedis pulses b/l, capillary refill <2s  Neurological: motor and sensation grossly intact and symmetric  Psychological: Mood and affect appropriate for age    Last Recorded Vitals  Blood pressure 130/65, pulse 61, temperature 36 °C (96.8 °F), temperature source Temporal, resp. rate 15, height 1.727 m (5' 8\"), weight 56.5 kg (124 lb 9 oz), SpO2 97 %.  Intake/Output last 3 Shifts:  I/O last 3 completed shifts:  In: 116.9 (2.1 mL/kg) [I.V.:116.9 (2.1 mL/kg)]  Out: 4250 (75.2 mL/kg) [Urine:4250 (2.1 mL/kg/hr)]  Weight: 56.5 kg     Relevant Results  Results for orders placed or performed during the hospital encounter of 02/29/24 (from the past 24 hour(s))   BLOOD GAS MIXED VENOUS FULL PANEL   Result Value Ref Range    POCT pH, Mixed 7.39 7.33 - 7.43 pH    POCT pCO2, Mixed 33 (L) 41 - 51 mm Hg    POCT pO2, Mixed 46 (H) 35 - 45 mm Hg    POCT SO2, Mixed 69 45 - 75 %    POCT Oxy Hemoglobin, Mixed 67.8 45.0 - 75.0 %    POCT " Hematocrit Calculated, Mixed 30.0 (L) 41.0 - 52.0 %    POCT Sodium, Mixed 138 136 - 145 mmol/L    POCT Potassium, Mixed 4.4 3.5 - 5.3 mmol/L    POCT Chloride, Mixed 107 98 - 107 mmol/L    POCT Ionized Calcium, Mixed 1.24 1.10 - 1.33 mmol/L    POCT Glucose, Mixed 138 (H) 74 - 99 mg/dL    POCT Lactate, Mixed 0.6 0.4 - 2.0 mmol/L    POCT Base Excess, Mixed -4.3 (L) -2.0 - 3.0 mmol/L    POCT HCO3 Calculated, Mixed 20.0 (L) 22.0 - 26.0 mmol/L    POCT Hemoglobin, Mixed 10.0 (L) 13.5 - 17.5 g/dL    POCT Anion Gap, Mixed 15 10 - 25 mmo/L    Patient Temperature 37.0 degrees Celsius    FiO2 21 %   Renal function panel   Result Value Ref Range    Glucose 165 (H) 74 - 99 mg/dL    Sodium 139 136 - 145 mmol/L    Potassium 4.4 3.5 - 5.3 mmol/L    Chloride 106 98 - 107 mmol/L    Bicarbonate 19 (L) 21 - 32 mmol/L    Anion Gap 18 10 - 20 mmol/L    Urea Nitrogen 84 (H) 6 - 23 mg/dL    Creatinine 4.33 (H) 0.50 - 1.30 mg/dL    eGFR 14 (L) >60 mL/min/1.73m*2    Calcium 9.7 8.6 - 10.6 mg/dL    Phosphorus 4.6 2.5 - 4.9 mg/dL    Albumin 4.5 3.4 - 5.0 g/dL   Magnesium   Result Value Ref Range    Magnesium 2.00 1.60 - 2.40 mg/dL   BLOOD GAS MIXED VENOUS FULL PANEL   Result Value Ref Range    POCT pH, Mixed 7.42 7.33 - 7.43 pH    POCT pCO2, Mixed 33 (L) 41 - 51 mm Hg    POCT pO2, Mixed 51 (H) 35 - 45 mm Hg    POCT SO2, Mixed 78 (H) 45 - 75 %    POCT Oxy Hemoglobin, Mixed 76.0 (H) 45.0 - 75.0 %    POCT Hematocrit Calculated, Mixed 31.0 (L) 41.0 - 52.0 %    POCT Sodium, Mixed 135 (L) 136 - 145 mmol/L    POCT Potassium, Mixed 4.4 3.5 - 5.3 mmol/L    POCT Chloride, Mixed 106 98 - 107 mmol/L    POCT Ionized Calcium, Mixed 1.19 1.10 - 1.33 mmol/L    POCT Glucose, Mixed 182 (H) 74 - 99 mg/dL    POCT Lactate, Mixed 0.8 0.4 - 2.0 mmol/L    POCT Base Excess, Mixed -2.5 (L) -2.0 - 3.0 mmol/L    POCT HCO3 Calculated, Mixed 21.4 (L) 22.0 - 26.0 mmol/L    POCT Hemoglobin, Mixed 10.2 (L) 13.5 - 17.5 g/dL    POCT Anion Gap, Mixed 12 10 - 25 mmo/L    Patient  Temperature 37.0 degrees Celsius    FiO2 21 %   BLOOD GAS MIXED VENOUS FULL PANEL   Result Value Ref Range    POCT pH, Mixed 7.44 (H) 7.33 - 7.43 pH    POCT pCO2, Mixed 34 (L) 41 - 51 mm Hg    POCT pO2, Mixed 49 (H) 35 - 45 mm Hg    POCT SO2, Mixed 74 45 - 75 %    POCT Oxy Hemoglobin, Mixed 72.6 45.0 - 75.0 %    POCT Hematocrit Calculated, Mixed 31.0 (L) 41.0 - 52.0 %    POCT Sodium, Mixed 138 136 - 145 mmol/L    POCT Potassium, Mixed 4.5 3.5 - 5.3 mmol/L    POCT Chloride, Mixed 105 98 - 107 mmol/L    POCT Ionized Calcium, Mixed 1.19 1.10 - 1.33 mmol/L    POCT Glucose, Mixed 119 (H) 74 - 99 mg/dL    POCT Lactate, Mixed 0.6 0.4 - 2.0 mmol/L    POCT Base Excess, Mixed -0.7 -2.0 - 3.0 mmol/L    POCT HCO3 Calculated, Mixed 23.1 22.0 - 26.0 mmol/L    POCT Hemoglobin, Mixed 10.4 (L) 13.5 - 17.5 g/dL    POCT Anion Gap, Mixed 14 10 - 25 mmo/L    Patient Temperature 37.0 degrees Celsius    FiO2 21 %   CBC   Result Value Ref Range    WBC 3.5 (L) 4.4 - 11.3 x10*3/uL    nRBC 0.6 (H) 0.0 - 0.0 /100 WBCs    RBC 3.08 (L) 4.50 - 5.90 x10*6/uL    Hemoglobin 9.9 (L) 13.5 - 17.5 g/dL    Hematocrit 28.1 (L) 41.0 - 52.0 %    MCV 91 80 - 100 fL    MCH 32.1 26.0 - 34.0 pg    MCHC 35.2 32.0 - 36.0 g/dL    RDW 13.9 11.5 - 14.5 %    Platelets 107 (L) 150 - 450 x10*3/uL   Magnesium   Result Value Ref Range    Magnesium 2.05 1.60 - 2.40 mg/dL   Hepatic function panel   Result Value Ref Range    Albumin 4.6 3.4 - 5.0 g/dL    Bilirubin, Total 1.1 0.0 - 1.2 mg/dL    Bilirubin, Direct 0.3 0.0 - 0.3 mg/dL    Alkaline Phosphatase 77 33 - 136 U/L    ALT 7 (L) 10 - 52 U/L    AST 15 9 - 39 U/L    Total Protein 7.6 6.4 - 8.2 g/dL   Phosphorus   Result Value Ref Range    Phosphorus 4.7 2.5 - 4.9 mg/dL   Basic Metabolic Panel   Result Value Ref Range    Glucose 112 (H) 74 - 99 mg/dL    Sodium 140 136 - 145 mmol/L    Potassium 4.2 3.5 - 5.3 mmol/L    Chloride 103 98 - 107 mmol/L    Bicarbonate 22 21 - 32 mmol/L    Anion Gap 19 10 - 20 mmol/L    Urea  Nitrogen 83 (H) 6 - 23 mg/dL    Creatinine 4.03 (H) 0.50 - 1.30 mg/dL    eGFR 15 (L) >60 mL/min/1.73m*2    Calcium 9.9 8.6 - 10.6 mg/dL           Assessment/Plan   Principal Problem:    Heart failure (CMS/HCC)  Active Problems:    Liver transplanted (CMS/HCC)    Protein-calorie malnutrition, unspecified severity (CMS/HCC)    Dilated cardiomyopathy (CMS/HCC)    Atrioventricular block, complete (CMS/HCC)    Diabetes mellitus, type 2 (CMS/HCC)    Asplenia    ATN (acute tubular necrosis) (CMS/HCC)    AV graft stenosis (CMS/HCC)    Back pain    Cerebral artery occlusion    End stage renal disease on dialysis (CMS/HCC)    Anemia    Gastroesophageal reflux disease    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    Hypertension    Increased heart rate    Lung nodule    Malignant neoplasm of colon (CMS/HCC)    Metastatic cancer to liver (CMS/HCC)    Moderate aortic insufficiency    Insulinoma    Presence of primary arteriovenous graft for hemodialysis    RBBB    Right jugular vein thrombosis    Right lower quadrant abdominal pain    Suture granuloma    Systolic murmur    SCC (squamous cell carcinoma)    Syncope and collapse    Delayed graft function of kidney transplant due to ATN requiring acute dialysis (CMS/HCC)    Pacemaker    Pseudoaneurysm of distal brachial artery (CMS/HCC)    Dissecting aneurysm of thoracic aorta, [place] type B (CMS/HCC)    Arteriovenous fistula (CMS/HCC)    Balanitis    Diarrhea    History of malignant neoplasm of pancreas    Obstruction of inferior vena cava    Shortness of breath    Acute systolic (congestive) heart failure (CMS/HCC)    Sick sinus syndrome (CMS/HCC)    Reynaldo Francisco is a 68 y.o. 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  Etiology of KIA appears to be most likely cardiorenal given elev CVP and PCWP, however Cr worsened yesterday with aggressive diuresis and additionally, found to have mild-mod MR, mod-severe TR and mod AI on TTE. Patient is not otherwise clinically hypervolemic on exam, ALISA, making valvular disease a more likely etiology of elevated filling pressures. Per transplant nephro team, lasix renogram obtained today to r/o functional obstruction and if unrevealing, will consider renal biopsy. Pending BK, CMV, EBV, DSA levels.     Updates 3/4:  - D/Nikita Bumex drip yesterday, per transplant nephrology team, ok to bolus prn  - f/u BK, CMV, EBV, DSA levels  - Plan for nuclear med lasix renogram today to r/o functional obstruction given worsened Cr with aggressive diuresis  - Pt has mild-mod MR, mod-severe TR and mod AI on TTE--may explain elevated filling pressures   - CXR to reassess pulmonary edema  - C/w Tacro 1.5mg BID per transplant nephro recs, will f/u AM tacro levels  - EP consulted for CRTD upgrade iso pacemaker-induced cardiomyopathy    CV  #ADHF  #Stage C systolic heart failure/NICM/HFrEF (40% TTE 01/2024)  #SSS s/p dual-chamber PM  #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 I suspect renal injury is due to cardiomyopathy which progressed to HF syndrome when the diuretics were held. Evidence include reduced EF, grossly overloaded on exam and previous unremarkable renal testing (CMV, US) albeit no biopsy completed. As to the cause of the cardiomyopathy would appear to be PM - mediated.  :: CT PET at OSH did not show ischemia  - EP consulted for CRTD upgrade iso pacemaker-induced cardiomyopathy  -Daily RFP and Mg for K>4, Mg >2  -Will continue to monitor I&Os     #Type A aortic  dissection  #HTN  :: No outpatient anti-hypertensive  -Hydralazine 25mg tid started     RENAL  #KIA on CKD (baseline Scr 2.1-2.2), non-oliguric   #ESRD s/p kidney transplantation (on tacromlimus and mycophenolate)  :: Suspect cardiorenal syndrome, however Cr worsening with aggressive diuresis, Dced drip on 3/3  :: consider renal biopsy  - fu CMV, EBV, BK, DSA labs  - Plan for nuclear med lasix renogram today to r/o functional obstruction given worsened Cr with aggressive diuresis  - Daily AM Tacrolimus     HEME/ONC  #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   -iron studies: 75, TIBC 252, ferritin 764     Fluids: no  Access: IV  Antibiotics: none   Electrolytes: none  Nutrition: cardiac   PPI: none  DVT: Hep 5000 TID  CODE: FULL CODE  Abdiel (wife) [phone]     Patient to be seen and discussed with Attending Physician. Plan as above subject to change. Note not finalized until signed by Attending.    Lisa Ridley MD, MPH  Internal Medicine-Pediatrics, PGY-2  [facility]  Please note that voice recognition software was used to dictate this note. Please excuse any errors.

## 2024-03-04 NOTE — HOSPITAL COURSE
History Of Present Illness  Reynaldo Francisco is a 68 y.o. male presenting for management of ADHF and KIA.   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 Patient AOX3 however history largely obtained from wife (accompanying him) as he does not speak English. Over the past 1 week patient with increasing weight gain (primarily in the abdomen), ALONZO, orthopnea and PND. Denies chest pain, fever, sick contact, or cough. Prior to his symptoms patient stopped his daily diuretic at the instruction of his nephrologist in the setting of worsening renal injury (additional hx below). Presented to [facility] with vitals are 98.1 temp, 66 HR, 20 RR, 90% O2 on RA.  Pertinent labs include 863 BNP, 664 troponin, 10.6 hemoglobin, 3.3 WBC.  Initial EKG showed ST depressions in the anterior lateral leads with no reciprocal elevation posterior EKG showed elevations in the posterior segments thus STEMI code was activated. Notably patient is V-paced. Case was discussed with  canceled STEMI activation. Subsequent CXR and POCUS suggestive of volume overlaoad. Given 40 IV lasix (unclear response). Due to the fact that he has a complex transplant history and is on multiple antirejection medications that require careful titration patient was accepted at the [facility] CICU in transfer.        Notable recent events (all occurring at [facility] in [place] including Transplant care):   -Liver transplantation in 2018 (requiring bypass) following unsuccesful first attempt 8 months prior. Course complicated by renal failure requiring RRT until renal transplantation in 03/2023.   -[employer] Dual Chamber PM placed 10/2023 for SSS  -Hospitalized 01/2024  with SOB/ALONZO/orthopnea/PND/abdominal distention and KIA . Initial plan was to work-up injury of transplanted kidney however symptoms and KIA improved with diuresis. Notably CMV TTE then demonstrated newly reduced EF 40% and severely hypokinetic apex. Completed stress PET (as part of ischemic evaluation) - negative. Concern that new cardiomyopathy was due to high pacing burden (unable to find documentation of device interrogation) however no device changes made. Discharged with Lasix 40 every day. Scr 2.1-2.2 on discharge.  -CMV and renal U/S negative  -symptoms continued to improve on DC with lowest weight achieved 128 lb (daily weight checks).   -On 2/6, outpatient CMP demonstrating increase in renal function. Nephrologist instructed patient to switch diuretic to EOD. Scr continued to uptrended to 3.3 such that patient instructed to hold diuretic  for the past week. Patient weight prior to presenting to [place] 137 lb.      CICU Course:    Reynaldo Francisco is a 68 y.o. male presenting for management of ADHF and KIA.   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    On arrival (3/1), VSS (breathing well on RA). EKG V-paced. Volume overloaded on exam and echo, diuresed with IV bumex and diuril pushes. Device interrogation -97% V-paced.      Saco placed in cath lab on 3/1, and pt found to have elev R sided filling pressures and elev Cr c/f cardiorenal syndrome and pacemaker-mediated cardiomyopathy, with aggressive diuresis initiated on Bumex drip and additional boluses of IV lasix, bumex and diuril. Pt did have good response  with -2L net negative, however Cr continued to worsen to max of 4.39 with diuresis and pt started complaining of abdominal pain over site of transplanted kidney. TTE showed that patient also has severe TR, mod MR and mod AR, so we think that elev R sided pressures may be iso valvular disease rather than true volume overload. Held diuresis for 24H, Cr initially decreased and then increased, so starting to seem like possibly just intrinsic kidney disease given that Cr doesnt seem to correlate with volume status and our suspicion for cardiorenal is low now given stable and improved filling pressures on swan today. Transplant nephro following, rec taking off bumex drip and going to boluses. Also concerned about obstruction vs rejection. Recommended nuclear med lasix renogram (he is allergic to contrast), negative for any obstruction. Sent CMV neg, EBV neg, BK virus (38 international units /mL).  Transplant nephro rec'd kidney biopsy performed on 3/7 with minimal histologic abnormality, possible calcineurin inhibitor toxicity indicating likely functional etiology of KIA such as ATN. Pt underwent iHD per transplant renal recs with sessions on 3/7, 3/9, 3/14.    Seen by hepatology given h/o liver transplant, deferred tacro dosing to transplant nephro and rec discussion with [facility] hepatology team with any issues. Not reached out thus far, no active hepatic issues.    EP consulted for pacemaker-mediated cardiomyopathy given that patient is pacing-dependent, and rec'd cMRI prior to deciding on pacemaker upgrade which had myocardial enhancement patterns with differential  considerations including sarcoidosis, PET sarcoid scan was rec'd which showed increased metabolic activity throughout the left ventricle, which is compatible with an inflammatory process such as myocarditis, sarcoidosis, or amyloidosis. EP cardiology rec'd HF cardiology consultation after which pt underwent endomyocardial biopsy  on 3/14. Pt ultimately  underwent uneventful upgrade to CRT-D on 3/15. Patient tolerated the procedure well. Developed a hematoma around the pacemaker with some decrement in hemoglobin but stabilized and was discharged home on 3/20.

## 2024-03-04 NOTE — PROGRESS NOTES
Physical Therapy                 Therapy Communication Note    Patient Name: Reynaldo Francisco  MRN: [id number]  Today's Date: 3/4/2024     Discipline: Physical Therapy    Missed Visit Reason: Missed Visit Reason:  (Pt on bedrest with femoral swan-narayan catheter and is not appropriate for PT.)    Missed Time: Attempt; 8:30    Comment:

## 2024-03-05 NOTE — PROGRESS NOTES
"Reynaldo Francisco is a 68 y.o. male on day 5 of admission presenting with Heart failure (CMS/HCC).    Subjective   NAEON. This morning, the patient reports feeling okay, he feels his breathing is at his baseline and his RLQ is only tender to palpation, similar to yesterday. He denies any chest pain, SOB, palpitations or nausea.        Objective     Physical Exam    Constitutional: awake and alert, resting comfortably in NAD  Eyes: No scleral icterus or conjunctival injection, EOMI  ENMT: MMM  Head/Neck: NC/AT  Respiratory/Thorax: Breathing comfortably. No retractions or accessory muscle use. Good air entry into all lung fields. RLL crackles, otherwise CTAB.   Cardiovascular: RRR, +S1, S2, no m/r/g  Gastrointestinal: (+) present TTP of RLQ improved from prior (pt reports tenderness over kidney), no pain at rest, no TTP to other quadrants normoactive BS, soft, no palpable masses, no organomegaly.  No rebound or guarding.  : R femoral swan in place, dried blood with no active oozing, mild tenderness to palpation without erythema or edema  Extremities: warm and well perfused, no LE edema, 2+ radial and dorsalis pedis pulses b/l, capillary refill <2s  Neurological: motor and sensation grossly intact and symmetric  Psychological: Mood and affect appropriate for age    Last Recorded Vitals  Blood pressure 103/59, pulse 59, temperature 36.4 °C (97.5 °F), temperature source Temporal, resp. rate 25, height 1.727 m (5' 8\"), weight 55.6 kg (122 lb 9.6 oz), SpO2 99 %.  Intake/Output last 3 Shifts:  I/O last 3 completed shifts:  In: 840 (15.1 mL/kg) [P.O.:840]  Out: 2090 (37.6 mL/kg) [Urine:2090 (1 mL/kg/hr)]  Weight: 55.6 kg     Relevant Results  Results for orders placed or performed during the hospital encounter of 02/29/24 (from the past 24 hour(s))   BLOOD GAS MIXED VENOUS FULL PANEL   Result Value Ref Range    POCT pH, Mixed 7.42 7.33 - 7.43 pH    POCT pCO2, Mixed 37 (L) 41 - 51 mm Hg    POCT pO2, Mixed 46 (H) 35 - 45 mm Hg    " POCT SO2, Mixed 70 45 - 75 %    POCT Oxy Hemoglobin, Mixed 68.5 45.0 - 75.0 %    POCT Hematocrit Calculated, Mixed 31.0 (L) 41.0 - 52.0 %    POCT Sodium, Mixed 139 136 - 145 mmol/L    POCT Potassium, Mixed 4.5 3.5 - 5.3 mmol/L    POCT Chloride, Mixed 106 98 - 107 mmol/L    POCT Ionized Calcium, Mixed 1.19 1.10 - 1.33 mmol/L    POCT Glucose, Mixed 120 (H) 74 - 99 mg/dL    POCT Lactate, Mixed 0.8 0.4 - 2.0 mmol/L    POCT Base Excess, Mixed -0.3 -2.0 - 3.0 mmol/L    POCT HCO3 Calculated, Mixed 24.0 22.0 - 26.0 mmol/L    POCT Hemoglobin, Mixed 10.3 (L) 13.5 - 17.5 g/dL    POCT Anion Gap, Mixed 14 10 - 25 mmo/L    Patient Temperature 37.0 degrees Celsius    FiO2 21 %   Renal function panel   Result Value Ref Range    Glucose 120 (H) 74 - 99 mg/dL    Sodium 140 136 - 145 mmol/L    Potassium 4.5 3.5 - 5.3 mmol/L    Chloride 103 98 - 107 mmol/L    Bicarbonate 24 21 - 32 mmol/L    Anion Gap 18 10 - 20 mmol/L    Urea Nitrogen 86 (H) 6 - 23 mg/dL    Creatinine 3.96 (H) 0.50 - 1.30 mg/dL    eGFR 16 (L) >60 mL/min/1.73m*2    Calcium 9.9 8.6 - 10.6 mg/dL    Phosphorus 5.0 (H) 2.5 - 4.9 mg/dL    Albumin 4.5 3.4 - 5.0 g/dL   Magnesium   Result Value Ref Range    Magnesium 2.09 1.60 - 2.40 mg/dL   BLOOD GAS MIXED VENOUS FULL PANEL   Result Value Ref Range    POCT pH, Mixed 7.43 7.33 - 7.43 pH    POCT pCO2, Mixed 36 (L) 41 - 51 mm Hg    POCT pO2, Mixed 46 (H) 35 - 45 mm Hg    POCT SO2, Mixed 71 45 - 75 %    POCT Oxy Hemoglobin, Mixed 69.7 45.0 - 75.0 %    POCT Hematocrit Calculated, Mixed 30.0 (L) 41.0 - 52.0 %    POCT Sodium, Mixed 136 136 - 145 mmol/L    POCT Potassium, Mixed 4.5 3.5 - 5.3 mmol/L    POCT Chloride, Mixed 104 98 - 107 mmol/L    POCT Ionized Calcium, Mixed 1.19 1.10 - 1.33 mmol/L    POCT Glucose, Mixed 125 (H) 74 - 99 mg/dL    POCT Lactate, Mixed 0.8 0.4 - 2.0 mmol/L    POCT Base Excess, Mixed -0.2 -2.0 - 3.0 mmol/L    POCT HCO3 Calculated, Mixed 23.9 22.0 - 26.0 mmol/L    POCT Hemoglobin, Mixed 10.0 (L) 13.5 - 17.5  g/dL    POCT Anion Gap, Mixed 13 10 - 25 mmo/L    Patient Temperature 37.0 degrees Celsius    FiO2 21 %   Renal Function Panel   Result Value Ref Range    Glucose 116 (H) 74 - 99 mg/dL    Sodium 139 136 - 145 mmol/L    Potassium 4.6 3.5 - 5.3 mmol/L    Chloride 102 98 - 107 mmol/L    Bicarbonate 26 21 - 32 mmol/L    Anion Gap 16 10 - 20 mmol/L    Urea Nitrogen 90 (H) 6 - 23 mg/dL    Creatinine 4.24 (H) 0.50 - 1.30 mg/dL    eGFR 15 (L) >60 mL/min/1.73m*2    Calcium 9.7 8.6 - 10.6 mg/dL    Phosphorus 4.8 2.5 - 4.9 mg/dL    Albumin 4.3 3.4 - 5.0 g/dL   Magnesium   Result Value Ref Range    Magnesium 1.94 1.60 - 2.40 mg/dL   PTH, Intact   Result Value Ref Range    Parathyroid Hormone, Intact 148.5 (H) 18.5 - 88.0 pg/mL   BLOOD GAS MIXED VENOUS FULL PANEL   Result Value Ref Range    POCT pH, Mixed 7.44 (H) 7.33 - 7.43 pH    POCT pCO2, Mixed 37 (L) 41 - 51 mm Hg    POCT pO2, Mixed 47 (H) 35 - 45 mm Hg    POCT SO2, Mixed 73 45 - 75 %    POCT Oxy Hemoglobin, Mixed 71.6 45.0 - 75.0 %    POCT Hematocrit Calculated, Mixed 28.0 (L) 41.0 - 52.0 %    POCT Sodium, Mixed 136 136 - 145 mmol/L    POCT Potassium, Mixed 4.7 3.5 - 5.3 mmol/L    POCT Chloride, Mixed 104 98 - 107 mmol/L    POCT Ionized Calcium, Mixed 1.20 1.10 - 1.33 mmol/L    POCT Glucose, Mixed 115 (H) 74 - 99 mg/dL    POCT Lactate, Mixed 0.5 0.4 - 2.0 mmol/L    POCT Base Excess, Mixed 1.0 -2.0 - 3.0 mmol/L    POCT HCO3 Calculated, Mixed 25.1 22.0 - 26.0 mmol/L    POCT Hemoglobin, Mixed 9.3 (L) 13.5 - 17.5 g/dL    POCT Anion Gap, Mixed 12 10 - 25 mmo/L    Patient Temperature 37.0 degrees Celsius    FiO2 21 %           Assessment/Plan   Principal Problem:    Heart failure (CMS/HCC)  Active Problems:    Liver transplanted (CMS/HCC)    Protein-calorie malnutrition, unspecified severity (CMS/HCC)    Dilated cardiomyopathy (CMS/HCC)    Atrioventricular block, complete (CMS/HCC)    Diabetes mellitus, type 2 (CMS/HCC)    Asplenia    ATN (acute tubular necrosis) (CMS/HCC)    AV  graft stenosis (CMS/HCC)    Back pain    Cerebral artery occlusion    End stage renal disease on dialysis (CMS/HCC)    Anemia    Gastroesophageal reflux disease    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    Hypertension    Increased heart rate    Lung nodule    Malignant neoplasm of colon (CMS/HCC)    Metastatic cancer to liver (CMS/HCC)    Moderate aortic insufficiency    Insulinoma    Presence of primary arteriovenous graft for hemodialysis    RBBB    Right jugular vein thrombosis    Right lower quadrant abdominal pain    Suture granuloma    Systolic murmur    SCC (squamous cell carcinoma)    Syncope and collapse    Delayed graft function of kidney transplant due to ATN requiring acute dialysis (CMS/HCC)    Pacemaker    Pseudoaneurysm of distal brachial artery (CMS/HCC)    Dissecting aneurysm of thoracic aorta, [place] type B (CMS/HCC)    Arteriovenous fistula (CMS/HCC)    Balanitis    Diarrhea    History of malignant neoplasm of pancreas    Obstruction of inferior vena cava    Shortness of breath    Acute systolic (congestive) heart failure (CMS/HCC)    Sick sinus syndrome (CMS/HCC)    Reynaldo Francisco is a 68 y.o. 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  Etiology of KIA appears to be most likely cardiorenal given elev CVP and PCWP, however Cr worsened with aggressive diuresis and additionally, found to have mild-mod MR, mod-severe TR and mod AI on TTE. Patient is not otherwise clinically hypervolemic on exam, ALISA, making valvular disease a more likely etiology of elevated filling pressures. However, Cr now rising again, making  mixed intrinsic and cardiorenal etiologies feasible. Per transplant nephro team, lasix renogram obtained and negative for functional obstruction. Pending BK, CMV, EBV, DSA levels, may consider renal biopsy.     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 I suspect renal injury is due to cardiomyopathy which progressed to HF syndrome when the diuretics were held. Evidence include reduced EF, grossly overloaded on exam and previous unremarkable renal testing (CMV, US) albeit no biopsy completed. As to the cause of the cardiomyopathy would appear to be PM - mediated.  :: CT PET at OSH did not show ischemia  - EP consulted for CRTD upgrade iso pacemaker-induced cardiomyopathy  -Daily RFP and Mg for K>4, Mg >2  -Will continue to monitor I&Os     #Type A aortic dissection  #HTN  :: No outpatient anti-hypertensive  -Hydralazine 25mg tid started     RENAL  #KIA on CKD (baseline Scr 2.1-2.2), non-oliguric   #ESRD s/p kidney transplantation (on tacromlimus and mycophenolate)  :: Suspect cardiorenal syndrome, however Cr worsening with aggressive diuresis, Dced drip on 3/3  :: consider renal biopsy  - fu CMV, EBV, BK, DSA labs  - Nuclear med lasix renogram negative for obstruction  - consider additional diuresis with IV pushes  - Daily AM Tacrolimus     HEME/ONC  #Insulinoma w/ hepatic metastasis requiring  liver transplantation (2018)   -C/w anti-rejection medication  -daily tacrolimus level  -hepatology consulted for immunosuppression  - Per recs consider contacting transplant team at [facility]      #anemia, multifactorial (baseline Hgb 10)   - at baseline.   -iron studies: 75, TIBC 252, ferritin 764     Fluids: no  Access: IV  Antibiotics: none   Electrolytes: none  Nutrition: cardiac   PPI: none  DVT: Hep 5000 TID  CODE: FULL CODE  Abdiel (wife) [phone]     Patient to be seen and discussed with Attending Physician. Plan as above subject to change. Note not finalized until signed by Attending.    Lisa Ridley MD, MPH  Internal Medicine-Pediatrics, PGY-2  [facility]  Please note that voice recognition software was used to dictate this note. Please excuse any errors.

## 2024-03-05 NOTE — PROGRESS NOTES
Physical Therapy                 Therapy Communication Note    Patient Name: Reynaldo Francisco  MRN: [id number]  Today's Date: 3/5/2024     Discipline: Physical Therapy    Missed Visit Reason: Missed Visit Reason:  (Pt on hold with femoral swan-narayan catheter and on bedrest. Will hold PT.)    Missed Time: Attempt; 8:15    Comment:

## 2024-03-05 NOTE — PROGRESS NOTES
Occupational Therapy                 Therapy Communication Note    Patient Name: Reynaldo Francisco  MRN: [id number]  Today's Date: 3/5/2024     Discipline: Occupational Therapy    Missed Visit Reason: Missed Visit Reason:  (Hold therapy this date. Pt currently with femoral Alpha and on bedrest.)    Missed Time: Attempt 1204

## 2024-03-05 NOTE — CARE PLAN
The patient's goals for the shift include      The clinical goals for the shift include Patient will remain hemodynamically stable and get rest thorughout shift.    Problem: Heart Failure  Goal: Improved gas exchange this shift  Outcome: Progressing  Goal: Improved urinary output this shift  Outcome: Progressing  Goal: Reduction in peripheral edema within 24 hours  Outcome: Progressing  Goal: Report improvement of dyspnea/breathlessness this shift  Outcome: Progressing  Goal: Weight from fluid excess reduced over 2-3 days, then stabilize  Outcome: Progressing  Goal: Increase self care and/or family involvement in 24 hours  Outcome: Progressing     Problem: Pain  Goal: My pain/discomfort is manageable  Outcome: Progressing     Problem: Safety  Goal: Patient will be injury free during hospitalization  Outcome: Progressing  Goal: I will remain free of falls  Outcome: Progressing     Problem: Daily Care  Goal: Daily care needs are met  Outcome: Progressing     Problem: Psychosocial Needs  Goal: Demonstrates ability to cope with hospitalization/illness  Outcome: Progressing  Goal: Collaborate with me, my family, and caregiver to identify my specific goals  Outcome: Progressing     Problem: Discharge Barriers  Goal: My discharge needs are met  Outcome: Progressing     Problem: Skin  Goal: Decreased wound size/increased tissue granulation at next dressing change  Outcome: Progressing  Goal: Participates in plan/prevention/treatment measures  Outcome: Progressing  Goal: Prevent/manage excess moisture  Outcome: Progressing  Goal: Prevent/minimize sheer/friction injuries  Outcome: Progressing  Goal: Promote/optimize nutrition  Outcome: Progressing  Goal: Promote skin healing  Outcome: Progressing     Problem: Fall/Injury  Goal: Not fall by end of shift  Outcome: Progressing  Goal: Be free from injury by end of the shift  Outcome: Progressing  Goal: Verbalize understanding of personal risk factors for fall in the  hospital  Outcome: Progressing  Goal: Verbalize understanding of risk factor reduction measures to prevent injury from fall in the home  Outcome: Progressing  Goal: Use assistive devices by end of the shift  Outcome: Progressing  Goal: Pace activities to prevent fatigue by end of the shift  Outcome: Progressing     Problem: Pain - Adult  Goal: Verbalizes/displays adequate comfort level or baseline comfort level  Outcome: Progressing     Problem: Discharge Planning  Goal: Discharge to home or other facility with appropriate resources  Outcome: Progressing     Problem: Chronic Conditions and Co-morbidities  Goal: Patient's chronic conditions and co-morbidity symptoms are monitored and maintained or improved  Outcome: Progressing

## 2024-03-05 NOTE — PROGRESS NOTES
"Reynaldo Francisco is a 68 y.o. male on day 5 of admission presenting with Heart failure (CMS/HCC).    Subjective:  Feels overall better, having fullness and discomfort in transplant kidney. Able to lay flat. Does feel like he a has a little bit of fluid still on but much better. Patient aware of negative lasix scan test. Discussed possibly kidney biopsy.     Scheduled medications  aspirin, 81 mg, oral, q AM  clotrimazole, , Topical, BID  heparin (porcine), 5,000 Units, subcutaneous, q8h KESHIA  hydrALAZINE, 25 mg, oral, TID  lidocaine, 1 patch, transdermal, Daily  mycophenolate, 180 mg, oral, BID  pantoprazole, 40 mg, oral, q AM  perflutren protein A microsphere, 0.5 mL, intravenous, Once in imaging  sodium bicarbonate, 1,300 mg, oral, BID  sulfamethoxazole-trimethoprim, 1 tablet, oral, Every Mon/Wed/Fri  sulfur hexafluoride microsphr, 2 mL, intravenous, Once in imaging  tacrolimus, 1.5 mg, oral, q12h KESHIA  tamsulosin, 0.4 mg, oral, Daily          Last Recorded Vitals  Blood pressure 109/60, pulse 63, temperature 36.8 °C (98.2 °F), temperature source Temporal, resp. rate 15, height 1.727 m (5' 8\"), weight 55.6 kg (122 lb 9.6 oz), SpO2 97 %.  Intake/Output last 3 Shifts:  I/O last 3 completed shifts:  In: 840 (15.1 mL/kg) [P.O.:840]  Out: 2090 (37.6 mL/kg) [Urine:2090 (1 mL/kg/hr)]  Weight: 55.6 kg      General: A&ox3, no distress   Respiratory: Lungs with no basilar crackles   Cardiac: RRR, no rubs    Abd: mild allograft tenderness. Abd soft, no distention  Extremities: No edema in bilateral lower extremities  : no horton  LUE AVF with bruit and thrill    Labs:  Results for orders placed or performed during the hospital encounter of 02/29/24 (from the past 24 hour(s))   Renal function panel   Result Value Ref Range    Glucose 120 (H) 74 - 99 mg/dL    Sodium 140 136 - 145 mmol/L    Potassium 4.5 3.5 - 5.3 mmol/L    Chloride 103 98 - 107 mmol/L    Bicarbonate 24 21 - 32 mmol/L    Anion Gap 18 10 - 20 mmol/L    Urea Nitrogen 86 " (H) 6 - 23 mg/dL    Creatinine 3.96 (H) 0.50 - 1.30 mg/dL    eGFR 16 (L) >60 mL/min/1.73m*2    Calcium 9.9 8.6 - 10.6 mg/dL    Phosphorus 5.0 (H) 2.5 - 4.9 mg/dL    Albumin 4.5 3.4 - 5.0 g/dL   Magnesium   Result Value Ref Range    Magnesium 2.09 1.60 - 2.40 mg/dL   BLOOD GAS MIXED VENOUS FULL PANEL   Result Value Ref Range    POCT pH, Mixed 7.43 7.33 - 7.43 pH    POCT pCO2, Mixed 36 (L) 41 - 51 mm Hg    POCT pO2, Mixed 46 (H) 35 - 45 mm Hg    POCT SO2, Mixed 71 45 - 75 %    POCT Oxy Hemoglobin, Mixed 69.7 45.0 - 75.0 %    POCT Hematocrit Calculated, Mixed 30.0 (L) 41.0 - 52.0 %    POCT Sodium, Mixed 136 136 - 145 mmol/L    POCT Potassium, Mixed 4.5 3.5 - 5.3 mmol/L    POCT Chloride, Mixed 104 98 - 107 mmol/L    POCT Ionized Calcium, Mixed 1.19 1.10 - 1.33 mmol/L    POCT Glucose, Mixed 125 (H) 74 - 99 mg/dL    POCT Lactate, Mixed 0.8 0.4 - 2.0 mmol/L    POCT Base Excess, Mixed -0.2 -2.0 - 3.0 mmol/L    POCT HCO3 Calculated, Mixed 23.9 22.0 - 26.0 mmol/L    POCT Hemoglobin, Mixed 10.0 (L) 13.5 - 17.5 g/dL    POCT Anion Gap, Mixed 13 10 - 25 mmo/L    Patient Temperature 37.0 degrees Celsius    FiO2 21 %   CBC   Result Value Ref Range    WBC 3.1 (L) 4.4 - 11.3 x10*3/uL    nRBC 0.0 0.0 - 0.0 /100 WBCs    RBC 2.83 (L) 4.50 - 5.90 x10*6/uL    Hemoglobin 9.2 (L) 13.5 - 17.5 g/dL    Hematocrit 25.6 (L) 41.0 - 52.0 %    MCV 91 80 - 100 fL    MCH 32.5 26.0 - 34.0 pg    MCHC 35.9 32.0 - 36.0 g/dL    RDW 13.9 11.5 - 14.5 %    Platelets 96 (L) 150 - 450 x10*3/uL   Renal Function Panel   Result Value Ref Range    Glucose 116 (H) 74 - 99 mg/dL    Sodium 139 136 - 145 mmol/L    Potassium 4.6 3.5 - 5.3 mmol/L    Chloride 102 98 - 107 mmol/L    Bicarbonate 26 21 - 32 mmol/L    Anion Gap 16 10 - 20 mmol/L    Urea Nitrogen 90 (H) 6 - 23 mg/dL    Creatinine 4.24 (H) 0.50 - 1.30 mg/dL    eGFR 15 (L) >60 mL/min/1.73m*2    Calcium 9.7 8.6 - 10.6 mg/dL    Phosphorus 4.8 2.5 - 4.9 mg/dL    Albumin 4.3 3.4 - 5.0 g/dL   Magnesium   Result  Value Ref Range    Magnesium 1.94 1.60 - 2.40 mg/dL   Tacrolimus level   Result Value Ref Range    Tacrolimus  7.2 <=15.0 ng/mL   PTH, Intact   Result Value Ref Range    Parathyroid Hormone, Intact 148.5 (H) 18.5 - 88.0 pg/mL   BLOOD GAS MIXED VENOUS FULL PANEL   Result Value Ref Range    POCT pH, Mixed 7.44 (H) 7.33 - 7.43 pH    POCT pCO2, Mixed 37 (L) 41 - 51 mm Hg    POCT pO2, Mixed 47 (H) 35 - 45 mm Hg    POCT SO2, Mixed 73 45 - 75 %    POCT Oxy Hemoglobin, Mixed 71.6 45.0 - 75.0 %    POCT Hematocrit Calculated, Mixed 28.0 (L) 41.0 - 52.0 %    POCT Sodium, Mixed 136 136 - 145 mmol/L    POCT Potassium, Mixed 4.7 3.5 - 5.3 mmol/L    POCT Chloride, Mixed 104 98 - 107 mmol/L    POCT Ionized Calcium, Mixed 1.20 1.10 - 1.33 mmol/L    POCT Glucose, Mixed 115 (H) 74 - 99 mg/dL    POCT Lactate, Mixed 0.5 0.4 - 2.0 mmol/L    POCT Base Excess, Mixed 1.0 -2.0 - 3.0 mmol/L    POCT HCO3 Calculated, Mixed 25.1 22.0 - 26.0 mmol/L    POCT Hemoglobin, Mixed 9.3 (L) 13.5 - 17.5 g/dL    POCT Anion Gap, Mixed 12 10 - 25 mmo/L    Patient Temperature 37.0 degrees Celsius    FiO2 21 %   Electrolyte Panel, Urine   Result Value Ref Range    Sodium, Urine Random 72 mmol/L    Sodium/Creatinine Ratio 73 Not established. mmol/g Creat    Potassium, Urine Random 26 mmol/L    Potassium/Creatinine Ratio 26 Not established mmol/g Creat    Chloride, Urine Random 71 mmol/L    Chloride/Creatinine Ratio 72 23 - 275 mmol/g creat    Creatinine, Urine Random 98.6 20.0 - 370.0 mg/dL   Protein, Urine Random   Result Value Ref Range    Total Protein, Urine Random 6 5 - 25 mg/dL    Creatinine, Urine Random 98.8 20.0 - 370.0 mg/dL    T. Protein/Creatinine Ratio 0.06 0.00 - 0.17 mg/mg Creat     Renal US: 3/1/24  IMPRESSION:  1. Mild pelvicaliceal dilation of the renal transplant, which was  also noted on 10/19/2023 CT. Follow-up ultrasound with additional  postvoid scans is recommended.  2. Doppler evaluation of the renal transplant is within normal  limits  as above.        Assessment/Plan   Reynaldo Francisco is a 68 y.o. 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  Patient admitted with ADHF after diuretics held for few days as outpt. Transplant Nephrology consulted for further management.      S/p DDKT 3/5/23. 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 Thymo induction, maintained on Tac, lower dose myfortic, no prednisone.     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 Cr has been gradually worsening past few weeks as OP in setting of decreased diuretics and weight gain.  Continued to increase as IP with diuretics with elevated tacrolimus level, currently stable. No hemodynamic injury, contrast, peripheral eosinophilia, with bland UA.   - Renal US 3/1 showed mild hydro which was stable from imaging 10/2023, with NM lasix scan (w/o horton in place) showed no mechanical obstruction.    - Etiology of KIA on CKD: cardiorenal, supratherapeutic tacrolimus vs rejection   - previous TP/C ratio 0.12 11/2023 -> 0.08 2/2024 - > 0.06 (3/5/2024)  - wedge 16, cvp 11, chest xray without edema    Plan:  - would discuss with IR a biopsy of transplanted kidney   - continue flomax 0.4 mg daily  - continue PRN diuretics based off CVP, maintain MAPs > 65.   -     #electrolytes  WNL    #NAGMA  - improved with sodium bicarb tabs and diuresis  - can lower  sodium bicarb to 650 mg BID     # Immunosuppression  - cont myfortic 180 mg BID (low dose sec to h/o leukopenia, CMV). Fk level 8.5 3/3. Continue reduced dose 1.5 mg bid, monitor rpt Fk trough daily.    Not on prednisone.   - DSA neg 1/2024 ([facility])    # anemia  - Iron saturation of 30%, iron stores adequate.   - hgb of 9.2, goal 10-11  - started aranesp q14 days    # BMD  - calcium and phos WNL  - PTH of 148     #Infection prophylaxis  - on bactrim    # Hemodynamics  - Bps at goal    Yasmany Tracey MD  Nephrology Fellow

## 2024-03-06 NOTE — PROGRESS NOTES
Reynaldo Francisco is a 68 y.o. male on day 6 of admission presenting with Heart failure (CMS/HCC).    Subjective   Patient seen and evaluated at bedside. No overnight events per nursing staff. Doing well this am with no acute complaints. NPO for transplant kidney biopsy this am. Denies any fever, chills, chest pain, SOB, nausea, vomiting, diarrhea, abdominal pain. Planning to transfer to the floor today    [facility] hospital course:  Mr. Reynaldo Francisco is a 68 y.o. male presenting for management of ADHF and KIA. 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    On arrival (3/1), VSS (breathing well on RA). EKG V-paced. Volume overloaded on exam and echo, diuresed with IV bumex and diuril pushes. Device interrogation -97% V-paced.      Street[place] placed in cath lab on 3/1, and pt found to have elev R sided filling pressures and elev Cr c/f cardiorenal syndrome and pacemaker-mediated cardiomyopathy, with aggressive diuresis initiated on Bumex drip and additional boluses of IV lasix, bumex and diuril. Pt did have good response with -2L net negative, however Cr continued to worsen to max of 4.39 with diuresis and pt started complaining of abdominal pain over site of transplanted kidney. TTE showed that patient also has severe TR, mod MR and mod AR, so we think that elev R sided pressures may be iso valvular disease rather than true volume overload. Held diuresis for 24H, Cr initially decreased and then increased, so starting to seem like possibly just intrinsic kidney disease given that Cr doesnt seem to correlate with volume status and our  suspicion for cardiorenal is low now given stable and improved filling pressures on swan today. Transplant nephro following, rec taking off bumex drip and going to boluses. Also concerned about obstruction vs rejection. Recommended nuclear med lasix renogram (he is allergic to contrast) which was negative for any obstruction. BKV was positive but EMB and CMV negative. Still pending DSA. Transplant nephro planning kidney biopsy 3/6. [place] removed 3/6 am    Seen by hepatology given h/o liver transplant, deferred tacro dosing to transplant nephro and rec discussion with [facility] hepatology team with any issues. Not reached out thus far, no active hepatic issues.    EP consulted for pacemaker-mediated cardiomyopathy given that patient is pacing-dependent, will plan to upgrade to BIV and possible CRT-D once otherwise ready for discharge.    To Do:  [ ] let EP know once otherwise ready for discharge--will upgrade pacemaker  [ ] fu transplant nephrology recs, planning for biopsy 3/6 to r/o rejection  [ ] continue following tacro levels for dosing--transplant nephro has been helping us with dosing so far but they are going to let us know if they will continue to; technically its usually transplant hepatology in dual liver/kidney transplant patients but his hepatology team is at [facility] and our hepatologists signed off and deferred to transplant nephro; Selvin Tracey is the transplant nephro fellow on  [ ] Discuss with nephrology diuresis plan after biopsy as patient had been on 40 at home but was taking intermittently          Objective     Physical Exam  Constitutional: awake and alert, resting comfortably in NAD  Eyes: No scleral icterus or conjunctival injection, EOMI  ENMT: MMM  Head/Neck: NC/AT  Respiratory/Thorax: Breathing comfortably. No retractions or accessory muscle use. Good air entry into all lung fields. RLL crackles, otherwise CTAB.   Cardiovascular: RRR, +S1, S2, no m/r/g  Gastrointestinal: (+) present TTP of RLQ  "improved from prior (pt reports tenderness over kidney), no pain at rest, no TTP to other quadrants normoactive BS, soft, no palpable masses, no organomegaly.  No rebound or guarding.  : R femoral swan in place, dried blood with no active oozing, mild tenderness to palpation without erythema or edema  Extremities: warm and well perfused, no LE edema, 2+ radial and dorsalis pedis pulses b/l, capillary refill <2s  Neurological: motor and sensation grossly intact and symmetric  Psychological: Mood and affect appropriate for age    Last Recorded Vitals  Blood pressure 105/54, pulse 60, temperature 37.1 °C (98.8 °F), resp. rate 23, height 1.727 m (5' 8\"), weight 55.6 kg (122 lb 9.2 oz), SpO2 98 %.  Intake/Output last 3 Shifts:  I/O last 3 completed shifts:  In: 580 (10.4 mL/kg) [P.O.:480; I.V.:100 (1.8 mL/kg)]  Out: 1100 (19.8 mL/kg) [Urine:1100 (0.5 mL/kg/hr)]  Weight: 55.6 kg     Relevant Results  Results for orders placed or performed during the hospital encounter of 02/29/24 (from the past 24 hour(s))   Electrolyte Panel, Urine   Result Value Ref Range    Sodium, Urine Random 72 mmol/L    Sodium/Creatinine Ratio 73 Not established. mmol/g Creat    Potassium, Urine Random 26 mmol/L    Potassium/Creatinine Ratio 26 Not established mmol/g Creat    Chloride, Urine Random 71 mmol/L    Chloride/Creatinine Ratio 72 23 - 275 mmol/g creat    Creatinine, Urine Random 98.6 20.0 - 370.0 mg/dL   Protein, Urine Random   Result Value Ref Range    Total Protein, Urine Random 6 5 - 25 mg/dL    Creatinine, Urine Random 98.8 20.0 - 370.0 mg/dL    T. Protein/Creatinine Ratio 0.06 0.00 - 0.17 mg/mg Creat   Renal function panel   Result Value Ref Range    Glucose 123 (H) 74 - 99 mg/dL    Sodium 136 136 - 145 mmol/L    Potassium 4.4 3.5 - 5.3 mmol/L    Chloride 100 98 - 107 mmol/L    Bicarbonate 23 21 - 32 mmol/L    Anion Gap 17 10 - 20 mmol/L    Urea Nitrogen 88 (H) 6 - 23 mg/dL    Creatinine 4.46 (H) 0.50 - 1.30 mg/dL    eGFR 14 (L) " >60 mL/min/1.73m*2    Calcium 9.4 8.6 - 10.6 mg/dL    Phosphorus 4.9 2.5 - 4.9 mg/dL    Albumin 4.4 3.4 - 5.0 g/dL   Magnesium   Result Value Ref Range    Magnesium 2.29 1.60 - 2.40 mg/dL   CBC and Auto Differential   Result Value Ref Range    WBC 3.5 (L) 4.4 - 11.3 x10*3/uL    nRBC 0.0 0.0 - 0.0 /100 WBCs    RBC 2.67 (L) 4.50 - 5.90 x10*6/uL    Hemoglobin 9.1 (L) 13.5 - 17.5 g/dL    Hematocrit 24.5 (L) 41.0 - 52.0 %    MCV 92 80 - 100 fL    MCH 34.1 (H) 26.0 - 34.0 pg    MCHC 37.1 (H) 32.0 - 36.0 g/dL    RDW 14.0 11.5 - 14.5 %    Platelets 90 (L) 150 - 450 x10*3/uL    Neutrophils % 55.1 40.0 - 80.0 %    Immature Granulocytes %, Automated 0.3 0.0 - 0.9 %    Lymphocytes % 21.4 13.0 - 44.0 %    Monocytes % 16.5 2.0 - 10.0 %    Eosinophils % 5.8 0.0 - 6.0 %    Basophils % 0.9 0.0 - 2.0 %    Neutrophils Absolute 1.91 1.20 - 7.70 x10*3/uL    Immature Granulocytes Absolute, Automated 0.01 0.00 - 0.70 x10*3/uL    Lymphocytes Absolute 0.74 (L) 1.20 - 4.80 x10*3/uL    Monocytes Absolute 0.57 0.10 - 1.00 x10*3/uL    Eosinophils Absolute 0.20 0.00 - 0.70 x10*3/uL    Basophils Absolute 0.03 0.00 - 0.10 x10*3/uL   BLOOD GAS MIXED VENOUS FULL PANEL   Result Value Ref Range    POCT pH, Mixed 7.44 (H) 7.33 - 7.43 pH    POCT pCO2, Mixed 37 (L) 41 - 51 mm Hg    POCT pO2, Mixed 43 35 - 45 mm Hg    POCT SO2, Mixed 65 45 - 75 %    POCT Oxy Hemoglobin, Mixed 64.2 45.0 - 75.0 %    POCT Hematocrit Calculated, Mixed 27.0 (L) 41.0 - 52.0 %    POCT Sodium, Mixed 134 (L) 136 - 145 mmol/L    POCT Potassium, Mixed 5.0 3.5 - 5.3 mmol/L    POCT Chloride, Mixed 102 98 - 107 mmol/L    POCT Ionized Calcium, Mixed 1.21 1.10 - 1.33 mmol/L    POCT Glucose, Mixed 104 (H) 74 - 99 mg/dL    POCT Lactate, Mixed 0.4 0.4 - 2.0 mmol/L    POCT Base Excess, Mixed 1.0 -2.0 - 3.0 mmol/L    POCT HCO3 Calculated, Mixed 25.1 22.0 - 26.0 mmol/L    POCT Hemoglobin, Mixed 9.1 (L) 13.5 - 17.5 g/dL    POCT Anion Gap, Mixed 12 10 - 25 mmo/L    Patient Temperature 37.0  degrees Celsius    FiO2 21 %           Assessment/Plan   Principal Problem:    Heart failure (CMS/HCC)  Active Problems:    Liver transplanted (CMS/HCC)    Protein-calorie malnutrition, unspecified severity (CMS/HCC)    Dilated cardiomyopathy (CMS/HCC)    Atrioventricular block, complete (CMS/HCC)    Diabetes mellitus, type 2 (CMS/HCC)    Asplenia    ATN (acute tubular necrosis) (CMS/HCC)    AV graft stenosis (CMS/HCC)    Back pain    Cerebral artery occlusion    End stage renal disease on dialysis (CMS/HCC)    Anemia    Gastroesophageal reflux disease    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    Hypertension    Increased heart rate    Lung nodule    Malignant neoplasm of colon (CMS/HCC)    Metastatic cancer to liver (CMS/HCC)    Moderate aortic insufficiency    Insulinoma    Presence of primary arteriovenous graft for hemodialysis    RBBB    Right jugular vein thrombosis    Right lower quadrant abdominal pain    Suture granuloma    Systolic murmur    SCC (squamous cell carcinoma)    Syncope and collapse    Delayed graft function of kidney transplant due to ATN requiring acute dialysis (CMS/HCC)    Pacemaker    Pseudoaneurysm of distal brachial artery (CMS/HCC)    Dissecting aneurysm of thoracic aorta, Christ type B (CMS/HCC)    Arteriovenous fistula (CMS/HCC)    Balanitis    Diarrhea    History of malignant neoplasm of pancreas    Obstruction of inferior vena cava    Shortness of breath    Acute systolic (congestive) heart failure (CMS/HCC)    Sick sinus syndrome (CMS/HCC)    Reynaldo Francisco is a 68 y.o. 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  Etiology of KIA appears to be most likely cardiorenal given elev CVP and PCWP, however Cr worsened with aggressive diuresis and additionally, found to have mild-mod MR, mod-severe TR and mod AI on TTE. Patient is not otherwise clinically hypervolemic on exam, ALISA, making valvular disease a more likely etiology of elevated filling pressures. However, Cr now rising again, making mixed intrinsic and cardiorenal etiologies feasible. Per transplant nephro team, lasix renogram obtained and negative for functional obstruction. NPO for transplanted kidney biopsy today 3/6. BKV found to be positive.      Updates 3/6:  - BKV positive, CMV and EBV negative, DSA pending still   - NPO for transplanted kidney biopsy 3/6  - Will follow with EP for CRTD upgrade iso of pacemaker-induced cardiomyopathy, will upgrade closer to discharge   - Will remove right femoral swan this am 3/6  - Transfer to floor orders in as no further CICU indication     CV  #ADHF  #Stage C systolic heart failure/NICM/HFrEF (40% TTE 01/2024)  #SSS s/p dual-chamber PM  #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  - Patient appeared to have develop symptoms after holding diuretic in the setting of worsening renal injury. I suspect renal injury is due to cardiomyopathy which progressed to HF syndrome when the diuretics were held. Evidence include reduced EF, grossly overloaded on exam and previous unremarkable renal testing (CMV, US) albeit no biopsy completed. As to the cause of the cardiomyopathy would appear to be PM - mediated.  - CT PET at OSH did not show ischemia  - EP following for CRTD upgrade iso pacemaker-induced cardiomyopathy closer to discharge   - Daily RFP and Mg for K>4, Mg >2  - Will continue to monitor I&Os     #Type A aortic dissection  #HTN  - BP well controlled in 100-110s/50-80s  - No outpatient anti-hypertensive  - Continue on Hydralazine 25mg tid (started during this admission)     RENAL  #KIA on CKD (baseline Scr  2.1-2.2), non-oliguric   #ESRD s/p kidney transplantation (on tacromlimus and mycophenolate)  #NAGMA  - Initially suspect cardiorenal syndrome, however Cr worsening with aggressive diuresis, Dced drip on 3/3  - NPO for transplanted kidney biopsy 3/6  - BKV positive   - EBV and CMV negatvie, pending DSA   - Nuclear med lasix renogram negative for obstruction  - consider additional diuresis with IV pushes  - Daily AM Tacrolimus lvls   - Continue Tacro 1.5 q12 and Mycophenolate 180 mg BID   - Continue Sodium bicarb 650 BID  - Continue Bactrim ppx   - Transplant nephrology following     HEME/ONC  #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   -iron studies: 75, TIBC 252, ferritin 764  - Started Aranesep 80 mcg q14 days, first dose 3/6 at 0900      Fluids: no  Access: IV, Right femoral [place]  Antibiotics: Bactrim ppx   Electrolytes: PRN  Nutrition: cardiac   PPI: Protonix 40  DVT: Hep 5000 TID  CODE status: FULL CODE  NOK: Abdiel (wife) [phone]     Patient to be seen and discussed with Attending Physician. Plan as above subject to change. Note not finalized until signed by Attending.

## 2024-03-06 NOTE — PROGRESS NOTES
Physical Therapy                 Therapy Communication Note    Patient Name: Reynaldo Francisco  MRN: [id number]  Today's Date: 3/6/2024     Discipline: Physical Therapy    Missed Visit Reason: Missed Visit Reason: Other (Comment) (Per RN, plan to pull femoral swan line. Defer PT at this time.)    Missed Time: Attempt    Comment:

## 2024-03-06 NOTE — CARE PLAN
The patient's goals for the shift include      The clinical goals for the shift include Patient will remain hemodynamically stable and get rest throughout shift.    Problem: Heart Failure  Goal: Improved gas exchange this shift  Outcome: Progressing  Goal: Improved urinary output this shift  Outcome: Progressing  Goal: Reduction in peripheral edema within 24 hours  Outcome: Progressing  Goal: Report improvement of dyspnea/breathlessness this shift  Outcome: Progressing  Goal: Weight from fluid excess reduced over 2-3 days, then stabilize  Outcome: Progressing  Goal: Increase self care and/or family involvement in 24 hours  Outcome: Progressing     Problem: Pain  Goal: My pain/discomfort is manageable  Outcome: Progressing     Problem: Safety  Goal: Patient will be injury free during hospitalization  Outcome: Progressing  Goal: I will remain free of falls  Outcome: Progressing     Problem: Daily Care  Goal: Daily care needs are met  Outcome: Progressing     Problem: Psychosocial Needs  Goal: Demonstrates ability to cope with hospitalization/illness  Outcome: Progressing  Goal: Collaborate with me, my family, and caregiver to identify my specific goals  Outcome: Progressing     Problem: Discharge Barriers  Goal: My discharge needs are met  Outcome: Progressing     Problem: Skin  Goal: Decreased wound size/increased tissue granulation at next dressing change  Outcome: Progressing  Goal: Participates in plan/prevention/treatment measures  Outcome: Progressing  Goal: Prevent/manage excess moisture  Outcome: Progressing  Goal: Prevent/minimize sheer/friction injuries  Outcome: Progressing  Goal: Promote/optimize nutrition  Outcome: Progressing  Goal: Promote skin healing  Outcome: Progressing     Problem: Fall/Injury  Goal: Not fall by end of shift  Outcome: Progressing  Goal: Be free from injury by end of the shift  Outcome: Progressing  Goal: Verbalize understanding of personal risk factors for fall in the  hospital  Outcome: Progressing  Goal: Verbalize understanding of risk factor reduction measures to prevent injury from fall in the home  Outcome: Progressing  Goal: Use assistive devices by end of the shift  Outcome: Progressing  Goal: Pace activities to prevent fatigue by end of the shift  Outcome: Progressing     Problem: Pain - Adult  Goal: Verbalizes/displays adequate comfort level or baseline comfort level  Outcome: Progressing     Problem: Safety - Adult  Goal: Free from fall injury  Outcome: Progressing     Problem: Discharge Planning  Goal: Discharge to home or other facility with appropriate resources  Outcome: Progressing     Problem: Chronic Conditions and Co-morbidities  Goal: Patient's chronic conditions and co-morbidity symptoms are monitored and maintained or improved  Outcome: Progressing

## 2024-03-06 NOTE — PROGRESS NOTES
"Reynaldo Francisco is a 68 y.o. male on day 6 of admission presenting with Heart failure (CMS/HCC).    Subjective   Patient seen at bedside.  He endorses back pain from sleeping on the hospital beds but otherwise feels comfortable.  Denies shortness of breath or chest pain.  He continues to be n.p.o. for kidney biopsy.       Objective     Physical Exam  Constitutional:       General: He is not in acute distress.     Comments: Chronically ill appearing    HENT:      Mouth/Throat:      Mouth: Mucous membranes are moist.   Eyes:      Conjunctiva/sclera: Conjunctivae normal.   Neck:      Comments: JVD up to the level of the mandible   Cardiovascular:      Rate and Rhythm: Regular rhythm.      Heart sounds: Murmur heard.   Pulmonary:      Effort: No respiratory distress.      Breath sounds: No wheezing.      Comments: Diminished in bilateral bases  Abdominal:      General: There is no distension.      Tenderness: There is no abdominal tenderness.   Musculoskeletal:      Right lower leg: No edema.      Left lower leg: No edema.   Skin:     Capillary Refill: Capillary refill takes less than 2 seconds.   Neurological:      General: No focal deficit present.      Mental Status: He is alert and oriented to person, place, and time.         Last Recorded Vitals  Blood pressure 105/58, pulse 60, temperature 36.5 °C (97.7 °F), temperature source Temporal, resp. rate 12, height 1.727 m (5' 8\"), weight 55.6 kg (122 lb 9.2 oz), SpO2 97 %.  Intake/Output last 3 Shifts:  I/O last 3 completed shifts:  In: 580 (10.4 mL/kg) [P.O.:480; I.V.:100 (1.8 mL/kg)]  Out: 1100 (19.8 mL/kg) [Urine:1100 (0.5 mL/kg/hr)]  Weight: 55.6 kg     Relevant Results  Results for orders placed or performed during the hospital encounter of 02/29/24 (from the past 24 hour(s))   Renal function panel   Result Value Ref Range    Glucose 123 (H) 74 - 99 mg/dL    Sodium 136 136 - 145 mmol/L    Potassium 4.4 3.5 - 5.3 mmol/L    Chloride 100 98 - 107 mmol/L    Bicarbonate 23 " 21 - 32 mmol/L    Anion Gap 17 10 - 20 mmol/L    Urea Nitrogen 88 (H) 6 - 23 mg/dL    Creatinine 4.46 (H) 0.50 - 1.30 mg/dL    eGFR 14 (L) >60 mL/min/1.73m*2    Calcium 9.4 8.6 - 10.6 mg/dL    Phosphorus 4.9 2.5 - 4.9 mg/dL    Albumin 4.4 3.4 - 5.0 g/dL   Magnesium   Result Value Ref Range    Magnesium 2.29 1.60 - 2.40 mg/dL   CBC and Auto Differential   Result Value Ref Range    WBC 3.5 (L) 4.4 - 11.3 x10*3/uL    nRBC 0.0 0.0 - 0.0 /100 WBCs    RBC 2.67 (L) 4.50 - 5.90 x10*6/uL    Hemoglobin 9.1 (L) 13.5 - 17.5 g/dL    Hematocrit 24.5 (L) 41.0 - 52.0 %    MCV 92 80 - 100 fL    MCH 34.1 (H) 26.0 - 34.0 pg    MCHC 37.1 (H) 32.0 - 36.0 g/dL    RDW 14.0 11.5 - 14.5 %    Platelets 90 (L) 150 - 450 x10*3/uL    Neutrophils % 55.1 40.0 - 80.0 %    Immature Granulocytes %, Automated 0.3 0.0 - 0.9 %    Lymphocytes % 21.4 13.0 - 44.0 %    Monocytes % 16.5 2.0 - 10.0 %    Eosinophils % 5.8 0.0 - 6.0 %    Basophils % 0.9 0.0 - 2.0 %    Neutrophils Absolute 1.91 1.20 - 7.70 x10*3/uL    Immature Granulocytes Absolute, Automated 0.01 0.00 - 0.70 x10*3/uL    Lymphocytes Absolute 0.74 (L) 1.20 - 4.80 x10*3/uL    Monocytes Absolute 0.57 0.10 - 1.00 x10*3/uL    Eosinophils Absolute 0.20 0.00 - 0.70 x10*3/uL    Basophils Absolute 0.03 0.00 - 0.10 x10*3/uL   Magnesium   Result Value Ref Range    Magnesium 2.18 1.60 - 2.40 mg/dL   Renal function panel   Result Value Ref Range    Glucose 94 74 - 99 mg/dL    Sodium 138 136 - 145 mmol/L    Potassium 4.9 3.5 - 5.3 mmol/L    Chloride 101 98 - 107 mmol/L    Bicarbonate 23 21 - 32 mmol/L    Anion Gap 19 10 - 20 mmol/L    Urea Nitrogen 93 (HH) 6 - 23 mg/dL    Creatinine 4.17 (H) 0.50 - 1.30 mg/dL    eGFR 15 (L) >60 mL/min/1.73m*2    Calcium 9.4 8.6 - 10.6 mg/dL    Phosphorus 4.7 2.5 - 4.9 mg/dL    Albumin 4.3 3.4 - 5.0 g/dL   BLOOD GAS MIXED VENOUS FULL PANEL   Result Value Ref Range    POCT pH, Mixed 7.44 (H) 7.33 - 7.43 pH    POCT pCO2, Mixed 37 (L) 41 - 51 mm Hg    POCT pO2, Mixed 43 35 - 45  mm Hg    POCT SO2, Mixed 65 45 - 75 %    POCT Oxy Hemoglobin, Mixed 64.2 45.0 - 75.0 %    POCT Hematocrit Calculated, Mixed 27.0 (L) 41.0 - 52.0 %    POCT Sodium, Mixed 134 (L) 136 - 145 mmol/L    POCT Potassium, Mixed 5.0 3.5 - 5.3 mmol/L    POCT Chloride, Mixed 102 98 - 107 mmol/L    POCT Ionized Calcium, Mixed 1.21 1.10 - 1.33 mmol/L    POCT Glucose, Mixed 104 (H) 74 - 99 mg/dL    POCT Lactate, Mixed 0.4 0.4 - 2.0 mmol/L    POCT Base Excess, Mixed 1.0 -2.0 - 3.0 mmol/L    POCT HCO3 Calculated, Mixed 25.1 22.0 - 26.0 mmol/L    POCT Hemoglobin, Mixed 9.1 (L) 13.5 - 17.5 g/dL    POCT Anion Gap, Mixed 12 10 - 25 mmo/L    Patient Temperature 37.0 degrees Celsius    FiO2 21 %   Tacrolimus level   Result Value Ref Range    Tacrolimus  7.7 <=15.0 ng/mL     Scheduled medications  aspirin, 81 mg, oral, q AM  clotrimazole, , Topical, BID  darbepoetin carolyn, 80 mcg, subcutaneous, q14 days  [Held by provider] heparin (porcine), 5,000 Units, subcutaneous, q8h KESHIA  hydrALAZINE, 25 mg, oral, TID  lidocaine, 1 patch, transdermal, Daily  mycophenolate, 180 mg, oral, BID  pantoprazole, 40 mg, oral, q AM  perflutren protein A microsphere, 0.5 mL, intravenous, Once in imaging  sodium bicarbonate, 650 mg, oral, BID  sulfamethoxazole-trimethoprim, 1 tablet, oral, Every Mon/Wed/Fri  sulfur hexafluoride microsphr, 2 mL, intravenous, Once in imaging  tacrolimus, 1.5 mg, oral, q12h KESHIA  tamsulosin, 0.4 mg, oral, Daily      Continuous medications     PRN medications                   Assessment/Plan   Principal Problem:    Heart failure (CMS/HCC)  Active Problems:    Liver transplanted (CMS/HCC)    Protein-calorie malnutrition, unspecified severity (CMS/HCC)    Dilated cardiomyopathy (CMS/HCC)    Atrioventricular block, complete (CMS/HCC)    Diabetes mellitus, type 2 (CMS/HCC)    Asplenia    ATN (acute tubular necrosis) (CMS/HCC)    AV graft stenosis (CMS/HCC)    Back pain    Cerebral artery occlusion    End stage renal disease on dialysis  (CMS/HCC)    Anemia    Gastroesophageal reflux disease    History of repair of [place] type A dissecting aneurysm of thoracic aorta    Hypertension    Increased heart rate    Lung nodule    Malignant neoplasm of colon (CMS/HCC)    Metastatic cancer to liver (CMS/HCC)    Moderate aortic insufficiency    Insulinoma    Presence of primary arteriovenous graft for hemodialysis    RBBB    Right jugular vein thrombosis    Right lower quadrant abdominal pain    Suture granuloma    Systolic murmur    SCC (squamous cell carcinoma)    Syncope and collapse    Delayed graft function of kidney transplant due to ATN requiring acute dialysis (CMS/HCC)    Pacemaker    Pseudoaneurysm of distal brachial artery (CMS/HCC)    Dissecting aneurysm of thoracic aorta, Christ type B (CMS/HCC)    Arteriovenous fistula (CMS/HCC)    Balanitis    Diarrhea    History of malignant neoplasm of pancreas    Obstruction of inferior vena cava    Shortness of breath    Acute systolic (congestive) heart failure (CMS/HCC)    Sick sinus syndrome (CMS/HCC)    Reynaldo Francisco is a 68 y.o. 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  Etiology of KIA appears to be most likely cardiorenal given elev CVP and PCWP, however Cr worsened with aggressive diuresis and additionally, found to have mild-mod MR, mod-severe TR and mod AI on TTE so valvular disease a more likely etiology of elevated filling pressures. However, Cr now rising again, making mixed intrinsic and cardiorenal etiologies feasible. Per transplant nephro team, lasix renogram obtained and negative for functional obstruction. NPO for transplanted  kidney biopsy today 3/6. BKV found to be positive.      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  - CT PET at OSH did not show ischemia  - EP following for CRTD upgrade iso pacemaker-induced cardiomyopathy closer to discharge   - Daily RFP and Mg for K>4, Mg >2  - Will continue to monitor I&Os   - GDMT optimization as renal function tolerates inpatient vs outpatient      #Type A aortic dissection  #HTN  - BP well controlled in 100-110s/50-80s  - No outpatient anti-hypertensive  - Continue on Hydralazine 25mg tid (started during this admission)     RENAL  #KIA on CKD (baseline Scr 2.1-2.2), non-oliguric   #ESRD s/p kidney transplantation (on tacromlimus and mycophenolate)  #NAGMA  - Initially suspect cardiorenal syndrome, however Cr worsening with aggressive diuresis, Dced drip on 3/3  - NPO for transplanted kidney biopsy 3/6  - BKV positive - transplant aware  - EBV and CMV negatvie, pending DSA   - Nuclear med lasix renogram negative for obstruction  - consider additional diuresis with IV pushes  - Daily AM Tacrolimus lvls   - Continue Tacro 1.5 q12 and Mycophenolate 180 mg BID   - Continue Sodium bicarb 650 BID  - Continue Bactrim ppx   - Transplant nephrology following     HEME/ONC  #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      #anemia, multifactorial (baseline Hgb 10)  - at baseline.   -iron studies: 75, TIBC 252, ferritin 764  - Started Aranesep 80 mcg q14 days, first dose 3/6 at 0900      Fluids: no  Access: IV, Right femoral [place]  Antibiotics: Bactrim ppx   Electrolytes: PRN  Nutrition: cardiac   PPI: Protonix 40  DVT: Hep 5000 TID  CODE status: FULL CODE  NOK: Abdiel (wife) [phone]        Joyce Messina MD

## 2024-03-06 NOTE — PROGRESS NOTES
-Patient discussed during CICU interdisciplinary 3PM meeting (Late Note)  -ICU Treatment Plan: The patient is from [place] he is a Kidney and liver transplant patient, but he gets his cardiology care here. He currently has a swan.   - P[facility]- Medicare [facility]  -Support system: The patients spouse is at the bedside and his daughter also visits  - Planned Disposition: Rehab Evaluation - Cardiac rehab at discharge   -Potential Barriers: None noted at this time.   - Anticipated date of discharge: 3/11  DG Mccall, LSW   UPDATE:   SDOH assessment has been updated. No issues or concerns from the patient or family at this time.   SANTO Mccall

## 2024-03-06 NOTE — PROGRESS NOTES
"Reynaldo Francisco is a 68 y.o. male on day 6 of admission presenting with Heart failure (CMS/HCC).    Subjective:  No acute events overnight, NPO for planned kidney biopsy today. Plan for transfer to the floor and removal of swan.     Scheduled medications  aspirin, 81 mg, oral, q AM  clotrimazole, , Topical, BID  darbepoetin carolyn, 80 mcg, subcutaneous, q14 days  [Held by provider] heparin (porcine), 5,000 Units, subcutaneous, q8h KESHIA  hydrALAZINE, 25 mg, oral, TID  lidocaine, 1 patch, transdermal, Daily  mycophenolate, 180 mg, oral, BID  pantoprazole, 40 mg, oral, q AM  perflutren protein A microsphere, 0.5 mL, intravenous, Once in imaging  sodium bicarbonate, 650 mg, oral, BID  sulfamethoxazole-trimethoprim, 1 tablet, oral, Every Mon/Wed/Fri  sulfur hexafluoride microsphr, 2 mL, intravenous, Once in imaging  tacrolimus, 1.5 mg, oral, q12h KESHIA  tamsulosin, 0.4 mg, oral, Daily          Last Recorded Vitals  Blood pressure 110/87, pulse 60, temperature 36.6 °C (97.9 °F), temperature source Temporal, resp. rate 13, height 1.727 m (5' 8\"), weight 55.6 kg (122 lb 9.2 oz), SpO2 96 %.  Intake/Output last 3 Shifts:  I/O last 3 completed shifts:  In: 580 (10.4 mL/kg) [P.O.:480; I.V.:100 (1.8 mL/kg)]  Out: 1100 (19.8 mL/kg) [Urine:1100 (0.5 mL/kg/hr)]  Weight: 55.6 kg      General: A&ox3, no distress   Respiratory: Lungs with no basilar crackles   Cardiac: RRR, no rubs    Abd: mild allograft tenderness. Abd soft, no distention  Extremities: No edema in bilateral lower extremities  : no horton  LUE AVF with bruit and thrill  [place] in place    Labs:  Results for orders placed or performed during the hospital encounter of 02/29/24 (from the past 24 hour(s))   Electrolyte Panel, Urine   Result Value Ref Range    Sodium, Urine Random 72 mmol/L    Sodium/Creatinine Ratio 73 Not established. mmol/g Creat    Potassium, Urine Random 26 mmol/L    Potassium/Creatinine Ratio 26 Not established mmol/g Creat    Chloride, Urine Random 71 mmol/L "    Chloride/Creatinine Ratio 72 23 - 275 mmol/g creat    Creatinine, Urine Random 98.6 20.0 - 370.0 mg/dL   Protein, Urine Random   Result Value Ref Range    Total Protein, Urine Random 6 5 - 25 mg/dL    Creatinine, Urine Random 98.8 20.0 - 370.0 mg/dL    T. Protein/Creatinine Ratio 0.06 0.00 - 0.17 mg/mg Creat   Renal function panel   Result Value Ref Range    Glucose 123 (H) 74 - 99 mg/dL    Sodium 136 136 - 145 mmol/L    Potassium 4.4 3.5 - 5.3 mmol/L    Chloride 100 98 - 107 mmol/L    Bicarbonate 23 21 - 32 mmol/L    Anion Gap 17 10 - 20 mmol/L    Urea Nitrogen 88 (H) 6 - 23 mg/dL    Creatinine 4.46 (H) 0.50 - 1.30 mg/dL    eGFR 14 (L) >60 mL/min/1.73m*2    Calcium 9.4 8.6 - 10.6 mg/dL    Phosphorus 4.9 2.5 - 4.9 mg/dL    Albumin 4.4 3.4 - 5.0 g/dL   Magnesium   Result Value Ref Range    Magnesium 2.29 1.60 - 2.40 mg/dL   CBC and Auto Differential   Result Value Ref Range    WBC 3.5 (L) 4.4 - 11.3 x10*3/uL    nRBC 0.0 0.0 - 0.0 /100 WBCs    RBC 2.67 (L) 4.50 - 5.90 x10*6/uL    Hemoglobin 9.1 (L) 13.5 - 17.5 g/dL    Hematocrit 24.5 (L) 41.0 - 52.0 %    MCV 92 80 - 100 fL    MCH 34.1 (H) 26.0 - 34.0 pg    MCHC 37.1 (H) 32.0 - 36.0 g/dL    RDW 14.0 11.5 - 14.5 %    Platelets 90 (L) 150 - 450 x10*3/uL    Neutrophils % 55.1 40.0 - 80.0 %    Immature Granulocytes %, Automated 0.3 0.0 - 0.9 %    Lymphocytes % 21.4 13.0 - 44.0 %    Monocytes % 16.5 2.0 - 10.0 %    Eosinophils % 5.8 0.0 - 6.0 %    Basophils % 0.9 0.0 - 2.0 %    Neutrophils Absolute 1.91 1.20 - 7.70 x10*3/uL    Immature Granulocytes Absolute, Automated 0.01 0.00 - 0.70 x10*3/uL    Lymphocytes Absolute 0.74 (L) 1.20 - 4.80 x10*3/uL    Monocytes Absolute 0.57 0.10 - 1.00 x10*3/uL    Eosinophils Absolute 0.20 0.00 - 0.70 x10*3/uL    Basophils Absolute 0.03 0.00 - 0.10 x10*3/uL   Magnesium   Result Value Ref Range    Magnesium 2.18 1.60 - 2.40 mg/dL   Renal function panel   Result Value Ref Range    Glucose 94 74 - 99 mg/dL    Sodium 138 136 - 145 mmol/L     Potassium 4.9 3.5 - 5.3 mmol/L    Chloride 101 98 - 107 mmol/L    Bicarbonate 23 21 - 32 mmol/L    Anion Gap 19 10 - 20 mmol/L    Urea Nitrogen 93 (HH) 6 - 23 mg/dL    Creatinine 4.17 (H) 0.50 - 1.30 mg/dL    eGFR 15 (L) >60 mL/min/1.73m*2    Calcium 9.4 8.6 - 10.6 mg/dL    Phosphorus 4.7 2.5 - 4.9 mg/dL    Albumin 4.3 3.4 - 5.0 g/dL   BLOOD GAS MIXED VENOUS FULL PANEL   Result Value Ref Range    POCT pH, Mixed 7.44 (H) 7.33 - 7.43 pH    POCT pCO2, Mixed 37 (L) 41 - 51 mm Hg    POCT pO2, Mixed 43 35 - 45 mm Hg    POCT SO2, Mixed 65 45 - 75 %    POCT Oxy Hemoglobin, Mixed 64.2 45.0 - 75.0 %    POCT Hematocrit Calculated, Mixed 27.0 (L) 41.0 - 52.0 %    POCT Sodium, Mixed 134 (L) 136 - 145 mmol/L    POCT Potassium, Mixed 5.0 3.5 - 5.3 mmol/L    POCT Chloride, Mixed 102 98 - 107 mmol/L    POCT Ionized Calcium, Mixed 1.21 1.10 - 1.33 mmol/L    POCT Glucose, Mixed 104 (H) 74 - 99 mg/dL    POCT Lactate, Mixed 0.4 0.4 - 2.0 mmol/L    POCT Base Excess, Mixed 1.0 -2.0 - 3.0 mmol/L    POCT HCO3 Calculated, Mixed 25.1 22.0 - 26.0 mmol/L    POCT Hemoglobin, Mixed 9.1 (L) 13.5 - 17.5 g/dL    POCT Anion Gap, Mixed 12 10 - 25 mmo/L    Patient Temperature 37.0 degrees Celsius    FiO2 21 %   Tacrolimus level   Result Value Ref Range    Tacrolimus  7.7 <=15.0 ng/mL     Renal US: 3/1/24  IMPRESSION:  1. Mild pelvicaliceal dilation of the renal transplant, which was  also noted on 10/19/2023 CT. Follow-up ultrasound with additional  postvoid scans is recommended.  2. Doppler evaluation of the renal transplant is within normal limits  as above.        Assessment/Plan   Reynaldo Francisco is a 68 y.o. 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  Patient admitted with ADHF after diuretics held for few days as outpt.  Transplant Nephrology consulted for further management.      S/p DDKT 3/5/23. 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 Thymo induction, maintained on Tac, lower dose myfortic, no prednisone.     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 Cr has been gradually worsening past few weeks as OP in setting of decreased diuretics and weight gain.  Continued to increase as IP with diuretics with elevated tacrolimus level, currently renal function stable. No hemodynamic injury, contrast, peripheral eosinophilia, with bland UA.   - Renal US 3/1 showed mild hydro which was stable from imaging 10/2023, with NM lasix scan (w/o horton in place) showed no mechanical obstruction.    - Etiology of KIA on CKD: cardiorenal, supratherapeutic tacrolimus vs rejection   - previous TP/C ratio 0.12 11/2023 -> 0.08 2/2024 - > 0.06 (3/5/2024)  - CVP 11  - BK positive, low quantitative. CMV and EBV negative  - DSA pending    Plan:  - pending IR biopsy of transplanted kidney   - continue flomax 0.4 mg daily  - maintain euvolemia. Continue to monitor strict Ins/Os and daily weights.   - For now weights and CVP stable without diuretics and NPO today, so no need to start maintenance diuretics today.     #electrolytes  - WNL    #NAGMA  - improved with sodium bicarb tabs and diuresis  - continue sodium bicarb to 650 mg BID     # Immunosuppression  - cont myfortic 180 mg BID (low dose sec to h/o leukopenia, CMV). Fk level 8.5 3/3. Continue reduced dose 1.5 mg bid, monitor rpt Fk trough daily. Level of 7.7 today  Not on prednisone.   - DSA neg 1/2024 ([facility])    # anemia  - Iron saturation of 30%, iron stores adequate.   - hgb of 9.2, goal 10-11  - continue aranesp q14 days    # BMD  - calcium and phos WNL  - PTH of 148     #Infection  prophylaxis  - on bactrim    # Hemodynamics  - Bps at goal    Yasmany Tracey MD  Nephrology Fellow

## 2024-03-06 NOTE — PROGRESS NOTES
Occupational Therapy                 Therapy Communication Note    Patient Name: Reynaldo Francisco  MRN: [id number]  Today's Date: 3/6/2024     Discipline: Occupational Therapy    Missed Visit Reason: Missed Visit Reason:  (Per RN, plan to pull femoral swan line. Defer OT at this time.)    Missed Time: Ananth Eden, OT  03/06/2024

## 2024-03-07 PROBLEM — N17.1: Status: ACTIVE | Noted: 2024-01-01

## 2024-03-07 PROBLEM — N18.4: Status: ACTIVE | Noted: 2024-01-01

## 2024-03-07 NOTE — PROGRESS NOTES
"Reynaldo Francisco is a 68 y.o. male on day 7 of admission presenting with Heart failure (CMS/HCC).    Subjective   This morning patient had no dyspnea, no lower extremity edema, no significant abdominal pain however does report continued tenderness at the transplanted kidney site.  Overall he feels close to his baseline    Patient has been n.p.o. since midnight for image guided biopsy of transplanted kidney       Objective     Physical Exam  Constitutional:       General: He is not in acute distress.     Comments: Chronically ill appearing    HENT:      Mouth/Throat:      Mouth: Mucous membranes are moist.   Eyes:      Conjunctiva/sclera: Conjunctivae normal.   Neck:      Comments: JVD up to the level of the mandible   Cardiovascular:      Rate and Rhythm: Regular rhythm.      Heart sounds: Murmur heard.   Pulmonary:      Effort: No respiratory distress.      Breath sounds: No wheezing.      Comments: Diminished in bilateral bases  Abdominal:      General: There is no distension.      Tenderness: There is no abdominal tenderness.   Musculoskeletal:      Right lower leg: No edema.      Left lower leg: No edema.   Skin:     Capillary Refill: Capillary refill takes less than 2 seconds.   Neurological:      General: No focal deficit present.      Mental Status: He is alert and oriented to person, place, and time.     Last Recorded Vitals  Blood pressure 100/55, pulse 65, temperature 37 °C (98.6 °F), temperature source Temporal, resp. rate 16, height 1.727 m (5' 8\"), weight 56 kg (123 lb 7.3 oz), SpO2 95 %.  Intake/Output last 3 Shifts:  I/O last 3 completed shifts:  In: 240 (4.3 mL/kg) [P.O.:240]  Out: 650 (11.6 mL/kg) [Urine:650 (0.3 mL/kg/hr)]  Weight: 56 kg     Relevant Results  Scheduled medications  aspirin, 81 mg, oral, q AM  clotrimazole, , Topical, BID  darbepoetin carolyn, 80 mcg, subcutaneous, q14 days  [Held by provider] heparin (porcine), 5,000 Units, subcutaneous, q8h KESHIA  hydrALAZINE, 25 mg, oral, TID  lidocaine, 1 " patch, transdermal, Daily  mycophenolate, 180 mg, oral, BID  pantoprazole, 40 mg, oral, q AM  perflutren protein A microsphere, 0.5 mL, intravenous, Once in imaging  sodium bicarbonate, 650 mg, oral, BID  sulfamethoxazole-trimethoprim, 1 tablet, oral, Every Mon/Wed/Fri  sulfur hexafluoride microsphr, 2 mL, intravenous, Once in imaging  tacrolimus, 1.5 mg, oral, q12h KESHIA  tamsulosin, 0.4 mg, oral, Daily      Continuous medications     PRN medications  Results for orders placed or performed during the hospital encounter of 02/29/24 (from the past 24 hour(s))   Renal function panel   Result Value Ref Range    Glucose 173 (H) 74 - 99 mg/dL    Sodium 138 136 - 145 mmol/L    Potassium 4.5 3.5 - 5.3 mmol/L    Chloride 101 98 - 107 mmol/L    Bicarbonate 23 21 - 32 mmol/L    Anion Gap 19 10 - 20 mmol/L    Urea Nitrogen 98 (HH) 6 - 23 mg/dL    Creatinine 4.70 (H) 0.50 - 1.30 mg/dL    eGFR 13 (L) >60 mL/min/1.73m*2    Calcium 10.0 8.6 - 10.6 mg/dL    Phosphorus 5.0 (H) 2.5 - 4.9 mg/dL    Albumin 4.7 3.4 - 5.0 g/dL   CBC and Auto Differential   Result Value Ref Range    WBC 3.3 (L) 4.4 - 11.3 x10*3/uL    nRBC 0.0 0.0 - 0.0 /100 WBCs    RBC 2.58 (L) 4.50 - 5.90 x10*6/uL    Hemoglobin 8.7 (L) 13.5 - 17.5 g/dL    Hematocrit 25.1 (L) 41.0 - 52.0 %    MCV 97 80 - 100 fL    MCH 33.7 26.0 - 34.0 pg    MCHC 34.7 32.0 - 36.0 g/dL    RDW 14.8 (H) 11.5 - 14.5 %    Platelets 94 (L) 150 - 450 x10*3/uL    Immature Granulocytes %, Automated 0.3 0.0 - 0.9 %    Immature Granulocytes Absolute, Automated 0.01 0.00 - 0.70 x10*3/uL   Renal function panel   Result Value Ref Range    Glucose 147 (H) 74 - 99 mg/dL    Sodium 137 136 - 145 mmol/L    Potassium 4.4 3.5 - 5.3 mmol/L    Chloride 101 98 - 107 mmol/L    Bicarbonate 23 21 - 32 mmol/L    Anion Gap 17 10 - 20 mmol/L    Urea Nitrogen 102 (HH) 6 - 23 mg/dL    Creatinine 4.86 (H) 0.50 - 1.30 mg/dL    eGFR 12 (L) >60 mL/min/1.73m*2    Calcium 9.6 8.6 - 10.6 mg/dL    Phosphorus 4.9 2.5 - 4.9 mg/dL     Albumin 4.2 3.4 - 5.0 g/dL   Magnesium   Result Value Ref Range    Magnesium 2.13 1.60 - 2.40 mg/dL   Tacrolimus level   Result Value Ref Range    Tacrolimus  6.6 <=15.0 ng/mL   Manual Differential   Result Value Ref Range    Neutrophils %, Manual 67.3 40.0 - 80.0 %    Lymphocytes %, Manual 16.8 13.0 - 44.0 %    Monocytes %, Manual 9.7 2.0 - 10.0 %    Eosinophils %, Manual 2.6 0.0 - 6.0 %    Basophils %, Manual 0.9 0.0 - 2.0 %    Atypical Lymphocytes %, Manual 2.7 0.0 - 2.0 %    Seg Neutrophils Absolute, Manual 2.22 1.20 - 7.00 x10*3/uL    Lymphocytes Absolute, Manual 0.55 (L) 1.20 - 4.80 x10*3/uL    Monocytes Absolute, Manual 0.32 0.10 - 1.00 x10*3/uL    Eosinophils Absolute, Manual 0.09 0.00 - 0.70 x10*3/uL    Basophils Absolute, Manual 0.03 0.00 - 0.10 x10*3/uL    Atypical Lymphs Absolute, Manual 0.09 0.00 - 0.50 x10*3/uL    Total Cells Counted 113     RBC Morphology See Below     Hypochromia Mild     Target Cells Few    Type and Screen   Result Value Ref Range    ABO TYPE O     Rh TYPE POS     ANTIBODY SCREEN NEG    Coagulation Screen   Result Value Ref Range    Protime 13.3 (H) 9.8 - 12.8 seconds    INR 1.2 (H) 0.9 - 1.1    aPTT 31 27 - 38 seconds   Hepatitis B surface antigen   Result Value Ref Range    Hepatitis B Surface AG Nonreactive Nonreactive   Hepatitis B surface antibody   Result Value Ref Range    Hepatitis B Surface AB 7.4 <10.0 mIU/mL                       Assessment/Plan     Reynaldo Francisco is a 68 y.o. 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  Etiology of KIA appears to be most likely cardiorenal given elev CVP and PCWP, however Cr worsened  with aggressive diuresis and additionally, found to have mild-mod MR, mod-severe TR and mod AI on TTE so valvular disease a more likely etiology of elevated filling pressures. However, Cr now rising again, making mixed intrinsic and cardiorenal etiologies feasible. Per transplant nephro team, lasix renogram obtained and negative for functional obstruction. NPO for transplanted kidney biopsy today 3/6. BKV found to be positive.      Updates 3/7:  - per renal recs, 20 mcg of DDAVP IV 1 hour prior to image guided transplanted kidney biopsy   - Will follow with EP for CRTD upgrade iso of pacemaker-induced cardiomyopathy, will upgrade closer to discharge  -will continue discussing with nephrology re: diuresis plan after biopsy  -will need to restart heparin DVT ppx following biopsy.    -euovolemic on exam. Uptrending Cr from 4.70 to 4.86 today      #KIA on CKD (baseline Scr 2.1-2.2), non-oliguric   #ESRD s/p kidney transplantation (on tacromlimus and mycophenolate)  #NAGMA  - Initially suspect cardiorenal syndrome, however Cr worsening with aggressive diuresis, Dced drip on 3/3  - NPO for transplanted kidney biopsy 3/7  - BKV positive - transplant aware  - EBV and CMV negatvie, pending DSA   - Nuclear med lasix renogram negative for obstruction  - consider additional diuresis with IV pushes  - Daily AM Tacrolimus lvls   - Continue Tacro 1.5 q12 and Mycophenolate 180 mg BID   - Continue Sodium bicarb 650 BID  - Continue Bactrim ppx   - Transplant nephrology following    #ADHF  #Stage C systolic heart failure/NICM/HFrEF (30-35% TTE 03/2024)  #SSS s/p dual-chamber PM  #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  - CT PET at OSH did not show ischemia  - EP following for CRTD upgrade iso pacemaker-induced cardiomyopathy closer to discharge   - Daily RFP and Mg for K>4, Mg >2  - Will continue to monitor I&Os   - GDMT optimization as renal function tolerates inpatient vs outpatient      #Type A aortic dissection s/p repair  2020  #chronic residual type B aortic dissection w/ R carotid artery dissection  #HTN  - BP well controlled in 100-110s/50-80s  - No outpatient anti-hypertensive  - Continue on Hydralazine 25mg tid (started during this admission)      #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   -iron studies: 75, TIBC 252, ferritin 764  - Started Aranesep 80 mcg q14 days, first dose 3/6 at 0900      Fluids: no  Access: IV  Antibiotics: Bactrim ppx   Electrolytes: PRN  Nutrition: cardiac   PPI: Protonix 40  DVT: Hep 5000 TID  CODE status: FULL CODE  NOK: Abdiel (wife) [phone]        Glen Gonzales MD

## 2024-03-07 NOTE — NURSING NOTE
Report from Sending RN:    Report From: Daxa BUTLER  Recent Surgery of Procedure: S/P renal bx of today  Baseline Level of Consciousness (LOC): A&OX4  Oxygen Use: No  Type: pulse ox 96% on room air  Diabetic: Yes, blood sugar 147  Last BP Med Given Day of Dialysis: 0829 - hydralazine 25 mg po  Last Pain Med Given: 1500 - fentanyl 50 mcg IV and versed 1 mg IV  Lab Tests to be Obtained with Dialysis: No  Blood Transfusion to be Given During Dialysis: No  Available IV Access: Yes  Medications to be Administered During Dialysis: No  Continuous IV Infusion Running: No  Restraints on Currently or in the Last 24 Hours: No  Hand-Off Communication: Full code, Protective Precautions for immunosuppression, admitted for HF, volume overload, hx kidney transplant 3/2023, liver transplant 6/2022, dilated cardiomyopathy, HTN, DM

## 2024-03-07 NOTE — POST-PROCEDURE NOTE
Interventional Radiology Brief Postprocedure Note    Attending: Dr. Tom MD    Assistant: Sivakumar Reyna MD PGY-3     Diagnosis: Transplant kidney KIA    Description of procedure: US guided biopsy of right transplant kidney.  X2 cores obtained. No immediate complications.     Anesthesia:  MAC    Complications: None    Estimated Blood Loss: minimal    Medications  As of 03/07/24 1555      heparin (porcine) injection 5,000 Units (Units) Total dose:  80,000 Units* Dosing weight:  62.1   *Administration not included in total     Date/Time Rate/Dose/Volume Action       03/01/24  0855 5,000 Units Given      1538 5,000 Units Given      2335 5,000 Units Given     03/02/24  0817 5,000 Units Given      1535 5,000 Units Given      2336 5,000 Units Given     03/03/24  0837 5,000 Units Given      1535 5,000 Units Given      2341 5,000 Units Given     03/04/24  0846 5,000 Units Given      1542 5,000 Units Given     03/05/24  0055 5,000 Units Given      0900 5,000 Units Given      1507 5,000 Units Given     03/06/24  0043 5,000 Units Given      0847 5,000 Units Given      0955 *Not included in total Held by provider      1600 *5,000 Units Missed     03/07/24  0000 *5,000 Units Missed      0800 *5,000 Units Missed               aspirin EC tablet 81 mg (mg) Total dose:  567 mg Dosing weight:  62.1      Date/Time Rate/Dose/Volume Action       03/01/24  0855 81 mg Given     03/02/24  0817 81 mg Given     03/03/24  0837 81 mg Given     03/04/24  0846 81 mg Given     03/05/24  1010 81 mg Given     03/06/24  0847 81 mg Given      1431 *Not included in total MAR Hold      1451 *Not included in total MAR Unhold     03/07/24  0829 81 mg Given               clotrimazole (Lotrimin) 1 % cream Total dose:  Cannot be calculated*   *Administration does not have dose documented     Date/Time Rate/Dose/Volume Action       03/01/24  0900 *Not included in total Missed      2335  Given     03/02/24  0818  Given      2333  Given     03/03/24  0836  *Not included in total Missed      2100 *Not included in total Missed     03/04/24  0900 *Not included in total Missed      2032  Given     03/05/24  0900 *Not included in total Missed      2034  Given     03/06/24  0847  Given      1431 *Not included in total MAR Hold      1451 *Not included in total MAR Unhold      2103  Given     03/07/24  0900 *Not included in total Missed               mycophenolate (Myfortic) EC tablet 180 mg (mg) Total dose:  2,340 mg Dosing weight:  62.1      Date/Time Rate/Dose/Volume Action       03/01/24  0855 180 mg Given      2049 180 mg Given     03/02/24  0817 180 mg Given      2031 180 mg Given     03/03/24  0837 180 mg Given      2132 180 mg Given     03/04/24  0846 180 mg Given      2031 180 mg Given     03/05/24  1010 180 mg Given      2034 180 mg Given     03/06/24  0847 180 mg Given      2104 180 mg Given     03/07/24  0830 180 mg Given               pantoprazole (ProtoNix) EC tablet 40 mg (mg) Total dose:  280 mg Dosing weight:  62.1      Date/Time Rate/Dose/Volume Action       03/01/24  0855 40 mg Given     03/02/24  0817 40 mg Given     03/03/24  0837 40 mg Given     03/04/24  0846 40 mg Given     03/05/24  1010 40 mg Given     03/06/24  0847 40 mg Given      1431 *Not included in total MAR Hold      1451 *Not included in total MAR Unhold     03/07/24  0829 40 mg Given               sodium bicarbonate tablet 650 mg (mg) Total dose:  650 mg Dosing weight:  62.1      Date/Time Rate/Dose/Volume Action       03/01/24  0855 650 mg Given               sodium bicarbonate tablet 1,300 mg (mg) Total dose:  10,400 mg* Dosing weight:  61.6   *Administration not included in total     Date/Time Rate/Dose/Volume Action       03/01/24  1415 *1,300 mg Missed      2049 1,300 mg Given     03/02/24  0817 1,300 mg Given      2031 1,300 mg Given     03/03/24  0837 1,300 mg Given      2132 1,300 mg Given     03/04/24  0846 1,300 mg Given      2032 1,300 mg Given     03/05/24  1010 1,300 mg Given                sodium bicarbonate tablet 650 mg (mg) Total dose:  2,600 mg Dosing weight:  55.6      Date/Time Rate/Dose/Volume Action       03/05/24  2034 650 mg Given     03/06/24  0847 650 mg Given      1431 *Not included in total MAR Hold      1451 *Not included in total MAR Unhold      2104 650 mg Given     03/07/24  0829 650 mg Given               sulfamethoxazole-trimethoprim (Bactrim) 400-80 mg per tablet 1 tablet (tablet) Total dose:  3 tablet Dosing weight:  62.1      Date/Time Rate/Dose/Volume Action       03/01/24  0855 1 tablet Given     03/04/24  0846 1 tablet Given     03/06/24  0847 1 tablet Given               tacrolimus (Prograf) capsule 3 mg (mg) Total dose:  6 mg Dosing weight:  62.1      Date/Time Rate/Dose/Volume Action       03/01/24  0603 3 mg Given     03/02/24  0549 3 mg Given      0920 *Not included in total Held by provider      1447 *Not included in total Unheld by provider               tacrolimus (Prograf) capsule 2 mg (mg) Total dose:  2 mg Dosing weight:  62.1      Date/Time Rate/Dose/Volume Action       03/01/24  1753 2 mg Given     03/02/24  0920 *Not included in total Held by provider      1447 *Not included in total Unheld by provider               bumetanide (Bumex) injection 2 mg (mL/hr) Total volume:  Not documented* Dosing weight:  61.8   *Total volume has not been documented. View each administration to see the amount administered.     Date/Time Rate/Dose/Volume Action       03/01/24  0201 2 mg - 240 mL/hr (over 2 min) Given      0610 2 mg - 240 mL/hr (over 2 min) Given               bumetanide (Bumex) injection 6 mg (mL/hr) Total volume:  Not documented* Dosing weight:  61.6   *Total volume has not been documented. View each administration to see the amount administered.     Date/Time Rate/Dose/Volume Action       03/01/24  1741 6 mg - 720 mL/hr (over 2 min) Given               bumetanide (Bumex) injection 4 mg (mL/hr) Total volume:  Not documented* Dosing weight:  61.6    *Total volume has not been documented. View each administration to see the amount administered.     Date/Time Rate/Dose/Volume Action       03/02/24  0527 4 mg - 480 mL/hr (over 2 min) Given               bumetanide (Bumex) injection 2 mg (mL/hr) Total volume:  Not documented* Dosing weight:  61.6   *Total volume has not been documented. View each administration to see the amount administered.     Date/Time Rate/Dose/Volume Action       03/03/24  0100 2 mg - 240 mL/hr (over 2 min) Given               bumetanide (Bumex) injection 4 mg (mL/hr) Total volume:  Not documented* Dosing weight:  61.6   *Total volume has not been documented. View each administration to see the amount administered.     Date/Time Rate/Dose/Volume Action       03/03/24  0615 4 mg - 480 mL/hr (over 2 min) Given               lidocaine 2% jelly 1 Application (Application) Total dose:  1 Application Dosing weight:  61.8      Date/Time Rate/Dose/Volume Action       03/01/24  0409 1 Application Given               chlorothiazide (Diuril) injection 500 mg (mg) Total dose:  500 mg Dosing weight:  61.8      Date/Time Rate/Dose/Volume Action       03/01/24  0648 500 mg (over 5 min) Given               chlorothiazide (Diuril) injection 500 mg (mg) Total dose:  500 mg Dosing weight:  61.6      Date/Time Rate/Dose/Volume Action       03/03/24  0606 500 mg (over 5 min) Given               perflutren lipid microspheres (Definity) injection 0.5-10 mL of dilution (mL of dilution) Total dose:  10 mL of dilution Dosing weight:  61.6      Date/Time Rate/Dose/Volume Action       03/01/24  1106 10 mL of dilution Given               sulfur hexafluoride microsphr (Lumason) injection 24.28 mg (mL) Total dose:  Cannot be calculated* Dosing weight:  61.6   *Administration dose not documented     Date/Time Rate/Dose/Volume Action       03/06/24  1431 *Not included in total MAR Hold      1451 *Not included in total MAR Unhold               perflutren protein A  microsphere (Optison) injection 0.5 mL (mL) Total dose:  Cannot be calculated* Dosing weight:  61.6   *Administration dose not documented     Date/Time Rate/Dose/Volume Action       03/06/24  1431 *Not included in total MAR Hold      1451 *Not included in total MAR Unhold               hydrALAZINE (Apresoline) tablet 25 mg (mg) Total dose:  450 mg* Dosing weight:  61.6   *Administration not included in total     Date/Time Rate/Dose/Volume Action       03/01/24  1104 25 mg Given      1450 25 mg Given      2049 25 mg Given     03/02/24  0817 25 mg Given      1534 25 mg Given      2031 25 mg Given     03/03/24  0837 25 mg Given      1535 25 mg Given      2132 25 mg Given     03/04/24  0846 25 mg Given      1542 25 mg Given      2030 25 mg Given     03/05/24  1010 25 mg Given      1507 25 mg Given      2034 25 mg Given     03/06/24  0847 25 mg Given      1431 *Not included in total MAR Hold      1451 *Not included in total MAR Unhold      1500 *25 mg Missed      2104 25 mg Given     03/07/24  0829 25 mg Given               bumetanide (Bumex) 25 mg in 100 mL (0.25 mg/mL) infusion (mg/hr) Total dose:  Cannot be calculated* Dosing weight:  61.6   *Total user-documented volume 139.74 mL may contain volume from other administrations     Date/Time Rate/Dose/Volume Action       03/02/24  1218 1 mg/hr - 4 mL/hr New Bag      1300 1 mg/hr - 4 mL/hr Rate Verify      1400 1 mg/hr - 4 mL/hr Rate Verify      1500 1 mg/hr - 4 mL/hr Rate Verify      1600 1 mg/hr - 4 mL/hr Rate Verify      1700 1 mg/hr - 4 mL/hr Rate Verify      1800 1 mg/hr - 4 mL/hr Rate Verify      1900 1 mg/hr - 4 mL/hr Rate Verify     03/03/24  0038 2 mg/hr - 8 mL/hr Rate Change      0351 2 mg/hr - 8 mL/hr New Bag      0700 2 mg/hr - 8 mL/hr Rate Verify      0800 2 mg/hr - 8 mL/hr Rate Verify      0900 2 mg/hr - 8 mL/hr Rate Verify      0956  Stopped               bumetanide (Bumex) 25 mg in 100 mL (0.25 mg/mL) infusion (mg/hr) Total dose:  Cannot be calculated*  Dosing weight:  61.6   *Total user-documented volume 139.74 mL may contain volume from other administrations     Date/Time Rate/Dose/Volume Action       03/03/24  1015 1 mg/hr - 4 mL/hr Rate Change      1200 1 mg/hr - 4 mL/hr Rate Verify      1415  Stopped               lidocaine 4 % patch 1 patch (patch) Total dose:  Cannot be calculated* Dosing weight:  61.6   *Administration dose not documented     Date/Time Rate/Dose/Volume Action       03/02/24  2100 *1 patch (over 720 min) Missed     03/03/24  0836 *1 patch (over 720 min) Missed     03/04/24  0900 *1 patch (over 720 min) Missed     03/05/24  0900 *1 patch (over 720 min) Missed     03/06/24  0847 *1 patch (over 720 min) Missed      1431 *Not included in total MAR Hold      1451 *Not included in total MAR Unhold     03/07/24  0900 *1 patch (over 720 min) Missed               magnesium sulfate IV 2 g (mL/hr) Total volume:  Cannot be calculated* Dosing weight:  61.6   *Total user-documented volume 150 mL may contain volume from other administrations     Date/Time Rate/Dose/Volume Action       03/02/24  0954 2 g - 25 mL/hr (over 120 min) New Bag      1154  (over 120 min) Stopped               tacrolimus (Prograf) capsule 1.5 mg (mg) Total volume:  Not documented* Dosing weight:  61.6   *Total volume has not been documented. View each administration to see the amount administered.     Date/Time Rate/Dose/Volume Action       03/02/24  1800 1.5 mg Given     03/03/24  0606 1.5 mg Given      1735 1.5 mg Given     03/04/24  0601 1.5 mg Given      1813 1.5 mg Given     03/05/24  0600 1.5 mg Given      1839 1.5 mg Given     03/06/24  0633 1.5 mg Given      1808 1.5 mg Given     03/07/24  0603 1.5 mg Given               Tc-99m-mertiatide (Technescan MAG3) injection 10 millicurie (millicurie) Total dose:  10 millicurie Dosing weight:  56.5      Date/Time Rate/Dose/Volume Action       03/04/24  1022 10 millicurie Given               furosemide (Lasix) injection 28.25 mg (mg)  Total dose:  56.5 mg Dosing weight:  56.5      Date/Time Rate/Dose/Volume Action       03/04/24  1115 28.25 mg Given      1145 28.25 mg Given               furosemide (Lasix) injection 40 mg (mg) Total dose:  40 mg Dosing weight:  55.6      Date/Time Rate/Dose/Volume Action       03/06/24  1110 40 mg Given               tamsulosin (Flomax) 24 hr capsule 0.4 mg (mg) Total dose:  1.6 mg Dosing weight:  56.5      Date/Time Rate/Dose/Volume Action       03/04/24  2032 0.4 mg Given     03/05/24  1010 0.4 mg Given     03/06/24  0847 0.4 mg Given      1431 *Not included in total MAR Hold      1451 *Not included in total MAR Unhold     03/07/24  0829 0.4 mg Given               magnesium sulfate in D5W IV 1 g (mL/hr) Total volume:  Cannot be calculated* Dosing weight:  55.6   *Total user-documented volume 150 mL may contain volume from other administrations     Date/Time Rate/Dose/Volume Action       03/05/24  1017 1 g - 100 mL/hr (over 60 min) New Bag      1117  (over 60 min) Stopped               darbepoetin carolyn (Aranesp) injection 80 mcg (mcg) Total dose:  80 mcg Dosing weight:  55.6      Date/Time Rate/Dose/Volume Action       03/06/24  1213 80 mcg Given      1431 *Not included in total MAR Hold      1451 *Not included in total MAR Unhold               desmopressin (DDAVP) 20 mcg in sodium chloride 0.9% 50 mL IV (mL/hr) Total volume:  Not documented* Dosing weight:  56   *Total volume has not been documented. View each administration to see the amount administered.     Date/Time Rate/Dose/Volume Action       03/07/24  1231 20 mcg - 100 mL/hr (over 30 min) New Bag               fentaNYL PF (Sublimaze) injection (mcg) Total dose:  50 mcg      Date/Time Rate/Dose/Volume Action       03/07/24  1500 50 mcg Given               midazolam (Versed) injection (mg) Total dose:  1 mg      Date/Time Rate/Dose/Volume Action       03/07/24  1500 1 mg Given                       See detailed result report with images in PACS.    The  patient tolerated the procedure well without incident or complication and is in stable condition.

## 2024-03-07 NOTE — PRE-PROCEDURE NOTE
Interventional Radiology Preprocedure Note    Indication for procedure: Diagnoses of Heart failure (CMS/HCC), Acute systolic (congestive) heart failure (CMS/HCC), Shortness of breath, Liver transplanted (CMS/HCC), Heart failure, acute systolic (CMS/HCC), and Acute on chronic systolic (congestive) heart failure (CMS/HCC) were pertinent to this visit.    67 y/o transplant kidney biopsy . Presents to IR for biopsy.     Relevant review of systems: NA    Relevant Labs:   Lab Results   Component Value Date    CREATININE 4.86 (H) 03/07/2024    EGFR 12 (L) 03/07/2024    INR 1.2 (H) 03/07/2024    PROTIME 13.3 (H) 03/07/2024       Planned Sedation/Anesthesia: Moderate    Airway assessment: normal    Directed physical examination:    Focused exam:Unremarkable.     Mallampati: II (hard and soft palate, upper portion of tonsils anduvula visible)    ASA Score: ASA 3 - Patient with moderate systemic disease with functional limitations    Benefits, risks and alternatives of procedure and planned sedation have been discussed with the patient and/or their representative. All questions answered and they agree to proceed.

## 2024-03-07 NOTE — PROGRESS NOTES
Occupational Therapy    Communication Note    Missed Visit: Yes  Missed Visit Reason: Other (Comment) (Pt leaving floor for testing and dialysis.)      03/07/24 at 2:06 PM   Bisi Oliver, OT   Rehab Office: [phone]

## 2024-03-08 NOTE — PROGRESS NOTES
Physical Therapy    Physical Therapy Treatment    Patient Name: Reynaldo Francisco  MRN: [id number]  Today's Date: 3/8/2024  Time Calculation  Start Time: 1621  Stop Time: 1646  Time Calculation (min): 25 min       Assessment/Plan   PT Assessment  PT Assessment Results: Decreased strength, Decreased endurance, Impaired balance, Decreased mobility  Assessment Comment: Pt able ascend/descend 3 stairs using 1 HR with supervision. Pt remains appropriate for low intensity therapy upon discharge.  End of Session Patient Position: Bed, 2 rail up, Alarm off, not on at start of session     PT Plan  Treatment/Interventions: Bed mobility, Transfer training, Stair training, Gait training, Balance training, Endurance training, Strengthening, Therapeutic exercise, Therapeutic activity, Range of motion, Home exercise program  PT Plan: Skilled PT  PT Frequency: 3 times per week  PT Discharge Recommendations: Low intensity level of continued care  PT Recommended Transfer Status: Assist x1, Contact guard  PT - OK to Discharge: Yes      General Visit Information:   PT  Visit  PT Received On: 03/08/24  General  Family/Caregiver Present: Yes  Caregiver Feedback: Wife present, supportive, and engaged throughout treatment.  Prior to Session Communication: Bedside nurse  Patient Position Received: Bed, 2 rail up, Alarm off, not on at start of session  General Comment: Pt supine in bed upon arrival. Pt very pleasant and agreeable to therapy.    Subjective   Precautions:  Precautions  Medical Precautions: Cardiac precautions  Vital Signs:  Vital Signs  BP: 116/63 (post treatment in sitting)    Objective   Pain:     Cognition:  Cognition  Overall Cognitive Status: Within Functional Limits    Activity Tolerance:  Activity Tolerance  Endurance: Tolerates 30 min exercise with multiple rests  Treatments:  Bed Mobility  Bed Mobility: Yes  Bed Mobility 1  Bed Mobility 1: Supine to sitting  Level of Assistance 1: Close supervision  Bed Mobility Comments 1:  HOB elevated.    Ambulation/Gait Training  Ambulation/Gait Training Performed: Yes  Ambulation/Gait Training 1  Surface 1: Level tile  Device 1: Rolling walker  Assistance 1: Close supervision  Quality of Gait 1: Decreased step length  Comments/Distance (ft) 1: 50' + 250'  Transfers  Transfer: Yes  Transfer 1  Transfer From 1: Sit to  Transfer to 1: Stand  Technique 1: Sit to stand  Transfer Device 1:  (no AD)  Transfer Level of Assistance 1: Close supervision  Trials/Comments 1: cues for hand placement.  Transfers 2  Transfer From 2: Stand to  Transfer to 2: Sit  Technique 2: Stand to sit  Transfer Device 2:  (no AD)  Transfer Level of Assistance 2: Close supervision  Trials/Comments 2: cues for hand placement.    Stairs  Stairs: Yes  Stairs  Rails 1: Right  Device 1: Railing  Assistance 1: Close supervision  Comment/Number of Steps 1: 3 with non-reciprocal pattern.    Outcome Measures:  [facility] Basic Mobility  Turning from your back to your side while in a flat bed without using bedrails: None  Moving from lying on your back to sitting on the side of a flat bed without using bedrails: A little  Moving to and from bed to chair (including a wheelchair): A little  Standing up from a chair using your arms (e.g. wheelchair or bedside chair): A little  To walk in hospital room: A little  Climbing 3-5 steps with railing: A little  Basic Mobility - Total Score: 19    Education Documentation  Body Mechanics, taught by Cassandra Madera PTA at 3/8/2024  4:52 PM.  Learner: Patient  Readiness: Acceptance  Method: Explanation  Response: Verbalizes Understanding    Precautions, taught by Cassandra Madera PTA at 3/8/2024  4:52 PM.  Learner: Patient  Readiness: Acceptance  Method: Explanation  Response: Verbalizes Understanding    Mobility Training, taught by Cassnadra Madera PTA at 3/8/2024  4:52 PM.  Learner: Patient  Readiness: Acceptance  Method: Explanation  Response: Verbalizes Understanding    Education Comments  No comments  found.        OP EDUCATION:       Encounter Problems       Encounter Problems (Active)       PT Problem       Patient will ambulate 300 ft with LRAD and modified independence.   (Progressing)       Start:  03/01/24    Expected End:  03/15/24            Patient will transfer bed to/from chair with LRAD and modified independence.  (Progressing)       Start:  03/01/24    Expected End:  03/15/24            Patient will score >24 on the Tinetti test indicating low falls risk for homegoing.   (Progressing)       Start:  03/01/24    Expected End:  03/15/24            Patient will negotiate 3 OTIS with LRAD and modified independence.   (Progressing)       Start:  03/01/24    Expected End:  03/15/24               Pain - Adult

## 2024-03-08 NOTE — NURSING NOTE
Patient returned from hemodialysis via stretcher at this time. Able to ambulate to room without difficulty.

## 2024-03-08 NOTE — CARE PLAN
Problem: Heart Failure  Goal: Improved gas exchange this shift  3/8/2024 0138 by Jeri Dumont RN  Outcome: Progressing  3/8/2024 0138 by Jeri Dumont RN  Outcome: Progressing  Goal: Improved urinary output this shift  3/8/2024 0138 by Jeri Dumont RN  Outcome: Progressing  3/8/2024 0138 by Jeri Dumont RN  Outcome: Progressing  Goal: Reduction in peripheral edema within 24 hours  3/8/2024 0138 by Jeri Dumont RN  Outcome: Progressing  3/8/2024 0138 by Jeri Dumont RN  Outcome: Progressing  Goal: Report improvement of dyspnea/breathlessness this shift  3/8/2024 0138 by Jeri Dumont RN  Outcome: Progressing  3/8/2024 0138 by Jeri Dumont RN  Outcome: Progressing  Goal: Weight from fluid excess reduced over 2-3 days, then stabilize  3/8/2024 0138 by Jeri Dumont RN  Outcome: Progressing  3/8/2024 0138 by Jeri Dumont RN  Outcome: Progressing  Goal: Increase self care and/or family involvement in 24 hours  3/8/2024 0138 by Jeri Dumont RN  Outcome: Progressing  3/8/2024 0138 by Jeri Dumont RN  Outcome: Progressing     Problem: Pain  Goal: My pain/discomfort is manageable  3/8/2024 0138 by Jeri Dumont RN  Outcome: Progressing  3/8/2024 0138 by Jeri Dumont RN  Outcome: Progressing     Problem: Safety  Goal: I will remain free of falls  3/8/2024 0138 by Jeri Dumont RN  Outcome: Progressing  3/8/2024 0138 by Jeri Dumont RN  Outcome: Progressing     Problem: Daily Care  Goal: Daily care needs are met  3/8/2024 0138 by Jeri Dumont RN  Outcome: Progressing  3/8/2024 0138 by Jeri Dumont RN  Outcome: Progressing     Problem: Diabetes  Goal: Maintain electrolyte levels within acceptable range throughout shift  3/8/2024 0138 by Jeri Dumont RN  Outcome: Progressing  3/8/2024 0138 by Jeri Dumont RN  Outcome: Progressing     Problem: Diabetes  Goal: No changes in neurological exam by end of shift  3/8/2024 0138 by Jeri [place], RN  Outcome: Progressing  3/8/2024 0138 by Jeri Dumont RN  Outcome:  Progressing     Problem: Diabetes  Goal: Vital signs within normal range for age by end of shift  3/8/2024 0138 by Jeri Dumont RN  Outcome: Progressing  3/8/2024 0138 by Jeri Dumont RN  Outcome: Progressing   The patient's goals for the shift include      The clinical goals for the shift include pt will remain hemnodynamically stable

## 2024-03-08 NOTE — PROGRESS NOTES
"Reynaldo Francisco is a 68 y.o. male on day 8 of admission presenting with Acute systolic (congestive) heart failure (CMS/HCC).    Subjective:  Tolerated HD well yesterday. Had biopsy yesterday, continues to endorse fullness and pressure at transplant site with no change since biopsy. Appetite better today. Urine OP      Scheduled medications  aspirin, 81 mg, oral, q AM  clotrimazole, , Topical, BID  darbepoetin carolyn, 80 mcg, subcutaneous, q14 days  [Held by provider] heparin (porcine), 5,000 Units, subcutaneous, q8h KESHIA  hydrALAZINE, 25 mg, oral, TID  lidocaine, 1 patch, transdermal, Daily  mycophenolate, 180 mg, oral, BID  pantoprazole, 40 mg, oral, q AM  perflutren protein A microsphere, 0.5 mL, intravenous, Once in imaging  sodium bicarbonate, 650 mg, oral, BID  sulfamethoxazole-trimethoprim, 1 tablet, oral, Every Mon/Wed/Fri  sulfur hexafluoride microsphr, 2 mL, intravenous, Once in imaging  tacrolimus, 1.5 mg, oral, q12h KESHIA  tamsulosin, 0.4 mg, oral, Daily          Last Recorded Vitals  Blood pressure (!) 98/48, pulse 88, temperature 36.6 °C (97.9 °F), resp. rate 17, height 1.727 m (5' 8\"), weight 56 kg (123 lb 7.3 oz), SpO2 96 %.  Intake/Output last 3 Shifts:  I/O last 3 completed shifts:  In: 1720 (30.7 mL/kg) [P.O.:720; I.V.:600 (10.7 mL/kg); Other:400]  Out: 1375 (24.6 mL/kg) [Urine:175 (0.1 mL/kg/hr); Emesis/NG output:400; Other:800]  Weight: 56 kg      General: A&ox3, no distress   Respiratory: Lungs with no basilar crackles   Cardiac: RRR, no rubs    Abd: mild allograft tenderness. Abd soft and no distention  Extremities: No edema in bilateral lower extremities  : no horton  LUE AVF with bruit and thrill    Labs:  Results for orders placed or performed during the hospital encounter of 24 (from the past 24 hour(s))   CBC and Auto Differential   Result Value Ref Range    WBC 2.9 (L) 4.4 - 11.3 x10*3/uL    nRBC 0.0 0.0 - 0.0 /100 WBCs    RBC 2.56 (L) 4.50 - 5.90 x10*6/uL    Hemoglobin 8.4 (L) 13.5 " - 17.5 g/dL    Hematocrit 24.9 (L) 41.0 - 52.0 %    MCV 97 80 - 100 fL    MCH 32.8 26.0 - 34.0 pg    MCHC 33.7 32.0 - 36.0 g/dL    RDW 14.7 (H) 11.5 - 14.5 %    Platelets 73 (L) 150 - 450 x10*3/uL    Neutrophils % 53.1 40.0 - 80.0 %    Immature Granulocytes %, Automated 0.0 0.0 - 0.9 %    Lymphocytes % 22.6 13.0 - 44.0 %    Monocytes % 19.1 2.0 - 10.0 %    Eosinophils % 4.5 0.0 - 6.0 %    Basophils % 0.7 0.0 - 2.0 %    Neutrophils Absolute 1.53 1.20 - 7.70 x10*3/uL    Immature Granulocytes Absolute, Automated 0.00 0.00 - 0.70 x10*3/uL    Lymphocytes Absolute 0.65 (L) 1.20 - 4.80 x10*3/uL    Monocytes Absolute 0.55 0.10 - 1.00 x10*3/uL    Eosinophils Absolute 0.13 0.00 - 0.70 x10*3/uL    Basophils Absolute 0.02 0.00 - 0.10 x10*3/uL   Magnesium   Result Value Ref Range    Magnesium 2.05 1.60 - 2.40 mg/dL   Tacrolimus level   Result Value Ref Range    Tacrolimus  6.6 <=15.0 ng/mL   Hepatic function panel   Result Value Ref Range    Albumin 4.3 3.4 - 5.0 g/dL    Bilirubin, Total 0.8 0.0 - 1.2 mg/dL    Bilirubin, Direct 0.2 0.0 - 0.3 mg/dL    Alkaline Phosphatase 69 33 - 136 U/L    ALT 8 (L) 10 - 52 U/L    AST 17 9 - 39 U/L    Total Protein 7.3 6.4 - 8.2 g/dL   Phosphorus   Result Value Ref Range    Phosphorus 3.9 2.5 - 4.9 mg/dL   Basic Metabolic Panel   Result Value Ref Range    Glucose 120 (H) 74 - 99 mg/dL    Sodium 139 136 - 145 mmol/L    Potassium 4.5 3.5 - 5.3 mmol/L    Chloride 100 98 - 107 mmol/L    Bicarbonate 27 21 - 32 mmol/L    Anion Gap 17 10 - 20 mmol/L    Urea Nitrogen 77 (H) 6 - 23 mg/dL    Creatinine 4.50 (H) 0.50 - 1.30 mg/dL    eGFR 14 (L) >60 mL/min/1.73m*2    Calcium 9.6 8.6 - 10.6 mg/dL     Renal US: 3/1/24  IMPRESSION:  1. Mild pelvicaliceal dilation of the renal transplant, which was  also noted on 10/19/2023 CT. Follow-up ultrasound with additional  postvoid scans is recommended.  2. Doppler evaluation of the renal transplant is within normal limits  as above.        Assessment/Plan   Reynaldo Francisco  is a 68 y.o. 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  Patient admitted with ADHF after diuretics held for few days as outpt. Transplant Nephrology consulted for further management.      S/p DDKT 3/5/23. 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 Thymo induction, maintained on Tac, lower dose myfortic, no prednisone.     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    - baseline creatinine variable, 1.5-2. Cr has been gradually worsening past few weeks as OP in setting of decreased diuretics and weight gain.  Continued to increase as IP with diuretics with elevated tacrolimus level, currently renal function worsening despite resolution of overload, no hemodynamic injury, contrast, peripheral eosinophilia, with bland UA.   - Etiology of KIA on CKD: rejection vs atn from cardiorenal/supratheraputic tacrolimus. Pending biopsy results, biopsy completed 3/7.   - previous TP/C ratio 0.12 11/2023 -> 0.08 2/2024 - > 0.06 (3/5/2024)  - BK positive, low quantitative. CMV and EBV negative  - check A1C       Plan:  - Plan for HD tomorrow if renal function remains impaired, and pending prelim biopsy results.   - would check post void bladder scan  - continue flomax 0.4 mg daily  - maintain euvolemia. Continue to monitor strict Ins/Os and daily weights. Hold off on starting diuretics for now, as patient was Npo over several days and intake has been minimal  - will need to be on diuretics  again in the next few days. With Bps soft on hydralazine,  would hold hydralazine to allow for increased renal perfusion and in anticipation of resuming diuretics    #electrolytes  - WNL    #NAGMA  - continue sodium bicarb to 650 mg BID for now, if continuing to require HD will no longer need oral bicarb supplementation     # Immunosuppression  - cont myfortic 180 mg BID (low dose sec to h/o leukopenia, CMV). Continue reduced dose 1.5 mg bid, monitor rpt Fk trough daily.   Not on prednisone.   - [facility]SA neg 1/2024 ([facility])    # anemia  - Iron saturation of 30%, iron stores adequate.   - hgb goal 10-11  - continue aranesp q14 days, given 3/6    # BMD  - calcium and phos WNL  - PTH of 148     #Infection prophylaxis  - on bactrim    # Hemodynamics  - Bps soft, on hydralazine    Yasmany Tracey MD  Nephrology Fellow

## 2024-03-08 NOTE — PROGRESS NOTES
Per MD the patient may need IHD at discharge.   The are working him up and will not know more until next week. In the meantime, I called his wife to get some information. The patient was going to [facility] in [place] on [address] until he had his transplant. They prefer early morning.   I have asked the MD for the name of his nephrology, his schedule and how many hours he will need. Per Rehab Eval - He is low intensity rehab.   I have NOT sent any information to [facility]  because it is unclear if he will need it. The MD is not able to confirm any additional information at this time  SW to follow up on Monday to confirm the plan. [facility] is aware  SANTO Mccall

## 2024-03-08 NOTE — CONSULTS
"Nutrition Initial Assessment:   Nutrition Assessment    Reason for Assessment: Length of stay (with low BMI)    Patient is a 68 y.o. male on hospital day 8 presenting for management of ADHF and KIA    Etiology of KIA appears to be most likely cardiorenal. HD completed yesterday.   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 His spouse answered the majority of my questions. Reports fairly good appetite and intake. When ordering meals they are often being told foods aren't allowed on his diet. He was drinking one Ensure per day at home - usually sipped on it all day long.  Food Allergies/Intolerances:  None  GI Symptoms: None  Oral Problems: None       Anthropometrics:  Height: 172.7 cm (5' 8\")   Weight: 56 kg (123 lb 7.3 oz)   BMI (Calculated): 18.78  IBW/kg (Dietitian Calculated): 70 kg  Percent of IBW: 80 %       Weight History:   Wt Readings from Last 10 Encounters:   03/07/24 56 kg (123 lb 7.3 oz)  9.8% weight loss    02/29/24 62.1 kg (137 lb)   02/12/24 62.6 kg (138 lb 0.1 oz)   02/07/24 61.7 kg (136 lb)   01/25/24 59 kg (130 lb)   01/03/24 68.9 kg (152 lb)   12/07/23 65.7 kg (144 lb 12.8 oz)   11/06/23 64.7 kg (142 lb 9.6 oz)   10/26/23 62.6 kg (138 lb)   10/22/23 62.2 kg (137 lb 2 oz)    Weights prior to admission have been fairly stable for the past year. Fluid retention appears to have been masking weight loss.     Weight Change %:  Weight History / % Weight Change: They state that his usual weight has been about 135#(61kg). He has been having problems with fluid retention for a while.    Nutrition Focused Physical Exam Findings:  defer: pt " comfort    Nutrition Significant Labs:  CBC Trend:   Results from last 7 days   Lab Units 03/08/24  0500 03/07/24  0450 03/06/24  0601 03/05/24  0533   WBC AUTO x10*3/uL 2.9* 3.3* 3.5* 3.1*   RBC AUTO x10*6/uL 2.56* 2.58* 2.67* 2.83*   HEMOGLOBIN g/dL 8.4* 8.7* 9.1* 9.2*   HEMATOCRIT % 24.9* 25.1* 24.5* 25.6*   MCV fL 97 97 92 91   PLATELETS AUTO x10*3/uL 73* 94* 90* 96*    , A1C:  Lab Results   Component Value Date    HGBA1C 5.9 (H) 03/08/2024   , BG POCT trend:    , Renal Lab Trend:   Results from last 7 days   Lab Units 03/08/24  0500 03/07/24  0450 03/06/24  1819 03/06/24  0601   POTASSIUM mmol/L 4.5 4.4 4.5 4.9   PHOSPHORUS mg/dL 3.9 4.9 5.0* 4.7   SODIUM mmol/L 139 137 138 138   MAGNESIUM mg/dL 2.05 2.13  --  2.18   EGFR mL/min/1.73m*2 14* 12* 13* 15*   BUN mg/dL 77* 102* 98* 93*   CREATININE mg/dL 4.50* 4.86* 4.70* 4.17*        Nutrition Specific Medications:  None    I/O:   Last BM Date: 03/06/24; Stool Appearance: Loose (03/06/24 1800)    Dietary Orders (From admission, onward)       Start     Ordered    03/07/24 1734  Adult diet Cardiac, Renal, Potassium restricted 2 gm (50mEq), 2-3 grams sodium; 70 gm fat; 2 - 3 grams Sodium; Potassium Restricted 2 gm (50mEq)  Diet effective now        Question Answer Comment   Diet type Cardiac    Diet type Renal    Diet type Potassium restricted 2 gm (50mEq)    Diet type 2-3 grams sodium    Fat restriction: 70 gm fat    Sodium restriction: 2 - 3 grams Sodium    Potassium restriction: Potassium Restricted 2 gm (50mEq)        03/07/24 1733    03/04/24 0911  Oral nutritional supplements  Until discontinued        Question Answer Comment   Deliver with All meals    Select supplement: Ensure High Protein        03/04/24 2200                     Estimated Needs:   Total Energy Estimated Needs (kCal): 1700 kCal  Method for Estimating Needs: 30kcal/kg ABW  Total Protein Estimated Needs (g): 85 g  Method for Estimating Needs: 1.2gm/kg IBW  Total Fluid Estimated Needs (mL):  (per  team)           Nutrition Diagnosis   Malnutrition Diagnosis  Patient has Malnutrition Diagnosis: No    Nutrition Diagnosis  Patient has Nutrition Diagnosis: Yes  Diagnosis Status (1): New  Nutrition Diagnosis 1: Unintended weight loss  Related to (1): chronic medical conditions  As Evidenced by (1): 8.8% wt loss from usual weight       Nutrition Interventions/Recommendations   Consider liberalizing diet to encourage intake. Potassium and Fat restrictions are hindering meal ordering.         Nutrition Prescription:  Individualized Nutrition Prescription Provided for : Change diet to 2 gm Na with no other restrictions. Change supplement to Nepro once per day for 420 calories and 19 gm pro per serving.        Nutrition Interventions:   Interventions: Meals and snacks, Medical food supplement  Goal: consume >75% of liberal diet and 1 ONS per day    Nutrition Education:   None at this time.        Nutrition Monitoring and Evaluation   Food/Nutrient Related History Monitoring  Monitoring and Evaluation Plan: Energy intake, Amount of food  Criteria: PO intake adequate to meet estimated nutrition needs    Body Composition/Growth/Weight History  Monitoring and Evaluation Plan: Weight  Criteria: prevent further wt loss    Biochemical Data, Medical Tests and Procedures  Monitoring and Evaluation Plan: Electrolyte/renal panel, Glucose/endocrine profile  Criteria: labs WNL    Time Spent/Follow-up Reminder:   Time Spent (min): 60 minutes  Last Date of Nutrition Visit: 03/08/24  Nutrition Follow-Up Needed?: Dietitian to reassess per policy

## 2024-03-08 NOTE — NURSING NOTE
Report to Receiving RN:    Report To: isaac  Time Report Called: 1911  Hand-Off Communication: removed no fluid, post tx /63/66, pt stable and alert, bleeding at left arm access site stopped   Complications During Treatment: No  Ultrafiltration Treatment: No  Medications Administered During Dialysis: No  Blood Products Administered During Dialysis: No  Labs Sent During Dialysis: No  Heparin Drip Rate Changes: No    Electronic Signatures:    (Signed  )   Authored:      (Signed  )   Authored:      Last Updated: 7:11 PM by MANJEET JOINER

## 2024-03-08 NOTE — PROGRESS NOTES
"Reynaldo Francisco is a 68 y.o. male on day 8 of admission presenting with Acute systolic (congestive) heart failure (CMS/HCC).    Subjective   NAEO    Unenventful kidney biopsy performed yesterday and first session of HD (without removal of any fluid).     This morning patient had no dyspnea, no lower extremity edema, . Pt has continued overall fatigue uncharged after HD yesterday. no significant abdominal pain however does have mild pain from biopsy site.             Objective     Physical Exam  Constitutional:       General: He is not in acute distress.     Comments: Chronically ill appearing    HENT:      Mouth/Throat:      Mouth: Mucous membranes are moist.   Eyes:      Conjunctiva/sclera: Conjunctivae normal.   Neck:      Comments: JVD up to the level of the mandible   Cardiovascular:      Rate and Rhythm: Regular rhythm.      Heart sounds: Murmur heard.   Pulmonary:      Effort: No respiratory distress.      Breath sounds: No wheezing.      Comments: Diminished in bilateral bases  Abdominal:      General: There is no distension.      Tenderness: mild tenderness of transplanted kidney. Bandage overlying the biopsy site.   Musculoskeletal:      Right lower leg: No edema.      Left lower leg: No edema.   Skin:     Capillary Refill: Capillary refill takes less than 2 seconds.   Neurological:      General: No focal deficit present.      Mental Status: He is alert and oriented to person, place, and time.     Last Recorded Vitals  Blood pressure 106/56, pulse 64, temperature 36.6 °C (97.9 °F), resp. rate 17, height 1.727 m (5' 8\"), weight 56 kg (123 lb 7.3 oz), SpO2 98 %.  Intake/Output last 3 Shifts:  I/O last 3 completed shifts:  In: 1720 (30.7 mL/kg) [P.O.:720; I.V.:600 (10.7 mL/kg); Other:400]  Out: 1375 (24.6 mL/kg) [Urine:175 (0.1 mL/kg/hr); Emesis/NG output:400; Other:800]  Weight: 56 kg     Relevant Results  Scheduled medications  aspirin, 81 mg, oral, q AM  clotrimazole, , Topical, BID  darbepoetin carolyn, 80 mcg, " subcutaneous, q14 days  [START ON 3/9/2024] heparin, 5,000 Units, subcutaneous, q8h  [Held by provider] hydrALAZINE, 25 mg, oral, TID  lidocaine, 1 patch, transdermal, Daily  mycophenolate, 180 mg, oral, BID  pantoprazole, 40 mg, oral, q AM  perflutren protein A microsphere, 0.5 mL, intravenous, Once in imaging  sodium bicarbonate, 650 mg, oral, BID  sulfamethoxazole-trimethoprim, 1 tablet, oral, Every Mon/Wed/Fri  sulfur hexafluoride microsphr, 2 mL, intravenous, Once in imaging  tacrolimus, 1.5 mg, oral, q12h KESHIA  tamsulosin, 0.4 mg, oral, Daily      Continuous medications     PRN medications  Results for orders placed or performed during the hospital encounter of 02/29/24 (from the past 24 hour(s))   CBC and Auto Differential   Result Value Ref Range    WBC 2.9 (L) 4.4 - 11.3 x10*3/uL    nRBC 0.0 0.0 - 0.0 /100 WBCs    RBC 2.56 (L) 4.50 - 5.90 x10*6/uL    Hemoglobin 8.4 (L) 13.5 - 17.5 g/dL    Hematocrit 24.9 (L) 41.0 - 52.0 %    MCV 97 80 - 100 fL    MCH 32.8 26.0 - 34.0 pg    MCHC 33.7 32.0 - 36.0 g/dL    RDW 14.7 (H) 11.5 - 14.5 %    Platelets 73 (L) 150 - 450 x10*3/uL    Neutrophils % 53.1 40.0 - 80.0 %    Immature Granulocytes %, Automated 0.0 0.0 - 0.9 %    Lymphocytes % 22.6 13.0 - 44.0 %    Monocytes % 19.1 2.0 - 10.0 %    Eosinophils % 4.5 0.0 - 6.0 %    Basophils % 0.7 0.0 - 2.0 %    Neutrophils Absolute 1.53 1.20 - 7.70 x10*3/uL    Immature Granulocytes Absolute, Automated 0.00 0.00 - 0.70 x10*3/uL    Lymphocytes Absolute 0.65 (L) 1.20 - 4.80 x10*3/uL    Monocytes Absolute 0.55 0.10 - 1.00 x10*3/uL    Eosinophils Absolute 0.13 0.00 - 0.70 x10*3/uL    Basophils Absolute 0.02 0.00 - 0.10 x10*3/uL   Magnesium   Result Value Ref Range    Magnesium 2.05 1.60 - 2.40 mg/dL   Tacrolimus level   Result Value Ref Range    Tacrolimus  6.6 <=15.0 ng/mL   Hepatic function panel   Result Value Ref Range    Albumin 4.3 3.4 - 5.0 g/dL    Bilirubin, Total 0.8 0.0 - 1.2 mg/dL    Bilirubin, Direct 0.2 0.0 - 0.3 mg/dL     Alkaline Phosphatase 69 33 - 136 U/L    ALT 8 (L) 10 - 52 U/L    AST 17 9 - 39 U/L    Total Protein 7.3 6.4 - 8.2 g/dL   Phosphorus   Result Value Ref Range    Phosphorus 3.9 2.5 - 4.9 mg/dL   Basic Metabolic Panel   Result Value Ref Range    Glucose 120 (H) 74 - 99 mg/dL    Sodium 139 136 - 145 mmol/L    Potassium 4.5 3.5 - 5.3 mmol/L    Chloride 100 98 - 107 mmol/L    Bicarbonate 27 21 - 32 mmol/L    Anion Gap 17 10 - 20 mmol/L    Urea Nitrogen 77 (H) 6 - 23 mg/dL    Creatinine 4.50 (H) 0.50 - 1.30 mg/dL    eGFR 14 (L) >60 mL/min/1.73m*2    Calcium 9.6 8.6 - 10.6 mg/dL   Hemoglobin A1c   Result Value Ref Range    Hemoglobin A1C 5.9 (H) see below %    Estimated Average Glucose 123 Not Established mg/dL                       Assessment/Plan     Reynaldo Francisco is a 68 y.o. 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  Etiology of KIA appears to be most likely cardiorenal given elev CVP and PCWP, however Cr worsened with aggressive diuresis and additionally, found to have mild-mod MR, mod-severe TR and mod AI on TTE so valvular disease a more likely etiology of elevated filling pressures. However, Cr now rising again, making mixed intrinsic and cardiorenal etiologies feasible. Per transplant nephro team, lasix renogram obtained and negative for functional obstruction. NPO for transplanted kidney biopsy today 3/6. BKV found to be positive.      Updates 3/8:  - s/p uneventful transplanted kidney biopsy. Pending results. Stable hemoglobin.   - Will follow with EP for CRTD upgrade iso of pacemaker-induced cardiomyopathy, will upgrade closer to discharge  -will continue  discussing with nephrology re: diuresis plan after biopsy. Per nephrology plans in next few days for diuretic initiation. Rec'd to begin holding hydralazine given soft blood pressures to permit the diuretic initation.   -scheduled restarting heparin TID DVT ppx tomorrow   -euovolemic on exam. S/p first HD on 3/7 without UF. Bun down to 77 from 102      #KIA on CKD (baseline Scr 2.1-2.2), non-oliguric   #ESRD s/p kidney transplantation (on tacromlimus and mycophenolate)  #NAGMA  - Initially suspect cardiorenal syndrome, however Cr worsening with aggressive diuresis, Dced drip on 3/3  - NPO for transplanted kidney biopsy 3/7  - BKV positive - transplant aware  - EBV and CMV negatvie, pending DSA   - Nuclear med lasix renogram negative for obstruction  - consider additional diuresis with IV pushes  - Daily AM Tacrolimus lvls   - Continue Tacro 1.5 q12 and Mycophenolate 180 mg BID   - Continue Sodium bicarb 650 BID  - Continue Bactrim ppx   - Transplant nephrology following    #ADHF  #Stage C systolic heart failure/NICM/HFrEF (30-35% TTE 03/2024)  #SSS s/p dual-chamber PM  #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  - CT PET at OSH did not show ischemia  - EP following for CRTD upgrade iso pacemaker-induced cardiomyopathy closer to discharge   - Daily RFP and Mg for K>4, Mg >2  - Will continue to monitor I&Os   - GDMT optimization as renal function tolerates inpatient vs outpatient      #Type A aortic dissection s/p repair 2020  #chronic residual type B aortic dissection w/ R carotid artery dissection  #HTN  - BP well controlled in 100-110s/50-80s  - No outpatient anti-hypertensive  - Continue on Hydralazine 25mg tid (started during this admission)      #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   -iron studies: 75, TIBC 252, ferritin 764  - Started Aranesep 80 mcg q14 days, first dose 3/6 at 0900      Fluids: no  Access: IV  Antibiotics: Bactrim ppx   Electrolytes: PRN  Nutrition: cardiac   PPI: Protonix 40  DVT: Hep 5000 TID  CODE status: FULL CODE  NOK: Abdiel (wife) [phone]        Glen Gonzales MD

## 2024-03-08 NOTE — PROGRESS NOTES
Subjective Data:  Reports doing well so-so this morning    Overnight Events:    No acute overnight events. S/p renal biopsy yesterday     Objective Data:  Last Recorded Vitals:  Vitals:    03/07/24 2009 03/07/24 2326 03/08/24 0438 03/08/24 0722   BP: 102/56 97/51 113/55 107/56   Pulse: 65 62 55 62   Resp: 18 17 17    Temp: 36.7 °C (98.1 °F) 36.9 °C (98.4 °F) 37.3 °C (99.1 °F) 36.6 °C (97.9 °F)   TempSrc: Temporal      SpO2: 98% 97% 96% 94%   Weight:       Height:           Last Labs:  CBC - 3/8/2024:  5:00 AM  2.9 8.4 73    24.9      CMP - 3/8/2024:  5:00 AM  9.6 7.3 17 --- 0.8   3.9 4.3 8 69      PTT - 3/7/2024:  8:09 AM  1.2   13.3 31     TROPHS   Date/Time Value Ref Range Status   03/01/2024 12:03  0 - 53 ng/L Final     Comment:     Previous result verified on 2/29/2024 1656 on specimen/case [id number] called with component TRPHS for procedure Troponin I, High Sensitivity, Initial with value 664 ng/L.   02/29/2024 05:08  0 - 20 ng/L Final     Comment:     Previous result verified on 2/29/2024 1656 on specimen/case [id number] called with component TRPHS for procedure Troponin I, High Sensitivity, Initial with value 664 ng/L.   02/29/2024 04:18  0 - 20 ng/L Final     BNP   Date/Time Value Ref Range Status   03/01/2024 12:03  0 - 99 pg/mL Final   02/29/2024 04:18  0 - 99 pg/mL Final     HGBA1C   Date/Time Value Ref Range Status   05/01/2023 06:22 AM 6.1 <5.7 % Final   03/07/2023 03:24 AM 5.2 <5.7 % Final      Last I/O:  I/O last 3 completed shifts:  In: 1720 (30.7 mL/kg) [P.O.:720; I.V.:600 (10.7 mL/kg); Other:400]  Out: 1375 (24.6 mL/kg) [Urine:175 (0.1 mL/kg/hr); Emesis/NG output:400; Other:800]  Weight: 56 kg     Past Cardiology Tests (Last 3 Years):  EKG:  Electrocardiogram, 12-lead PRN ACS symptoms 02/29/2024 (Preliminary)      ECG 12 lead 02/29/2024 (Preliminary)      ECG 12 lead 02/29/2024 (Preliminary)      ECG 12 lead 12/21/2023      ECG 12 lead tomorrow at 8 AM  10/28/2023      ECG 12 lead STAT 10/28/2023      Electrocardiogram, 12-lead PRN ACS symptoms 10/28/2023      Electrocardiogram, 12-lead PRN ACS symptoms 10/28/2023      Electrocardiogram, 12-lead PRN ACS symptoms 10/28/2023      ECG 12 Lead     Echo:  Transthoracic Echo (TTE) Complete 03/01/2024      Transthoracic Echo (TTE) Limited 03/01/2024      Onco-Echo Complete (Strain & 3D) 10/06/2023    Ejection Fractions:  EF   Date/Time Value Ref Range Status   03/01/2024 11:00 AM 33 %      Cath:  Cardiac Catheterization Procedure 03/01/2024    Stress Test:  No results found for this or any previous visit from the past 1095 days.    Cardiac Imaging:  No results found for this or any previous visit from the past 1095 days.      Inpatient Medications:  Scheduled medications   Medication Dose Route Frequency    aspirin  81 mg oral q AM    clotrimazole   Topical BID    darbepoetin carolyn  80 mcg subcutaneous q14 days    [Held by provider] heparin (porcine)  5,000 Units subcutaneous q8h KESHIA    hydrALAZINE  25 mg oral TID    lidocaine  1 patch transdermal Daily    mycophenolate  180 mg oral BID    pantoprazole  40 mg oral q AM    perflutren protein A microsphere  0.5 mL intravenous Once in imaging    sodium bicarbonate  650 mg oral BID    sulfamethoxazole-trimethoprim  1 tablet oral Every Mon/Wed/Fri    sulfur hexafluoride microsphr  2 mL intravenous Once in imaging    tacrolimus  1.5 mg oral q12h KESHIA    tamsulosin  0.4 mg oral Daily     PRN medications   Medication     Continuous Medications   Medication Dose Last Rate       Physical Exam:  Gen: awake and alert  HEENT: normocephalic. Trachea midline  CV: RRR. Diastolic murmur appreciated, no gallops, rubs  Pulm: clear to auscultation bilaterally. No coarse lung sounds  Abd: soft, non-distended. Normal active bowel sounds  Ext: warm and well-perfused  Psych: appropriate affect  Neuro: CN II-XII grossly intact      Assessment/Plan   Mr. Francisco is a 68 y.o. 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 EP c/s for BIV upgrade eval.     Pt has had multiple admissions for ADHF, EF has fluctuated with time (was 45-50% in 3/2022 then 60% in 9/2022 then 50-55% in 10/2023 and now 30-35%). QRS duration 180ms when RV pacing with a 97%  burden. Would likely benefit from upgrade to BIV device.     Recommendations  -Appreciate management per primary team  -When close to discharge, please let EP know so we can plan for potential BIV upgrade  -Optimal lytes: K>4, Mag>2     Thank you for the consult. EP will continue to follow. Discussed with Dr. Nelson.      General Cardiology Consult Pager: [phone] (weekday 7AM-6PM and weekend 7AM-2PM) and other: [phone]  EP Consult Pager: [phone] (weekday 7AM-6PM and weekend 7AM-2PM) and other: [phone]  EP Device Nurse Pager: [phone] (weekday 7AM-4PM)  Advanced Heart Failure Consult Pager: [phone] anytime  CICU Fellow Pager: [phone] anytime  Endovascular/Limb Salvage Team Pager: [phone] for day coverage (8am-5pm)  Interventional Cardiology Fellow Pager: [phone] for night and weekend coverage  Structural Heart Team Pager: [phone] anytime  Night coverage: [id number]      Peripheral IV 03/07/24 20 G Right;Anterior Forearm (Active)   Site Assessment Clean;Dry;Intact 03/07/24 2100   Dressing Status Clean;Dry 03/07/24 2100   Number of days: 1       Hemodialysis Arteriovenous Fistula 01/01/18 Left Upper arm (Active)   Site Assessment Clean;Dry;Intact 03/07/24 2100   Current State Active 03/07/24 2100   Status Deaccessed 03/07/24 2100   Dressing Status Clean;Dry 03/07/24 2100   Number of days: 2258       Code Status:  Full Code    I  spent 30 minutes in the professional and overall care of this patient.        Vlad Weiss MD

## 2024-03-08 NOTE — PROGRESS NOTES
3/8/2024 Care coordination  Transf from ICU  Renal allograft (DDKT 3/2023), KIA, nonoliguric  - ESRD after liver transplant with anuric KIA, has LUE AVF  - baseline creatinine variable, 1.5-2. Cr has been gradually worsening past few weeks.  First HD session yesterday.  Nephrology unsure about HD need going forward.  Sw updated.

## 2024-03-08 NOTE — PROGRESS NOTES
"Reynaldo Francisco is a 68 y.o. male on day 7 of admission presenting with Acute systolic (congestive) heart failure (CMS/HCC).    Subjective:  Is more fatigued today, no SOB, nausea. Continues to have RLQ pain    Scheduled medications  aspirin, 81 mg, oral, q AM  clotrimazole, , Topical, BID  darbepoetin carolyn, 80 mcg, subcutaneous, q14 days  [Held by provider] heparin (porcine), 5,000 Units, subcutaneous, q8h KESHIA  hydrALAZINE, 25 mg, oral, TID  lidocaine, 1 patch, transdermal, Daily  mycophenolate, 180 mg, oral, BID  pantoprazole, 40 mg, oral, q AM  perflutren protein A microsphere, 0.5 mL, intravenous, Once in imaging  sodium bicarbonate, 650 mg, oral, BID  sulfamethoxazole-trimethoprim, 1 tablet, oral, Every Mon/Wed/Fri  sulfur hexafluoride microsphr, 2 mL, intravenous, Once in imaging  tacrolimus, 1.5 mg, oral, q12h KESHIA  tamsulosin, 0.4 mg, oral, Daily          Last Recorded Vitals  Blood pressure 102/56, pulse 65, temperature 36.7 °C (98.1 °F), resp. rate 18, height 1.727 m (5' 8\"), weight 56 kg (123 lb 7.3 oz), SpO2 98 %.  Intake/Output last 3 Shifts:  I/O last 3 completed shifts:  In: 1000 (17.9 mL/kg) [I.V.:600 (10.7 mL/kg); Other:400]  Out: 600 (10.7 mL/kg) [Urine:200 (0.1 mL/kg/hr); Other:400]  Weight: 56 kg      General: A&ox3, no distress   Respiratory: Lungs with no basilar crackles   Cardiac: RRR, no rubs    Abd: mild allograft tenderness. Abd soft, no distention  Extremities: No edema in bilateral lower extremities  : no horton  LUE AVF with bruit and thrill    Labs:  Results for orders placed or performed during the hospital encounter of 02/29/24 (from the past 24 hour(s))   CBC and Auto Differential   Result Value Ref Range    WBC 3.3 (L) 4.4 - 11.3 x10*3/uL    nRBC 0.0 0.0 - 0.0 /100 WBCs    RBC 2.58 (L) 4.50 - 5.90 x10*6/uL    Hemoglobin 8.7 (L) 13.5 - 17.5 g/dL    Hematocrit 25.1 (L) 41.0 - 52.0 %    MCV 97 80 - 100 fL    MCH 33.7 26.0 - 34.0 pg    MCHC 34.7 32.0 - 36.0 g/dL    RDW 14.8 (H) 11.5 - 14.5 " %    Platelets 94 (L) 150 - 450 x10*3/uL    Immature Granulocytes %, Automated 0.3 0.0 - 0.9 %    Immature Granulocytes Absolute, Automated 0.01 0.00 - 0.70 x10*3/uL   Renal function panel   Result Value Ref Range    Glucose 147 (H) 74 - 99 mg/dL    Sodium 137 136 - 145 mmol/L    Potassium 4.4 3.5 - 5.3 mmol/L    Chloride 101 98 - 107 mmol/L    Bicarbonate 23 21 - 32 mmol/L    Anion Gap 17 10 - 20 mmol/L    Urea Nitrogen 102 (HH) 6 - 23 mg/dL    Creatinine 4.86 (H) 0.50 - 1.30 mg/dL    eGFR 12 (L) >60 mL/min/1.73m*2    Calcium 9.6 8.6 - 10.6 mg/dL    Phosphorus 4.9 2.5 - 4.9 mg/dL    Albumin 4.2 3.4 - 5.0 g/dL   Magnesium   Result Value Ref Range    Magnesium 2.13 1.60 - 2.40 mg/dL   Tacrolimus level   Result Value Ref Range    Tacrolimus  6.6 <=15.0 ng/mL   Manual Differential   Result Value Ref Range    Neutrophils %, Manual 67.3 40.0 - 80.0 %    Lymphocytes %, Manual 16.8 13.0 - 44.0 %    Monocytes %, Manual 9.7 2.0 - 10.0 %    Eosinophils %, Manual 2.6 0.0 - 6.0 %    Basophils %, Manual 0.9 0.0 - 2.0 %    Atypical Lymphocytes %, Manual 2.7 0.0 - 2.0 %    Seg Neutrophils Absolute, Manual 2.22 1.20 - 7.00 x10*3/uL    Lymphocytes Absolute, Manual 0.55 (L) 1.20 - 4.80 x10*3/uL    Monocytes Absolute, Manual 0.32 0.10 - 1.00 x10*3/uL    Eosinophils Absolute, Manual 0.09 0.00 - 0.70 x10*3/uL    Basophils Absolute, Manual 0.03 0.00 - 0.10 x10*3/uL    Atypical Lymphs Absolute, Manual 0.09 0.00 - 0.50 x10*3/uL    Total Cells Counted 113     RBC Morphology See Below     Hypochromia Mild     Target Cells Few    Type and Screen   Result Value Ref Range    ABO TYPE O     Rh TYPE POS     ANTIBODY SCREEN NEG    Coagulation Screen   Result Value Ref Range    Protime 13.3 (H) 9.8 - 12.8 seconds    INR 1.2 (H) 0.9 - 1.1    aPTT 31 27 - 38 seconds   Hepatitis B surface antigen   Result Value Ref Range    Hepatitis B Surface AG Nonreactive Nonreactive   Hepatitis B surface antibody   Result Value Ref Range    Hepatitis B Surface AB  7.4 <10.0 mIU/mL     Renal US: 3/1/24  IMPRESSION:  1. Mild pelvicaliceal dilation of the renal transplant, which was  also noted on 10/19/2023 CT. Follow-up ultrasound with additional  postvoid scans is recommended.  2. Doppler evaluation of the renal transplant is within normal limits  as above.        Assessment/Plan   Reynaldo Francisco is a 68 y.o. 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  Patient admitted with ADHF after diuretics held for few days as outpt. Transplant Nephrology consulted for further management.      S/p DDKT 3/5/23. 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 Thymo induction, maintained on Tac, lower dose myfortic, no prednisone.     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 Cr has been gradually worsening past few weeks as OP in setting of decreased diuretics and weight gain.  Continued to increase as IP with diuretics with elevated tacrolimus level, currently renal function worsening despite no hemodynamic injury, contrast, peripheral eosinophilia, with bland UA.   - Renal US 3/1 showed mild hydro which was stable from imaging 10/2023, with NM lasix scan (w/o horton in place) showed no mechanical obstruction.    - Etiology of KIA on CKD: rejection vs atn from cardiorenal/supratheraputic tacrolimus  - previous TP/C ratio 0.12 11/2023 -> 0.08 2/2024 - > 0.06 (3/5/2024)  - BK positive, low quantitative. CMV and EBV negative  - DSA pending    Plan:  - pending IR biopsy of transplanted kidney   -plan fir dialysis today, concern increased fatigue related to uremia.  Plan to use LUE AVF  -Desmopressin 20mcg IV 60 mins prior to kidney biopsy  - continue flomax 0.4 mg daily  - maintain euvolemia. Continue to monitor strict Ins/Os and daily weights.     #electrolytes  - WNL    #NAGMA  - improved with sodium bicarb tabs and diuresis  - continue sodium bicarb to 650 mg BID     # Immunosuppression  - cont myfortic 180 mg BID (low dose sec to h/o leukopenia, CMV). Continue reduced dose 1.5 mg bid, monitor rpt Fk trough daily.   Not on prednisone.   - DSA neg 1/2024 ([facility])    # anemia  - Iron saturation of 30%, iron stores adequate.   - hgb goal 10-11  - continue aranesp q14 days    # BMD  - calcium and phos WNL  - PTH of 148     #Infection prophylaxis  - on bactrim    # Hemodynamics  - Bps at goal    Yasmany Tracey MD  Nephrology Fellow

## 2024-03-08 NOTE — PROGRESS NOTES
Occupational Therapy    Occupational Therapy    Occupational Therapy Treatment    Name: Reynaldo Francisco  MRN: [id number]  : 1955  Date: 24  Time Calculation  Start Time: 1145  Stop Time: 1210  Time Calculation (min): 25 min    Assessment:  End of Session Communication: Bedside nurse  End of Session Patient Position: Up in chair, Alarm off, not on at start of session, Alarm off, caregiver present  Plan:  Treatment Interventions: ADL retraining, Functional transfer training, Endurance training, Neuromuscular reeducation, Compensatory technique education  OT Frequency: 2 times per week  OT Discharge Recommendations: Low intensity level of continued care  Equipment Recommended upon Discharge:  (None)  OT Recommended Transfer Status:  (CGA)  OT - OK to Discharge: Yes (Per OT POC)    Subjective   General:  OT Last Visit  OT Received On: 24  Reason for Referral: 67 y/o male admitted for  management of ADHF and KIA. s/p renal biopsy 3/7  Prior to Session Communication: Bedside nurse  Patient Position Received: Bed, 2 rail up, Alarm off, not on at start of session  Family/Caregiver Present: Yes  Caregiver Feedback: wife present  General Comment: Pt sitting up in flexed position on arrival, stating he is stretching his back out, motivated to particpate in OT.   Precautions:  Medical Precautions: Cardiac precautions, Fall precautions  Vitals:  Vital Signs  Heart Rate: 88  SpO2: 96 %  Lines/Tubes/Drains:     Pain Assessment:  Pain Assessment  Pain Assessment: 0-10  Pain Score:  (unrated)  Pain Type: Acute pain, Surgical pain  Pain Location:  (biopsy site)  Pain Orientation: Right  Pain Interventions: Repositioned (RN aware)     Objective   Activities of Daily Living:        UE Dressing  UE Dressing Level of Assistance: Minimum assistance  UE Dressing Where Assessed: Chair  UE Dressing Comments: donned 2nd gown as shirin, required assistance with locating sleeves    LE Dressing  LE Dressing: Yes  Pants Level of  Assistance: Contact guard  Sock Level of Assistance: Close supervision, Minimal verbal cues  LE Dressing Where Assessed: Edge of bed         Functional Standing Tolerance:     Bed Mobility/Transfers:     Bed Mobility  Bed Mobility: Yes  Bed Mobility 1  Bed Mobility 1: Supine to sitting  Level of Assistance 1: Close supervision    Transfers  Transfer: Yes  Transfer 1  Transfer From 1: Sit to, Stand to  Transfer to 1: Stand, Sit  Technique 1: Stand to sit, Sit to stand  Transfer Level of Assistance 1: Contact guard  Trials/Comments 1: multiple trials completed from bed and chair surface         Outcome Measures:  [facility] Daily Activity  Putting on and taking off regular lower body clothing: A little  Bathing (including washing, rinsing, drying): A little  Putting on and taking off regular upper body clothing: None  Toileting, which includes using toilet, bedpan or urinal: None  Taking care of personal grooming such as brushing teeth: None  Eating Meals: None  Daily Activity - Total Score: 22     Education Documentation  Body Mechanics, taught by Bisi Oliver OT at 3/8/2024  1:28 PM.  Learner: Patient  Readiness: Eager  Method: Explanation  Response: Needs Reinforcement    Precautions, taught by Bisi Oliver OT at 3/8/2024  1:28 PM.  Learner: Patient  Readiness: Eager  Method: Explanation  Response: Needs Reinforcement    Home Exercise Program, taught by Bisi Oliver OT at 3/8/2024  1:28 PM.  Learner: Patient  Readiness: Eager  Method: Explanation  Response: Needs Reinforcement    ADL Training, taught by Bisi Oliver OT at 3/8/2024  1:28 PM.  Learner: Patient  Readiness: Eager  Method: Explanation  Response: Needs Reinforcement    Education Comments  No comments found.        Goals:  Encounter Problems       Encounter Problems (Active)       ADLs       Patient will complete toileting, including clothing management and hygiene, with MOD I in order to maximize functional Indep with task completion.   (Progressing)       Start:  03/01/24    Expected End:  03/15/24               COGNITION/SAFETY       Pt will identify and incorporate 3 EC/WS strategies into daily routines for increased safety and Indep.  (Progressing)       Start:  03/01/24    Expected End:  03/15/24               EXERCISE/STRENGTHENING       Pt will increase endurance to tolerate 15-20min of activity with no more than 1 rest break in order to increase ability to engage in ADL completion.  (Progressing)       Start:  03/01/24    Expected End:  03/15/24               MOBILITY       Pt will demo increased functional mobility to tolerate tasks necessary to complete ADL routine with Sup and no LOB. (Progressing)       Start:  03/01/24    Expected End:  03/15/24                     03/08/24 at 1:29 PM   Bisi Oliver, OT   [phone]

## 2024-03-09 NOTE — PROGRESS NOTES
"24-hour updates:  -iHD planned for today  -pending Bx results  -still holding diuresis per renal  -known free fluid in abdomen, cannot rely on bladder scan, has been straight-cathed multiple times w/o urine ouput, low c/f obstruction, will rely on urine output  -will re-engage EP when otherwise medically ready for dispo      Subjective   NAOE. Some ongoing pain at Bx incision site, but improving.         Objective     Physical Exam  Constitutional:       General: He is not in acute distress.     Comments: Chronically ill appearing    HENT:      Mouth/Throat:      Mouth: Mucous membranes are moist.   Eyes:      Conjunctiva/sclera: Conjunctivae normal.   Neck:      Comments: JVD up to the level of the mandible   Cardiovascular:      Rate and Rhythm: Regular rhythm.      Heart sounds: Murmur heard.   Pulmonary:      Effort: No respiratory distress.      Breath sounds: No wheezing.      Comments: Diminished in bilateral bases  Abdominal:      General: There is no distension.      Tenderness: mild tenderness of transplanted kidney. Bandage overlying the biopsy site.   Musculoskeletal:      Right lower leg: No edema.      Left lower leg: No edema.   Skin:     Capillary Refill: Capillary refill takes less than 2 seconds.   Neurological:      General: No focal deficit present.      Mental Status: He is alert and oriented to person, place, and time.     Last Recorded Vitals  Blood pressure 113/64, pulse 59, temperature 36.3 °C (97.3 °F), temperature source Temporal, resp. rate 18, height 1.727 m (5' 8\"), weight 55 kg (121 lb 4.8 oz), SpO2 96 %.  Intake/Output last 3 Shifts:  I/O last 3 completed shifts:  In: 1200 (21.8 mL/kg) [P.O.:1200]  Out: 1100 (20 mL/kg) [Urine:300 (0.2 mL/kg/hr); Emesis/NG output:400; Other:400]  Weight: 55 kg     Relevant Results  Scheduled medications  aspirin, 81 mg, oral, q AM  clotrimazole, , Topical, BID  darbepoetin carolyn, 80 mcg, subcutaneous, q14 days  heparin, 5,000 Units, subcutaneous, " q8h  [Held by provider] hydrALAZINE, 25 mg, oral, TID  lidocaine, 1 patch, transdermal, Daily  mycophenolate, 180 mg, oral, BID  pantoprazole, 40 mg, oral, q AM  perflutren protein A microsphere, 0.5 mL, intravenous, Once in imaging  sodium bicarbonate, 650 mg, oral, BID  sulfamethoxazole-trimethoprim, 1 tablet, oral, Every Mon/Wed/Fri  sulfur hexafluoride microsphr, 2 mL, intravenous, Once in imaging  tacrolimus, 1.5 mg, oral, q12h KESHIA  tamsulosin, 0.8 mg, oral, Daily      Continuous medications     PRN medications  Results for orders placed or performed during the hospital encounter of 02/29/24 (from the past 24 hour(s))   CBC and Auto Differential   Result Value Ref Range    WBC 3.7 (L) 4.4 - 11.3 x10*3/uL    nRBC 0.0 0.0 - 0.0 /100 WBCs    RBC 2.57 (L) 4.50 - 5.90 x10*6/uL    Hemoglobin 8.6 (L) 13.5 - 17.5 g/dL    Hematocrit 25.1 (L) 41.0 - 52.0 %    MCV 98 80 - 100 fL    MCH 33.5 26.0 - 34.0 pg    MCHC 34.3 32.0 - 36.0 g/dL    RDW 14.9 (H) 11.5 - 14.5 %    Platelets 80 (L) 150 - 450 x10*3/uL    Neutrophils % 56.4 40.0 - 80.0 %    Immature Granulocytes %, Automated 0.3 0.0 - 0.9 %    Lymphocytes % 22.2 13.0 - 44.0 %    Monocytes % 17.6 2.0 - 10.0 %    Eosinophils % 3.2 0.0 - 6.0 %    Basophils % 0.3 0.0 - 2.0 %    Neutrophils Absolute 2.11 1.20 - 7.70 x10*3/uL    Immature Granulocytes Absolute, Automated 0.01 0.00 - 0.70 x10*3/uL    Lymphocytes Absolute 0.83 (L) 1.20 - 4.80 x10*3/uL    Monocytes Absolute 0.66 0.10 - 1.00 x10*3/uL    Eosinophils Absolute 0.12 0.00 - 0.70 x10*3/uL    Basophils Absolute 0.01 0.00 - 0.10 x10*3/uL   Magnesium   Result Value Ref Range    Magnesium 2.04 1.60 - 2.40 mg/dL   Tacrolimus level   Result Value Ref Range    Tacrolimus  5.8 <=15.0 ng/mL   Hepatic function panel   Result Value Ref Range    Albumin 4.2 3.4 - 5.0 g/dL    Bilirubin, Total 0.7 0.0 - 1.2 mg/dL    Bilirubin, Direct 0.2 0.0 - 0.3 mg/dL    Alkaline Phosphatase 63 33 - 136 U/L    ALT 7 (L) 10 - 52 U/L    AST 17 9 - 39  U/L    Total Protein 6.9 6.4 - 8.2 g/dL   Phosphorus   Result Value Ref Range    Phosphorus 4.3 2.5 - 4.9 mg/dL   Basic Metabolic Panel   Result Value Ref Range    Glucose 126 (H) 74 - 99 mg/dL    Sodium 134 (L) 136 - 145 mmol/L    Potassium 5.0 3.5 - 5.3 mmol/L    Chloride 96 (L) 98 - 107 mmol/L    Bicarbonate 26 21 - 32 mmol/L    Anion Gap 17 10 - 20 mmol/L    Urea Nitrogen 93 (HH) 6 - 23 mg/dL    Creatinine 5.49 (H) 0.50 - 1.30 mg/dL    eGFR 11 (L) >60 mL/min/1.73m*2    Calcium 9.4 8.6 - 10.6 mg/dL                   Assessment/Plan     Reynaldo Francisco is a 68 y.o. 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  Etiology of KIA appears to be most likely cardiorenal given elev CVP and PCWP, however Cr worsened with aggressive diuresis and additionally, found to have mild-mod MR, mod-severe TR and mod AI on TTE so valvular disease a more likely etiology of elevated filling pressures. However, Cr now rising again, making mixed intrinsic and cardiorenal etiologies feasible. Per transplant nephro team, lasix renogram obtained and negative for functional obstruction. NPO for transplanted kidney biopsy today 3/6. BKV found to be positive.      #KIA on CKD (baseline Scr 2.1-2.2), non-oliguric   #ESRD s/p kidney transplantation (on tacromlimus and mycophenolate)  #NAGMA  - Initially suspect cardiorenal syndrome, however Cr worsening with aggressive diuresis, Dced drip on 3/3  - NPO for transplanted kidney biopsy 3/7  - BKV positive - transplant aware  - EBV and CMV negatvie, pending DSA   - Nuclear med lasix renogram negative for obstruction  - consider additional diuresis  with IV pushes  - Daily AM Tacrolimus lvls   - Continue Tacro 1.5 q12 and Mycophenolate 180 mg BID   - Continue Sodium bicarb 650 BID  - Continue Bactrim ppx   - Transplant nephrology following    #ADHF  #Stage C systolic heart failure/NICM/HFrEF (30-35% TTE 03/2024)  #SSS s/p dual-chamber PM  #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  - CT PET at OSH did not show ischemia  - EP following for CRTD upgrade iso pacemaker-induced cardiomyopathy closer to discharge   - Daily RFP and Mg for K>4, Mg >2  - Will continue to monitor I&Os   - GDMT optimization as renal function tolerates inpatient vs outpatient      #Type A aortic dissection s/p repair 2020  #chronic residual type B aortic dissection w/ R carotid artery dissection  #HTN  - BP well controlled in 100-110s/50-80s  - No outpatient anti-hypertensive  - Continue on Hydralazine 25mg tid (started during this admission)      #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      #anemia, multifactorial (baseline Hgb 10)  - at baseline.   -iron studies: 75, TIBC 252, ferritin 764  - Started Aranesep 80 mcg q14 days, first dose 3/6 at 0900      Fluids: no  Access: IV  Antibiotics: Bactrim ppx   Electrolytes: PRN  Nutrition: cardiac   PPI: Protonix 40  DVT: Hep 5000 TID  CODE status: FULL CODE  NOK: Abdiel (wife) [phone]        Fabricio Fernandez MD

## 2024-03-09 NOTE — NURSING NOTE
Bladder scan 137 ml. Bladder non distended. No c/o discomfort over bladder. Reported bladder scan result to MD via secure chat.

## 2024-03-09 NOTE — NURSING NOTE
.Report from Sending RN:    Report From: CARRIE Manrique  Recent Surgery of Procedure: No  Baseline Level of Consciousness (LOC): A&O X3  Oxygen Use: No  Type: Room air  Diabetic: No  Last BP Med Given Day of Dialysis: none  Last Pain Med Given: none  Lab Tests to be Obtained with Dialysis: No  Blood Transfusion to be Given During Dialysis: No  Available IV Access: Yes  Medications to be Administered During Dialysis: No  Continuous IV Infusion Running: No  Restraints on Currently or in the Last 24 Hours: No  Hand-Off Communication: Pt had no acute event overnight, vital signs stable. On protective precaution and can travel without telemetry.

## 2024-03-09 NOTE — CARE PLAN
The patient's goals for the shift include      The clinical goals for the shift include pt will remain hemnodynamically stable      Problem: Heart Failure  Goal: Improved gas exchange this shift  Outcome: Progressing  Goal: Improved urinary output this shift  Outcome: Progressing  Goal: Reduction in peripheral edema within 24 hours  Outcome: Progressing  Goal: Report improvement of dyspnea/breathlessness this shift  Outcome: Progressing  Goal: Weight from fluid excess reduced over 2-3 days, then stabilize  Outcome: Progressing  Goal: Increase self care and/or family involvement in 24 hours  Outcome: Progressing

## 2024-03-09 NOTE — NURSING NOTE
Informed Dr. Sarah that off going nurse only obtained 20 ml urine via st cath. MD ordered that patient be st. cathed again. This nurse bladder scanned Mr. Francisco before st cath and got 184 ml via bladder scan. st cathed patient and only got 10 ml tea colored urine and I let it dwell for 10 mins as I stood at bedside. Patient states that this happens to him all the time but he does not know why. No bladder distention or c/o bladder discomfort over bladder. MD notified via secure chat. Awaiting response.

## 2024-03-09 NOTE — NURSING NOTE
Report to Receiving RN:    Report To: CARRIE Manrique  Time Report Called: 1600  Hand-Off Communication: No acute change from RN to RN report.  Patient tolerated treatment well with 1.5L fluid removed.  Last BP 94/48, HR 57.  No complaint of pain or discomfort.  Patient remained on telemetry throughout treatment.  Complications During Treatment: No  Ultrafiltration Treatment: No  Medications Administered During Dialysis: No  Blood Products Administered During Dialysis: No  Labs Sent During Dialysis: No  Heparin Drip Rate Changes: No    Electronic Signatures:   (Signed 3/9/24)   Authored: Aroldo Jones RN       Last Updated: 4:08 PM by AROLDO JONES

## 2024-03-09 NOTE — NURSING NOTE
Reported critical BUN via secure chat. BUN 93. Also, reported that patient has only voided 70 ml since midnight. Patient states that he has experienced this at home as well. No new orders at this time.

## 2024-03-10 NOTE — PROGRESS NOTES
"Reynaldo Francisco is a 68 y.o. male on day 10 of admission presenting with Acute systolic (congestive) heart failure (CMS/HCC).    Subjective:  Tolerated HD well yesterday.   Patient still has low UOP.    Bx 3/7/2024  Reynaldo Francisco (1955)  -- minimal histologic abnormality suggesting functional etiology for rise in creatinine  Early tubular atrophy may suggest chronic ischemic injury  Negative for acute T cell mediated rejection  No evidence of acute antibody-mediated rejection  BK serologies noted; SV40 pending. No viral cytopathic changes noted.    Scheduled medications  aspirin, 81 mg, oral, q AM  clotrimazole, , Topical, BID  darbepoetin carolyn, 80 mcg, subcutaneous, q14 days  heparin, 5,000 Units, subcutaneous, q8h  [Held by provider] hydrALAZINE, 25 mg, oral, TID  lidocaine, 1 patch, transdermal, Daily  mycophenolate, 180 mg, oral, BID  pantoprazole, 40 mg, oral, q AM  perflutren protein A microsphere, 0.5 mL, intravenous, Once in imaging  sulfamethoxazole-trimethoprim, 1 tablet, oral, Every Mon/Wed/Fri  sulfur hexafluoride microsphr, 2 mL, intravenous, Once in imaging  tacrolimus, 1.5 mg, oral, q12h KESHIA  tamsulosin, 0.8 mg, oral, Daily          Last Recorded Vitals  Blood pressure 109/66, pulse 69, temperature 37.1 °C (98.8 °F), resp. rate 18, height 1.727 m (5' 8\"), weight 55 kg (121 lb 4.8 oz), SpO2 94 %.  Intake/Output last 3 Shifts:  I/O last 3 completed shifts:  In: 520 (9.5 mL/kg) [P.O.:120; I.V.:400 (7.3 mL/kg)]  Out: 205 (3.7 mL/kg) [Urine:205 (0.1 mL/kg/hr)]  Weight: 55 kg      General: A&ox3, no distress   Respiratory: Lungs with no basilar crackles   Cardiac: RRR, no rubs    Abd: mild allograft tenderness. Abd soft and no distention  Extremities: No edema in bilateral lower extremities  : no horton  LUE AVF with bruit and thrill    Labs:  Results for orders placed or performed during the hospital encounter of 02/29/24 (from the past 24 hour(s))   CBC and Auto Differential   Result Value Ref Range    WBC " 3.2 (L) 4.4 - 11.3 x10*3/uL    nRBC 0.0 0.0 - 0.0 /100 WBCs    RBC 2.51 (L) 4.50 - 5.90 x10*6/uL    Hemoglobin 8.4 (L) 13.5 - 17.5 g/dL    Hematocrit 24.8 (L) 41.0 - 52.0 %    MCV 99 80 - 100 fL    MCH 33.5 26.0 - 34.0 pg    MCHC 33.9 32.0 - 36.0 g/dL    RDW 14.8 (H) 11.5 - 14.5 %    Platelets 78 (L) 150 - 450 x10*3/uL    Neutrophils % 58.5 40.0 - 80.0 %    Immature Granulocytes %, Automated 0.3 0.0 - 0.9 %    Lymphocytes % 20.7 13.0 - 44.0 %    Monocytes % 18.0 2.0 - 10.0 %    Eosinophils % 2.2 0.0 - 6.0 %    Basophils % 0.3 0.0 - 2.0 %    Neutrophils Absolute 1.89 1.20 - 7.70 x10*3/uL    Immature Granulocytes Absolute, Automated 0.01 0.00 - 0.70 x10*3/uL    Lymphocytes Absolute 0.67 (L) 1.20 - 4.80 x10*3/uL    Monocytes Absolute 0.58 0.10 - 1.00 x10*3/uL    Eosinophils Absolute 0.07 0.00 - 0.70 x10*3/uL    Basophils Absolute 0.01 0.00 - 0.10 x10*3/uL   Magnesium   Result Value Ref Range    Magnesium 1.97 1.60 - 2.40 mg/dL   Tacrolimus level   Result Value Ref Range    Tacrolimus  4.5 <=15.0 ng/mL   Hepatic function panel   Result Value Ref Range    Albumin 4.3 3.4 - 5.0 g/dL    Bilirubin, Total 0.7 0.0 - 1.2 mg/dL    Bilirubin, Direct 0.2 0.0 - 0.3 mg/dL    Alkaline Phosphatase 70 33 - 136 U/L    ALT 7 (L) 10 - 52 U/L    AST 17 9 - 39 U/L    Total Protein 7.1 6.4 - 8.2 g/dL   Phosphorus   Result Value Ref Range    Phosphorus 3.6 2.5 - 4.9 mg/dL   Basic Metabolic Panel   Result Value Ref Range    Glucose 105 (H) 74 - 99 mg/dL    Sodium 136 136 - 145 mmol/L    Potassium 4.3 3.5 - 5.3 mmol/L    Chloride 96 (L) 98 - 107 mmol/L    Bicarbonate 29 21 - 32 mmol/L    Anion Gap 15 10 - 20 mmol/L    Urea Nitrogen 51 (H) 6 - 23 mg/dL    Creatinine 4.39 (H) 0.50 - 1.30 mg/dL    eGFR 14 (L) >60 mL/min/1.73m*2    Calcium 9.3 8.6 - 10.6 mg/dL     Renal US: 3/1/24  IMPRESSION:  1. Mild pelvicaliceal dilation of the renal transplant, which was  also noted on 10/19/2023 CT. Follow-up ultrasound with additional  postvoid scans is  recommended.  2. Doppler evaluation of the renal transplant is within normal limits  as above.        Assessment/Plan     68 y.o. 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 Thymo induction, maintained on Tac, lower dose MMF. DSA 1/2024 neg at [facility]. He also has hx of aortic dissection, HTN.      Patient admitted to CICU with ADHF and KIA. Baseline creatinine had been around 1.5-2. Renal US with mild-mod hydro, also seen on CT 10/2023. MAG-3 Negative for obstruction.     Bx 3/7/24 : minimal histologic abnormality suggesting functional etiology for rise in creatinine  Early tubular atrophy may suggest chronic ischemic injury  Negative for acute T cell mediated rejection  No evidence of acute antibody-mediated rejection  BK serologies noted; SV40 pending. No viral cytopathic changes noted.       Plan:  - Plan for HD on Monday  -HD started 3/7/24 for uremia and again on 3/9/24. Still with low UOP. Please evaluate for HD tomorrow. EP is planning another procedure prior to discharge.   Plan to evaluate for HD tomorrow and will need HD chair as outpatient. Consider using our HD unit while monitoring for kidney recovery. Patient and wife has now moved to [place].     #electrolytes  - WNL    #NAGMA  - continue sodium bicarb to 650 mg BID for now, if continuing to require HD will no longer need oral bicarb supplementation     # Immunosuppression  - Myfortic 360 mg bid  -increased tac to 2 mg bid today  Not on prednisone.   - DSA neg 1/2024 ([facility])    # anemia  - Iron saturation of 30%, iron stores adequate.   - hgb goal 10-11  - continue aranesp q14 days, given 3/6    # BMD  - calcium and phos WNL  - PTH of 148     #Infection prophylaxis  - on  bactrim    # Hemodynamics  - Bps soft, on hydralazine    # BK Viremia  Low level 38  Continue to monitor BK PLASMA PCR q 2 weeks    *Dr. Ha will take over the service tomorrow.    Magda Lipscomb MD

## 2024-03-10 NOTE — PROGRESS NOTES
"24-hour updates:  -iHD planned for tomorrow  -pending Bx results, prelim w/o rejection  -still holding diuresis per renal  -known free fluid in abdomen, cannot rely on bladder scan, has been straight-cathed multiple times w/o urine ouput, low c/f obstruction, will rely on urine output  -will re-engage EP when otherwise medically ready for dispo, likely to reach out tomorrow  -seems like will need OP iHD chair      Subjective   NAOE. Sleeping comfortably.       Objective     Physical Exam  Constitutional:       General: He is not in acute distress.     Comments: Chronically ill appearing    HENT:      Mouth/Throat:      Mouth: Mucous membranes are moist.   Eyes:      Conjunctiva/sclera: Conjunctivae normal.   Neck:      Comments: JVD up to the level of the mandible   Cardiovascular:      Rate and Rhythm: Regular rhythm.      Heart sounds: Murmur heard.   Pulmonary:      Effort: No respiratory distress.      Breath sounds: No wheezing.      Comments: Diminished in bilateral bases  Abdominal:      General: There is no distension.      Tenderness: mild tenderness of transplanted kidney. Bandage overlying the biopsy site.   Musculoskeletal:      Right lower leg: No edema.      Left lower leg: No edema.   Skin:     Capillary Refill: Capillary refill takes less than 2 seconds.   Neurological:      General: No focal deficit present.      Mental Status: He is alert and oriented to person, place, and time.     Last Recorded Vitals  Blood pressure 106/54, pulse 62, temperature 36.7 °C (98.1 °F), resp. rate 18, height 1.727 m (5' 8\"), weight 55 kg (121 lb 4.8 oz), SpO2 95 %.  Intake/Output last 3 Shifts:  I/O last 3 completed shifts:  In: 520 (9.5 mL/kg) [P.O.:120; I.V.:400 (7.3 mL/kg)]  Out: 205 (3.7 mL/kg) [Urine:205 (0.1 mL/kg/hr)]  Weight: 55 kg     Relevant Results  Scheduled medications  aspirin, 81 mg, oral, q AM  clotrimazole, , Topical, BID  darbepoetin carolyn, 80 mcg, subcutaneous, q14 days  heparin, 5,000 Units, " subcutaneous, q8h  [Held by provider] hydrALAZINE, 25 mg, oral, TID  lidocaine, 1 patch, transdermal, Daily  mycophenolate, 180 mg, oral, BID  pantoprazole, 40 mg, oral, q AM  perflutren protein A microsphere, 0.5 mL, intravenous, Once in imaging  sulfamethoxazole-trimethoprim, 1 tablet, oral, Every Mon/Wed/Fri  sulfur hexafluoride microsphr, 2 mL, intravenous, Once in imaging  tacrolimus, 1.5 mg, oral, q12h KESHIA  tamsulosin, 0.8 mg, oral, Daily      Continuous medications     PRN medications  Results for orders placed or performed during the hospital encounter of 02/29/24 (from the past 24 hour(s))   CBC and Auto Differential   Result Value Ref Range    WBC 3.2 (L) 4.4 - 11.3 x10*3/uL    nRBC 0.0 0.0 - 0.0 /100 WBCs    RBC 2.51 (L) 4.50 - 5.90 x10*6/uL    Hemoglobin 8.4 (L) 13.5 - 17.5 g/dL    Hematocrit 24.8 (L) 41.0 - 52.0 %    MCV 99 80 - 100 fL    MCH 33.5 26.0 - 34.0 pg    MCHC 33.9 32.0 - 36.0 g/dL    RDW 14.8 (H) 11.5 - 14.5 %    Platelets 78 (L) 150 - 450 x10*3/uL    Neutrophils % 58.5 40.0 - 80.0 %    Immature Granulocytes %, Automated 0.3 0.0 - 0.9 %    Lymphocytes % 20.7 13.0 - 44.0 %    Monocytes % 18.0 2.0 - 10.0 %    Eosinophils % 2.2 0.0 - 6.0 %    Basophils % 0.3 0.0 - 2.0 %    Neutrophils Absolute 1.89 1.20 - 7.70 x10*3/uL    Immature Granulocytes Absolute, Automated 0.01 0.00 - 0.70 x10*3/uL    Lymphocytes Absolute 0.67 (L) 1.20 - 4.80 x10*3/uL    Monocytes Absolute 0.58 0.10 - 1.00 x10*3/uL    Eosinophils Absolute 0.07 0.00 - 0.70 x10*3/uL    Basophils Absolute 0.01 0.00 - 0.10 x10*3/uL   Magnesium   Result Value Ref Range    Magnesium 1.97 1.60 - 2.40 mg/dL   Tacrolimus level   Result Value Ref Range    Tacrolimus  4.5 <=15.0 ng/mL   Hepatic function panel   Result Value Ref Range    Albumin 4.3 3.4 - 5.0 g/dL    Bilirubin, Total 0.7 0.0 - 1.2 mg/dL    Bilirubin, Direct 0.2 0.0 - 0.3 mg/dL    Alkaline Phosphatase 70 33 - 136 U/L    ALT 7 (L) 10 - 52 U/L    AST 17 9 - 39 U/L    Total Protein 7.1  6.4 - 8.2 g/dL   Phosphorus   Result Value Ref Range    Phosphorus 3.6 2.5 - 4.9 mg/dL   Basic Metabolic Panel   Result Value Ref Range    Glucose 105 (H) 74 - 99 mg/dL    Sodium 136 136 - 145 mmol/L    Potassium 4.3 3.5 - 5.3 mmol/L    Chloride 96 (L) 98 - 107 mmol/L    Bicarbonate 29 21 - 32 mmol/L    Anion Gap 15 10 - 20 mmol/L    Urea Nitrogen 51 (H) 6 - 23 mg/dL    Creatinine 4.39 (H) 0.50 - 1.30 mg/dL    eGFR 14 (L) >60 mL/min/1.73m*2    Calcium 9.3 8.6 - 10.6 mg/dL                   Assessment/Plan     Reynaldo Francisco is a 68 y.o. 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  Etiology of KIA appears to be most likely cardiorenal given elev CVP and PCWP, however Cr worsened with aggressive diuresis and additionally, found to have mild-mod MR, mod-severe TR and mod AI on TTE so valvular disease a more likely etiology of elevated filling pressures. However, Cr now rising again, making mixed intrinsic and cardiorenal etiologies feasible. Per transplant nephro team, lasix renogram obtained and negative for functional obstruction. NPO for transplanted kidney biopsy today 3/6. BKV found to be positive.      #KIA on CKD (baseline Scr 2.1-2.2), non-oliguric   #ESRD s/p kidney transplantation (on tacromlimus and mycophenolate)  #NAGMA  - Initially suspect cardiorenal syndrome, however Cr worsening with aggressive diuresis, Dced drip on 3/3  - NPO for transplanted kidney biopsy 3/7  - BKV positive - transplant aware  - EBV and CMV negatvie, pending DSA   - Nuclear med lasix renogram negative for obstruction  - consider additional diuresis with IV pushes  - Daily AM  Tacrolimus lvls   - Continue Tacro 1.5 q12 and Mycophenolate 180 mg BID   - Continue Sodium bicarb 650 BID  - Continue Bactrim ppx   - Transplant nephrology following    #ADHF  #Stage C systolic heart failure/NICM/HFrEF (30-35% TTE 03/2024)  #SSS s/p dual-chamber PM  #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  - CT PET at OSH did not show ischemia  - EP following for CRTD upgrade iso pacemaker-induced cardiomyopathy closer to discharge   - Daily RFP and Mg for K>4, Mg >2  - Will continue to monitor I&Os   - GDMT optimization as renal function tolerates inpatient vs outpatient      #Type A aortic dissection s/p repair 2020  #chronic residual type B aortic dissection w/ R carotid artery dissection  #HTN  - BP well controlled in 100-110s/50-80s  - No outpatient anti-hypertensive  - Continue on Hydralazine 25mg tid (started during this admission)      #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   -iron studies: 75, TIBC 252, ferritin 764  - Started Aranesep 80 mcg q14 days, first dose 3/6 at 0900      Fluids: no  Access: IV  Antibiotics: Bactrim ppx   Electrolytes: PRN  Nutrition: cardiac   PPI: Protonix 40  DVT: Hep 5000 TID  CODE status: FULL CODE  NOK: Abdiel (wife) [phone]        Fabricio Fernandez MD

## 2024-03-10 NOTE — CARE PLAN
Patient had nausea and vomiting at beginning of shift. Offered patient Tigan IM for nausea relief. Patient refused and wanted to rest.   Patient fistula had moderate bleeding at site. Held 25 minutes of pressure at site. Bleeding eventually slowed down. Applied new gauze and tape dressing to site.     Problem: Heart Failure  Goal: Improved gas exchange this shift  Outcome: Progressing  Goal: Improved urinary output this shift  Outcome: Progressing     Problem: Pain  Goal: My pain/discomfort is manageable  Outcome: Progressing     Problem: Safety  Goal: Patient will be injury free during hospitalization  Outcome: Progressing  Goal: I will remain free of falls  Outcome: Progressing     Problem: Daily Care  Goal: Daily care needs are met  Outcome: Progressing     Problem: Skin  Goal: Decreased wound size/increased tissue granulation at next dressing change  Outcome: Progressing  Goal: Participates in plan/prevention/treatment measures  Outcome: Progressing  Goal: Prevent/manage excess moisture  Outcome: Progressing  Goal: Prevent/minimize sheer/friction injuries  Outcome: Progressing  Goal: Promote/optimize nutrition  Outcome: Progressing  Goal: Promote skin healing  Outcome: Progressing     Problem: Fall/Injury  Goal: Not fall by end of shift  Outcome: Progressing     Problem: Safety - Adult  Goal: Free from fall injury  Outcome: Progressing     Problem: Chronic Conditions and Co-morbidities  Goal: Patient's chronic conditions and co-morbidity symptoms are monitored and maintained or improved  Outcome: Progressing     Problem: Diabetes  Goal: Achieve decreasing blood glucose levels by end of shift  Outcome: Progressing

## 2024-03-10 NOTE — PROGRESS NOTES
"Reynaldo Francisco is a 68 y.o. male on day 10 of admission presenting with Acute systolic (congestive) heart failure (CMS/HCC).    Subjective:    HD today for rising BUN, Uremia    Bx 3/7/2024  Reynaldo Francisco (1955)  -- minimal histologic abnormality suggesting functional etiology for rise in creatinine  Early tubular atrophy may suggest chronic ischemic injury  Negative for acute T cell mediated rejection  No evidence of acute antibody-mediated rejection  BK serologies noted; SV40 pending. No viral cytopathic changes noted.    Scheduled medications  aspirin, 81 mg, oral, q AM  clotrimazole, , Topical, BID  darbepoetin carolyn, 80 mcg, subcutaneous, q14 days  heparin, 5,000 Units, subcutaneous, q8h  [Held by provider] hydrALAZINE, 25 mg, oral, TID  lidocaine, 1 patch, transdermal, Daily  mycophenolate, 180 mg, oral, BID  pantoprazole, 40 mg, oral, q AM  perflutren protein A microsphere, 0.5 mL, intravenous, Once in imaging  sulfamethoxazole-trimethoprim, 1 tablet, oral, Every Mon/Wed/Fri  sulfur hexafluoride microsphr, 2 mL, intravenous, Once in imaging  tacrolimus, 2 mg, oral, q12h KESHIA  tamsulosin, 0.8 mg, oral, Daily          Last Recorded Vitals  Blood pressure 109/66, pulse 69, temperature 37.1 °C (98.8 °F), resp. rate 18, height 1.727 m (5' 8\"), weight 55 kg (121 lb 4.8 oz), SpO2 94 %.  Intake/Output last 3 Shifts:  I/O last 3 completed shifts:  In: 520 (9.5 mL/kg) [P.O.:120; I.V.:400 (7.3 mL/kg)]  Out: 205 (3.7 mL/kg) [Urine:205 (0.1 mL/kg/hr)]  Weight: 55 kg      General: A&ox3, no distress   Respiratory: Lungs with no basilar crackles   Cardiac: RRR, no rubs    Abd: mild allograft tenderness. Abd soft and no distention  Extremities: No edema in bilateral lower extremities  : no horton  LUE AVF with bruit and thrill    Labs:  Results for orders placed or performed during the hospital encounter of 02/29/24 (from the past 24 hour(s))   CBC and Auto Differential   Result Value Ref Range    WBC 3.2 (L) 4.4 - 11.3 " x10*3/uL    nRBC 0.0 0.0 - 0.0 /100 WBCs    RBC 2.51 (L) 4.50 - 5.90 x10*6/uL    Hemoglobin 8.4 (L) 13.5 - 17.5 g/dL    Hematocrit 24.8 (L) 41.0 - 52.0 %    MCV 99 80 - 100 fL    MCH 33.5 26.0 - 34.0 pg    MCHC 33.9 32.0 - 36.0 g/dL    RDW 14.8 (H) 11.5 - 14.5 %    Platelets 78 (L) 150 - 450 x10*3/uL    Neutrophils % 58.5 40.0 - 80.0 %    Immature Granulocytes %, Automated 0.3 0.0 - 0.9 %    Lymphocytes % 20.7 13.0 - 44.0 %    Monocytes % 18.0 2.0 - 10.0 %    Eosinophils % 2.2 0.0 - 6.0 %    Basophils % 0.3 0.0 - 2.0 %    Neutrophils Absolute 1.89 1.20 - 7.70 x10*3/uL    Immature Granulocytes Absolute, Automated 0.01 0.00 - 0.70 x10*3/uL    Lymphocytes Absolute 0.67 (L) 1.20 - 4.80 x10*3/uL    Monocytes Absolute 0.58 0.10 - 1.00 x10*3/uL    Eosinophils Absolute 0.07 0.00 - 0.70 x10*3/uL    Basophils Absolute 0.01 0.00 - 0.10 x10*3/uL   Magnesium   Result Value Ref Range    Magnesium 1.97 1.60 - 2.40 mg/dL   Tacrolimus level   Result Value Ref Range    Tacrolimus  4.5 <=15.0 ng/mL   Hepatic function panel   Result Value Ref Range    Albumin 4.3 3.4 - 5.0 g/dL    Bilirubin, Total 0.7 0.0 - 1.2 mg/dL    Bilirubin, Direct 0.2 0.0 - 0.3 mg/dL    Alkaline Phosphatase 70 33 - 136 U/L    ALT 7 (L) 10 - 52 U/L    AST 17 9 - 39 U/L    Total Protein 7.1 6.4 - 8.2 g/dL   Phosphorus   Result Value Ref Range    Phosphorus 3.6 2.5 - 4.9 mg/dL   Basic Metabolic Panel   Result Value Ref Range    Glucose 105 (H) 74 - 99 mg/dL    Sodium 136 136 - 145 mmol/L    Potassium 4.3 3.5 - 5.3 mmol/L    Chloride 96 (L) 98 - 107 mmol/L    Bicarbonate 29 21 - 32 mmol/L    Anion Gap 15 10 - 20 mmol/L    Urea Nitrogen 51 (H) 6 - 23 mg/dL    Creatinine 4.39 (H) 0.50 - 1.30 mg/dL    eGFR 14 (L) >60 mL/min/1.73m*2    Calcium 9.3 8.6 - 10.6 mg/dL     Renal US: 3/1/24  IMPRESSION:  1. Mild pelvicaliceal dilation of the renal transplant, which was  also noted on 10/19/2023 CT. Follow-up ultrasound with additional  postvoid scans is recommended.  2.  Doppler evaluation of the renal transplant is within normal limits  as above.        Assessment/Plan     68 y.o. male with a past medical hx of NICM/HFrEF (40% TTE 01/2024), SSS s/p dual-chamber PM, Insulinoma w/ hepatic metastasis liver transplant (1/8/2018, and DDKT (3/5/2023) at [facility]. 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 Thymo induction, maintained on Tac, lower dose MMF. DSA 1/2024 neg at [facility]. He also has hx of aortic dissection, HTN.      Patient admitted to CICU with ADHF and KIA. Baseline creatinine had been around 1.5-2. Renal US with mild-mod hydro, also seen on CT 10/2023. MAG-3 Negative for obstruction.     Bx 3/7/24 : minimal histologic abnormality suggesting functional etiology for rise in creatinine  Early tubular atrophy may suggest chronic ischemic injury  Negative for acute T cell mediated rejection  No evidence of acute antibody-mediated rejection  BK serologies noted; SV40 pending. No viral cytopathic changes noted.       Plan:  - Plan for HD Today    -HD started 3/7/24 for uremia and again on 3/9/24. Still with low UOP. Please evaluate for HD tomorrow. EP is planning another procedure prior to discharge.   Plan to evaluate for HD tomorrow and will need HD chair as outpatient. Consider using our HD unit while monitoring for kidney recovery. Patient and wife has now moved to [place].     #electrolytes  - WNL    #NAGMA  - continue sodium bicarb to 650 mg BID for now, if continuing to require HD will no longer need oral bicarb supplementation     # Immunosuppression  - Myfortic 360 mg bid  -increased tac to 2 mg bid today  Not on prednisone.   - DSA neg 1/2024 ([facility])    # anemia  - Iron saturation of 30%, iron stores adequate.   - hgb goal 10-11  - continue aranesp q14 days, given 3/6    # BMD  - calcium and phos WNL  - PTH of 148     #Infection prophylaxis  - on bactrim    #  Hemodynamics  - Bps soft, on hydralazine    # BK Viremia  Low level 38  Continue to monitor BK PLASMA PCR q 2 weeks    *Dr. Ha will take over the service tomorrow.    Magda Lipscomb MD

## 2024-03-11 NOTE — PROGRESS NOTES
MD stating they feel that patient will not need outpatient iHD chair upon discharge. We will continue to follow.     Melony Ortiz LCSW

## 2024-03-11 NOTE — PROGRESS NOTES
Transplant Nephrology progress note     Date of admission: 2/29/2024     Reynaldo Francisco is a 68 y.o.  with PMH   Past Medical History:   Diagnosis Date    Arteriovenous fistula, acquired (CMS/HCC) 06/17/2022    AV fistula    Lung nodule     Nutritional anemia, unspecified     Anemia, deficiency    Personal history of malignant neoplasm of pancreas 06/17/2022    History of malignant neoplasm of pancreas        SUBJECTIVE:    Denied any complaints today.  Urine output in last 24 hours is 380 cc.      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    Hypertension    Increased heart rate    Lung nodule    Malignant neoplasm of colon (CMS/HCC)    Metastatic cancer to liver (CMS/HCC)    Moderate aortic insufficiency    Insulinoma    Presence of primary arteriovenous graft for hemodialysis    RBBB    Right jugular vein thrombosis    Right lower quadrant abdominal pain    Suture granuloma    Systolic murmur    SCC (squamous cell carcinoma)    Syncope and collapse    Delayed graft function of kidney transplant due to ATN requiring acute dialysis (CMS/HCC)    Pacemaker    Pseudoaneurysm of distal brachial artery (CMS/HCC)    Dissecting aneurysm of thoracic aorta, Wylie type B (CMS/HCC)    Arteriovenous fistula (CMS/HCC)    Balanitis    Diarrhea    History of malignant neoplasm of pancreas    Obstruction of inferior vena cava    Shortness of breath    Heart  "failure (CMS/HCC)    Acute renal failure with acute renal cortical necrosis superimposed on stage 4 chronic kidney disease (CMS/HCC)    Sick sinus syndrome (CMS/HCC)         ALLERGIES:  Allergies   Allergen Reactions    Milk Diarrhea and GI Upset     (Skim milk) diarrhea    Shellfish Derived Hives and Itching    Iodinated Contrast Media Nausea/vomiting            CURRENT MEDICATIONS:  Scheduled medications  aspirin, 81 mg, oral, q AM  clotrimazole, , Topical, BID  darbepoetin carolyn, 80 mcg, subcutaneous, q14 days  heparin, 5,000 Units, subcutaneous, q8h  [Held by provider] hydrALAZINE, 25 mg, oral, TID  lidocaine, 1 patch, transdermal, Daily  mycophenolate, 360 mg, oral, BID  pantoprazole, 40 mg, oral, q AM  perflutren protein A microsphere, 0.5 mL, intravenous, Once in imaging  sulfamethoxazole-trimethoprim, 1 tablet, oral, Every Mon/Wed/Fri  sulfur hexafluoride microsphr, 2 mL, intravenous, Once in imaging  tacrolimus, 2 mg, oral, q12h KESHIA  tamsulosin, 0.8 mg, oral, Daily      Continuous medications     PRN medications  PRN medications: trimethobenzamide       OBJECTIVE:    VITALS: Visit Vitals  BP 99/53   Pulse 65   Temp 37 °C (98.6 °F)   Resp 17   Ht 1.727 m (5' 8\")   Wt 57.1 kg (125 lb 14.1 oz)   SpO2 96%   BMI 19.14 kg/m²   Smoking Status Never   BSA 1.66 m²        General: No distress   Mucosa moist   AI, AC, AF     HEENT: PEERLA  CVS: S1 S2 no murmurs  RESP:  Lungs clear to auscultation   ABDO: Soft, non-tender   Neuro: A + O x 3  Skin: No rash   Extremities: No edema       LABS:  Results from last 72 hours   Lab Units 03/11/24  0508   WBC AUTO x10*3/uL 3.3*   HEMOGLOBIN g/dL 8.0*   MCV fL 98   PLATELETS AUTO x10*3/uL 89*   BUN mg/dL 66*   CREATININE mg/dL 4.74*   CALCIUM mg/dL 9.5   TACROLIMUS ng/mL 4.8            Intake/Output Summary (Last 24 hours) at 3/11/2024 1615  Last data filed at 3/11/2024 1053  Gross per 24 hour   Intake 120 ml   Output 180 ml   Net -60 ml          ASSESSMENT AND PLAN:    Reynaldo " Nolan is a 68 y.o. with a history of nonischemic cardiomyopathy with a EF around 40%, s/p dual-chamber pacemaker, insulinoma with hepatic metastasis s/p liver transplant as of 1/8/2018 and DDKT as of 3/5/2023 at [facility].  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  Patient was a induced by Thymoglobulin and maintained on Tac low-dose of MMF.  DSA have been negative as of 1/2024.  Patient currently got admitted with acute diastolic heart failure and KIA with his baseline creatinine 1.5-2.  Other history include aortic dissection, hypertension.    Allograft function/kidney:  -Acute kidney injury likely in the setting of cardiorenal physiology required dialysis for uremia and volume management.  S/p kidney biopsy on 3/7/2024 showing ATN, ultrasound showing moderate pelvicalyceal dilatation and hydroureter but Lasix renogram was negative for any obstruction.  Urine analysis looks bland urine lites going with intrinsic renal disease, UPC is a 0.06..  Did not respond initially to Lasix.  -dialysis sessions on 3/7/2024 and 3/9/2024.  -Consider adding Lasix 40 mg daily, no acute indication for dialysis today.  Will watch closely for any renal recovery.  -Monitor kidney function closely and avoid nephrotoxins.    Hemodynamics: Cardiology quested cardiac MRI to evaluate further for drop in the EF and and also planning evaluation for CRT-D.  -Blood pressures are soft agree with holding hydralazine.    Immunosuppression: As per the liver transplant team continue with the Myfortic 360 twice daily, tacrolimus 2 mg twice daily.  Current tacrolimus levels are 4.8.    Anemia: Seems to have anemia due to chronic disease please consider checking iron studies B12 and folate.  Mild thrombocytopenia noticed also.    Bone mineral disease: Calcium and phosphorus levels are optimal continue with the current management.        Thank you for  consulting .  Leland Cagle MD       Notes created by Bryant -Please excuse the Typos .

## 2024-03-11 NOTE — PROGRESS NOTES
"Subjective   NAOE. No acute complaints this AM        Objective     Physical Exam  Constitutional:       General: He is not in acute distress.     Comments: Chronically ill appearing    HENT:      Mouth/Throat:      Mouth: Mucous membranes are moist.   Eyes:      Conjunctiva/sclera: Conjunctivae normal.   Neck:      Comments: JVD up to the level of the mandible   Cardiovascular:      Rate and Rhythm: Regular rhythm.      Heart sounds: Murmur heard.   Pulmonary:      Effort: No respiratory distress.      Breath sounds: No wheezing.      Comments: Diminished in bilateral bases  Abdominal:      General: There is no distension.      Tenderness: mild tenderness of transplanted kidney. Bandage overlying the biopsy site.   Musculoskeletal:      Right lower leg: No edema.      Left lower leg: No edema.   Skin:     Capillary Refill: Capillary refill takes less than 2 seconds.   Neurological:      General: No focal deficit present.      Mental Status: He is alert and oriented to person, place, and time.     Last Recorded Vitals  Blood pressure 111/60, pulse 63, temperature 37.2 °C (99 °F), resp. rate 17, height 1.727 m (5' 8\"), weight 57.1 kg (125 lb 14.1 oz), SpO2 93 %.  Intake/Output last 3 Shifts:  I/O last 3 completed shifts:  In: 480 (8.4 mL/kg) [P.O.:480]  Out: 380 (6.7 mL/kg) [Urine:380 (0.2 mL/kg/hr)]  Weight: 57.1 kg     Relevant Results  Scheduled medications  aspirin, 81 mg, oral, q AM  clotrimazole, , Topical, BID  darbepoetin carolyn, 80 mcg, subcutaneous, q14 days  heparin, 5,000 Units, subcutaneous, q8h  [Held by provider] hydrALAZINE, 25 mg, oral, TID  lidocaine, 1 patch, transdermal, Daily  mycophenolate, 360 mg, oral, BID  pantoprazole, 40 mg, oral, q AM  perflutren protein A microsphere, 0.5 mL, intravenous, Once in imaging  sulfamethoxazole-trimethoprim, 1 tablet, oral, Every Mon/Wed/Fri  sulfur hexafluoride microsphr, 2 mL, intravenous, Once in imaging  tacrolimus, 2 mg, oral, q12h KESHIA  tamsulosin, 0.8 mg, " oral, Daily      Continuous medications     PRN medications  Results for orders placed or performed during the hospital encounter of 02/29/24 (from the past 24 hour(s))   CBC and Auto Differential   Result Value Ref Range    WBC 3.3 (L) 4.4 - 11.3 x10*3/uL    nRBC 0.0 0.0 - 0.0 /100 WBCs    RBC 2.41 (L) 4.50 - 5.90 x10*6/uL    Hemoglobin 8.0 (L) 13.5 - 17.5 g/dL    Hematocrit 23.7 (L) 41.0 - 52.0 %    MCV 98 80 - 100 fL    MCH 33.2 26.0 - 34.0 pg    MCHC 33.8 32.0 - 36.0 g/dL    RDW 14.7 (H) 11.5 - 14.5 %    Platelets 89 (L) 150 - 450 x10*3/uL    Neutrophils % 56.9 40.0 - 80.0 %    Immature Granulocytes %, Automated 0.3 0.0 - 0.9 %    Lymphocytes % 22.0 13.0 - 44.0 %    Monocytes % 18.1 2.0 - 10.0 %    Eosinophils % 2.4 0.0 - 6.0 %    Basophils % 0.3 0.0 - 2.0 %    Neutrophils Absolute 1.89 1.20 - 7.70 x10*3/uL    Immature Granulocytes Absolute, Automated 0.01 0.00 - 0.70 x10*3/uL    Lymphocytes Absolute 0.73 (L) 1.20 - 4.80 x10*3/uL    Monocytes Absolute 0.60 0.10 - 1.00 x10*3/uL    Eosinophils Absolute 0.08 0.00 - 0.70 x10*3/uL    Basophils Absolute 0.01 0.00 - 0.10 x10*3/uL   Magnesium   Result Value Ref Range    Magnesium 1.92 1.60 - 2.40 mg/dL   Tacrolimus level   Result Value Ref Range    Tacrolimus  4.8 <=15.0 ng/mL   Hepatic function panel   Result Value Ref Range    Albumin 4.5 3.4 - 5.0 g/dL    Bilirubin, Total 0.8 0.0 - 1.2 mg/dL    Bilirubin, Direct 0.2 0.0 - 0.3 mg/dL    Alkaline Phosphatase 74 33 - 136 U/L    ALT 6 (L) 10 - 52 U/L    AST 16 9 - 39 U/L    Total Protein 7.4 6.4 - 8.2 g/dL   Phosphorus   Result Value Ref Range    Phosphorus 3.2 2.5 - 4.9 mg/dL   Basic Metabolic Panel   Result Value Ref Range    Glucose 105 (H) 74 - 99 mg/dL    Sodium 139 136 - 145 mmol/L    Potassium 4.2 3.5 - 5.3 mmol/L    Chloride 93 (L) 98 - 107 mmol/L    Bicarbonate 29 21 - 32 mmol/L    Anion Gap 21 (H) 10 - 20 mmol/L    Urea Nitrogen 66 (H) 6 - 23 mg/dL    Creatinine 4.74 (H) 0.50 - 1.30 mg/dL    eGFR 13 (L) >60  "mL/min/1.73m*2    Calcium 9.5 8.6 - 10.6 mg/dL                   Assessment/Plan     Reynaldo Francisco is a 68 y.o. 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  Etiology of KIA appears to be most likely cardiorenal given elev CVP and PCWP, however Cr worsened with aggressive diuresis and additionally, found to have mild-mod MR, mod-severe TR and mod AI on TTE so valvular disease a more likely etiology of elevated filling pressures. However, Cr now rising again, making mixed intrinsic and cardiorenal etiologies feasible. Per transplant nephro team, lasix renogram obtained and negative for functional obstruction. NPO for transplanted kidney biopsy today 3/6. BKV found to be positive.     3/11 updates  -discussing with EP for pacemaker upgrade given pt is otherwise ready for discharge   -pending possible HD session      #KIA on CKD (baseline Scr 2.1-2.2), non-oliguric   #ESRD s/p kidney transplantation (on tacromlimus and mycophenolate)  #NAGMA  - Initially suspect cardiorenal syndrome, however Cr worsening with aggressive diuresis, Dced drip on 3/3  - NPO for transplanted kidney biopsy 3/7  - BKV positive - transplant aware  - EBV and CMV negatvie, pending DSA   - Nuclear med lasix renogram negative for obstruction  - consider additional diuresis with IV pushes  - Daily AM Tacrolimus lvls   - Continue Tacro 2.0 q12 and Mycophenolate 360 mg BID   - Continue Sodium bicarb 650 BID  - Continue Bactrim ppx   - Transplant nephrology following  - prelim 3/7/24 bx with \"minimal histologic abnormality suggesting functional etiology for rise in creatinine Early tubular atrophy may " "suggest chronic ischemic injury  Negative for acute T cell mediated rejection  No evidence of acute antibody-mediated rejection\"  -known free fluid in abdomen, cannot rely on bladder scan, has been straight-cathed multiple times w/o urine ouput, low c/f obstruction, will rely on urine output    #ADHF  #Stage C systolic heart failure/NICM/HFrEF (30-35% TTE 03/2024)  #SSS s/p dual-chamber PM  #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  - CT PET at OSH did not show ischemia  - EP following for CRTD upgrade iso pacemaker-induced cardiomyopathy closer to discharge   - Daily RFP and Mg for K>4, Mg >2  - Will continue to monitor I&Os   - GDMT optimization as renal function tolerates inpatient vs outpatient      #Type A aortic dissection s/p repair 2020  #chronic residual type B aortic dissection w/ R carotid artery dissection  #HTN  - BP well controlled in 100-110s/50-80s  - No outpatient anti-hypertensive  - Continue on Hydralazine 25mg tid (started during this admission)      #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   -iron studies: 75, TIBC 252, ferritin 764  - Started Aranesep 80 mcg q14 days, first dose 3/6 at 0900      Fluids: no  Access: IV  Antibiotics: Bactrim ppx   Electrolytes: PRN  Nutrition: cardiac   PPI: Protonix 40  DVT: Hep 5000 TID  CODE status: FULL CODE  NOK: Abdiel (wife) [phone]      Glen Gonzales MD  "

## 2024-03-11 NOTE — PROGRESS NOTES
Subjective Data:  Reports doing well overall    Overnight Events:    Continues to remain V-paced     Objective Data:  Last Recorded Vitals:  Vitals:    03/11/24 0419 03/11/24 0840 03/11/24 1209 03/11/24 1536   BP:  109/58 111/60 99/53   Patient Position:       Pulse:  61 63 65   Resp:       Temp:    37 °C (98.6 °F)   TempSrc:       SpO2:  96% 93% 96%   Weight: 57.1 kg (125 lb 14.1 oz)      Height:           Last Labs:  CBC - 3/11/2024:  5:08 AM  3.3 8.0 89    23.7      CMP - 3/11/2024:  5:08 AM  9.5 7.4 16 --- 0.8   3.2 4.5 6 74      PTT - 3/7/2024:  8:09 AM  1.2   13.3 31     TROPHS   Date/Time Value Ref Range Status   03/01/2024 12:03  0 - 53 ng/L Final     Comment:     Previous result verified on 2/29/2024 1656 on specimen/case [id number] called with component TRPHS for procedure Troponin I, High Sensitivity, Initial with value 664 ng/L.   02/29/2024 05:08  0 - 20 ng/L Final     Comment:     Previous result verified on 2/29/2024 1656 on specimen/case [id number] called with component TRPHS for procedure Troponin I, High Sensitivity, Initial with value 664 ng/L.   02/29/2024 04:18  0 - 20 ng/L Final     BNP   Date/Time Value Ref Range Status   03/01/2024 12:03  0 - 99 pg/mL Final   02/29/2024 04:18  0 - 99 pg/mL Final     HGBA1C   Date/Time Value Ref Range Status   03/08/2024 05:00 AM 5.9 see below % Final   05/01/2023 06:22 AM 6.1 <5.7 % Final   03/07/2023 03:24 AM 5.2 <5.7 % Final      Last I/O:  I/O last 3 completed shifts:  In: 600 (10.5 mL/kg) [P.O.:600]  Out: 505 (8.8 mL/kg) [Urine:505 (0.2 mL/kg/hr)]  Weight: 57.1 kg     Past Cardiology Tests (Last 3 Years):  EKG:  Electrocardiogram, 12-lead PRN ACS symptoms 02/29/2024      ECG 12 lead 02/29/2024      ECG 12 lead 02/29/2024      ECG 12 lead 12/21/2023      ECG 12 lead tomorrow at 8 AM 10/28/2023      ECG 12 lead STAT 10/28/2023      Electrocardiogram, 12-lead PRN ACS symptoms 10/28/2023      Electrocardiogram, 12-lead  PRN ACS symptoms 10/28/2023      Electrocardiogram, 12-lead PRN ACS symptoms 10/28/2023      ECG 12 Lead     Echo:  Transthoracic Echo (TTE) Complete 03/01/2024      Transthoracic Echo (TTE) Limited 03/01/2024      Onco-Echo Complete (Strain & 3D) 10/06/2023    Ejection Fractions:  EF   Date/Time Value Ref Range Status   03/01/2024 11:00 AM 33 %      Cath:  Cardiac Catheterization Procedure 03/01/2024    Stress Test:  No results found for this or any previous visit from the past 1095 days.    Cardiac Imaging:  No results found for this or any previous visit from the past 1095 days.      Inpatient Medications:  Scheduled medications   Medication Dose Route Frequency    aspirin  81 mg oral q AM    clotrimazole   Topical BID    darbepoetin carolyn  80 mcg subcutaneous q14 days    heparin  5,000 Units subcutaneous q8h    [Held by provider] hydrALAZINE  25 mg oral TID    lidocaine  1 patch transdermal Daily    mycophenolate  360 mg oral BID    pantoprazole  40 mg oral q AM    perflutren protein A microsphere  0.5 mL intravenous Once in imaging    sulfamethoxazole-trimethoprim  1 tablet oral Every Mon/Wed/Fri    sulfur hexafluoride microsphr  2 mL intravenous Once in imaging    tacrolimus  2 mg oral q12h KESHIA    tamsulosin  0.8 mg oral Daily     PRN medications   Medication    trimethobenzamide     Continuous Medications   Medication Dose Last Rate       Physical Exam:  Gen: awake and alert  HEENT: normocephalic. Trachea midline  CV: RRR. Diastolic murmur appreciated, no gallops, rubs  Pulm: clear to auscultation bilaterally. No coarse lung sounds  Abd: soft, non-distended. Normal active bowel sounds  Ext: warm and well-perfused  Psych: appropriate affect  Neuro: CN II-XII grossly intact      Assessment/Plan   Mr. Francisco is a 68 y.o. gentleman with a complex PMHx notable for Stage C systolic heart failure/NICM/HFrEF (30% TTE 02/2024), SSS s/p dual-chamber PM, Insulinoma w/ hepatic metastasis requiring liver transplantation  (2018) c/b renal failure s/p kidney transplantation (on tacrolimus and mycophenolate; baseline Scr 2.1-2.2), type A aortic dissection, HTN, anemia (baseline hemoglobin 10) presenting for management of ADHF and KIA. EP c/s for BIV upgrade eval.     Pt has had multiple admissions for ADHF, EF has fluctuated with time (was 45-50% in 3/2022 then 60% in 9/2022 then 50-55% in 10/2023 and now 30-35%). QRS duration 180ms when RV pacing with a 97%  burden.     Patient's underlying rhythm appears to be CHB at this time. Note that in 2023 when he underwent the PPM placement, he was found to have SSS. 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 After discontinuing the Coreg, patient's david function seems to have improved for some time. Note that during implantation of patient's PPM, there was reportedly no signal from patient's right atrium thus leading to placement of a single-lead PPM to patient's RV. The main question that needs to be addressed concerns the etiology of patient's CHB. He certainly has risk factors including prior malignancy, prior chemo, age, renal failure. Note that per his TTE from 3/2022, GLS was -14 and suggested an Amyloid-like pattern. Thus, further workup to rule-out sarcoidosis v amyloidosis as potential culprits to patient's CHB would be prudent.     Recommendations  -Appreciate management per primary team  -Please obtain cMRI to assess for Amyloidosis v Sarcoidosis as potential culprit to patient's underlying CHB  -Based on findings, will consider BiV upgrade v treating underlying process  -Empirically make NPO at midnight. Hold subQ heparin tonight  -Optimal lytes: K>4, Mag>2     Thank you for the consult. EP will continue to follow. Discussed with Dr. Adams     General Cardiology Consult Pager: [phone] (weekday 7AM-6PM and weekend 7AM-2PM) and other: [phone]  EP Consult Pager: [phone] (weekday 7AM-6PM and weekend 7AM-2PM) and other: [phone]  EP  Device Nurse Pager: [phone] (weekday 7AM-4PM)  Advanced Heart Failure Consult Pager: [phone] anytime  CICU Fellow Pager: [phone] anytime  Endovascular/Limb Salvage Team Pager: [phone] for day coverage (8am-5pm)  Interventional Cardiology Fellow Pager: [phone] for night and weekend coverage  Structural Heart Team Pager: [phone] anytime  Night coverage: [id number]      Peripheral IV 03/07/24 20 G Right;Anterior Forearm (Active)   Site Assessment Clean;Dry;Intact 03/11/24 0900   Dressing Status Clean;Dry 03/11/24 0900   Number of days: 4       Hemodialysis Arteriovenous Fistula 01/01/18 Left Upper arm (Active)   Site Assessment Clean;Dry;Intact 03/11/24 0900   Dressing Status Clean;Dry 03/11/24 0900   Number of days: 2261       Code Status:  Full Code    I spent 30 minutes in the professional and overall care of this patient.        Vlad Weiss MD

## 2024-03-11 NOTE — CARE PLAN
The patient's goals for the shift include      The clinical goals for the shift include patient will be nausea free this shift- patient reported mild nausea this shift. Patient wanted to rest for management.     Problem: Heart Failure  Goal: Improved gas exchange this shift  Outcome: Progressing  Goal: Improved urinary output this shift  Outcome: Progressing  Goal: Reduction in peripheral edema within 24 hours  Outcome: Progressing     Problem: Pain  Goal: My pain/discomfort is manageable  Outcome: Progressing     Problem: Safety  Goal: Patient will be injury free during hospitalization  Outcome: Progressing     Problem: Daily Care  Goal: Daily care needs are met  Outcome: Progressing     Problem: Psychosocial Needs  Goal: Demonstrates ability to cope with hospitalization/illness  Outcome: Progressing     Problem: Skin  Goal: Decreased wound size/increased tissue granulation at next dressing change  Outcome: Progressing  Flowsheets (Taken 3/10/2024 0755 by Aleks Shipley RN)  Decreased wound size/increased tissue granulation at next dressing change: Promote sleep for wound healing     Problem: Pain - Adult  Goal: Verbalizes/displays adequate comfort level or baseline comfort level  Outcome: Progressing

## 2024-03-12 NOTE — PROGRESS NOTES
"Subjective   NAOE. No acute complaints this AM        Objective     Physical Exam  Constitutional:       General: He is not in acute distress.     Comments: Chronically ill appearing    HENT:      Mouth/Throat:      Mouth: Mucous membranes are moist.   Eyes:      Conjunctiva/sclera: Conjunctivae normal.   Neck:      Comments: JVD up to the level of the mandible   Cardiovascular:      Rate and Rhythm: Regular rhythm.      Heart sounds: Murmur heard.   Pulmonary:      Effort: No respiratory distress.      Breath sounds: No wheezing.      Comments: Diminished in bilateral bases  Abdominal:      General: There is no distension.      Tenderness: mild tenderness of transplanted kidney. Bandage overlying the biopsy site.   Musculoskeletal:      Right lower leg: No edema.      Left lower leg: No edema.   Skin:     Capillary Refill: Capillary refill takes less than 2 seconds.   Neurological:      General: No focal deficit present.      Mental Status: He is alert and oriented to person, place, and time.     Last Recorded Vitals  Blood pressure 112/63, pulse 72, temperature 37.1 °C (98.8 °F), temperature source Temporal, resp. rate 16, height 1.727 m (5' 8\"), weight 57.1 kg (125 lb 14.1 oz), SpO2 99 %.  Intake/Output last 3 Shifts:  I/O last 3 completed shifts:  In: 120 (2.1 mL/kg) [P.O.:120]  Out: 405 (7.1 mL/kg) [Urine:405 (0.2 mL/kg/hr)]  Weight: 57.1 kg     Relevant Results  Scheduled medications  aspirin, 81 mg, oral, q AM  clotrimazole, , Topical, BID  darbepoetin carolyn, 80 mcg, subcutaneous, q14 days  [Held by provider] furosemide, 40 mg, oral, Daily  [Held by provider] heparin, 5,000 Units, subcutaneous, q8h  [Held by provider] hydrALAZINE, 25 mg, oral, TID  lidocaine, 1 patch, transdermal, Daily  magnesium sulfate, 4 g, intravenous, Once  mycophenolate, 360 mg, oral, BID  pantoprazole, 40 mg, oral, q AM  perflutren protein A microsphere, 0.5 mL, intravenous, Once in imaging  sulfamethoxazole-trimethoprim, 1 tablet, " oral, Every Mon/Wed/Fri  sulfur hexafluoride microsphr, 2 mL, intravenous, Once in imaging  tacrolimus, 2 mg, oral, q12h KESHIA  tamsulosin, 0.4 mg, oral, Daily      Continuous medications     PRN medications  Results for orders placed or performed during the hospital encounter of 02/29/24 (from the past 24 hour(s))   CBC and Auto Differential   Result Value Ref Range    WBC 3.2 (L) 4.4 - 11.3 x10*3/uL    nRBC 0.0 0.0 - 0.0 /100 WBCs    RBC 2.43 (L) 4.50 - 5.90 x10*6/uL    Hemoglobin 8.1 (L) 13.5 - 17.5 g/dL    Hematocrit 24.0 (L) 41.0 - 52.0 %    MCV 99 80 - 100 fL    MCH 33.3 26.0 - 34.0 pg    MCHC 33.8 32.0 - 36.0 g/dL    RDW 14.6 (H) 11.5 - 14.5 %    Platelets 97 (L) 150 - 450 x10*3/uL    Neutrophils % 56.8 40.0 - 80.0 %    Immature Granulocytes %, Automated 0.3 0.0 - 0.9 %    Lymphocytes % 21.8 13.0 - 44.0 %    Monocytes % 18.0 2.0 - 10.0 %    Eosinophils % 2.2 0.0 - 6.0 %    Basophils % 0.9 0.0 - 2.0 %    Neutrophils Absolute 1.80 1.20 - 7.70 x10*3/uL    Immature Granulocytes Absolute, Automated 0.01 0.00 - 0.70 x10*3/uL    Lymphocytes Absolute 0.69 (L) 1.20 - 4.80 x10*3/uL    Monocytes Absolute 0.57 0.10 - 1.00 x10*3/uL    Eosinophils Absolute 0.07 0.00 - 0.70 x10*3/uL    Basophils Absolute 0.03 0.00 - 0.10 x10*3/uL   Magnesium   Result Value Ref Range    Magnesium 1.24 (L) 1.60 - 2.40 mg/dL   Tacrolimus level   Result Value Ref Range    Tacrolimus  5.6 <=15.0 ng/mL   Hepatic function panel   Result Value Ref Range    Albumin 2.7 (L) 3.4 - 5.0 g/dL    Bilirubin, Total 0.4 0.0 - 1.2 mg/dL    Bilirubin, Direct 0.1 0.0 - 0.3 mg/dL    Alkaline Phosphatase 46 33 - 136 U/L    ALT 4 (L) 10 - 52 U/L    AST 10 9 - 39 U/L    Total Protein 4.5 (L) 6.4 - 8.2 g/dL   Phosphorus   Result Value Ref Range    Phosphorus 2.5 2.5 - 4.9 mg/dL   Basic Metabolic Panel   Result Value Ref Range    Glucose 72 (L) 74 - 99 mg/dL    Sodium 141 136 - 145 mmol/L    Potassium 2.8 (LL) 3.5 - 5.3 mmol/L    Chloride 111 (H) 98 - 107 mmol/L     Bicarbonate 20 (L) 21 - 32 mmol/L    Anion Gap 13 10 - 20 mmol/L    Urea Nitrogen 58 (H) 6 - 23 mg/dL    Creatinine 3.23 (H) 0.50 - 1.30 mg/dL    eGFR 20 (L) >60 mL/min/1.73m*2    Calcium 6.1 (L) 8.6 - 10.6 mg/dL   Renal function panel   Result Value Ref Range    Glucose 95 74 - 99 mg/dL    Sodium 135 (L) 136 - 145 mmol/L    Potassium 4.4 3.5 - 5.3 mmol/L    Chloride 95 (L) 98 - 107 mmol/L    Bicarbonate 27 21 - 32 mmol/L    Anion Gap 17 10 - 20 mmol/L    Urea Nitrogen 84 (H) 6 - 23 mg/dL    Creatinine 4.74 (H) 0.50 - 1.30 mg/dL    eGFR 13 (L) >60 mL/min/1.73m*2    Calcium 9.5 8.6 - 10.6 mg/dL    Phosphorus 4.2 2.5 - 4.9 mg/dL    Albumin 4.2 3.4 - 5.0 g/dL   Magnesium   Result Value Ref Range    Magnesium 1.97 1.60 - 2.40 mg/dL   Cardiac device check - MRI   Result Value Ref Range    BSA 1.66 m2                   Assessment/Plan     Reynaldo Francisco is a 68 y.o. 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  Etiology of KIA appears to be most likely cardiorenal given elev CVP and PCWP, however Cr worsened with aggressive diuresis and additionally, found to have mild-mod MR, mod-severe TR and mod AI on TTE so valvular disease a more likely etiology of elevated filling pressures. However, Cr now rising again, making mixed intrinsic and cardiorenal etiologies feasible. Per transplant nephro team, lasix renogram obtained and negative for functional obstruction. NPO for transplanted kidney biopsy today 3/6. BKV found to be positive.     3/12 updates  -per EP recs, cMRI prior to deciding on pacemaker upgrade   -continuing to follow transplant nephro recs  - stable kidney  "function (likely ATN)      #KIA on CKD (baseline Scr 2.1-2.2), non-oliguric   #ESRD s/p kidney transplantation (on tacromlimus and mycophenolate)  #NAGMA  - Initially suspect cardiorenal syndrome, however Cr worsening with aggressive diuresis, Dced drip on 3/3  - NPO for transplanted kidney biopsy 3/7  - BKV positive - transplant aware  - EBV and CMV negatvie, pending DSA   - Nuclear med lasix renogram negative for obstruction  - consider additional diuresis with IV pushes  - Daily AM Tacrolimus lvls   - Continue Tacro 2.0 q12 and Mycophenolate 360 mg BID   - Continue Sodium bicarb 650 BID  - Continue Bactrim ppx   - Transplant nephrology following  - prelim 3/7/24 bx with \"minimal histologic abnormality suggesting functional etiology for rise in creatinine Early tubular atrophy may suggest chronic ischemic injury  Negative for acute T cell mediated rejection  No evidence of acute antibody-mediated rejection\"  -known free fluid in abdomen, cannot rely on bladder scan, has been straight-cathed multiple times w/o urine ouput, low c/f obstruction, will rely on urine output    #ADHF  #Stage C systolic heart failure/NICM/HFrEF (30-35% TTE 03/2024)  #SSS s/p dual-chamber PM  #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  - CT PET at OSH did not show ischemia  - EP following for CRTD upgrade iso pacemaker-induced cardiomyopathy closer to discharge   - Daily RFP and Mg for K>4, Mg >2  - Will continue to monitor I&Os   - GDMT optimization as renal function tolerates inpatient vs outpatient      #Type A aortic dissection s/p repair 2020  #chronic residual type B aortic dissection w/ R carotid artery dissection  #HTN  - BP well controlled in 100-110s/50-80s  - No outpatient anti-hypertensive  - Continue on Hydralazine 25mg tid (started during this admission)      #Insulinoma w/ hepatic metastasis requiring liver transplantation (2018)   -C/w anti-rejection medication  -daily tacrolimus level  -hepatology consulted for " immunosuppression  - Per recs consider contacting transplant team at [facility]      #anemia, multifactorial (baseline Hgb 10)  - at baseline.   -iron studies: 75, TIBC 252, ferritin 764  - Started Aranesep 80 mcg q14 days, first dose 3/6 at 0900      Fluids: no  Access: IV  Antibiotics: Bactrim ppx   Electrolytes: PRN  Nutrition: cardiac   PPI: Protonix 40  DVT: Hep 5000 TID  CODE status: FULL CODE  NOK: Abdiel (wife) [phone]      Glen Gonzales MD

## 2024-03-12 NOTE — PROGRESS NOTES
Physical Therapy    Physical Therapy Treatment    Patient Name: Reynaldo Francisco  MRN: [id number]  Today's Date: 3/12/2024  Time Calculation  Start Time: 0857  Stop Time: 0920  Time Calculation (min): 23 min       Assessment/Plan   PT Assessment  End of Session Communication: Bedside nurse  Assessment Comment: pt continues to progress ambulation distances and functional assist levels. pt remains appropriate for LOW intensity PT upon D/C to reach maximal potential  End of Session Patient Position: Up in chair, Alarm off, caregiver present     PT Plan  Treatment/Interventions: Bed mobility, Transfer training, Stair training, Gait training, Balance training, Endurance training, Strengthening, Therapeutic exercise, Therapeutic activity, Range of motion, Home exercise program  PT Plan: Skilled PT  PT Frequency: 3 times per week  PT Discharge Recommendations: Low intensity level of continued care  PT Recommended Transfer Status: Assist x1, Contact guard  PT - OK to Discharge: Yes      General Visit Information:   PT  Visit  PT Received On: 03/12/24  Response to Previous Treatment: Patient with no complaints from previous session.  General  Reason for Referral: 69 y/o male admitted for  management of ADHF and KIA. 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 Pt very pleasant and agreeable to therapy.    Subjective   Precautions:  Precautions  Medical Precautions: Cardiac  precautions     Objective   Pain:  Pain Assessment  Pain Assessment: 0-10  Pain Score: 0 - No pain  Cognition:  Cognition  Overall Cognitive Status: Within Functional Limits  Orientation Level: Oriented X4  Postural Control:  Static Sitting Balance  Static Sitting-Balance Support: Feet supported  Static Sitting-Level of Assistance: Contact guard  Static Standing Balance  Static Standing-Balance Support: No upper extremity supported  Static Standing-Level of Assistance: Contact guard    Activity Tolerance:  Activity Tolerance  Endurance: Tolerates 30 min exercise with multiple rests  Treatments:  Therapeutic Exercise  Therapeutic Exercise Performed: Yes  Therapeutic Exercise Activity 1: Seated AP, LAQ, Hip Flexion: AROM x 10 reps each     Bed Mobility  Bed Mobility: Yes  Bed Mobility 1  Bed Mobility 1: Supine to sitting  Level of Assistance 1: Close supervision  Bed Mobility Comments 1: HOB elevated.    Ambulation/Gait Training  Ambulation/Gait Training Performed: Yes  Ambulation/Gait Training 1  Surface 1: Level tile  Device 1: Rolling walker  Assistance 1: Close supervision  Quality of Gait 1: Decreased step length  Comments/Distance (ft) 1: 250'  Transfers  Transfer: Yes  Transfer 1  Transfer From 1: Sit to  Transfer to 1: Stand  Technique 1: Sit to stand  Transfer Level of Assistance 1: Close supervision  Trials/Comments 1: verbal cues for safe hand placement  Transfers 2  Transfer From 2: Stand to  Transfer to 2: Chair with arms  Technique 2: Stand to sit  Transfer Level of Assistance 2: Close supervision    Outcome Measures:  [facility] Basic Mobility  Turning from your back to your side while in a flat bed without using bedrails: None  Moving from lying on your back to sitting on the side of a flat bed without using bedrails: None  Moving to and from bed to chair (including a wheelchair): None  Standing up from a chair using your arms (e.g. wheelchair or bedside chair): None  To walk in hospital room: A  little  Climbing 3-5 steps with railing: MILENA white  Basic Mobility - Total Score: 22    Education Documentation  Body Mechanics, taught by Jose Dean PTA at 3/12/2024 10:30 AM.  Learner: Patient  Readiness: Acceptance  Method: Explanation  Response: Verbalizes Understanding    Precautions, taught by Jose Dean PTA at 3/12/2024 10:30 AM.  Learner: Patient  Readiness: Acceptance  Method: Explanation  Response: Verbalizes Understanding    Mobility Training, taught by Jose Dean PTA at 3/12/2024 10:30 AM.  Learner: Patient  Readiness: Acceptance  Method: Explanation  Response: Verbalizes Understanding    Education Comments  No comments found.        Encounter Problems       Encounter Problems (Active)       PT Problem       Patient will ambulate 300 ft with LRAD and modified independence.   (Progressing)       Start:  03/01/24    Expected End:  03/12/24            Patient will transfer bed to/from chair with LRAD and modified independence.  (Progressing)       Start:  03/01/24    Expected End:  03/12/24            Patient will score >24 on the Tinetti test indicating low falls risk for homegoing.   (Progressing)       Start:  03/01/24    Expected End:  03/12/24            Patient will negotiate 3 OTIS with LRAD and modified independence.   (Progressing)       Start:  03/01/24    Expected End:  03/12/24               Pain - Adult

## 2024-03-12 NOTE — CARE PLAN
The patient's goals for the shift include      The clinical goals for the shift include patient will be free from nausea this shift- patient goal met this shift     Problem: Heart Failure  Goal: Improved gas exchange this shift  Outcome: Progressing     Problem: Heart Failure  Goal: Reduction in peripheral edema within 24 hours  Outcome: Progressing     Problem: Pain  Goal: My pain/discomfort is manageable  Outcome: Progressing     Problem: Safety  Goal: Patient will be injury free during hospitalization  Outcome: Progressing     Problem: Skin  Goal: Decreased wound size/increased tissue granulation at next dressing change  Outcome: Progressing  Flowsheets (Taken 3/12/2024 0212)  Decreased wound size/increased tissue granulation at next dressing change: Promote sleep for wound healing     Problem: Fall/Injury  Goal: Not fall by end of shift  Outcome: Progressing

## 2024-03-12 NOTE — PROGRESS NOTES
Subjective Data:  Reports doing well overall    Overnight Events:    No acute overnight issues     Objective Data:  Last Recorded Vitals:  Vitals:    03/12/24 0458 03/12/24 0759 03/12/24 0800 03/12/24 1100   BP: 105/56 106/57  112/63   BP Location: Right arm Right arm  Right arm   Patient Position: Lying Lying  Sitting   Pulse: 63 62  72   Resp: 16 16  16   Temp: 36.8 °C (98.2 °F) 37 °C (98.6 °F)  37.1 °C (98.8 °F)   TempSrc: Temporal Temporal  Temporal   SpO2: 95% (!) 89% 93% 99%   Weight:       Height:           Last Labs:  CBC - 3/12/2024:  4:57 AM  3.2 8.1 97    24.0      CMP - 3/12/2024: 10:49 AM  9.5 4.5 10 --- 0.4   4.2 4.2 4 46      PTT - 3/7/2024:  8:09 AM  1.2   13.3 31     TROPHS   Date/Time Value Ref Range Status   03/01/2024 12:03  0 - 53 ng/L Final     Comment:     Previous result verified on 2/29/2024 1656 on specimen/case [id number] called with component TRPHS for procedure Troponin I, High Sensitivity, Initial with value 664 ng/L.   02/29/2024 05:08  0 - 20 ng/L Final     Comment:     Previous result verified on 2/29/2024 1656 on specimen/case [id number] called with component TRPHS for procedure Troponin I, High Sensitivity, Initial with value 664 ng/L.   02/29/2024 04:18  0 - 20 ng/L Final     BNP   Date/Time Value Ref Range Status   03/01/2024 12:03  0 - 99 pg/mL Final   02/29/2024 04:18  0 - 99 pg/mL Final     HGBA1C   Date/Time Value Ref Range Status   03/08/2024 05:00 AM 5.9 see below % Final   05/01/2023 06:22 AM 6.1 <5.7 % Final   03/07/2023 03:24 AM 5.2 <5.7 % Final      Last I/O:  I/O last 3 completed shifts:  In: 120 (2.1 mL/kg) [P.O.:120]  Out: 405 (7.1 mL/kg) [Urine:405 (0.2 mL/kg/hr)]  Weight: 57.1 kg     Past Cardiology Tests (Last 3 Years):  EKG:  Electrocardiogram, 12-lead PRN ACS symptoms 02/29/2024      ECG 12 lead 02/29/2024      ECG 12 lead 02/29/2024      ECG 12 lead 12/21/2023      ECG 12 lead tomorrow at 8 AM 10/28/2023      ECG 12 lead  STAT 10/28/2023      Electrocardiogram, 12-lead PRN ACS symptoms 10/28/2023      Electrocardiogram, 12-lead PRN ACS symptoms 10/28/2023      Electrocardiogram, 12-lead PRN ACS symptoms 10/28/2023      ECG 12 Lead     Echo:  Transthoracic Echo (TTE) Complete 03/01/2024      Transthoracic Echo (TTE) Limited 03/01/2024      Onco-Echo Complete (Strain & 3D) 10/06/2023    Ejection Fractions:  EF   Date/Time Value Ref Range Status   03/01/2024 11:00 AM 33 %      Cath:  Cardiac Catheterization Procedure 03/01/2024    Stress Test:  No results found for this or any previous visit from the past 1095 days.    Cardiac Imaging:  No results found for this or any previous visit from the past 1095 days.      Inpatient Medications:  Scheduled medications   Medication Dose Route Frequency    aspirin  81 mg oral q AM    clotrimazole   Topical BID    darbepoetin carolyn  80 mcg subcutaneous q14 days    [Held by provider] furosemide  40 mg oral Daily    [Held by provider] heparin  5,000 Units subcutaneous q8h    [Held by provider] hydrALAZINE  25 mg oral TID    lidocaine  1 patch transdermal Daily    magnesium sulfate  4 g intravenous Once    mycophenolate  360 mg oral BID    pantoprazole  40 mg oral q AM    perflutren protein A microsphere  0.5 mL intravenous Once in imaging    sulfamethoxazole-trimethoprim  1 tablet oral Every Mon/Wed/Fri    sulfur hexafluoride microsphr  2 mL intravenous Once in imaging    tacrolimus  2 mg oral q12h KESHIA    tamsulosin  0.4 mg oral Daily     PRN medications   Medication    trimethobenzamide     Continuous Medications   Medication Dose Last Rate     Physical Exam:  Gen: awake and alert  HEENT: normocephalic. Trachea midline  CV: RRR. Diastolic murmur appreciated, no gallops, rubs  Pulm: clear to auscultation bilaterally. No coarse lung sounds  Abd: soft, non-distended. Normal active bowel sounds  Ext: warm and well-perfused  Psych: appropriate affect  Neuro: CN II-XII grossly intact      Assessment/Plan    Mr. Francisco is a 68 y.o. 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 EP c/s for BIV upgrade eval.     Pt has had multiple admissions for ADHF, EF has fluctuated with time (was 45-50% in 3/2022 then 60% in 9/2022 then 50-55% in 10/2023 and now 30-35%). QRS duration 180ms when RV pacing with a 97%  burden.      Patient's underlying rhythm appears to be CHB at this time. Note that in 2023 when he underwent the PPM placement, he was found to have SSS. 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 After discontinuing the Coreg, patient's david function seems to have improved for some time. Note that during implantation of patient's PPM, there was reportedly no signal from patient's right atrium thus leading to placement of a single-lead PPM to patient's RV. The main question that needs to be addressed concerns the etiology of patient's CHB. He certainly has risk factors including prior malignancy, prior chemo, age, renal failure. Note that per his TTE from 3/2022, GLS was -14 and suggested an Amyloid-like pattern.     cMRI showsLVEF 34% with a scar burden of 14% and areas of delayed enhancement with elevated STIR signals suggesting active edema with Sarcoidosis on the differential. Will proceed with further testing with PET sarcoid to assess for potential cardiac sarcoid.      Recommendations  -Please order PET sarcoid: PET w/ CMO approval (order name) and call to schedule. Patient must be NPO for at least 16 hrs and no D5 products to be administered. Would suggest making NPO starting at 1800 tonight  -Will  consider BiV upgrade on Thursday as appropriate   -Appreciate management per primary team  -Empirically make NPO at midnight. May resume subQ heparin and hold it tomorrow night   -Optimal lytes: K>4, Mag>2    PET Sarcoid instructions:  Needs to be coordinated with nuclear medicine.   NPO for at least 16 hrs with no IV infusions with D5.     Thank you for the consult. EP will continue to follow. Discussed with Dr. Adams     General Cardiology Consult Pager: [phone] (weekday 7AM-6PM and weekend 7AM-2PM) and other: [phone]  EP Consult Pager: [phone] (weekday 7AM-6PM and weekend 7AM-2PM) and other: [phone]  EP Device Nurse Pager: [phone] (weekday 7AM-4PM)  Advanced Heart Failure Consult Pager: [phone] anytime  CICU Fellow Pager: [phone] anytime  Endovascular/Limb Salvage Team Pager: [phone] for day coverage (8am-5pm)  Interventional Cardiology Fellow Pager: [phone] for night and weekend coverage  Structural Heart Team Pager: [phone] anytime  Night coverage: [id number]      Peripheral IV 03/07/24 20 G Right;Anterior Forearm (Active)   Site Assessment Clean 03/12/24 0900   Dressing Status Clean;Dry 03/12/24 0900   Number of days: 5       Hemodialysis Arteriovenous Fistula 01/01/18 Left Upper arm (Active)   Site Assessment Clean;Dry 03/12/24 0829   Dressing Status Clean;Dry 03/12/24 0829   Number of days: 2262       Code Status:  Full Code    I spent 30 minutes in the professional and overall care of this patient.        Vlad Weiss MD

## 2024-03-13 PROBLEM — I42.9: Status: ACTIVE | Noted: 2024-01-01

## 2024-03-13 NOTE — CARE PLAN
The patient's goals for the shift include      The clinical goals for the shift include Patient will be from from falls this shift.      Problem: Heart Failure  Goal: Improved gas exchange this shift  Outcome: Progressing  Goal: Improved urinary output this shift  Outcome: Progressing  Goal: Reduction in peripheral edema within 24 hours  Outcome: Progressing  Goal: Report improvement of dyspnea/breathlessness this shift  Outcome: Progressing  Goal: Weight from fluid excess reduced over 2-3 days, then stabilize  Outcome: Progressing  Goal: Increase self care and/or family involvement in 24 hours  Outcome: Progressing

## 2024-03-13 NOTE — PROGRESS NOTES
"Subjective   NAOE. No acute complaints this AM        Objective     Physical Exam  Constitutional:       General: He is not in acute distress.     Comments: Chronically ill appearing    HENT:      Mouth/Throat:      Mouth: Mucous membranes are moist.   Eyes:      Conjunctiva/sclera: Conjunctivae normal.   Neck:      Comments: JVD up to the level of the mandible   Cardiovascular:      Rate and Rhythm: Regular rhythm.      Heart sounds: Murmur heard.   Pulmonary:      Effort: No respiratory distress.      Breath sounds: No wheezing.      Comments: Diminished in bilateral bases  Abdominal:      General: There is no distension.      Tenderness: mild tenderness of transplanted kidney. Bandage overlying the biopsy site.   Musculoskeletal:      Right lower leg: No edema.      Left lower leg: No edema.   Skin:     Capillary Refill: Capillary refill takes less than 2 seconds.   Neurological:      General: No focal deficit present.      Mental Status: He is alert and oriented to person, place, and time.     Last Recorded Vitals  Blood pressure 108/63, pulse 62, temperature 36.9 °C (98.4 °F), resp. rate 18, height 1.727 m (5' 8\"), weight 57.1 kg (125 lb 14.1 oz), SpO2 96 %.  Intake/Output last 3 Shifts:  I/O last 3 completed shifts:  In: 220 (3.9 mL/kg) [P.O.:220]  Out: 795 (13.9 mL/kg) [Urine:795 (0.4 mL/kg/hr)]  Weight: 57.1 kg     Relevant Results  Scheduled medications  aspirin, 81 mg, oral, q AM  clotrimazole, , Topical, BID  darbepoetin carolyn, 80 mcg, subcutaneous, q14 days  [Held by provider] furosemide, 40 mg, oral, Daily  [Held by provider] heparin, 5,000 Units, subcutaneous, q8h  [Held by provider] hydrALAZINE, 25 mg, oral, TID  lidocaine, 1 patch, transdermal, Daily  magnesium sulfate, 4 g, intravenous, Once  mycophenolate, 360 mg, oral, BID  pantoprazole, 40 mg, oral, q AM  perflutren protein A microsphere, 0.5 mL, intravenous, Once in imaging  sulfamethoxazole-trimethoprim, 1 tablet, oral, Every Mon/Wed/Fri  sulfur " hexafluoride microsphr, 2 mL, intravenous, Once in imaging  tacrolimus, 2 mg, oral, q12h KESHIA  tamsulosin, 0.4 mg, oral, Daily      Continuous medications     PRN medications  Results for orders placed or performed during the hospital encounter of 02/29/24 (from the past 24 hour(s))   Renal function panel   Result Value Ref Range    Glucose 95 74 - 99 mg/dL    Sodium 135 (L) 136 - 145 mmol/L    Potassium 4.4 3.5 - 5.3 mmol/L    Chloride 95 (L) 98 - 107 mmol/L    Bicarbonate 27 21 - 32 mmol/L    Anion Gap 17 10 - 20 mmol/L    Urea Nitrogen 84 (H) 6 - 23 mg/dL    Creatinine 4.74 (H) 0.50 - 1.30 mg/dL    eGFR 13 (L) >60 mL/min/1.73m*2    Calcium 9.5 8.6 - 10.6 mg/dL    Phosphorus 4.2 2.5 - 4.9 mg/dL    Albumin 4.2 3.4 - 5.0 g/dL   Magnesium   Result Value Ref Range    Magnesium 1.97 1.60 - 2.40 mg/dL   Cardiac device check - MRI   Result Value Ref Range    BSA 1.66 m2   CBC and Auto Differential   Result Value Ref Range    WBC 3.9 (L) 4.4 - 11.3 x10*3/uL    nRBC 0.0 0.0 - 0.0 /100 WBCs    RBC 2.58 (L) 4.50 - 5.90 x10*6/uL    Hemoglobin 8.6 (L) 13.5 - 17.5 g/dL    Hematocrit 24.9 (L) 41.0 - 52.0 %    MCV 97 80 - 100 fL    MCH 33.3 26.0 - 34.0 pg    MCHC 34.5 32.0 - 36.0 g/dL    RDW 14.7 (H) 11.5 - 14.5 %    Platelets      Neutrophils % 66.0 40.0 - 80.0 %    Immature Granulocytes %, Automated 0.3 0.0 - 0.9 %    Lymphocytes % 14.9 13.0 - 44.0 %    Monocytes % 16.0 2.0 - 10.0 %    Eosinophils % 2.3 0.0 - 6.0 %    Basophils % 0.5 0.0 - 2.0 %    Neutrophils Absolute 2.56 1.20 - 7.70 x10*3/uL    Immature Granulocytes Absolute, Automated 0.01 0.00 - 0.70 x10*3/uL    Lymphocytes Absolute 0.58 (L) 1.20 - 4.80 x10*3/uL    Monocytes Absolute 0.62 0.10 - 1.00 x10*3/uL    Eosinophils Absolute 0.09 0.00 - 0.70 x10*3/uL    Basophils Absolute 0.02 0.00 - 0.10 x10*3/uL   Morphology   Result Value Ref Range    RBC Morphology See Below     Polychromasia Mild     Target Cells Few     Acanthocytes Few               cMRI 3/12:    IMPRESSION:  1. The left ventricle is dilated (LVEDVi = 107 mL/m2) with moderately  reduced systolic function (LVEF = 34%). Moderate global hypokinesis  without focal wall motion abnormality.  2. Multifocal areas of patchy mid myocardial enhancement, greatest in  the basal septum and lateral wall which are nonspecific and in  keeping with areas of scarring. In addition, there is a more focal  area of delayed enhancement within the true apex which may have some  subendocardial involvement and thus sequelae of prior infarction is  not entirely excluded. Approximate scar size = 14%. Of note, there  are areas of increased STIR signal correlates areas of delayed  enhancement and are consistent with active edema. Differential  considerations include sarcoidosis in the appropriate clinical  setting.  3. Mild aortic regurgitation (regurgitant fraction = 17%).  4. Mild concentric left ventricular hypertrophy with wall thickness  measuring up to 1.3 cm.  5. Biatrial enlargement.  6. Postsurgical changes of prior ascending aortic graft repair and  residual aortic dissection extending from the distal arch to the  visualized descending aorta and not fully evaluated on this exam.  7. Large right and trace left pleural effusions with associated  atelectasis.     Assessment/Plan     Reynaldo Francisco is a 68 y.o. 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  Etiology of KIA appears to be most likely cardiorenal given elev CVP and PCWP, however Cr worsened with aggressive diuresis and additionally, found to have mild-mod MR, mod-severe TR and mod AI on TTE so valvular disease a more  "likely etiology of elevated filling pressures. However, Cr now rising again, making mixed intrinsic and cardiorenal etiologies feasible. Per transplant nephro team, lasix renogram obtained and negative for functional obstruction. NPO for transplanted kidney biopsy today 3/6. BKV found to be positive.     3/13 updates  -per EP recs, given sarcoid on ddx from cMRI, proceed with PET cardiac scan to further eval. EP is r/o reversible etiologies prior to pacemaker upgrade  -continuing to follow transplant nephro recs  - pending AM labs   - final renal transplant biopsy with again minimal histologic abnormality, possible calcineurin inhibitor toxicity      #KIA on CKD (baseline Scr 2.1-2.2), non-oliguric   #ESRD s/p kidney transplantation (on tacromlimus and mycophenolate)  #NAGMA  - Initially suspect cardiorenal syndrome, however Cr worsening with aggressive diuresis, Dced drip on 3/3  - NPO for transplanted kidney biopsy 3/7  - BKV positive - transplant aware  - EBV and CMV negatvie, pending DSA   - Nuclear med lasix renogram negative for obstruction  - consider additional diuresis with IV pushes  - Daily AM Tacrolimus lvls   - Continue Tacro 2.0 q12 and Mycophenolate 360 mg BID   - Continue Sodium bicarb 650 BID  - Continue Bactrim ppx   - Transplant nephrology following  - prelim 3/7/24 bx with \"minimal histologic abnormality suggesting functional etiology for rise in creatinine Early tubular atrophy may suggest chronic ischemic injury  Negative for acute T cell mediated rejection  No evidence of acute antibody-mediated rejection\"  -known free fluid in abdomen, cannot rely on bladder scan, has been straight-cathed multiple times w/o urine ouput, low c/f obstruction, will rely on urine output    #ADHF  #Stage C systolic heart failure/NICM/HFrEF (30-35% TTE 03/2024)  #SSS s/p dual-chamber PM  #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  - CT PET at OSH did not show ischemia  - EP following for CRTD upgrade iso " pacemaker-induced cardiomyopathy closer to discharge   - Daily RFP and Mg for K>4, Mg >2  - Will continue to monitor I&Os   - GDMT optimization as renal function tolerates inpatient vs outpatient      #Type A aortic dissection s/p repair 2020  #chronic residual type B aortic dissection w/ R carotid artery dissection  #HTN  - BP well controlled in 100-110s/50-80s  - No outpatient anti-hypertensive  - Continue on Hydralazine 25mg tid (started during this admission)      #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   -iron studies: 75, TIBC 252, ferritin 764  - Started Aranesep 80 mcg q14 days, first dose 3/6 at 0900      Fluids: no  Access: IV  Antibiotics: Bactrim ppx   Electrolytes: PRN  Nutrition: cardiac   PPI: Protonix 40  DVT: Hep 5000 TID  CODE status: FULL CODE  NOK: Abdiel (wife) [phone]      Glen Gonzales MD

## 2024-03-13 NOTE — PROGRESS NOTES
Brief HF attending note  Case reviewed in detail.  Plan endomyocardial biopsy given conflicting data.  Please keep n.p.o. except medications tomorrow.  Full note to follow.

## 2024-03-13 NOTE — PROGRESS NOTES
Subjective Data:  Reports doing well overall today. No symptoms    Overnight Events:    V-paced. No acute overnight issues     Objective Data:  Last Recorded Vitals:  Vitals:    03/12/24 1951 03/13/24 0027 03/13/24 0536 03/13/24 0900   BP: 112/68 115/69 108/63 112/65   BP Location:       Patient Position:       Pulse: 62 75 62 62   Resp: 18      Temp: 36.9 °C (98.4 °F) 36.6 °C (97.9 °F) 36.9 °C (98.4 °F) 37 °C (98.6 °F)   TempSrc: Temporal      SpO2: 96% 97% 96% 98%   Weight:       Height:           Last Labs:  CBC - 3/13/2024:  5:07 AM  3.9 8.6 110    24.9      CMP - 3/13/2024:  5:07 AM  9.5 4.5 10 --- 0.4   4.6 4.4 4 46      PTT - 3/7/2024:  8:09 AM  1.2   13.3 31     TROPHS   Date/Time Value Ref Range Status   03/01/2024 12:03  0 - 53 ng/L Final     Comment:     Previous result verified on 2/29/2024 1656 on specimen/case [id number] called with component TRPHS for procedure Troponin I, High Sensitivity, Initial with value 664 ng/L.   02/29/2024 05:08  0 - 20 ng/L Final     Comment:     Previous result verified on 2/29/2024 1656 on specimen/case [id number] called with component TRPHS for procedure Troponin I, High Sensitivity, Initial with value 664 ng/L.   02/29/2024 04:18  0 - 20 ng/L Final     BNP   Date/Time Value Ref Range Status   03/01/2024 12:03  0 - 99 pg/mL Final   02/29/2024 04:18  0 - 99 pg/mL Final     HGBA1C   Date/Time Value Ref Range Status   03/08/2024 05:00 AM 5.9 see below % Final   05/01/2023 06:22 AM 6.1 <5.7 % Final   03/07/2023 03:24 AM 5.2 <5.7 % Final      Last I/O:  I/O last 3 completed shifts:  In: 220 (3.9 mL/kg) [P.O.:220]  Out: 795 (13.9 mL/kg) [Urine:795 (0.4 mL/kg/hr)]  Weight: 57.1 kg     Past Cardiology Tests (Last 3 Years):  EKG:  Electrocardiogram, 12-lead PRN ACS symptoms 02/29/2024    Echo:  Transthoracic Echo (TTE) Complete 03/01/2024    Ejection Fractions:  EF   Date/Time Value Ref Range Status   03/01/2024 11:00 AM 33 %       Cath:  Cardiac Catheterization Procedure 03/01/2024    Stress Test:  No results found for this or any previous visit from the past 1095 days.    Cardiac Imaging:  MR cardiac morphology and function w and wo IV contrast 03/12/2024      Inpatient Medications:  Scheduled medications   Medication Dose Route Frequency    aspirin  81 mg oral q AM    clotrimazole   Topical BID    darbepoetin carolyn  80 mcg subcutaneous q14 days    [Held by provider] furosemide  40 mg oral Daily    [Held by provider] heparin  5,000 Units subcutaneous q8h    [Held by provider] hydrALAZINE  25 mg oral TID    lidocaine  1 patch transdermal Daily    magnesium sulfate  4 g intravenous Once    mycophenolate  360 mg oral BID    pantoprazole  40 mg oral q AM    perflutren protein A microsphere  0.5 mL intravenous Once in imaging    sulfamethoxazole-trimethoprim  1 tablet oral Every Mon/Wed/Fri    sulfur hexafluoride microsphr  2 mL intravenous Once in imaging    tacrolimus  2 mg oral q12h KESHIA    tamsulosin  0.4 mg oral Daily     PRN medications   Medication    trimethobenzamide     Continuous Medications   Medication Dose Last Rate     Physical Exam:  Gen: awake and alert  HEENT: normocephalic. Trachea midline  CV: RRR. Diastolic murmur appreciated, no gallops, rubs  Pulm: clear to auscultation bilaterally. No coarse lung sounds  Abd: soft, non-distended. Normal active bowel sounds  Ext: warm and well-perfused  Psych: appropriate affect  Neuro: CN II-XII grossly intact       Assessment/Plan   Mr. Francisco is a 68 y.o. 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 EP c/s for BIV upgrade eval.      Pt has had multiple admissions for ADHF, EF has fluctuated with time (was 45-50% in 3/2022 then 60% in 9/2022 then 50-55% in 10/2023 and now 30-35%). QRS duration 180ms when RV pacing with a 97%  burden.      Patient's underlying rhythm appears to be CHB at this time. Note that in 2023 when he underwent the PPM placement, he was found to have SSS. 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 After discontinuing the Coreg, patient's david function seems to have improved for some time. Note that during implantation of patient's PPM, there was reportedly no signal from patient's right atrium thus leading to placement of a single-lead PPM to patient's RV. The main question that needs to be addressed concerns the etiology of patient's CHB. He certainly has risk factors including prior malignancy, prior chemo, age, renal failure. Note that per his TTE from 3/2022, GLS was -14 and suggested an Amyloid-like pattern.      cMRI shows LVEF 34% with a scar burden of 14% and areas of delayed enhancement with elevated STIR signals suggesting active edema with Sarcoidosis on the differential. PET Sarcoid suggests sarcoidosis v myocarditis v amyloidosis with profound lighting of the LV more than the RV. cMRI without gross evidence of myocarditis (patient had troponin to 870s but otherwise no chest pain). Patient had SPEP in 2021 which was normal and although he had GLS pattern suggesting Amyloidosis in 2022, Amyloisis may be less likely although it's still considered.      Recommendations  -NPO at midnight for biopsy with heart failure (note that LV lights up more when compared to the RV)  -Please hold the subQ heparin and all AC starting tonight  -Please obtain SPEP and UPEP with ALO, [facility]. May potentially need PYP scanfor Amyloidosis workup   -Please consult Advanced Heart Failure regarding additional recs and potential management  -Will pursue BiV upgrade when able to  after cardiac biopsy   -Appreciate excellent management per primary team  -Optimal lytes: K>4, Mag>2     Thank you for the consult. EP will continue to follow. Discussed with Dr. Adams     General Cardiology Consult Pager: [phone] (weekday 7AM-6PM and weekend 7AM-2PM) and other: [phone]  EP Consult Pager: [phone] (weekday 7AM-6PM and weekend 7AM-2PM) and other: [phone]  EP Device Nurse Pager: [phone] (weekday 7AM-4PM)    Peripheral IV 03/07/24 20 G Right;Anterior Forearm (Active)   Site Assessment Clean;Dry;Intact 03/13/24 0744   Dressing Status Clean;Dry 03/13/24 0744   Number of days: 6       Hemodialysis Arteriovenous Fistula 01/01/18 Left Upper arm (Active)   Site Assessment Clean;Dry;Intact 03/13/24 0744   Dressing Status Clean;Dry 03/13/24 0744   Number of days: 2263       Code Status:  Full Code    I spent 30 minutes in the professional and overall care of this patient.        Vlad Weiss MD

## 2024-03-14 NOTE — PROGRESS NOTES
"Subjective   NAOE. No acute complaints this AM        Objective     Physical Exam  Constitutional:       General: He is not in acute distress.     Comments: Chronically ill appearing    HENT:      Mouth/Throat:      Mouth: Mucous membranes are moist.   Eyes:      Conjunctiva/sclera: Conjunctivae normal.   Neck:      Comments: JVD up to the level of the mandible   Cardiovascular:      Rate and Rhythm: Regular rhythm.      Heart sounds: Murmur heard.   Pulmonary:      Effort: No respiratory distress.      Breath sounds: No wheezing.      Comments: Diminished in bilateral bases  Abdominal:      General: There is no distension.      Tenderness: mild tenderness of transplanted kidney. Bandage overlying the biopsy site.   Musculoskeletal:      Right lower leg: No edema.      Left lower leg: No edema.   Skin:     Capillary Refill: Capillary refill takes less than 2 seconds.   Neurological:      General: No focal deficit present.      Mental Status: He is alert and oriented to person, place, and time.     Last Recorded Vitals  Blood pressure 112/64, pulse 59, temperature 36 °C (96.8 °F), resp. rate 14, height 1.727 m (5' 8\"), weight 57.1 kg (125 lb 14.1 oz), SpO2 95 %.  Intake/Output last 3 Shifts:  I/O last 3 completed shifts:  In: 100 (1.8 mL/kg) [P.O.:100]  Out: 825 (14.4 mL/kg) [Urine:825 (0.4 mL/kg/hr)]  Weight: 57.1 kg     Relevant Results  Scheduled medications  aspirin, 81 mg, oral, q AM  clotrimazole, , Topical, BID  darbepoetin carolyn, 80 mcg, subcutaneous, q14 days  [Held by provider] furosemide, 40 mg, oral, Daily  [Held by provider] heparin, 5,000 Units, subcutaneous, q8h  [Held by provider] hydrALAZINE, 25 mg, oral, TID  lidocaine, 1 patch, transdermal, Daily  magnesium sulfate, 4 g, intravenous, Once  mycophenolate, 360 mg, oral, BID  pantoprazole, 40 mg, oral, q AM  perflutren protein A microsphere, 0.5 mL, intravenous, Once in imaging  sulfamethoxazole-trimethoprim, 1 tablet, oral, Every Mon/Wed/Fri  sulfur " hexafluoride microsphr, 2 mL, intravenous, Once in imaging  tacrolimus, 2 mg, oral, q12h KESHIA  tamsulosin, 0.4 mg, oral, Daily      Continuous medications     PRN medications  Results for orders placed or performed during the hospital encounter of 02/29/24 (from the past 24 hour(s))   POCT GLUCOSE   Result Value Ref Range    POCT Glucose 80 74 - 99 mg/dL   Type and screen   Result Value Ref Range    ABO TYPE O     Rh TYPE POS     ANTIBODY SCREEN NEG    Coagulation Screen   Result Value Ref Range    Protime 13.7 (H) 9.8 - 12.8 seconds    INR 1.2 (H) 0.9 - 1.1    aPTT 31 27 - 38 seconds   CBC and Auto Differential   Result Value Ref Range    WBC 3.8 (L) 4.4 - 11.3 x10*3/uL    nRBC 0.0 0.0 - 0.0 /100 WBCs    RBC 2.42 (L) 4.50 - 5.90 x10*6/uL    Hemoglobin 8.1 (L) 13.5 - 17.5 g/dL    Hematocrit 24.0 (L) 41.0 - 52.0 %    MCV 99 80 - 100 fL    MCH 33.5 26.0 - 34.0 pg    MCHC 33.8 32.0 - 36.0 g/dL    RDW 14.8 (H) 11.5 - 14.5 %    Platelets 113 (L) 150 - 450 x10*3/uL    Neutrophils % 68.2 40.0 - 80.0 %    Immature Granulocytes %, Automated 0.3 0.0 - 0.9 %    Lymphocytes % 15.9 13.0 - 44.0 %    Monocytes % 12.7 2.0 - 10.0 %    Eosinophils % 2.4 0.0 - 6.0 %    Basophils % 0.5 0.0 - 2.0 %    Neutrophils Absolute 2.58 1.20 - 7.70 x10*3/uL    Immature Granulocytes Absolute, Automated 0.01 0.00 - 0.70 x10*3/uL    Lymphocytes Absolute 0.60 (L) 1.20 - 4.80 x10*3/uL    Monocytes Absolute 0.48 0.10 - 1.00 x10*3/uL    Eosinophils Absolute 0.09 0.00 - 0.70 x10*3/uL    Basophils Absolute 0.02 0.00 - 0.10 x10*3/uL   Renal function panel   Result Value Ref Range    Glucose 115 (H) 74 - 99 mg/dL    Sodium 135 (L) 136 - 145 mmol/L    Potassium 4.1 3.5 - 5.3 mmol/L    Chloride 97 (L) 98 - 107 mmol/L    Bicarbonate 24 21 - 32 mmol/L    Anion Gap 18 10 - 20 mmol/L    Urea Nitrogen 95 (HH) 6 - 23 mg/dL    Creatinine 4.92 (H) 0.50 - 1.30 mg/dL    eGFR 12 (L) >60 mL/min/1.73m*2    Calcium 9.1 8.6 - 10.6 mg/dL    Phosphorus 4.8 2.5 - 4.9 mg/dL     Albumin 4.0 3.4 - 5.0 g/dL   Magnesium   Result Value Ref Range    Magnesium 1.97 1.60 - 2.40 mg/dL              cMRI 3/12:   IMPRESSION:  1. The left ventricle is dilated (LVEDVi = 107 mL/m2) with moderately  reduced systolic function (LVEF = 34%). Moderate global hypokinesis  without focal wall motion abnormality.  2. Multifocal areas of patchy mid myocardial enhancement, greatest in  the basal septum and lateral wall which are nonspecific and in  keeping with areas of scarring. In addition, there is a more focal  area of delayed enhancement within the true apex which may have some  subendocardial involvement and thus sequelae of prior infarction is  not entirely excluded. Approximate scar size = 14%. Of note, there  are areas of increased STIR signal correlates areas of delayed  enhancement and are consistent with active edema. Differential  considerations include sarcoidosis in the appropriate clinical  setting.  3. Mild aortic regurgitation (regurgitant fraction = 17%).  4. Mild concentric left ventricular hypertrophy with wall thickness  measuring up to 1.3 cm.  5. Biatrial enlargement.  6. Postsurgical changes of prior ascending aortic graft repair and  residual aortic dissection extending from the distal arch to the  visualized descending aorta and not fully evaluated on this exam.  7. Large right and trace left pleural effusions with associated  atelectasis.    PET/Ct cardiac sarcoid scan 3/13  IMPRESSION:  1. Assuming appropriate fasting, there is increased metabolic  activity throughout the left ventricle, which is compatible with an  inflammatory process such as myocarditis or sarcoidosis. Findings can  also be seen with amyloidosis.  2. A small area of decreased perfusion is seen along the apical  anterior wall, within the apex as well as a mid size area of  decreased perfusion in the basal half of the lateral wall, compatible  with fibrotic changes.  3. The left ventricle is enlarged with globally  reduced wall motion  and ejection fraction of 34%.  4. Mild increased metabolic activity is seen along the ascending  aorta, compatible with prior ascending aortic graft repair.  5. No evidence of hypermetabolic lymphadenopathy.     Assessment/Plan     Reynaldo Francisco is a 68 y.o. 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  Etiology of KIA appears to be most likely cardiorenal given elev CVP and PCWP, however Cr worsened with aggressive diuresis and additionally, found to have mild-mod MR, mod-severe TR and mod AI on TTE so valvular disease a more likely etiology of elevated filling pressures. However, Cr now rising again, making mixed intrinsic and cardiorenal etiologies feasible. Per transplant nephro team, lasix renogram obtained and negative for functional obstruction. NPO for transplanted kidney biopsy today 3/6. BKV found to be positive.     3/14 updates  -heart failure following re: c/f cardiac sarcoid vs. Amyloid with labs sent. S/p endomyocardial biopsy on 3/14  -continuing to follow transplant nephro recs, HD with UF of 1 L off today       #KIA on CKD (baseline Scr 2.1-2.2), non-oliguric   #ESRD s/p kidney transplantation (on tacromlimus and mycophenolate)  #NAGMA  - Initially suspect cardiorenal syndrome, however Cr worsening with aggressive diuresis, Dced drip on 3/3  - s/p transplanted kidney biopsy 3/7 minimal histologic abnormality, possible calcineurin inhibitor toxicity   - BKV positive - transplant aware  - EBV and CMV negatvie, pending DSA   - Nuclear med lasix renogram negative for obstruction  - consider additional diuresis with IV pushes  -  Daily AM Tacrolimus lvls   - Continue Tacro 2.0 q12 and Mycophenolate 360 mg BID   - Continue Sodium bicarb 650 BID  - Continue Bactrim ppx   - Transplant nephrology following  -known free fluid in abdomen, cannot rely on bladder scan, has been straight-cathed multiple times w/o urine ouput, low c/f obstruction, will rely on urine output    #ADHF  #Stage C systolic heart failure/NICM/HFrEF (30-35% TTE 03/2024)  #SSS s/p dual-chamber PM  #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  - CT PET at OSH did not show ischemia  - EP following for CRTD upgrade iso pacemaker-induced cardiomyopathy closer to discharge   - Daily RFP and Mg for K>4, Mg >2  - Will continue to monitor I&Os   - GDMT optimization as renal function tolerates inpatient vs outpatient      #Type A aortic dissection s/p repair 2020  #chronic residual type B aortic dissection w/ R carotid artery dissection  #HTN  - BP well controlled in 100-110s/50-80s  - No outpatient anti-hypertensive  - Continue on Hydralazine 25mg tid (started during this admission)      #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   -iron studies: 75, TIBC 252, ferritin 764  - Started Aranesep 80 mcg q14 days, first dose 3/6 at 0900      Fluids: no  Access: IV  Antibiotics: Bactrim ppx   Electrolytes: PRN  Nutrition: cardiac   PPI: Protonix 40  DVT: Hep 5000 TID  CODE status: FULL CODE  NOK: Abdiel (wife) [phone]      Glen Gonzales MD

## 2024-03-14 NOTE — CARE PLAN
The patient's goals for the shift include      The clinical goals for the shift include Patient will get at least 5 hours of sleep this shift- patient met this goal.     Problem: Heart Failure  Goal: Improved gas exchange this shift  Outcome: Progressing     Problem: Heart Failure  Goal: Improved urinary output this shift  Outcome: Progressing     Problem: Heart Failure  Goal: Reduction in peripheral edema within 24 hours  Outcome: Progressing     Problem: Pain  Goal: My pain/discomfort is manageable  Outcome: Progressing     Problem: Safety  Goal: Patient will be injury free during hospitalization  Outcome: Progressing     Problem: Psychosocial Needs  Goal: Demonstrates ability to cope with hospitalization/illness  Outcome: Progressing     Problem: Daily Care  Goal: Daily care needs are met  Outcome: Progressing

## 2024-03-14 NOTE — NURSING NOTE
Report from Sending RN:    Report From: CARRIE Hammond  Recent Surgery of Procedure: Yes, heart cath  Baseline Level of Consciousness (LOC): x4  Oxygen Use: No  Type:   Diabetic: No  Last BP Med Given Day of Dialysis:   Last Pain Med Given:   Lab Tests to be Obtained with Dialysis: No  Blood Transfusion to be Given During Dialysis: No  Available IV Access: Yes  Medications to be Administered During Dialysis: No  Continuous IV Infusion Running: No  Restraints on Currently or in the Last 24 Hours: No  Hand-Off Communication:   Patient complaint no pain.  Will come in cart after bedrest until 10:30.

## 2024-03-14 NOTE — NURSING NOTE
.Report to Receiving RN:    Report To: CARRIE Hammond  Time Report Called: Pt tolerated Hd well with no issue. Fluid removal 1 Liter /64 HR 60  Hand-Off Communication: Pt   Complications During Treatment: No  Ultrafiltration Treatment: No  Medications Administered During Dialysis: No  Blood Products Administered During Dialysis: No  Labs Sent During Dialysis: No  Heparin Drip Rate Changes: No

## 2024-03-14 NOTE — CONSULTS
Consults  History Of Present Illness:    Reynaldo Francisco is a 68 y.o. male with a history of stage C HF/HFrEF/NICM with LVEF 30%, previous sick sinus syndrome s/p PPM placed in 2023, reportedly had CHB due to beta blocker toxicity, insulinoma with hepatic metastasis s/p liver transplant in 2018 but had subsequent renal failure and had a kidney transplant as well, type 1 aortic dissection, and hypertension who presented two weeks ago with weight gain, SOB, and concern for acutely decompensated HF. He was admitted to the CICU for management of acute heart failure and EP was consulted for consideration of upgrading his device to CRT in light of low EF and wide QRS (180ms). As a part of the work up, he had a cardiac MRI to determine the underlying etiology of the previous complete heart block noted in 2022. Notably his echo did show GLS -14% which was suggestive of amyloid-like pattern. The MRI showed an EF 34% and scar burden of 14%, with areas of delayed enhancement and increased STIR signals suggesting edema with cardiac sarcoidosis as the differential diagnosis. A follow up PET scan was also suggestive of amyloid vs sarcoid process. Advanced heart failure service was consulted to determine if endomyocaridal biopsy is reasonable in the work up to determine if there is a treatable cause prior to upgrading his device this admission.       Last Recorded Vitals:  Vitals:    03/14/24 0412 03/14/24 0718 03/14/24 0740 03/14/24 0749   BP: 113/65 114/67 114/65    Pulse: 65 63 65    Resp: 18 18 16    Temp: 36.7 °C (98.1 °F) 36.6 °C (97.9 °F)     TempSrc:       SpO2: 97% 95% 97% 95%   Weight:       Height:           Last Labs:  CBC - 3/14/2024:  5:03 AM  3.8 8.1 113    24.0      CMP - 3/14/2024:  5:03 AM  9.1 4.5 10 --- 0.4   4.8 4.0 4 46      PTT - 3/13/2024:  9:00 PM  1.2   13.7 31     Troponin I, High Sensitivity   Date/Time Value Ref Range Status   03/01/2024 12:03  (HH) 0 - 53 ng/L Final     Comment:     Previous result  verified on 2/29/2024 1656 on specimen/case [id number] called with component TRPHS for procedure Troponin I, High Sensitivity, Initial with value 664 ng/L.   02/29/2024 05:08  (HH) 0 - 20 ng/L Final     Comment:     Previous result verified on 2/29/2024 1656 on specimen/case [id number] called with component TRPHS for procedure Troponin I, High Sensitivity, Initial with value 664 ng/L.   02/29/2024 04:18  (HH) 0 - 20 ng/L Final     BNP   Date/Time Value Ref Range Status   03/01/2024 12:03  (H) 0 - 99 pg/mL Final   02/29/2024 04:18  (H) 0 - 99 pg/mL Final     Hemoglobin A1C   Date/Time Value Ref Range Status   03/08/2024 05:00 AM 5.9 (H) see below % Final   05/01/2023 06:22 AM 6.1 (H) <5.7 % Final   03/07/2023 03:24 AM 5.2 <5.7 % Final      Last I/O:  I/O last 3 completed shifts:  In: 100 (1.8 mL/kg) [P.O.:100]  Out: 825 (14.4 mL/kg) [Urine:825 (0.4 mL/kg/hr)]  Weight: 57.1 kg     Past Cardiology Tests (Last 3 Years):    Echo:  Transthoracic Echo (TTE) Complete 03/01/2024  PHYSICIAN INTERPRETATION:  Left Ventricle: The left ventricular systolic function is moderately to severely decreased, with an estimated ejection fraction of 30-35%. There is global hypokinesis of the left ventricle with minor regional variations. The left ventricular cavity size is severely dilated. There is severe eccentric left ventricular hypertrophy. Left ventricular diastolic filling was not assessed.  Left Atrium: The left atrium is severely dilated. There is no evidence of a patent foramen ovale. A bubble study using agitated saline was performed. Bubble study is negative.  Right Ventricle: The right ventricle is normal in size. There is reduced right ventricular systolic function. A device is visualized in the right ventricle.  Right Atrium: The right atrium is severely dilated. There is a device visualized in the right atrium.  Aortic Valve: The aortic valve appears structurally normal. There is minimal  aortic valve cusp calcification. There is moderate aortic valve regurgitation. The peak instantaneous gradient of the aortic valve is 4.3 mmHg.  Mitral Valve: The mitral valve is mildly thickened. There is mild to moderate mitral valve regurgitation.  Tricuspid Valve: The tricuspid valve is structurally normal. There is moderate to severe tricuspid regurgitation. The Doppler estimated RVSP is severely elevated at 59.4 mmHg.  Pulmonic Valve: The pulmonic valve is structurally normal. There is trace pulmonic valve regurgitation.  Pericardium: There is a trivial pericardial effusion.  Pleural: There is a moderate left pleural effusion.  Aorta: The aortic root is normal.  Systemic Veins: There is IVC inspiratory collapse greater than 50%. The hepatic vein shows a pattern of systolic flow reversal, suggestive of severe tricuspid regurgitation.  In comparison to the previous echocardiogram(s): Compared with study from 10/6/2023, LV dilatation and reduced LVEF is new. Previously LV systolic function was normal. Biatrial dilatation is know with possible some worsening. AR is known.        CONCLUSIONS:   1. Left ventricular systolic function is moderately to severely decreased with a 30-35% estimated ejection fraction.   2. There is severe eccentric left ventricular hypertrophy.   3. There is reduced right ventricular systolic function.   4. The left atrium is severely dilated.   5. The right atrium is severely dilated.   6. There is a moderate pleural effusion.   7. Mild to moderate mitral valve regurgitation.   8. Moderate to severe tricuspid regurgitation visualized.   9. Severely elevated right ventricular systolic pressure.  10. Moderate aortic valve regurgitation.  11. Left ventricular cavity size is severely dilated.  12. There is no evidence of a patent foramen ovale.  13. There is global hypokinesis of the left ventricle with minor regional variations.  14. Compared with study from 10/6/2023, LV dilatation and  reduced LVEF is new. Previously LV systolic function was normal. Biatrial dilatation is know with possible some worsening. AR is known.    Ejection Fractions:  EF   Date/Time Value Ref Range Status   03/01/2024 11:00 AM 33 %      Cath:  [facility] EMBx 3/14/24  CONCLUSIONS:   1. /93/(97), RA 11, PA 42/24/(30), PCW 22, sat 60%, CO/CI 5.9/3.5, SVR 1166 dynes, PVR 1.4 HUMPHREYS.   2. Preserved CO/CI with elevated biventricular filling pressures.   3. EMBx x5 via LFV sent to pathology to eval for infiltrative CM (r/o sarcoidosis).   4. Return to LT7 for ongoing management.    Cardiac Catheterization Procedure 03/01/2024  CONCLUSIONS:   1. Elevated right- and left-sided filling pressures, normal cardiac output (CO/CI 5.4/3.1).   2. MRA 18 mmHg.   3. RV 46/17 mmHg.   4. PA 51/25, mPA 36 mmHg.   5. MPCWP 33 mmHg (with large V waves).   6. CO 5.4 L/min, CI 3.1 L/min/m2.      Stress Test:  No results found for this or any previous visit from the past 1095 days.    Cardiac Imaging:  NM PET CT  3/13/24  IMPRESSION:  1. Assuming appropriate fasting, there is increased metabolic  activity throughout the left ventricle, which is compatible with an  inflammatory process such as myocarditis or sarcoidosis. Findings can  also be seen with amyloidosis.  2. A small area of decreased perfusion is seen along the apical  anterior wall, within the apex as well as a mid size area of  decreased perfusion in the basal half of the lateral wall, compatible  with fibrotic changes.  3. The left ventricle is enlarged with globally reduced wall motion  and ejection fraction of 34%.  4. Mild increased metabolic activity is seen along the ascending  aorta, compatible with prior ascending aortic graft repair.  5. No evidence of hypermetabolic lymphadenopathy.      MR cardiac morphology and function w and wo IV contrast 03/12/2024  IMPRESSION:  1. The left ventricle is dilated (LVEDVi = 107 mL/m2) with moderately  reduced systolic function (LVEF = 34%).  Moderate global hypokinesis  without focal wall motion abnormality.  2. Multifocal areas of patchy mid myocardial enhancement, greatest in  the basal septum and lateral wall which are nonspecific and in  keeping with areas of scarring. In addition, there is a more focal  area of delayed enhancement within the true apex which may have some  subendocardial involvement and thus sequelae of prior infarction is  not entirely excluded. Approximate scar size = 14%. Of note, there  are areas of increased STIR signal correlates areas of delayed  enhancement and are consistent with active edema. Differential  considerations include sarcoidosis in the appropriate clinical  setting.  3. Mild aortic regurgitation (regurgitant fraction = 17%).  4. Mild concentric left ventricular hypertrophy with wall thickness  measuring up to 1.3 cm.  5. Biatrial enlargement.  6. Postsurgical changes of prior ascending aortic graft repair and  residual aortic dissection extending from the distal arch to the  visualized descending aorta and not fully evaluated on this exam.  7. Large right and trace left pleural effusions with associated  atelectasis.    Past Medical History:  He has a past medical history of Arteriovenous fistula, acquired (CMS/HCC) (06/17/2022), Lung nodule, Nutritional anemia, unspecified, and Personal history of malignant neoplasm of pancreas (06/17/2022).    Past Surgical History:  He has a past surgical history that includes Other surgical history (03/17/2022); Other surgical history (06/17/2022); Other surgical history (06/17/2022); US kidney transplant; Liver transplantation; Cardiac electrophysiology procedure (Right, 10/23/2023); and Cardiac catheterization (N/A, 3/1/2024).      Social History:  He reports that he has never smoked. He has never used smokeless tobacco. He reports that he does not currently use drugs. He reports that he does not drink alcohol.    Family History:  Family History   Problem Relation Name  Age of Onset    Diabetes Mother      Hypertension Mother          Allergies:  Milk, Shellfish derived, and Iodinated contrast media    Inpatient Medications:  Scheduled medications   Medication Dose Route Frequency    aspirin  81 mg oral q AM    clotrimazole   Topical BID    darbepoetin carolyn  80 mcg subcutaneous q14 days    [Held by provider] furosemide  40 mg oral Daily    [Held by provider] heparin  5,000 Units subcutaneous q8h    [Held by provider] hydrALAZINE  25 mg oral TID    lidocaine  1 patch transdermal Daily    magnesium sulfate  4 g intravenous Once    mycophenolate  360 mg oral BID    pantoprazole  40 mg oral q AM    perflutren protein A microsphere  0.5 mL intravenous Once in imaging    sulfamethoxazole-trimethoprim  1 tablet oral Every Mon/Wed/Fri    sulfur hexafluoride microsphr  2 mL intravenous Once in imaging    tacrolimus  2 mg oral q12h KESHIA    tamsulosin  0.4 mg oral Daily     PRN medications   Medication    fentaNYL PF    lidocaine    midazolam    trimethobenzamide     Continuous Medications   Medication Dose Last Rate     Outpatient Medications:  Current Outpatient Medications   Medication Instructions    aspirin 81 mg, oral, Every morning    clotrimazole (Lotrimin) 1 % cream Apply to penis and foreskin twice a day for 2 weeks    furosemide (LASIX) 40 mg, oral, Daily    mycophenolate (MYFORTIC) 180 mg, oral, 2 times daily    Protonix 40 mg, oral, Every morning    sodium bicarbonate 650 mg, oral, Daily, In the afternoon    sulfamethoxazole-trimethoprim (Bactrim) 400-80 mg tablet 1 tablet, oral, Every Mon/Wed/Fri    tacrolimus (Prograf) 1 mg capsule 3 capsules, oral, Every morning    tacrolimus (PROGRAF) 2 mg, oral, Every evening       Physical Exam:  Physical Exam  Constitutional:       General: He is not in acute distress.     Appearance: Normal appearance. He is normal weight.   HENT:      Head: Normocephalic and atraumatic.      Mouth/Throat:      Mouth: Mucous membranes are moist.       Pharynx: Oropharynx is clear.   Cardiovascular:      Rate and Rhythm: Normal rate and regular rhythm.      Pulses: Normal pulses.      Heart sounds: Normal heart sounds. No murmur heard.     No friction rub. No gallop.   Pulmonary:      Effort: Pulmonary effort is normal. No respiratory distress.      Breath sounds: Normal breath sounds. No wheezing or rales.   Abdominal:      General: Abdomen is flat. Bowel sounds are normal. There is no distension.      Palpations: Abdomen is soft.      Tenderness: There is no abdominal tenderness.   Musculoskeletal:         General: Normal range of motion.      Cervical back: Normal range of motion and neck supple.      Right lower leg: No edema.      Left lower leg: No edema.   Skin:     General: Skin is warm and dry.      Capillary Refill: Capillary refill takes less than 2 seconds.      Findings: No lesion.   Neurological:      General: No focal deficit present.      Mental Status: He is alert and oriented to person, place, and time.            Assessment/Plan   Stage C HF/HFrEF/NICM with LVEF 30-35%  Hx of SSS and CHB s/p PPM - suspected PICM due to RV apical pacing (97% V-pacing noted), infiltrative CM not ruled out  Hx of insulinoma with liver mets s/p OLT in 2018  KIA, ESRD s/p DDKT (subsequent to OLT)    Recommendations:    - Plan for RHC/EMBx today as there is conflicting data with prior studies and current CMR/PET imaging.  - We will follow up pathology results and collaborate with EP team to determine plan.   - Agree with other amyloid investigations: SPEP/UPEP w/ ALO, FLC, K/L ratio.   - Advanced HF service will follow along with you.    Pt discussed with HF attending, Dr. Varner.    Peripheral IV 03/07/24 20 G Right;Anterior Forearm (Active)   Site Assessment Clean;Dry;Intact 03/13/24 2053   Dressing Type Transparent 03/13/24 2053   Line Status Flushed 03/13/24 2053   Dressing Status Clean;Dry 03/13/24 2053   Number of days: 7       Hemodialysis Arteriovenous Fistula  01/01/18 Left Upper arm (Active)   AV Fistula Present;Thrill;Bruit 03/13/24 2053   Site Assessment Clean;Dry;Intact 03/13/24 2053   Dressing Status Clean;Dry 03/13/24 2053   Number of days: 2264       Code Status:  Full Code    I spent 45 minutes in the professional and overall care of this patient.        Tc Lizama DO

## 2024-03-14 NOTE — PROGRESS NOTES
Transplant Nephrology progress note     Date of admission: 2/29/2024     Reynaldo Francisco is a 68 y.o.  with PMH   Past Medical History:   Diagnosis Date    Arteriovenous fistula, acquired (CMS/HCC) 06/17/2022    AV fistula    Lung nodule     Nutritional anemia, unspecified     Anemia, deficiency    Personal history of malignant neoplasm of pancreas 06/17/2022    History of malignant neoplasm of pancreas        SUBJECTIVE:    Seen on dialysis this afternoon.  Urine output in last 24 hours is only 375 cc.    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    Hypertension    Increased heart rate    Lung nodule    Malignant neoplasm of colon (CMS/HCC)    Metastatic cancer to liver (CMS/HCC)    Moderate aortic insufficiency    Insulinoma    Presence of primary arteriovenous graft for hemodialysis    RBBB    Right jugular vein thrombosis    Right lower quadrant abdominal pain    Suture granuloma    Systolic murmur    SCC (squamous cell carcinoma)    Syncope and collapse    Delayed graft function of kidney transplant due to ATN requiring acute dialysis (CMS/HCC)    Pacemaker    Pseudoaneurysm of distal brachial artery (CMS/HCC)    Dissecting aneurysm of thoracic aorta, Christ type B (CMS/HCC)    Arteriovenous fistula (CMS/HCC)    Balanitis    Diarrhea    History of malignant neoplasm of pancreas    Obstruction of inferior vena cava    Shortness of breath     "Heart failure (CMS/HCC)    Acute renal failure with acute renal cortical necrosis superimposed on stage 4 chronic kidney disease (CMS/HCC)    Sick sinus syndrome (CMS/HCC)    Cardiomyopathy of undetermined type (CMS/HCC)         ALLERGIES:  Allergies   Allergen Reactions    Milk Diarrhea and GI Upset     (Skim milk) diarrhea    Shellfish Derived Hives and Itching    Iodinated Contrast Media Nausea/vomiting            CURRENT MEDICATIONS:  Scheduled medications  aspirin, 81 mg, oral, q AM  clotrimazole, , Topical, BID  darbepoetin carolyn, 80 mcg, subcutaneous, q14 days  [Held by provider] furosemide, 40 mg, oral, Daily  [Held by provider] hydrALAZINE, 25 mg, oral, TID  lidocaine, 1 patch, transdermal, Daily  magnesium sulfate, 4 g, intravenous, Once  mycophenolate, 360 mg, oral, BID  pantoprazole, 40 mg, oral, q AM  perflutren protein A microsphere, 0.5 mL, intravenous, Once in imaging  sulfamethoxazole-trimethoprim, 1 tablet, oral, Every Mon/Wed/Fri  sulfur hexafluoride microsphr, 2 mL, intravenous, Once in imaging  tacrolimus, 2 mg, oral, q12h KESHIA  tamsulosin, 0.4 mg, oral, Daily      Continuous medications     PRN medications  PRN medications: trimethobenzamide       OBJECTIVE:    VITALS: Visit Vitals  /60   Pulse 66   Temp 36.6 °C (97.9 °F) (Temporal)   Resp 18   Ht 1.727 m (5' 8\")   Wt 57.1 kg (125 lb 14.1 oz)   SpO2 96%   BMI 19.14 kg/m²   Smoking Status Never   BSA 1.66 m²          General: No distress   Mucosa moist   AI, AC, AF     HEENT: PEERLA  CVS: S1 S2 no murmurs  RESP:  Lungs clear to auscultation   ABDO: Soft, non-tender   Neuro: A + O x 3  Skin: No rash   Extremities: No edema       LABS:  Results from last 72 hours   Lab Units 03/14/24  0503   WBC AUTO x10*3/uL 3.8*   HEMOGLOBIN g/dL 8.1*   MCV fL 99   PLATELETS AUTO x10*3/uL 113*   BUN mg/dL 95*   CREATININE mg/dL 4.92*   CALCIUM mg/dL 9.1   TACROLIMUS ng/mL 6.8              Intake/Output Summary (Last 24 hours) at 3/14/2024 1910  Last data " filed at 3/14/2024 1600  Gross per 24 hour   Intake 1500 ml   Output 2688 ml   Net -1188 ml            ASSESSMENT AND PLAN:    Reynaldo Francisco is a 68 y.o. with a history of nonischemic cardiomyopathy with a EF around 40%, s/p dual-chamber pacemaker, insulinoma with hepatic metastasis s/p liver transplant as of 1/8/2018 and DDKT as of 3/5/2023 at [facility].  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  Patient was a induced by Thymoglobulin and maintained on Tac low-dose of MMF.  DSA have been negative as of 1/2024.  Patient currently got admitted with acute diastolic heart failure and KIA with his baseline creatinine 1.5-2.  Other history include aortic dissection, hypertension.    Allograft function/kidney:  -Acute kidney injury likely in the setting of cardiorenal physiology required dialysis for uremia and volume management.  S/p kidney biopsy on 3/7/2024 showing ATN, ultrasound showing moderate pelvicalyceal dilatation and hydroureter but Lasix renogram was negative for any obstruction.  Urine analysis looks bland urine lites going with intrinsic renal disease, UPC is a 0.06..  Did not respond initially to Lasix.  -dialysis sessions on 3/7/2024 and 3/9/2024 -as a gradual uptrend in the creatinine with poor clearance planning dialysis session today.  -Discussed with the pathology about adding a Congo red stain to the prior biopsy to evaluate for any amyloidosis..  Agree with SPEP/UPEP with serum free light chains.  -Monitor kidney function closely and avoid nephrotoxins.    Hemodynamics: So far workup suspicious for amyloidosis versus sarcoidosis s/p endomyocardial biopsy this morning-cardiology recommended waiting for the biopsy report.  -Blood pressures are soft agree with holding hydralazine.    Immunosuppression: As per the liver transplant team continue with the Myfortic 360 twice daily, tacrolimus 2 mg twice daily.   Current tacrolimus levels are 6.8.    Anemia: Seems to have anemia due to chronic disease please consider checking iron studies B12 and folate.  Mild thrombocytopenia noticed also.    Bone mineral disease: Calcium and phosphorus levels are optimal continue with the current management.        Thank you for consulting .  Leland Cagle MD       Notes created by Bryant -Please excuse the Typos .

## 2024-03-14 NOTE — POST-PROCEDURE NOTE
Physician Transition of Care Summary  Invasive Cardiovascular Lab    Procedure Date: 3/14/2024  Attending:    * Evan Barrientos - Primary  Resident/Fellow/Other Assistant: Surgeon(s) and Role:    Indications:   Pre-op Diagnosis     * Heart failure (CMS/HCC) [I50.9]     * Acute systolic (congestive) heart failure (CMS/HCC) [I50.21]     * Atrioventricular block, complete (CMS/HCC) [I44.2]     * Cardiomyopathy of undetermined type (CMS/HCC) [I42.9]    Post-procedure diagnosis:   Post-op Diagnosis     * Heart failure (CMS/HCC) [I50.9]     * Acute systolic (congestive) heart failure (CMS/HCC) [I50.21]     * Atrioventricular block, complete (CMS/HCC) [I44.2]     * Cardiomyopathy of undetermined type (CMS/HCC) [I42.9]    Procedure(s):   Endomyocardial Biopsy  10758 - [place] ENDOMYOCARDIAL BIOPSY        Procedure Findings:   Preserved CO/CI with elevated filling pressures. EMBx x5    Description of the Procedure:   RHC with EMBx via LFV    Complications:   None    Estimated Blood Loss:   5 mL    Anesthesia: Moderate Sedation Anesthesia Staff: No anesthesia staff entered.    Any Specimen(s) Removed:   EMBx x5    Disposition:   Return to LT for ongoing management.       Electronically signed by: Evan Barrientos DO, 3/14/2024 8:28 AM

## 2024-03-14 NOTE — PROGRESS NOTES
3/14/2024 Care coordination  Went for Endomyocardial Biopsy  today.  Will have HD session today.  SW  will coordinate community HD if pt discharges with the need for HD. Per Primary do not anticipate discharge this weekend.

## 2024-03-15 PROBLEM — I45.4 BBB (BUNDLE BRANCH BLOCK): Status: ACTIVE | Noted: 2024-01-01

## 2024-03-15 NOTE — PROGRESS NOTES
Subjective Data:  Reports doing ok    Overnight Events:    No acute overnight events     Objective Data:  Last Recorded Vitals:  Vitals:    03/14/24 1555 03/14/24 1600 03/14/24 1653 03/14/24 2024   BP:   110/60 119/60   Pulse: 60 60 66 72   Resp:   18 18   Temp: 35.7 °C (96.3 °F) 36 °C (96.8 °F) 36.6 °C (97.9 °F) 36.9 °C (98.4 °F)   TempSrc: Skin Skin Temporal    SpO2:   96% 95%   Weight:       Height:           Last Labs:  CBC - 3/14/2024:  5:03 AM  3.8 8.1 113    24.0      CMP - 3/14/2024:  5:03 AM  9.1 4.5 10 --- 0.4   4.8 4.0 4 46      PTT - 3/13/2024:  9:00 PM  1.2   13.7 31     TROPHS   Date/Time Value Ref Range Status   03/01/2024 12:03  0 - 53 ng/L Final     Comment:     Previous result verified on 2/29/2024 1656 on specimen/case [id number] called with component TRPHS for procedure Troponin I, High Sensitivity, Initial with value 664 ng/L.   02/29/2024 05:08  0 - 20 ng/L Final     Comment:     Previous result verified on 2/29/2024 1656 on specimen/case [id number] called with component TRPHS for procedure Troponin I, High Sensitivity, Initial with value 664 ng/L.   02/29/2024 04:18  0 - 20 ng/L Final     BNP   Date/Time Value Ref Range Status   03/01/2024 12:03  0 - 99 pg/mL Final   02/29/2024 04:18  0 - 99 pg/mL Final     HGBA1C   Date/Time Value Ref Range Status   03/08/2024 05:00 AM 5.9 see below % Final   05/01/2023 06:22 AM 6.1 <5.7 % Final   03/07/2023 03:24 AM 5.2 <5.7 % Final      Last I/O:  I/O last 3 completed shifts:  In: 1500 (26.3 mL/kg) [P.O.:100; I.V.:800 (14 mL/kg); Other:600]  Out: 2788 (48.8 mL/kg) [Urine:375 (0.2 mL/kg/hr); Other:2408; Blood:5]  Weight: 57.1 kg     Past Cardiology Tests (Last 3 Years):  EKG:  Electrocardiogram, 12-lead PRN ACS symptoms 02/29/2024      ECG 12 lead 02/29/2024      ECG 12 lead 02/29/2024      ECG 12 lead 12/21/2023      ECG 12 lead tomorrow at 8 AM 10/28/2023      ECG 12 lead STAT 10/28/2023      Electrocardiogram,  12-lead PRN ACS symptoms 10/28/2023      Electrocardiogram, 12-lead PRN ACS symptoms 10/28/2023      Electrocardiogram, 12-lead PRN ACS symptoms 10/28/2023      ECG 12 Lead     Echo:  Transthoracic Echo (TTE) Complete 03/01/2024      Transthoracic Echo (TTE) Limited 03/01/2024      Onco-Echo Complete (Strain & 3D) 10/06/2023    Ejection Fractions:  EF   Date/Time Value Ref Range Status   03/01/2024 11:00 AM 33 %      Cath:  Cardiac Catheterization Procedure 03/14/2024      Cardiac Catheterization Procedure 03/01/2024    Stress Test:  No results found for this or any previous visit from the past 1095 days.    Cardiac Imaging:  MR cardiac morphology and function w and wo IV contrast 03/12/2024      Inpatient Medications:  Scheduled medications   Medication Dose Route Frequency    aspirin  81 mg oral q AM    clotrimazole   Topical BID    darbepoetin carolyn  80 mcg subcutaneous q14 days    [Held by provider] furosemide  40 mg oral Daily    [Held by provider] hydrALAZINE  25 mg oral TID    lidocaine  1 patch transdermal Daily    magnesium sulfate  4 g intravenous Once    mycophenolate  360 mg oral BID    pantoprazole  40 mg oral q AM    perflutren protein A microsphere  0.5 mL intravenous Once in imaging    sulfamethoxazole-trimethoprim  1 tablet oral Every Mon/Wed/Fri    sulfur hexafluoride microsphr  2 mL intravenous Once in imaging    tacrolimus  2 mg oral q12h KESHIA    tamsulosin  0.4 mg oral Daily     PRN medications   Medication    trimethobenzamide     Continuous Medications   Medication Dose Last Rate       Physical Exam:  Gen: awake and alert  HEENT: normocephalic. Trachea midline  CV: RRR. Diastolic murmur appreciated, no gallops, rubs  Pulm: clear to auscultation bilaterally. No coarse lung sounds  Abd: soft, non-distended. Normal active bowel sounds  Ext: warm and well-perfused  Psych: appropriate affect  Neuro: CN II-XII grossly intact       Assessment/Plan   Mr. Francisco is a 68 y.o. 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 EP c/s for BIV upgrade eval.     Pt has had multiple admissions for ADHF, EF has fluctuated with time (was 45-50% in 3/2022 then 60% in 9/2022 then 50-55% in 10/2023 and now 30-35%). QRS duration 180ms when RV pacing with a 97%  burden.      Patient's underlying rhythm appears to be CHB at this time. Note that in 2023 when he underwent the PPM placement, he was found to have SSS. 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 After discontinuing the Coreg, patient's david function seems to have improved for some time. Note that during implantation of patient's PPM, there was reportedly no signal from patient's right atrium thus leading to placement of a single-lead PPM to patient's RV. The main question that needs to be addressed concerns the etiology of patient's CHB. He certainly has risk factors including prior malignancy, prior chemo, age, renal failure. Note that per his TTE from 3/2022, GLS was -14 and suggested an Amyloid-like pattern.      cMRI shows LVEF 34% with a scar burden of 14% and areas of delayed enhancement with elevated STIR signals suggesting active edema with Sarcoidosis on the differential. PET Sarcoid suggests sarcoidosis v myocarditis v amyloidosis with profound lighting of the LV more than the RV. cMRI without gross evidence of myocarditis (patient had troponin to 870s but otherwise no chest pain). Patient had SPEP in 2021 which was normal and although he had GLS pattern suggesting Amyloidosis in 2022, Amyloisis may be less likely although it's still considered.      3/14: s/p cardiac biopsy. Appreciate HF recs     Recommendations  -Ok to empirically make NPO at midnight in case biopsy results (unlikely to result that quickly). HF would like EP to await biopsy results prior to CRT-D upgrade  -Appreciate HF recs  -Follow-up on infiltrative process workup and biopsy results  -Will pursue BiV upgrade when ok with HF  -Appreciate excellent management per primary team  -Optimal lytes: K>4, Mag>2     Thank you for the consult. EP will continue to follow. Discussed with Dr. Adams     General Cardiology Consult Pager: [phone] (weekday 7AM-6PM and weekend 7AM-2PM) and other: [phone]  EP Consult Pager: [phone] (weekday 7AM-6PM and weekend 7AM-2PM) and other: [phone]  EP Device Nurse Pager: [phone] (weekday 7AM-4PM)    Peripheral IV 03/07/24 20 G Right;Anterior Forearm (Active)   Site Assessment Clean;Dry;Intact 03/14/24 2100   Dressing Status Clean;Dry 03/14/24 2100   Number of days: 7       Hemodialysis Arteriovenous Fistula 01/01/18 Left Upper arm (Active)   Site Assessment Clean;Dry;Intact 03/14/24 2100   Dressing Status Clean;Dry 03/14/24 2100   Number of days: 2264       Code Status:  Full Code    I spent 30 minutes in the professional and overall care of this patient.        Vlad Weiss MD

## 2024-03-15 NOTE — PROGRESS NOTES
Outbound call to wife to review her understanding of any dialysis needs.  She summarized that patient was a Mar 2023 Kidney transplant with transplant @ [facility] & nephrologist, Dr. Chacko.  They originally from [place] but moved to [place] in 2021 but preferred to utilize [facility] for transplant. Wife notes, that patient does return to [facility] for follow up to his Kidney transplant. Prior to transplant, patient with outpatient HD center [facility] in [place] on [address]. Wife cannot recall name of Nephrologist that patient had at [facility].  Per wife it is her understanding of possible dialysis & wants to wait.  However she verifies that if needed, preference would be same facility as before ([facility] [place] on [address] ) with a.m. hours.     Kae Anne (LSW, MSW)

## 2024-03-15 NOTE — PROGRESS NOTES
"Subjective   NAOE. No acute complaints this AM        Objective     Physical Exam  Constitutional:       General: He is not in acute distress.     Comments: Chronically ill appearing    HENT:      Mouth/Throat:      Mouth: Mucous membranes are moist.   Eyes:      Conjunctiva/sclera: Conjunctivae normal.   Neck:      Comments: JVD up to the level of the mandible   Cardiovascular:      Rate and Rhythm: Regular rhythm.      Heart sounds: Murmur heard.   Pulmonary:      Effort: No respiratory distress.      Breath sounds: No wheezing.      Comments: Diminished in bilateral bases  Abdominal:      General: There is no distension.      Tenderness: mild tenderness of transplanted kidney. Bandage overlying the biopsy site.   Musculoskeletal:      Right lower leg: No edema.      Left lower leg: No edema.   Skin:     Capillary Refill: Capillary refill takes less than 2 seconds.   Neurological:      General: No focal deficit present.      Mental Status: He is alert and oriented to person, place, and time.     Last Recorded Vitals  Blood pressure 118/65, pulse 72, temperature 35.6 °C (96.1 °F), resp. rate 14, height 1.727 m (5' 8\"), weight 57.1 kg (125 lb 14.1 oz), SpO2 92 %.  Intake/Output last 3 Shifts:  I/O last 3 completed shifts:  In: 1700 (29.8 mL/kg) [P.O.:300; I.V.:800 (14 mL/kg); Other:600]  Out: 2738 (48 mL/kg) [Urine:325 (0.2 mL/kg/hr); Other:2408; Blood:5]  Weight: 57.1 kg     Relevant Results  Scheduled medications  aspirin, 81 mg, oral, q AM  clotrimazole, , Topical, BID  darbepoetin carolyn, 80 mcg, subcutaneous, q14 days  [Held by provider] furosemide, 40 mg, oral, Daily  [Held by provider] hydrALAZINE, 25 mg, oral, TID  hydrocortisone sodium succinate, 200 mg, intravenous, q4h  lidocaine, 1 patch, transdermal, Daily  magnesium sulfate, 2 g, intravenous, Once  mycophenolate, 360 mg, oral, BID  pantoprazole, 40 mg, oral, q AM  perflutren protein A microsphere, 0.5 mL, intravenous, Once in imaging  potassium chloride " CR, 20 mEq, oral, Once  [START ON 3/18/2024] sulfamethoxazole-trimethoprim, 1 tablet, oral, Every Mon/Wed/Fri  sulfur hexafluoride microsphr, 2 mL, intravenous, Once in imaging  tacrolimus, 2 mg, oral, q12h KESHIA  tamsulosin, 0.4 mg, oral, Daily      Continuous medications  sodium chloride 0.9%, 50 mL/hr    PRN medications  Results for orders placed or performed during the hospital encounter of 02/29/24 (from the past 24 hour(s))   CBC and Auto Differential   Result Value Ref Range    WBC 3.0 (L) 4.4 - 11.3 x10*3/uL    nRBC 0.0 0.0 - 0.0 /100 WBCs    RBC 2.23 (L) 4.50 - 5.90 x10*6/uL    Hemoglobin 7.4 (L) 13.5 - 17.5 g/dL    Hematocrit 22.6 (L) 41.0 - 52.0 %     (H) 80 - 100 fL    MCH 33.2 26.0 - 34.0 pg    MCHC 32.7 32.0 - 36.0 g/dL    RDW 15.0 (H) 11.5 - 14.5 %    Platelets 92 (L) 150 - 450 x10*3/uL    Neutrophils % 61.0 40.0 - 80.0 %    Immature Granulocytes %, Automated 0.3 0.0 - 0.9 %    Lymphocytes % 18.5 13.0 - 44.0 %    Monocytes % 18.9 2.0 - 10.0 %    Eosinophils % 1.0 0.0 - 6.0 %    Basophils % 0.3 0.0 - 2.0 %    Neutrophils Absolute 1.84 1.20 - 7.70 x10*3/uL    Immature Granulocytes Absolute, Automated 0.01 0.00 - 0.70 x10*3/uL    Lymphocytes Absolute 0.56 (L) 1.20 - 4.80 x10*3/uL    Monocytes Absolute 0.57 0.10 - 1.00 x10*3/uL    Eosinophils Absolute 0.03 0.00 - 0.70 x10*3/uL    Basophils Absolute 0.01 0.00 - 0.10 x10*3/uL   Renal function panel   Result Value Ref Range    Glucose 95 74 - 99 mg/dL    Sodium 137 136 - 145 mmol/L    Potassium 3.6 3.5 - 5.3 mmol/L    Chloride 102 98 - 107 mmol/L    Bicarbonate 27 21 - 32 mmol/L    Anion Gap 12 10 - 20 mmol/L    Urea Nitrogen 47 (H) 6 - 23 mg/dL    Creatinine 3.50 (H) 0.50 - 1.30 mg/dL    eGFR 18 (L) >60 mL/min/1.73m*2    Calcium 8.3 (L) 8.6 - 10.6 mg/dL    Phosphorus 3.1 2.5 - 4.9 mg/dL    Albumin 3.7 3.4 - 5.0 g/dL   Magnesium   Result Value Ref Range    Magnesium 1.64 1.60 - 2.40 mg/dL   Tacrolimus level   Result Value Ref Range    Tacrolimus  6.3  <=15.0 ng/mL   Cardiac Device Check - Inpatient   Result Value Ref Range    BSA 1.66 m2              cMRI 3/12:   IMPRESSION:  1. The left ventricle is dilated (LVEDVi = 107 mL/m2) with moderately  reduced systolic function (LVEF = 34%). Moderate global hypokinesis  without focal wall motion abnormality.  2. Multifocal areas of patchy mid myocardial enhancement, greatest in  the basal septum and lateral wall which are nonspecific and in  keeping with areas of scarring. In addition, there is a more focal  area of delayed enhancement within the true apex which may have some  subendocardial involvement and thus sequelae of prior infarction is  not entirely excluded. Approximate scar size = 14%. Of note, there  are areas of increased STIR signal correlates areas of delayed  enhancement and are consistent with active edema. Differential  considerations include sarcoidosis in the appropriate clinical  setting.  3. Mild aortic regurgitation (regurgitant fraction = 17%).  4. Mild concentric left ventricular hypertrophy with wall thickness  measuring up to 1.3 cm.  5. Biatrial enlargement.  6. Postsurgical changes of prior ascending aortic graft repair and  residual aortic dissection extending from the distal arch to the  visualized descending aorta and not fully evaluated on this exam.  7. Large right and trace left pleural effusions with associated  atelectasis.    PET/Ct cardiac sarcoid scan 3/13  IMPRESSION:  1. Assuming appropriate fasting, there is increased metabolic  activity throughout the left ventricle, which is compatible with an  inflammatory process such as myocarditis or sarcoidosis. Findings can  also be seen with amyloidosis.  2. A small area of decreased perfusion is seen along the apical  anterior wall, within the apex as well as a mid size area of  decreased perfusion in the basal half of the lateral wall, compatible  with fibrotic changes.  3. The left ventricle is enlarged with globally reduced wall  motion  and ejection fraction of 34%.  4. Mild increased metabolic activity is seen along the ascending  aorta, compatible with prior ascending aortic graft repair.  5. No evidence of hypermetabolic lymphadenopathy.     Assessment/Plan     Reynaldo Francisco is a 68 y.o. 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  Etiology of KIA appears to be most likely cardiorenal given elev CVP and PCWP, however Cr worsened with aggressive diuresis and additionally, found to have mild-mod MR, mod-severe TR and mod AI on TTE so valvular disease a more likely etiology of elevated filling pressures. However, Cr now rising again, making mixed intrinsic and cardiorenal etiologies feasible. Per transplant nephro team, lasix renogram obtained and negative for functional obstruction. NPO for transplanted kidney biopsy today 3/6. BKV found to be positive.     3/15 updates  -heart failure following re: c/f cardiac sarcoid vs. Amyloid with labs sent. S/p endomyocardial biopsy on 3/14  -s/p pacemaker upgrade 3/15   -continuing to follow transplant nephro recs, given pacemaker upgrade with contrast load. NS 50 ml/hr for 500 ml.        #KIA on CKD (baseline Scr 2.1-2.2), non-oliguric   #ESRD s/p kidney transplantation (on tacromlimus and mycophenolate)  #NAGMA  - Initially suspect cardiorenal syndrome, however Cr worsening with aggressive diuresis, Dced drip on 3/3  - s/p transplanted kidney biopsy 3/7 minimal histologic abnormality, possible calcineurin inhibitor toxicity   - BKV positive - transplant aware  - EBV and CMV negatvie, pending DSA   - Nuclear med lasix renogram negative for  obstruction  - consider additional diuresis with IV pushes  - Daily AM Tacrolimus lvls   - Continue Tacro 2.0 q12 and Mycophenolate 360 mg BID   - Continue Sodium bicarb 650 BID  - Continue Bactrim ppx   - Transplant nephrology following  -known free fluid in abdomen, cannot rely on bladder scan, has been straight-cathed multiple times w/o urine ouput, low c/f obstruction, will rely on urine output    #ADHF  #Stage C systolic heart failure/NICM/HFrEF (30-35% TTE 03/2024)  #SSS s/p dual-chamber PM  #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  - CT PET at OSH did not show ischemia  - EP following for CRTD upgrade iso pacemaker-induced cardiomyopathy closer to discharge   - Daily RFP and Mg for K>4, Mg >2  - Will continue to monitor I&Os   - GDMT optimization as renal function tolerates inpatient vs outpatient      #Type A aortic dissection s/p repair 2020  #chronic residual type B aortic dissection w/ R carotid artery dissection  #HTN  - BP well controlled in 100-110s/50-80s  - No outpatient anti-hypertensive  - Continue on Hydralazine 25mg tid (started during this admission)      #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   -iron studies: 75, TIBC 252, ferritin 764  - Started Aranesep 80 mcg q14 days, first dose 3/6 at 0900      Fluids: no  Access: IV  Antibiotics: Bactrim ppx   Electrolytes: PRN  Nutrition: cardiac   PPI: Protonix 40  DVT: Hep 5000 TID  CODE status: FULL CODE  NOK: Abdiel (wife) [phone]      Glen Gonzales MD

## 2024-03-15 NOTE — H&P
History Of Present Illness  Reynaldo Francisco is a 68 y.o. male presenting with device upgrade.     Past Medical History  Past Medical History:   Diagnosis Date    Arteriovenous fistula, acquired (CMS/HCC) 06/17/2022    AV fistula    Lung nodule     Nutritional anemia, unspecified     Anemia, deficiency    Personal history of malignant neoplasm of pancreas 06/17/2022    History of malignant neoplasm of pancreas       Surgical History  Past Surgical History:   Procedure Laterality Date    CARDIAC CATHETERIZATION N/A 3/1/2024    Procedure: [place] Insertion;  Surgeon: Pedro Pantoja MD;  Location: [place] [address] Cardiac Cath Lab;  Service: Cardiovascular;  Laterality: N/A;  Occluded right & left IJ, requires femoral approach. [place] needed for hemodynamic monitoring for diuresis in acute exacerbation of HF and KIA in renal transplant patient.    CARDIAC ELECTROPHYSIOLOGY PROCEDURE Right 10/23/2023    Procedure: PPM Single Ventricle Implant;  Surgeon: Cosme Pina MD;  Location: [facility] Cardiac Cath Lab;  Service: Electrophysiology;  Laterality: Right;  medtronic notified    LIVER TRANSPLANTATION      OTHER SURGICAL HISTORY  03/17/2022    Pancreatic surgery    OTHER SURGICAL HISTORY  06/17/2022    Liver surgery    OTHER SURGICAL HISTORY  06/17/2022    Liver transplantation    US KIDNEY TRANSPLANT          Social History  He reports that he has never smoked. He has never used smokeless tobacco. He reports that he does not currently use drugs. He reports that he does not drink alcohol.    Family History  Family History   Problem Relation Name Age of Onset    Diabetes Mother      Hypertension Mother          Allergies  Milk, Shellfish derived, and Iodinated contrast media    Review of Systems   Constitutional:  Positive for fatigue.   All other systems reviewed and are negative.       Physical Exam  Vitals reviewed.   Constitutional:       General: He is not in acute distress.     Appearance: Normal appearance. He is normal weight.  "  HENT:      Head: Normocephalic and atraumatic.      Nose: Nose normal. No congestion or rhinorrhea.      Mouth/Throat:      Mouth: Mucous membranes are moist.      Pharynx: Oropharynx is clear. No oropharyngeal exudate or posterior oropharyngeal erythema.   Eyes:      Extraocular Movements: Extraocular movements intact.      Conjunctiva/sclera: Conjunctivae normal.      Pupils: Pupils are equal, round, and reactive to light.   Neck:      Vascular: No carotid bruit.   Cardiovascular:      Rate and Rhythm: Normal rate and regular rhythm.      Pulses: Normal pulses.      Heart sounds: Normal heart sounds. No murmur heard.     No friction rub. No gallop.      Comments: R chest device with prior incision. No ttp.   Pulmonary:      Effort: Pulmonary effort is normal. No respiratory distress.      Breath sounds: Normal breath sounds. No wheezing or rales.   Abdominal:      General: Abdomen is flat. Bowel sounds are normal. There is no distension.      Palpations: Abdomen is soft.      Tenderness: There is no abdominal tenderness.   Musculoskeletal:         General: No swelling. Normal range of motion.      Cervical back: Normal range of motion.   Lymphadenopathy:      Cervical: No cervical adenopathy.   Skin:     General: Skin is warm and dry.      Capillary Refill: Capillary refill takes less than 2 seconds.      Findings: No erythema, lesion or rash.   Neurological:      General: No focal deficit present.      Mental Status: He is alert and oriented to person, place, and time. Mental status is at baseline.   Psychiatric:         Mood and Affect: Mood normal.         Behavior: Behavior normal.         Thought Content: Thought content normal.         Judgment: Judgment normal.          Last Recorded Vitals  Blood pressure 112/60, pulse (!) 49, temperature 36.7 °C (98.1 °F), temperature source Temporal, resp. rate 17, height 1.727 m (5' 8\"), weight 57.1 kg (125 lb 14.1 oz), SpO2 94 %.    Relevant Results        Results " reviewed      Assessment/Plan   Principal Problem:    Acute systolic (congestive) heart failure (CMS/HCC)  Active Problems:    Liver transplanted (CMS/HCC)    Protein-calorie malnutrition, unspecified severity (CMS/HCC)    Dilated cardiomyopathy (CMS/HCC)    Atrioventricular block, complete (CMS/HCC)    Diabetes mellitus, type 2 (CMS/HCC)    Asplenia    ATN (acute tubular necrosis) (CMS/HCC)    AV graft stenosis (CMS/HCC)    Back pain    Cerebral artery occlusion    End stage renal disease on dialysis (CMS/HCC)    Anemia    Gastroesophageal reflux disease    History of repair of Aristes type A dissecting aneurysm of thoracic aorta    Hypertension    Increased heart rate    Lung nodule    Malignant neoplasm of colon (CMS/HCC)    Metastatic cancer to liver (CMS/HCC)    Moderate aortic insufficiency    Insulinoma    Presence of primary arteriovenous graft for hemodialysis    RBBB    Right jugular vein thrombosis    Right lower quadrant abdominal pain    Suture granuloma    Systolic murmur    SCC (squamous cell carcinoma)    Syncope and collapse    Delayed graft function of kidney transplant due to ATN requiring acute dialysis (CMS/HCC)    Pacemaker    Pseudoaneurysm of distal brachial artery (CMS/HCC)    Dissecting aneurysm of thoracic aorta, Aristes type B (CMS/HCC)    Arteriovenous fistula (CMS/HCC)    Balanitis    Diarrhea    History of malignant neoplasm of pancreas    Obstruction of inferior vena cava    Shortness of breath    Heart failure (CMS/HCC)    Acute renal failure with acute renal cortical necrosis superimposed on stage 4 chronic kidney disease (CMS/HCC)    Sick sinus syndrome (CMS/HCC)    Cardiomyopathy of undetermined type (CMS/HCC)    BBB (bundle branch block)      Upgrade of single chamber ICD to CRT-D       I spent 30 minutes in the professional and overall care of this patient.      Sai Savage MD

## 2024-03-15 NOTE — PROGRESS NOTES
Physical Therapy                 Therapy Communication Note    Patient Name: Reynaldo Francisco  MRN: [id number]  Today's Date: 3/15/2024     Discipline: Physical Therapy    Missed Visit Reason: Missed Visit Reason: Patient in a medical procedure (Pt off floor for ICD placement. Will reattempt as schedule allows.)    Missed Time: Attempt    Comment:

## 2024-03-15 NOTE — PROGRESS NOTES
Overnight Events:    No overnight events. No new symptoms this morning.     Objective Data:  Last Recorded Vitals:  Vitals:    03/14/24 2024 03/14/24 2356 03/15/24 0447 03/15/24 0748   BP: 119/60 102/52 106/59 112/60   Pulse: 72 60 77 (!) 49   Resp: 18 18  17   Temp: 36.9 °C (98.4 °F) 36.6 °C (97.9 °F) 37 °C (98.6 °F) 36.7 °C (98.1 °F)   TempSrc: Temporal   Temporal   SpO2: 95% 92% 93% 94%   Weight:       Height:           Last Labs:  CBC - 3/15/2024:  5:39 AM  3.0 7.4 92    22.6      CMP - 3/15/2024:  5:39 AM  8.3 4.5 10 --- 0.4   3.1 3.7 4 46      PTT - 3/13/2024:  9:00 PM  1.2   13.7 31     TROPHS   Date/Time Value Ref Range Status   03/01/2024 12:03  0 - 53 ng/L Final     Comment:     Previous result verified on 2/29/2024 1656 on specimen/case [id number] called with component TRPHS for procedure Troponin I, High Sensitivity, Initial with value 664 ng/L.   02/29/2024 05:08  0 - 20 ng/L Final     Comment:     Previous result verified on 2/29/2024 1656 on specimen/case [id number] called with component TRPHS for procedure Troponin I, High Sensitivity, Initial with value 664 ng/L.   02/29/2024 04:18  0 - 20 ng/L Final     BNP   Date/Time Value Ref Range Status   03/01/2024 12:03  0 - 99 pg/mL Final   02/29/2024 04:18  0 - 99 pg/mL Final     HGBA1C   Date/Time Value Ref Range Status   03/08/2024 05:00 AM 5.9 see below % Final   05/01/2023 06:22 AM 6.1 <5.7 % Final   03/07/2023 03:24 AM 5.2 <5.7 % Final      Last I/O:  I/O last 3 completed shifts:  In: 1700 (29.8 mL/kg) [P.O.:300; I.V.:800 (14 mL/kg); Other:600]  Out: 2738 (48 mL/kg) [Urine:325 (0.2 mL/kg/hr); Other:2408; Blood:5]  Weight: 57.1 kg     Past Cardiology Tests (Last 3 Years):  Echo:  Transthoracic Echo (TTE) Complete 03/01/2024  PHYSICIAN INTERPRETATION:  Left Ventricle: The left ventricular systolic function is moderately to severely decreased, with an estimated ejection fraction of 30-35%. There is global  hypokinesis of the left ventricle with minor regional variations. The left ventricular cavity size is severely dilated. There is severe eccentric left ventricular hypertrophy. Left ventricular diastolic filling was not assessed.  Left Atrium: The left atrium is severely dilated. There is no evidence of a patent foramen ovale. A bubble study using agitated saline was performed. Bubble study is negative.  Right Ventricle: The right ventricle is normal in size. There is reduced right ventricular systolic function. A device is visualized in the right ventricle.  Right Atrium: The right atrium is severely dilated. There is a device visualized in the right atrium.  Aortic Valve: The aortic valve appears structurally normal. There is minimal aortic valve cusp calcification. There is moderate aortic valve regurgitation. The peak instantaneous gradient of the aortic valve is 4.3 mmHg.  Mitral Valve: The mitral valve is mildly thickened. There is mild to moderate mitral valve regurgitation.  Tricuspid Valve: The tricuspid valve is structurally normal. There is moderate to severe tricuspid regurgitation. The Doppler estimated RVSP is severely elevated at 59.4 mmHg.  Pulmonic Valve: The pulmonic valve is structurally normal. There is trace pulmonic valve regurgitation.  Pericardium: There is a trivial pericardial effusion.  Pleural: There is a moderate left pleural effusion.  Aorta: The aortic root is normal.  Systemic Veins: There is IVC inspiratory collapse greater than 50%. The hepatic vein shows a pattern of systolic flow reversal, suggestive of severe tricuspid regurgitation.  In comparison to the previous echocardiogram(s): Compared with study from 10/6/2023, LV dilatation and reduced LVEF is new. Previously LV systolic function was normal. Biatrial dilatation is know with possible some worsening. AR is known.        CONCLUSIONS:   1. Left ventricular systolic function is moderately to severely decreased with a 30-35%  estimated ejection fraction.   2. There is severe eccentric left ventricular hypertrophy.   3. There is reduced right ventricular systolic function.   4. The left atrium is severely dilated.   5. The right atrium is severely dilated.   6. There is a moderate pleural effusion.   7. Mild to moderate mitral valve regurgitation.   8. Moderate to severe tricuspid regurgitation visualized.   9. Severely elevated right ventricular systolic pressure.  10. Moderate aortic valve regurgitation.  11. Left ventricular cavity size is severely dilated.  12. There is no evidence of a patent foramen ovale.  13. There is global hypokinesis of the left ventricle with minor regional variations.  14. Compared with study from 10/6/2023, LV dilatation and reduced LVEF is new. Previously LV systolic function was normal. Biatrial dilatation is know with possible some worsening. AR is known.     Ejection Fractions:        EF   Date/Time Value Ref Range Status   03/01/2024 11:00 AM 33 %        Cath:  [facility] EMBx 3/14/24  CONCLUSIONS:   1. /93/(97), RA 11, PA 42/24/(30), PCW 22, sat 60%, CO/CI 5.9/3.5, SVR 1166 dynes, PVR 1.4 HUMPHREYS.   2. Preserved CO/CI with elevated biventricular filling pressures.   3. EMBx x5 via LFV sent to pathology to eval for infiltrative CM (r/o sarcoidosis).   4. Return to LT7 for ongoing management.     Cardiac Catheterization Procedure 03/01/2024  CONCLUSIONS:   1. Elevated right- and left-sided filling pressures, normal cardiac output (CO/CI 5.4/3.1).   2. MRA 18 mmHg.   3. RV 46/17 mmHg.   4. PA 51/25, mPA 36 mmHg.   5. MPCWP 33 mmHg (with large V waves).   6. CO 5.4 L/min, CI 3.1 L/min/m2.        Stress Test:  No results found for this or any previous visit from the past 1095 days.     Cardiac Imaging:  NM PET CT  3/13/24  IMPRESSION:  1. Assuming appropriate fasting, there is increased metabolic  activity throughout the left ventricle, which is compatible with an  inflammatory process such as myocarditis or  sarcoidosis. Findings can  also be seen with amyloidosis.  2. A small area of decreased perfusion is seen along the apical  anterior wall, within the apex as well as a mid size area of  decreased perfusion in the basal half of the lateral wall, compatible  with fibrotic changes.  3. The left ventricle is enlarged with globally reduced wall motion  and ejection fraction of 34%.  4. Mild increased metabolic activity is seen along the ascending  aorta, compatible with prior ascending aortic graft repair.  5. No evidence of hypermetabolic lymphadenopathy.        MR cardiac morphology and function w and wo IV contrast 03/12/2024  IMPRESSION:  1. The left ventricle is dilated (LVEDVi = 107 mL/m2) with moderately  reduced systolic function (LVEF = 34%). Moderate global hypokinesis  without focal wall motion abnormality.  2. Multifocal areas of patchy mid myocardial enhancement, greatest in  the basal septum and lateral wall which are nonspecific and in  keeping with areas of scarring. In addition, there is a more focal  area of delayed enhancement within the true apex which may have some  subendocardial involvement and thus sequelae of prior infarction is  not entirely excluded. Approximate scar size = 14%. Of note, there  are areas of increased STIR signal correlates areas of delayed  enhancement and are consistent with active edema. Differential  considerations include sarcoidosis in the appropriate clinical  setting.  3. Mild aortic regurgitation (regurgitant fraction = 17%).  4. Mild concentric left ventricular hypertrophy with wall thickness  measuring up to 1.3 cm.  5. Biatrial enlargement.  6. Postsurgical changes of prior ascending aortic graft repair and  residual aortic dissection extending from the distal arch to the  visualized descending aorta and not fully evaluated on this exam.  7. Large right and trace left pleural effusions with associated  atelectasis.      Inpatient Medications:  Scheduled medications    Medication Dose Route Frequency    aspirin  81 mg oral q AM    clotrimazole   Topical BID    darbepoetin carolyn  80 mcg subcutaneous q14 days    [Held by provider] furosemide  40 mg oral Daily    [Held by provider] hydrALAZINE  25 mg oral TID    hydrocortisone sodium succinate  200 mg intravenous q4h    lidocaine  1 patch transdermal Daily    magnesium sulfate  2 g intravenous Once    mycophenolate  360 mg oral BID    pantoprazole  40 mg oral q AM    perflutren protein A microsphere  0.5 mL intravenous Once in imaging    potassium chloride  20 mEq intravenous Once    sulfamethoxazole-trimethoprim  1 tablet oral Every Mon/Wed/Fri    sulfur hexafluoride microsphr  2 mL intravenous Once in imaging    tacrolimus  2 mg oral q12h KESHIA    tamsulosin  0.4 mg oral Daily     PRN medications   Medication    trimethobenzamide     Continuous Medications   Medication Dose Last Rate       Physical Exam:  Physical Exam  Constitutional:       General: He is not in acute distress.     Appearance: Normal appearance. He is normal weight. He is not toxic-appearing.   HENT:      Head: Normocephalic and atraumatic.      Mouth/Throat:      Mouth: Mucous membranes are moist.      Pharynx: Oropharynx is clear.   Neck:      Comments: JVP ~8cmH2O  Cardiovascular:      Rate and Rhythm: Normal rate and regular rhythm.      Pulses: Normal pulses.      Heart sounds: Normal heart sounds. No murmur heard.     No friction rub. No gallop.      Comments: Frequent ectopy  Pulmonary:      Effort: Pulmonary effort is normal. No respiratory distress.      Breath sounds: Normal breath sounds. No wheezing or rales.   Abdominal:      General: Abdomen is flat. Bowel sounds are normal. There is no distension.      Palpations: Abdomen is soft.      Tenderness: There is no abdominal tenderness.   Musculoskeletal:         General: Normal range of motion.      Cervical back: Normal range of motion and neck supple.      Right lower leg: No edema.      Left lower leg:  No edema.   Skin:     General: Skin is warm and dry.      Capillary Refill: Capillary refill takes less than 2 seconds.      Findings: No lesion.   Neurological:      General: No focal deficit present.      Mental Status: He is alert and oriented to person, place, and time. Mental status is at baseline.            Assessment/Plan   Stage C HF/HFrEF/NICM with LVEF 30-35%  Hx of SSS and CHB s/p PPM - suspected PICM due to RV apical pacing (97% V-pacing noted), infiltrative CM not ruled out  Hx of insulinoma with liver mets s/p OLT in 2018  KIA, ESRD s/p DDKT (subsequent to OLT)     Recommendations:     - Results of EMBx pending. Noted nephrology also investigating amyloid pathology on kidney biopsy w/ congo red staining.   - D/w EP, plan is to upgrade device to BiV pacing with the expectation that immunosuppression may be needed depending on Bx result.  - Ongoing medical evaluation for amyloid per primary team.   - Advanced HF service will follow along with you.     Pt discussed with HF attending, Dr. Varner.      Peripheral IV 03/07/24 20 G Right;Anterior Forearm (Active)   Site Assessment Clean;Dry;Intact 03/15/24 0727   Dressing Status Clean;Dry 03/15/24 0727   Number of days: 8       Hemodialysis Arteriovenous Fistula 01/01/18 Left Upper arm (Active)   Site Assessment Clean;Intact;Dry 03/15/24 0727   Dressing Status Clean;Dry 03/15/24 0727   Number of days: 2265       Code Status:  Full Code    I spent 25 minutes in the professional and overall care of this patient.        Tc Lizama DO

## 2024-03-16 NOTE — CARE PLAN
Problem: Heart Failure  Goal: Improved gas exchange this shift  Outcome: Progressing     Problem: Pain  Goal: My pain/discomfort is manageable  Outcome: Progressing     Problem: Heart Failure  Goal: Weight from fluid excess reduced over 2-3 days, then stabilize  Outcome: Progressing   The patient's goals for the shift include      The clinical goals for the shift include tolerate adequate fluid intake     Addended by: KALIE PALOMINO on: 2/7/2024 08:09 AM     Modules accepted: Orders

## 2024-03-16 NOTE — PROGRESS NOTES
Overnight Events:    No major overnight events. Had BiV device upgrade yesterday. No new symptoms today.      Objective Data:  Last Recorded Vitals:  Vitals:    03/15/24 2251 03/16/24 0349 03/16/24 0725 03/16/24 1209   BP: 128/83 137/76 117/73 109/66   Pulse: 80 80 78 84   Resp: 17 18 18 18   Temp: 36.7 °C (98.1 °F) 36.5 °C (97.7 °F) 36.1 °C (97 °F) 36.1 °C (97 °F)   TempSrc:       SpO2: 99% 98% 97% 96%   Weight:       Height:           Last Labs:  CBC - 3/16/2024:  5:18 AM  5.1 8.1 114    24.1      CMP - 3/16/2024:  5:18 AM  9.2 4.5 10 --- 0.4   4.0 4.2 4 46      PTT - 3/13/2024:  9:00 PM  1.2   13.7 31     TROPHS   Date/Time Value Ref Range Status   03/01/2024 12:03  0 - 53 ng/L Final     Comment:     Previous result verified on 2/29/2024 1656 on specimen/case [id number] called with component TRPHS for procedure Troponin I, High Sensitivity, Initial with value 664 ng/L.   02/29/2024 05:08  0 - 20 ng/L Final     Comment:     Previous result verified on 2/29/2024 1656 on specimen/case [id number] called with component TRPHS for procedure Troponin I, High Sensitivity, Initial with value 664 ng/L.   02/29/2024 04:18  0 - 20 ng/L Final     BNP   Date/Time Value Ref Range Status   03/01/2024 12:03  0 - 99 pg/mL Final   02/29/2024 04:18  0 - 99 pg/mL Final     HGBA1C   Date/Time Value Ref Range Status   03/08/2024 05:00 AM 5.9 see below % Final   05/01/2023 06:22 AM 6.1 <5.7 % Final   03/07/2023 03:24 AM 5.2 <5.7 % Final      Last I/O:  I/O last 3 completed shifts:  In: 1108.3 (19.4 mL/kg) [P.O.:600; I.V.:508.3 (8.9 mL/kg)]  Out: 200 (3.5 mL/kg) [Urine:125 (0.1 mL/kg/hr); Blood:75]  Weight: 57.1 kg     Past Cardiology Tests (Last 3 Years):  Echo:  Transthoracic Echo (TTE) Complete 03/01/2024  PHYSICIAN INTERPRETATION:  Left Ventricle: The left ventricular systolic function is moderately to severely decreased, with an estimated ejection fraction of 30-35%. There is global hypokinesis  of the left ventricle with minor regional variations. The left ventricular cavity size is severely dilated. There is severe eccentric left ventricular hypertrophy. Left ventricular diastolic filling was not assessed.  Left Atrium: The left atrium is severely dilated. There is no evidence of a patent foramen ovale. A bubble study using agitated saline was performed. Bubble study is negative.  Right Ventricle: The right ventricle is normal in size. There is reduced right ventricular systolic function. A device is visualized in the right ventricle.  Right Atrium: The right atrium is severely dilated. There is a device visualized in the right atrium.  Aortic Valve: The aortic valve appears structurally normal. There is minimal aortic valve cusp calcification. There is moderate aortic valve regurgitation. The peak instantaneous gradient of the aortic valve is 4.3 mmHg.  Mitral Valve: The mitral valve is mildly thickened. There is mild to moderate mitral valve regurgitation.  Tricuspid Valve: The tricuspid valve is structurally normal. There is moderate to severe tricuspid regurgitation. The Doppler estimated RVSP is severely elevated at 59.4 mmHg.  Pulmonic Valve: The pulmonic valve is structurally normal. There is trace pulmonic valve regurgitation.  Pericardium: There is a trivial pericardial effusion.  Pleural: There is a moderate left pleural effusion.  Aorta: The aortic root is normal.  Systemic Veins: There is IVC inspiratory collapse greater than 50%. The hepatic vein shows a pattern of systolic flow reversal, suggestive of severe tricuspid regurgitation.  In comparison to the previous echocardiogram(s): Compared with study from 10/6/2023, LV dilatation and reduced LVEF is new. Previously LV systolic function was normal. Biatrial dilatation is know with possible some worsening. AR is known.        CONCLUSIONS:   1. Left ventricular systolic function is moderately to severely decreased with a 30-35% estimated  ejection fraction.   2. There is severe eccentric left ventricular hypertrophy.   3. There is reduced right ventricular systolic function.   4. The left atrium is severely dilated.   5. The right atrium is severely dilated.   6. There is a moderate pleural effusion.   7. Mild to moderate mitral valve regurgitation.   8. Moderate to severe tricuspid regurgitation visualized.   9. Severely elevated right ventricular systolic pressure.  10. Moderate aortic valve regurgitation.  11. Left ventricular cavity size is severely dilated.  12. There is no evidence of a patent foramen ovale.  13. There is global hypokinesis of the left ventricle with minor regional variations.  14. Compared with study from 10/6/2023, LV dilatation and reduced LVEF is new. Previously LV systolic function was normal. Biatrial dilatation is know with possible some worsening. AR is known.     Ejection Fractions:        EF   Date/Time Value Ref Range Status   03/01/2024 11:00 AM 33 %        Cath:  [facility] EMBx 3/14/24  CONCLUSIONS:   1. /93/(97), RA 11, PA 42/24/(30), PCW 22, sat 60%, CO/CI 5.9/3.5, SVR 1166 dynes, PVR 1.4 HUMPHREYS.   2. Preserved CO/CI with elevated biventricular filling pressures.   3. EMBx x5 via LFV sent to pathology to eval for infiltrative CM (r/o sarcoidosis).   4. Return to LT7 for ongoing management.     Cardiac Catheterization Procedure 03/01/2024  CONCLUSIONS:   1. Elevated right- and left-sided filling pressures, normal cardiac output (CO/CI 5.4/3.1).   2. MRA 18 mmHg.   3. RV 46/17 mmHg.   4. PA 51/25, mPA 36 mmHg.   5. MPCWP 33 mmHg (with large V waves).   6. CO 5.4 L/min, CI 3.1 L/min/m2.        Stress Test:  No results found for this or any previous visit from the past 1095 days.     Cardiac Imaging:  NM PET CT  3/13/24  IMPRESSION:  1. Assuming appropriate fasting, there is increased metabolic  activity throughout the left ventricle, which is compatible with an  inflammatory process such as myocarditis or sarcoidosis.  Findings can  also be seen with amyloidosis.  2. A small area of decreased perfusion is seen along the apical  anterior wall, within the apex as well as a mid size area of  decreased perfusion in the basal half of the lateral wall, compatible  with fibrotic changes.  3. The left ventricle is enlarged with globally reduced wall motion  and ejection fraction of 34%.  4. Mild increased metabolic activity is seen along the ascending  aorta, compatible with prior ascending aortic graft repair.  5. No evidence of hypermetabolic lymphadenopathy.        MR cardiac morphology and function w and wo IV contrast 03/12/2024  IMPRESSION:  1. The left ventricle is dilated (LVEDVi = 107 mL/m2) with moderately  reduced systolic function (LVEF = 34%). Moderate global hypokinesis  without focal wall motion abnormality.  2. Multifocal areas of patchy mid myocardial enhancement, greatest in  the basal septum and lateral wall which are nonspecific and in  keeping with areas of scarring. In addition, there is a more focal  area of delayed enhancement within the true apex which may have some  subendocardial involvement and thus sequelae of prior infarction is  not entirely excluded. Approximate scar size = 14%. Of note, there  are areas of increased STIR signal correlates areas of delayed  enhancement and are consistent with active edema. Differential  considerations include sarcoidosis in the appropriate clinical  setting.  3. Mild aortic regurgitation (regurgitant fraction = 17%).  4. Mild concentric left ventricular hypertrophy with wall thickness  measuring up to 1.3 cm.  5. Biatrial enlargement.  6. Postsurgical changes of prior ascending aortic graft repair and  residual aortic dissection extending from the distal arch to the  visualized descending aorta and not fully evaluated on this exam.  7. Large right and trace left pleural effusions with associated  atelectasis.      Inpatient Medications:  Scheduled medications   Medication  Dose Route Frequency    aspirin  81 mg oral q AM    clotrimazole   Topical BID    darbepoetin carolyn  80 mcg subcutaneous q14 days    [Held by provider] furosemide  40 mg oral Daily    [Held by provider] hydrALAZINE  25 mg oral TID    lidocaine  1 patch transdermal Daily    mycophenolate  360 mg oral BID    pantoprazole  40 mg oral q AM    perflutren protein A microsphere  0.5 mL intravenous Once in imaging    [START ON 3/18/2024] sulfamethoxazole-trimethoprim  1 tablet oral Every Mon/Wed/Fri    sulfur hexafluoride microsphr  2 mL intravenous Once in imaging    tacrolimus  2 mg oral q12h KESHIA    tamsulosin  0.4 mg oral Daily     PRN medications   Medication    trimethobenzamide     Continuous Medications   Medication Dose Last Rate       Physical Exam:  Physical Exam  Constitutional:       General: He is not in acute distress.     Appearance: Normal appearance. He is normal weight. He is not toxic-appearing.   HENT:      Head: Normocephalic and atraumatic.      Mouth/Throat:      Mouth: Mucous membranes are moist.      Pharynx: Oropharynx is clear.   Neck:      Comments: JVP ~6cmH2O  Cardiovascular:      Rate and Rhythm: Normal rate and regular rhythm.      Pulses: Normal pulses.      Heart sounds: Normal heart sounds. No murmur heard.     No friction rub. No gallop.   Pulmonary:      Effort: Pulmonary effort is normal. No respiratory distress.      Breath sounds: Normal breath sounds. No wheezing or rales.   Abdominal:      General: Abdomen is flat. Bowel sounds are normal. There is no distension.      Palpations: Abdomen is soft.      Tenderness: There is no abdominal tenderness.   Musculoskeletal:         General: Normal range of motion.      Cervical back: Normal range of motion and neck supple.      Right lower leg: No edema.      Left lower leg: No edema.   Skin:     General: Skin is warm and dry.      Capillary Refill: Capillary refill takes less than 2 seconds.      Findings: No lesion.   Neurological:       General: No focal deficit present.      Mental Status: He is alert and oriented to person, place, and time. Mental status is at baseline.            Assessment/Plan   Stage C HF/HFrEF/NICM with LVEF 30-35% s/p CRT upgrade 3/15  Hx of SSS and CHB s/p PPM - suspected PICM due to RV apical pacing (97% V-pacing noted), infiltrative CM not ruled out  Hx of insulinoma with liver mets s/p OLT in 2018  KIA, ESRD s/p DDKT (subsequent to OLT)     Recommendations:     - Results of EMBx pending. Noted nephrology also investigating amyloid pathology on kidney biopsy w/ congo red staining.   - Recommendations for managing HFrEF dependent on biopsy results.   - S/p BiV pacing upgrade yesterday. No acute complications, device pocket tender to palpation but incision site is clean.   - Ongoing medical evaluation for amyloid per primary team.   - Advanced HF service will follow along with you.     Pt discussed with HF attending, Dr. Varner.      Peripheral IV 03/07/24 20 G Right;Anterior Forearm (Active)   Site Assessment Clean;Dry;Intact 03/15/24 0727   Dressing Status Clean;Dry 03/15/24 0727   Number of days: 8       Hemodialysis Arteriovenous Fistula 01/01/18 Left Upper arm (Active)   Site Assessment Clean;Intact;Dry 03/15/24 0727   Dressing Status Clean;Dry 03/15/24 0727   Number of days: 2265       Code Status:  Full Code    I spent 20 minutes in the professional and overall care of this patient.        Tc Lizama DO

## 2024-03-16 NOTE — PROGRESS NOTES
Reynaldo Francisco is a 68 y.o. male on day 16 of admission presenting with Acute systolic (congestive) heart failure (CMS/HCC).    Per Dr Bosch, pt s/p ICD 3/15.  Pt will stay for tele monitoring.  Plan for dialysis Monday.  ADOD Tues or Wed.  Will follow for DC planning assistance.      Isabel Rosario RN [facility] weekend  epic messenger

## 2024-03-16 NOTE — PROGRESS NOTES
"Subjective   NAOE. No acute complaints this AM. Uneventful CRT-D upgrade yesteday. Unremarkable tele.        Objective     Physical Exam  Constitutional:       General: He is not in acute distress.     Comments: Chronically ill appearing    HENT:      Mouth/Throat:      Mouth: Mucous membranes are moist.   Eyes:      Conjunctiva/sclera: Conjunctivae normal.   Neck:      Comments: JVD up to the level of the mandible   Cardiovascular:      Rate and Rhythm: Regular rhythm.      Heart sounds: Murmur heard.   Pulmonary:      Effort: No respiratory distress.      Breath sounds: No wheezing.      Comments: Diminished in bilateral bases  Abdominal:      General: There is no distension.      Tenderness: mild tenderness of transplanted kidney. Bandage overlying the biopsy site.   Musculoskeletal:      Right lower leg: No edema.      Left lower leg: No edema.   Skin:     Capillary Refill: Capillary refill takes less than 2 seconds.   Neurological:      General: No focal deficit present.      Mental Status: He is alert and oriented to person, place, and time.     Last Recorded Vitals  Blood pressure 109/66, pulse 84, temperature 36.1 °C (97 °F), resp. rate 18, height 1.727 m (5' 8\"), weight 57.1 kg (125 lb 14.1 oz), SpO2 96 %.  Intake/Output last 3 Shifts:  I/O last 3 completed shifts:  In: 1108.3 (19.4 mL/kg) [P.O.:600; I.V.:508.3 (8.9 mL/kg)]  Out: 200 (3.5 mL/kg) [Urine:125 (0.1 mL/kg/hr); Blood:75]  Weight: 57.1 kg     Relevant Results  Scheduled medications  aspirin, 81 mg, oral, q AM  clotrimazole, , Topical, BID  darbepoetin carolyn, 80 mcg, subcutaneous, q14 days  [Held by provider] furosemide, 40 mg, oral, Daily  [Held by provider] hydrALAZINE, 25 mg, oral, TID  lidocaine, 1 patch, transdermal, Daily  mycophenolate, 360 mg, oral, BID  pantoprazole, 40 mg, oral, q AM  perflutren protein A microsphere, 0.5 mL, intravenous, Once in imaging  [START ON 3/18/2024] sulfamethoxazole-trimethoprim, 1 tablet, oral, Every " Mon/Wed/Fri  sulfur hexafluoride microsphr, 2 mL, intravenous, Once in imaging  tacrolimus, 2 mg, oral, q12h KESHIA  tamsulosin, 0.4 mg, oral, Daily      Continuous medications     PRN medications  Results for orders placed or performed during the hospital encounter of 02/29/24 (from the past 24 hour(s))   CBC and Auto Differential   Result Value Ref Range    WBC 5.1 4.4 - 11.3 x10*3/uL    nRBC 0.0 0.0 - 0.0 /100 WBCs    RBC 2.42 (L) 4.50 - 5.90 x10*6/uL    Hemoglobin 8.1 (L) 13.5 - 17.5 g/dL    Hematocrit 24.1 (L) 41.0 - 52.0 %     80 - 100 fL    MCH 33.5 26.0 - 34.0 pg    MCHC 33.6 32.0 - 36.0 g/dL    RDW 14.9 (H) 11.5 - 14.5 %    Platelets 114 (L) 150 - 450 x10*3/uL    Immature Granulocytes %, Automated 0.4 0.0 - 0.9 %    Immature Granulocytes Absolute, Automated 0.02 0.00 - 0.70 x10*3/uL   Renal function panel   Result Value Ref Range    Glucose 178 (H) 74 - 99 mg/dL    Sodium 135 (L) 136 - 145 mmol/L    Potassium 4.4 3.5 - 5.3 mmol/L    Chloride 97 (L) 98 - 107 mmol/L    Bicarbonate 25 21 - 32 mmol/L    Anion Gap 17 10 - 20 mmol/L    Urea Nitrogen 63 (H) 6 - 23 mg/dL    Creatinine 4.83 (H) 0.50 - 1.30 mg/dL    eGFR 12 (L) >60 mL/min/1.73m*2    Calcium 9.2 8.6 - 10.6 mg/dL    Phosphorus 4.0 2.5 - 4.9 mg/dL    Albumin 4.2 3.4 - 5.0 g/dL   Magnesium   Result Value Ref Range    Magnesium 2.26 1.60 - 2.40 mg/dL   Tacrolimus level   Result Value Ref Range    Tacrolimus  6.6 <=15.0 ng/mL   Manual Differential   Result Value Ref Range    Neutrophils %, Manual 81.9 40.0 - 80.0 %    Lymphocytes %, Manual 9.5 13.0 - 44.0 %    Monocytes %, Manual 7.7 2.0 - 10.0 %    Eosinophils %, Manual 0.0 0.0 - 6.0 %    Basophils %, Manual 0.0 0.0 - 2.0 %    Atypical Lymphocytes %, Manual 0.9 0.0 - 2.0 %    Seg Neutrophils Absolute, Manual 4.18 1.20 - 7.00 x10*3/uL    Lymphocytes Absolute, Manual 0.48 (L) 1.20 - 4.80 x10*3/uL    Monocytes Absolute, Manual 0.39 0.10 - 1.00 x10*3/uL    Eosinophils Absolute, Manual 0.00 0.00 - 0.70  x10*3/uL    Basophils Absolute, Manual 0.00 0.00 - 0.10 x10*3/uL    Atypical Lymphs Absolute, Manual 0.05 0.00 - 0.50 x10*3/uL    Total Cells Counted 116     RBC Morphology See Below     Hypochromia Mild     Basophilic Stippling Present               cMRI 3/12:   IMPRESSION:  1. The left ventricle is dilated (LVEDVi = 107 mL/m2) with moderately  reduced systolic function (LVEF = 34%). Moderate global hypokinesis  without focal wall motion abnormality.  2. Multifocal areas of patchy mid myocardial enhancement, greatest in  the basal septum and lateral wall which are nonspecific and in  keeping with areas of scarring. In addition, there is a more focal  area of delayed enhancement within the true apex which may have some  subendocardial involvement and thus sequelae of prior infarction is  not entirely excluded. Approximate scar size = 14%. Of note, there  are areas of increased STIR signal correlates areas of delayed  enhancement and are consistent with active edema. Differential  considerations include sarcoidosis in the appropriate clinical  setting.  3. Mild aortic regurgitation (regurgitant fraction = 17%).  4. Mild concentric left ventricular hypertrophy with wall thickness  measuring up to 1.3 cm.  5. Biatrial enlargement.  6. Postsurgical changes of prior ascending aortic graft repair and  residual aortic dissection extending from the distal arch to the  visualized descending aorta and not fully evaluated on this exam.  7. Large right and trace left pleural effusions with associated  atelectasis.    PET/Ct cardiac sarcoid scan 3/13  IMPRESSION:  1. Assuming appropriate fasting, there is increased metabolic  activity throughout the left ventricle, which is compatible with an  inflammatory process such as myocarditis or sarcoidosis. Findings can  also be seen with amyloidosis.  2. A small area of decreased perfusion is seen along the apical  anterior wall, within the apex as well as a mid size area of  decreased  perfusion in the basal half of the lateral wall, compatible  with fibrotic changes.  3. The left ventricle is enlarged with globally reduced wall motion  and ejection fraction of 34%.  4. Mild increased metabolic activity is seen along the ascending  aorta, compatible with prior ascending aortic graft repair.  5. No evidence of hypermetabolic lymphadenopathy.     Assessment/Plan     Reynaldo Francisco is a 68 y.o. 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  Etiology of KIA appears to be most likely cardiorenal given elev CVP and PCWP, however Cr worsened with aggressive diuresis and additionally, found to have mild-mod MR, mod-severe TR and mod AI on TTE so valvular disease a more likely etiology of elevated filling pressures. However, Cr now rising again, making mixed intrinsic and cardiorenal etiologies feasible. Per transplant nephro team, lasix renogram obtained and negative for functional obstruction. NPO for transplanted kidney biopsy today 3/6. BKV found to be positive.     3/16 updates  -heart failure recs pending endomyocardial biopsy results done on 3/14  -s/p CRT-D pacemaker upgrade 3/15. No complications. EP cards signed off  -continuing to follow transplant nephro recs, plan for inpt HD next week        #KIA on CKD (baseline Scr 2.1-2.2), non-oliguric   #ESRD s/p kidney transplantation (on tacromlimus and mycophenolate)  #NAGMA  - Initially suspect cardiorenal syndrome, however Cr worsening with aggressive diuresis, Dced drip on 3/3  - s/p transplanted kidney biopsy 3/7 minimal histologic abnormality, possible calcineurin inhibitor toxicity   - BKV  positive - transplant aware  - EBV and CMV negatvie, pending DSA   - Nuclear med lasix renogram negative for obstruction  - consider additional diuresis with IV pushes  - Daily AM Tacrolimus lvls   - Continue Tacro 2.0 q12 and Mycophenolate 360 mg BID   - Continue Sodium bicarb 650 BID  - Continue Bactrim ppx   - Transplant nephrology following  -known free fluid in abdomen, cannot rely on bladder scan, has been straight-cathed multiple times w/o urine ouput, low c/f obstruction, will rely on urine output    #ADHF  #Stage C systolic heart failure/NICM/HFrEF (30-35% TTE 03/2024)  #SSS s/p dual-chamber PM  #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  - CT PET at OSH did not show ischemia  - EP following for CRTD upgrade iso pacemaker-induced cardiomyopathy closer to discharge   - Daily RFP and Mg for K>4, Mg >2  - Will continue to monitor I&Os   - GDMT optimization as renal function tolerates inpatient vs outpatient      #Type A aortic dissection s/p repair 2020  #chronic residual type B aortic dissection w/ R carotid artery dissection  #HTN  - BP well controlled in 100-110s/50-80s  - No outpatient anti-hypertensive  - Continue on Hydralazine 25mg tid (started during this admission)      #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   -iron studies: 75, TIBC 252, ferritin 764  - Started Aranesep 80 mcg q14 days, first dose 3/6 at 0900      Fluids: no  Access: IV  Antibiotics: Bactrim ppx   Electrolytes: PRN  Nutrition: cardiac   PPI: Protonix 40  DVT: Hep 5000 TID  CODE status: FULL CODE  NOK: Abdiel (wife) [phone]      Glen Gonzales MD

## 2024-03-16 NOTE — PROGRESS NOTES
Cardiology - Electrophysiology Service  Daily Progress Note    Subjective Data:  No new concerns or complaints.    Overnight Events:    NAEO.     Objective Data:  Last Recorded Vitals:  Vitals:    03/15/24 2117 03/15/24 2251 03/16/24 0349 03/16/24 0725   BP: 120/68 128/83 137/76 117/73   Pulse: 85 80 80 78   Resp: 18 17 18 18   Temp: 36.5 °C (97.7 °F) 36.7 °C (98.1 °F) 36.5 °C (97.7 °F) 36.1 °C (97 °F)   TempSrc: Temporal      SpO2: 96% 99% 98% 97%   Weight:       Height:           Last Labs:  CBC - 3/16/2024:  5:18 AM  5.1 8.1 114    24.1      CMP - 3/16/2024:  5:18 AM  9.2 4.5 10 --- 0.4   4.0 4.2 4 46      PTT - 3/13/2024:  9:00 PM  1.2   13.7 31     TROPHS   Date/Time Value Ref Range Status   03/01/2024 12:03  0 - 53 ng/L Final     Comment:     Previous result verified on 2/29/2024 1656 on specimen/case [id number] called with component TRPHS for procedure Troponin I, High Sensitivity, Initial with value 664 ng/L.   02/29/2024 05:08  0 - 20 ng/L Final     Comment:     Previous result verified on 2/29/2024 1656 on specimen/case [id number] called with component TRPHS for procedure Troponin I, High Sensitivity, Initial with value 664 ng/L.   02/29/2024 04:18  0 - 20 ng/L Final     BNP   Date/Time Value Ref Range Status   03/01/2024 12:03  0 - 99 pg/mL Final   02/29/2024 04:18  0 - 99 pg/mL Final     HGBA1C   Date/Time Value Ref Range Status   03/08/2024 05:00 AM 5.9 see below % Final   05/01/2023 06:22 AM 6.1 <5.7 % Final   03/07/2023 03:24 AM 5.2 <5.7 % Final      Last I/O:  I/O last 3 completed shifts:  In: 1108.3 (19.4 mL/kg) [P.O.:600; I.V.:508.3 (8.9 mL/kg)]  Out: 200 (3.5 mL/kg) [Urine:125 (0.1 mL/kg/hr); Blood:75]  Weight: 57.1 kg     Past Cardiology Tests (Last 3 Years):  EKG:  Electrocardiogram, 12-lead PRN ACS symptoms 02/29/2024      ECG 12 lead 02/29/2024      ECG 12 lead 02/29/2024      ECG 12 lead 12/21/2023      ECG 12 lead tomorrow at 8 AM 10/28/2023      ECG  12 lead STAT 10/28/2023      Electrocardiogram, 12-lead PRN ACS symptoms 10/28/2023      Electrocardiogram, 12-lead PRN ACS symptoms 10/28/2023      Electrocardiogram, 12-lead PRN ACS symptoms 10/28/2023      ECG 12 Lead     Echo:  Transthoracic Echo (TTE) Complete 03/01/2024      Transthoracic Echo (TTE) Limited 03/01/2024      Onco-Echo Complete (Strain & 3D) 10/06/2023    Ejection Fractions:  EF   Date/Time Value Ref Range Status   03/01/2024 11:00 AM 33 %      Cath:  Cardiac Catheterization Procedure 03/14/2024      Cardiac Catheterization Procedure 03/01/2024    Stress Test:  No results found for this or any previous visit from the past 1095 days.    Cardiac Imaging:  MR cardiac morphology and function w and wo IV contrast 03/12/2024      Inpatient Medications:  Scheduled medications   Medication Dose Route Frequency    aspirin  81 mg oral q AM    clotrimazole   Topical BID    darbepoetin carolyn  80 mcg subcutaneous q14 days    [Held by provider] furosemide  40 mg oral Daily    [Held by provider] hydrALAZINE  25 mg oral TID    lidocaine  1 patch transdermal Daily    mycophenolate  360 mg oral BID    pantoprazole  40 mg oral q AM    perflutren protein A microsphere  0.5 mL intravenous Once in imaging    [START ON 3/18/2024] sulfamethoxazole-trimethoprim  1 tablet oral Every Mon/Wed/Fri    sulfur hexafluoride microsphr  2 mL intravenous Once in imaging    tacrolimus  2 mg oral q12h KESHIA    tamsulosin  0.4 mg oral Daily     PRN medications   Medication    trimethobenzamide     Continuous Medications   Medication Dose Last Rate       Physical Exam:  Physical Exam  Vitals reviewed.   Constitutional:       General: He is not in acute distress.     Appearance: Normal appearance. He is normal weight.   HENT:      Head: Normocephalic and atraumatic.      Nose: Nose normal. No congestion or rhinorrhea.      Mouth/Throat:      Mouth: Mucous membranes are moist.      Pharynx: Oropharynx is clear. No oropharyngeal exudate or  posterior oropharyngeal erythema.   Eyes:      Extraocular Movements: Extraocular movements intact.      Conjunctiva/sclera: Conjunctivae normal.      Pupils: Pupils are equal, round, and reactive to light.   Neck:      Vascular: No carotid bruit.   Cardiovascular:      Rate and Rhythm: Normal rate and regular rhythm.      Pulses: Normal pulses.      Heart sounds: Normal heart sounds. No murmur heard.     No friction rub. No gallop.      Comments: R upper chest with palpable device with mild tenderness to palpation. Incision clean/dry/intact. No discharge/erythema/induration.       Pulmonary:      Effort: Pulmonary effort is normal. No respiratory distress.      Breath sounds: Normal breath sounds. No wheezing or rales.   Abdominal:      General: Abdomen is flat. Bowel sounds are normal. There is no distension.      Palpations: Abdomen is soft.      Tenderness: There is no abdominal tenderness.   Musculoskeletal:         General: No swelling. Normal range of motion.      Cervical back: Normal range of motion.   Lymphadenopathy:      Cervical: No cervical adenopathy.   Skin:     General: Skin is warm and dry.      Capillary Refill: Capillary refill takes less than 2 seconds.      Findings: No erythema, lesion or rash.   Neurological:      General: No focal deficit present.      Mental Status: He is alert and oriented to person, place, and time. Mental status is at baseline.   Psychiatric:         Mood and Affect: Mood normal.         Behavior: Behavior normal.         Thought Content: Thought content normal.         Judgment: Judgment normal.            Assessment/Plan   Reynaldo Francisco is a 68 y.o. 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 Today is POD1.   -CXR portable reveals normal lead position, stable device position with no acute abnormalities   -CXR PA/LAT reveals normal lead position, stable device position with no acute abnormalities   -Device interrogation reveals normal lead parameters and device function    -physical exam  demonstrates wound that is well-apposed with expect post-surgical changes   -anticoagulation: please hold all heparin products 48hrs postop if possible.   -continue all other cardiac medications as ordered    We will sign off at this time, but please do not hesitate to contact us if more questions or concerns arise.     Please paste the following into the patient's discharge summary.     Discharge Instructions for your Cardiac Device   [facility]  [phone]              Incision:   1.  Keep your incision clean and dry for 1 week.  2. May shower 7 days after the procedure. Do not submerge the incision in a tub, pool, hot tub, or lake for 4 weeks.  3. Your incision should look better each day. If you notice unusual swelling, redness, drainage or fever greater than 100 degrees, please call the Device Clinic or your Doctor's office.  4. Avoid using deodorants, powders, creams, lotions, etc on your incision for 4 weeks.   5. There are no stitches to be removed.  If you received a “glue” closure this may appear purple-gray and does not get removed but wears away slowly on its own.  Steri-strips (small white bandages) may be removed in one week or they may fall off on their own earlier.  Pain:  1. It is normal for the area around the incision to be tender for a few weeks following surgery. Pain relievers such as Tylenol or Motrin (whichever you can take) are usually sufficient for pain relief. If the pain gets worse instead of better than please call the Device Clinic or your Doctor.  Activity:  1. If you have a new device and new leads  placed than avoid raising your arm above shoulder level for 4 weeks. Do no  anything weighing more than 15 pounds.   2. Avoid exercising with the arm on the side of your pacemaker.  So no golf, swimming, tennis, bowling etc for 4 weeks.      3. Driving: If you were driving prior to your procedure, you may resume driving in 1 week. If you experienced a loss of consciousness prior to your procedure, you should verify with your Doctor when you are able to resume driving.  ID CARD:  1. It is important to carry your device ID card with you at all times.   2. Inform doctors and health care providers that you have a pacemaker or defibrillator.  Electromagnetic Interference:  1. Microwave ovens are safe to use.  2. Cellular phones should be held to the opposite ear from your device. Do not carry your phone in your shirt pocket. Some i-phones that self-charge can interfere with your device so be sure to keep it away from your pacemaker/defibrillator.   3. Read the Patient Booklet for more information. You may call either the Device Clinic [phone]  or the patient services of the device  with questions about specific electrical appliances and interference problems.    IT IS YOUR RESPONSIBILITY TO MAKE AND KEEP APPOINTMENTS.   Please refer to your Device clinic handout.   Peripheral IV 03/07/24 20 G Right;Anterior Forearm (Active)   Site Assessment Clean;Dry;Intact 03/15/24 2100   Dressing Status Clean;Dry 03/15/24 2100   Number of days: 9       Hemodialysis Arteriovenous Fistula 01/01/18 Left Upper arm (Active)   Site Assessment Clean;Intact;Dry 03/15/24 2100   Dressing Status Clean;Dry 03/15/24 2100   Number of days: 2266       Code Status:  Full Code    I spent 30 minutes in the professional and overall care of this patient.        Sai Savage MD

## 2024-03-17 NOTE — PROGRESS NOTES
"24-hour updates:  -plan for iHD tomorrow, likely to need 2x weekly on dispo      Subjective   NAOE.       Objective   PE:  General: no acute distress  Cardio: normal rate  Pulm: non-labored, no accessory muscle usage  GI: non-distended  : no horton  MSK: no joint swelling, grossly full active ROM  HEENT: grossly normocephalic  Neuro: grossly oriented, CN grossly intact, extremities moving symmetrically  Psych: appropriate affect  Volume: mucous membranes moist, no LE pitting edema  Perfusion: no cyanosis    Last Recorded Vitals  Blood pressure 112/72, pulse 78, temperature 36.7 °C (98.1 °F), resp. rate 18, height 1.727 m (5' 8\"), weight 57.1 kg (125 lb 14.1 oz), SpO2 98 %.  Intake/Output last 3 Shifts:  I/O last 3 completed shifts:  In: 1148.3 (20.1 mL/kg) [P.O.:640; I.V.:508.3 (8.9 mL/kg)]  Out: 225 (3.9 mL/kg) [Urine:225 (0.1 mL/kg/hr)]  Weight: 57.1 kg     Relevant Results  Scheduled medications  aspirin, 81 mg, oral, q AM  clotrimazole, , Topical, BID  darbepoetin carolyn, 80 mcg, subcutaneous, q14 days  [Held by provider] furosemide, 40 mg, oral, Daily  [Held by provider] hydrALAZINE, 25 mg, oral, TID  lidocaine, 1 patch, transdermal, Daily  mycophenolate, 360 mg, oral, BID  pantoprazole, 40 mg, oral, q AM  perflutren protein A microsphere, 0.5 mL, intravenous, Once in imaging  [START ON 3/18/2024] sulfamethoxazole-trimethoprim, 1 tablet, oral, Every Mon/Wed/Fri  sulfur hexafluoride microsphr, 2 mL, intravenous, Once in imaging  tacrolimus, 2 mg, oral, q12h KESHIA  tamsulosin, 0.4 mg, oral, Daily      Continuous medications     PRN medications  Results for orders placed or performed during the hospital encounter of 02/29/24 (from the past 24 hour(s))   Magnesium   Result Value Ref Range    Magnesium 2.10 1.60 - 2.40 mg/dL   CBC and Auto Differential   Result Value Ref Range    WBC 4.9 4.4 - 11.3 x10*3/uL    nRBC 0.0 0.0 - 0.0 /100 WBCs    RBC 2.40 (L) 4.50 - 5.90 x10*6/uL    Hemoglobin 7.9 (L) 13.5 - 17.5 g/dL    " Hematocrit 23.7 (L) 41.0 - 52.0 %    MCV 99 80 - 100 fL    MCH 32.9 26.0 - 34.0 pg    MCHC 33.3 32.0 - 36.0 g/dL    RDW 14.7 (H) 11.5 - 14.5 %    Platelets 98 (L) 150 - 450 x10*3/uL    Immature Granulocytes %, Automated 0.2 0.0 - 0.9 %    Immature Granulocytes Absolute, Automated 0.01 0.00 - 0.70 x10*3/uL   Renal function panel   Result Value Ref Range    Glucose 106 (H) 74 - 99 mg/dL    Sodium 132 (L) 136 - 145 mmol/L    Potassium 4.4 3.5 - 5.3 mmol/L    Chloride 95 (L) 98 - 107 mmol/L    Bicarbonate 25 21 - 32 mmol/L    Anion Gap 16 10 - 20 mmol/L    Urea Nitrogen 76 (H) 6 - 23 mg/dL    Creatinine 5.64 (H) 0.50 - 1.30 mg/dL    eGFR 10 (L) >60 mL/min/1.73m*2    Calcium 9.0 8.6 - 10.6 mg/dL    Phosphorus 3.8 2.5 - 4.9 mg/dL    Albumin 4.0 3.4 - 5.0 g/dL   Tacrolimus level   Result Value Ref Range    Tacrolimus  4.9 <=15.0 ng/mL   Manual Differential   Result Value Ref Range    Neutrophils %, Manual 71.9 40.0 - 80.0 %    Bands %, Manual 4.4 0.0 - 5.0 %    Lymphocytes %, Manual 14.1 13.0 - 44.0 %    Monocytes %, Manual 7.0 2.0 - 10.0 %    Eosinophils %, Manual 0.0 0.0 - 6.0 %    Basophils %, Manual 0.0 0.0 - 2.0 %    Atypical Lymphocytes %, Manual 2.6 0.0 - 2.0 %    Seg Neutrophils Absolute, Manual 3.52 1.20 - 7.00 x10*3/uL    Bands Absolute, Manual 0.22 0.00 - 0.70 x10*3/uL    Lymphocytes Absolute, Manual 0.69 (L) 1.20 - 4.80 x10*3/uL    Monocytes Absolute, Manual 0.34 0.10 - 1.00 x10*3/uL    Eosinophils Absolute, Manual 0.00 0.00 - 0.70 x10*3/uL    Basophils Absolute, Manual 0.00 0.00 - 0.10 x10*3/uL    Atypical Lymphs Absolute, Manual 0.13 0.00 - 0.50 x10*3/uL    Total Cells Counted 114     Neutrophils Absolute, Manual 3.74 1.20 - 7.70 x10*3/uL    RBC Morphology See Below     Target Cells Few     Kalin Cells Few     Acanthocytes Few     Daily-Jolly Bodies Present               cMRI 3/12:   IMPRESSION:  1. The left ventricle is dilated (LVEDVi = 107 mL/m2) with moderately  reduced systolic function (LVEF = 34%).  Moderate global hypokinesis  without focal wall motion abnormality.  2. Multifocal areas of patchy mid myocardial enhancement, greatest in  the basal septum and lateral wall which are nonspecific and in  keeping with areas of scarring. In addition, there is a more focal  area of delayed enhancement within the true apex which may have some  subendocardial involvement and thus sequelae of prior infarction is  not entirely excluded. Approximate scar size = 14%. Of note, there  are areas of increased STIR signal correlates areas of delayed  enhancement and are consistent with active edema. Differential  considerations include sarcoidosis in the appropriate clinical  setting.  3. Mild aortic regurgitation (regurgitant fraction = 17%).  4. Mild concentric left ventricular hypertrophy with wall thickness  measuring up to 1.3 cm.  5. Biatrial enlargement.  6. Postsurgical changes of prior ascending aortic graft repair and  residual aortic dissection extending from the distal arch to the  visualized descending aorta and not fully evaluated on this exam.  7. Large right and trace left pleural effusions with associated  atelectasis.    PET/Ct cardiac sarcoid scan 3/13  IMPRESSION:  1. Assuming appropriate fasting, there is increased metabolic  activity throughout the left ventricle, which is compatible with an  inflammatory process such as myocarditis or sarcoidosis. Findings can  also be seen with amyloidosis.  2. A small area of decreased perfusion is seen along the apical  anterior wall, within the apex as well as a mid size area of  decreased perfusion in the basal half of the lateral wall, compatible  with fibrotic changes.  3. The left ventricle is enlarged with globally reduced wall motion  and ejection fraction of 34%.  4. Mild increased metabolic activity is seen along the ascending  aorta, compatible with prior ascending aortic graft repair.  5. No evidence of hypermetabolic lymphadenopathy.     Assessment/Plan      Reynaldo Francisco is a 68 y.o. 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  Etiology of KIA appears to be most likely cardiorenal given elev CVP and PCWP, however Cr worsened with aggressive diuresis and additionally, found to have mild-mod MR, mod-severe TR and mod AI on TTE so valvular disease a more likely etiology of elevated filling pressures. However, Cr now rising again, making mixed intrinsic and cardiorenal etiologies feasible. Per transplant nephro team, lasix renogram obtained and negative for functional obstruction. NPO for transplanted kidney biopsy today 3/6. BKV found to be positive.      #KAI on CKD (baseline Scr 2.1-2.2), non-oliguric   #ESRD s/p kidney transplantation (on tacromlimus and mycophenolate)  #NAGMA  - Initially suspect cardiorenal syndrome, however Cr worsening with aggressive diuresis, Dced drip on 3/3  - s/p transplanted kidney biopsy 3/7 minimal histologic abnormality, possible calcineurin inhibitor toxicity   - BKV positive - transplant aware  - EBV and CMV negatvie, pending DSA   - Nuclear med lasix renogram negative for obstruction  - consider additional diuresis with IV pushes  - Daily AM Tacrolimus lvls   - Continue Tacro 2.0 q12 and Mycophenolate 360 mg BID   - Continue Sodium bicarb 650 BID  - Continue Bactrim ppx   - Transplant nephrology following  -known free fluid in abdomen, cannot rely on bladder scan, has been straight-cathed multiple times w/o urine ouput, low c/f obstruction, will rely on urine output    #ADHF  #Stage C systolic heart failure/NICM/HFrEF (30-35% TTE 03/2024)  #SSS s/p dual-chamber PM  #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  - CT PET at OSH did not show ischemia  - EP following for CRTD upgrade iso pacemaker-induced cardiomyopathy closer to discharge   - Daily RFP and Mg for K>4, Mg >2  - Will continue to monitor I&Os   - GDMT optimization as renal function tolerates inpatient vs outpatient      #Type A aortic dissection s/p repair 2020  #chronic residual type B aortic dissection w/ R carotid artery dissection  #HTN  - BP well controlled in 100-110s/50-80s  - No outpatient anti-hypertensive  - Continue on Hydralazine 25mg tid (started during this admission)      #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   -iron studies: 75, TIBC 252, ferritin 764  - Started Aranesep 80 mcg q14 days, first dose 3/6 at 0900      Fluids: no  Access: IV  Antibiotics: Bactrim ppx   Electrolytes: PRN  Nutrition: cardiac   PPI: Protonix 40  DVT: Hep 5000 TID  CODE status: FULL CODE  NOK: Abdiel (wife) [phone]      Fabricio Fernandez MD

## 2024-03-17 NOTE — CARE PLAN
The patient's goals for the shift include      The clinical goals for the shift include Patient will be free from pain this shift.      Problem: Heart Failure  Goal: Improved gas exchange this shift  Outcome: Progressing  Goal: Improved urinary output this shift  Outcome: Progressing  Goal: Reduction in peripheral edema within 24 hours  Outcome: Progressing  Goal: Report improvement of dyspnea/breathlessness this shift  Outcome: Progressing  Goal: Weight from fluid excess reduced over 2-3 days, then stabilize  Outcome: Progressing  Goal: Increase self care and/or family involvement in 24 hours  Outcome: Progressing

## 2024-03-18 NOTE — PROGRESS NOTES
[facility] sent clinical information to [facility] intake for outpatient HD chair upon discharge. Requested location, [facility]. Will update.    Update 1419:  Patient needing irregular HD with signs of renal recovery. Plan has changed for followup in  dialysis clinic at [facility]. Transplant team to schedule. [facility]    Melony Ortiz LCSW

## 2024-03-18 NOTE — CONSULTS
"Nutrition Follow Up Assessment:   Nutrition Assessment    Reason for Assessment: Dietitian discretion (nutrition follow up)    Patient is a 68 y.o. male on hospital day 18. KIA on CKD - dialysis restarted following transplant, ADHF, SSS - s/p dual chamber pacemaker.       Nutrition History:  Energy Intake: Fair 50-75 %  Food and Nutrient History: Pt ate a little over 50% of breakfast this AM. He was tired so spoke with his wife. She states that he has been eating pretty well. When he doesn't get interrupted during meals he does better. He hasn't been getting the Nepro but she feels he is eating well enough htat he doesn't need it.  Food Allergies/Intolerances:   milk and shellfish       Anthropometrics:  Height: 172.7 cm (5' 8\")   Weight: 57.1 kg (125 lb 14.1 oz)   BMI (Calculated): 19.14  IBW/kg (Dietitian Calculated): 70 kg  Percent of IBW: 80 %       Weight History:   Weight         3/5/2024  0600 3/6/2024  0627 3/7/2024  0516 3/9/2024  0439 3/11/2024  0419    Weight: 55.6 kg (122 lb 9.6 oz) 55.6 kg (122 lb 9.2 oz) 56 kg (123 lb 7.3 oz) 55 kg (121 lb 4.8 oz) 57.1 kg (125 lb 14.1 oz)         No new wt x7 days.     Nutrition Significant Labs:  CBC Trend:   Results from last 7 days   Lab Units 03/18/24  0945 03/18/24  0450 03/17/24  0545 03/16/24  0518 03/15/24  0539   WBC AUTO x10*3/uL  --  4.5 4.9 5.1 3.0*   RBC AUTO x10*6/uL  --  2.02* 2.40* 2.42* 2.23*   HEMOGLOBIN g/dL 7.0* 6.8* 7.9* 8.1* 7.4*   HEMATOCRIT % 20.2* 19.4* 23.7* 24.1* 22.6*   MCV fL  --  96 99 100 101*   PLATELETS AUTO x10*3/uL  --  95* 98* 114* 92*    , BMP Trend:   Results from last 7 days   Lab Units 03/18/24  0450 03/17/24  0545 03/16/24  0518 03/15/24  0539   GLUCOSE mg/dL 99 106* 178* 95   CALCIUM mg/dL 8.8 9.0 9.2 8.3*   SODIUM mmol/L 131* 132* 135* 137   POTASSIUM mmol/L 4.2 4.4 4.4 3.6   CO2 mmol/L 22 25 25 27   CHLORIDE mmol/L 95* 95* 97* 102   BUN mg/dL 90* 76* 63* 47*   CREATININE mg/dL 4.68* 5.64* 4.83* 3.50*        Nutrition Specific " Medications:  None    I/O:   Last BM 3/17    Dietary Orders (From admission, onward)       Start     Ordered    03/15/24 1511  Adult diet 2-3 grams sodium, Potassium restricted 2 gm (50mEq), Cardiac, Renal; 70 gm fat; 2 - 3 grams Sodium; Potassium Restricted 2 gm (50mEq)  Diet effective now        Question Answer Comment   Diet type 2-3 grams sodium    Diet type Potassium restricted 2 gm (50mEq)    Diet type Cardiac    Diet type Renal    Fat restriction: 70 gm fat    Sodium restriction: 2 - 3 grams Sodium    Potassium restriction: Potassium Restricted 2 gm (50mEq)        03/15/24 1511    03/08/24 1556  Oral nutritional supplements  Until discontinued        Question Answer Comment   Deliver with Lunch    Select supplement: Nepro        03/08/24 1555                     Estimated Needs:   Total Energy Estimated Needs (kCal): 1700 kCal  Method for Estimating Needs: 30kcal/kg ABW  Total Protein Estimated Needs (g): 85 g  Method for Estimating Needs: 1.2gm/kg IBW  Total Fluid Estimated Needs (mL):  (per team)        Nutrition Diagnosis   Malnutrition Diagnosis  Patient has Malnutrition Diagnosis: No    Nutrition Diagnosis  Patient has Nutrition Diagnosis: Yes  Diagnosis Status (1): Ongoing  Nutrition Diagnosis 1: Unintended weight loss  Related to (1): chronic medical conditions  As Evidenced by (1): 8.8% wt loss from usual weight       Nutrition Interventions/Recommendations         Nutrition Prescription:  Individualized Nutrition Prescription Provided for : Liberalize diet to 2 gm Na - no other restrictions needed.  Continue Nepro once per day and encourage intake.  Weigh pt now and 1-2x week until discharge.        Nutrition Interventions:   Interventions: Meals and snacks, Medical food supplement  Goal: consume >75% of liberal diet and 1 ONS per day      Nutrition Education:   None at this time.        Nutrition Monitoring and Evaluation   Food/Nutrient Related History Monitoring  Monitoring and Evaluation Plan:  Energy intake, Amount of food  Criteria: PO intake adequate to meet estimated nutrition needs    Body Composition/Growth/Weight History  Monitoring and Evaluation Plan: Weight  Criteria: prevent further weight loss.    Biochemical Data, Medical Tests and Procedures  Monitoring and Evaluation Plan: Electrolyte/renal panel  Criteria: Labs WNL    Time Spent/Follow-up Reminder:   Time Spent (min): 45 minutes  Last Date of Nutrition Visit: 03/18/24  Nutrition Follow-Up Needed?: Dietitian to reassess per policy

## 2024-03-18 NOTE — CARE PLAN
The patient's goals for the shift include      The clinical goals for the shift include Patient will remain hemodynamically stable this shift.      Problem: Heart Failure  Goal: Improved gas exchange this shift  Outcome: Progressing  Goal: Improved urinary output this shift  Outcome: Progressing  Goal: Reduction in peripheral edema within 24 hours  Outcome: Progressing  Goal: Report improvement of dyspnea/breathlessness this shift  Outcome: Progressing  Goal: Weight from fluid excess reduced over 2-3 days, then stabilize  Outcome: Progressing  Goal: Increase self care and/or family involvement in 24 hours  Outcome: Progressing

## 2024-03-18 NOTE — PROGRESS NOTES
Physical Therapy                 Therapy Communication Note    Patient Name: Reynaldo Francisco  MRN: [id number]  Today's Date: 3/18/2024     Discipline: Physical Therapy    Missed Visit Reason:  Patient in a medical procedure (Patient in Dialysis)    Missed Time: 1:57 pm

## 2024-03-18 NOTE — PROGRESS NOTES
"Subjective   NAOE. \"I'm okay.\" He is feeling a little better compared to prior. Denies chest pain, palpitations abdominal pain.       Objective   PE:  General: no acute distress  Cardio: normal rate, pacemaker site on right chest more swollen concerns for worsening hematoma  Pulm: non-labored, no accessory muscle usage  GI: non-distended, BS present  : no horton  MSK: no joint swelling, grossly full active ROM  HEENT: grossly normocephalic  Psych: appropriate affect  Volume: mucous membranes moist    Tele: ventricular pacing; a few events of pacer non capture    Last Recorded Vitals  Blood pressure 115/66, pulse 95, temperature 37 °C (98.6 °F), temperature source Temporal, resp. rate 16, height 1.727 m (5' 8\"), weight 57.1 kg (125 lb 14.1 oz), SpO2 96 %.  Intake/Output last 3 Shifts:  I/O last 3 completed shifts:  In: 240 (4.2 mL/kg) [P.O.:240]  Out: 451 (7.9 mL/kg) [Urine:450 (0.2 mL/kg/hr); Stool:1]  Weight: 57.1 kg     Relevant Results  Scheduled medications  aspirin, 81 mg, oral, q AM  clotrimazole, , Topical, BID  darbepoetin carolyn, 80 mcg, subcutaneous, q14 days  [Held by provider] furosemide, 40 mg, oral, Daily  [Held by provider] hydrALAZINE, 25 mg, oral, TID  lidocaine, 1 patch, transdermal, Daily  mycophenolate, 360 mg, oral, BID  pantoprazole, 40 mg, oral, q AM  perflutren protein A microsphere, 0.5 mL, intravenous, Once in imaging  sulfamethoxazole-trimethoprim, 1 tablet, oral, Every Mon/Wed/Fri  sulfur hexafluoride microsphr, 2 mL, intravenous, Once in imaging  tacrolimus, 2 mg, oral, q12h KESHIA  tamsulosin, 0.4 mg, oral, Daily      Continuous medications     PRN medications  Results for orders placed or performed during the hospital encounter of 02/29/24 (from the past 24 hour(s))   Magnesium   Result Value Ref Range    Magnesium 1.91 1.60 - 2.40 mg/dL   CBC and Auto Differential   Result Value Ref Range    WBC 4.5 4.4 - 11.3 x10*3/uL    nRBC 0.0 0.0 - 0.0 /100 WBCs    RBC 2.02 (L) 4.50 - 5.90 x10*6/uL "    Hemoglobin 6.8 (L) 13.5 - 17.5 g/dL    Hematocrit 19.4 (L) 41.0 - 52.0 %    MCV 96 80 - 100 fL    MCH 33.7 26.0 - 34.0 pg    MCHC 35.1 32.0 - 36.0 g/dL    RDW 14.8 (H) 11.5 - 14.5 %    Platelets 95 (L) 150 - 450 x10*3/uL    Neutrophils % 72.3 40.0 - 80.0 %    Immature Granulocytes %, Automated 0.2 0.0 - 0.9 %    Lymphocytes % 13.2 13.0 - 44.0 %    Monocytes % 12.5 2.0 - 10.0 %    Eosinophils % 1.6 0.0 - 6.0 %    Basophils % 0.2 0.0 - 2.0 %    Neutrophils Absolute 3.24 1.20 - 7.70 x10*3/uL    Immature Granulocytes Absolute, Automated 0.01 0.00 - 0.70 x10*3/uL    Lymphocytes Absolute 0.59 (L) 1.20 - 4.80 x10*3/uL    Monocytes Absolute 0.56 0.10 - 1.00 x10*3/uL    Eosinophils Absolute 0.07 0.00 - 0.70 x10*3/uL    Basophils Absolute 0.01 0.00 - 0.10 x10*3/uL   Renal function panel   Result Value Ref Range    Glucose 99 74 - 99 mg/dL    Sodium 131 (L) 136 - 145 mmol/L    Potassium 4.2 3.5 - 5.3 mmol/L    Chloride 95 (L) 98 - 107 mmol/L    Bicarbonate 22 21 - 32 mmol/L    Anion Gap 18 10 - 20 mmol/L    Urea Nitrogen 90 (H) 6 - 23 mg/dL    Creatinine 4.68 (H) 0.50 - 1.30 mg/dL    eGFR 13 (L) >60 mL/min/1.73m*2    Calcium 8.8 8.6 - 10.6 mg/dL    Phosphorus 3.8 2.5 - 4.9 mg/dL    Albumin 3.9 3.4 - 5.0 g/dL              cMRI 3/12:   IMPRESSION:  1. The left ventricle is dilated (LVEDVi = 107 mL/m2) with moderately  reduced systolic function (LVEF = 34%). Moderate global hypokinesis  without focal wall motion abnormality.  2. Multifocal areas of patchy mid myocardial enhancement, greatest in  the basal septum and lateral wall which are nonspecific and in  keeping with areas of scarring. In addition, there is a more focal  area of delayed enhancement within the true apex which may have some  subendocardial involvement and thus sequelae of prior infarction is  not entirely excluded. Approximate scar size = 14%. Of note, there  are areas of increased STIR signal correlates areas of delayed  enhancement and are consistent with  active edema. Differential  considerations include sarcoidosis in the appropriate clinical  setting.  3. Mild aortic regurgitation (regurgitant fraction = 17%).  4. Mild concentric left ventricular hypertrophy with wall thickness  measuring up to 1.3 cm.  5. Biatrial enlargement.  6. Postsurgical changes of prior ascending aortic graft repair and  residual aortic dissection extending from the distal arch to the  visualized descending aorta and not fully evaluated on this exam.  7. Large right and trace left pleural effusions with associated  atelectasis.    PET/Ct cardiac sarcoid scan 3/13  IMPRESSION:  1. Assuming appropriate fasting, there is increased metabolic  activity throughout the left ventricle, which is compatible with an  inflammatory process such as myocarditis or sarcoidosis. Findings can  also be seen with amyloidosis.  2. A small area of decreased perfusion is seen along the apical  anterior wall, within the apex as well as a mid size area of  decreased perfusion in the basal half of the lateral wall, compatible  with fibrotic changes.  3. The left ventricle is enlarged with globally reduced wall motion  and ejection fraction of 34%.  4. Mild increased metabolic activity is seen along the ascending  aorta, compatible with prior ascending aortic graft repair.  5. No evidence of hypermetabolic lymphadenopathy.     Assessment/Plan     Reynaldo Francisco is a 68 y.o. 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  Etiology of KIA appears to be most likely cardiorenal given elev CVP and PCWP, however Cr worsened with aggressive diuresis and additionally, found to  have mild-mod MR, mod-severe TR and mod AI on TTE so valvular disease a more likely etiology of elevated filling pressures. However, Cr now rising again, making mixed intrinsic and cardiorenal etiologies feasible. Per transplant nephro team, lasix renogram obtained and negative for functional obstruction. NPO for transplanted kidney biopsy today 3/6. BKV found to be positive.      Changes Today:  - dialysis today  - spoke with EP; they said conservative management for hematoma from pace maker site  - spoke with nephrology, they will dialyze today  - 1 x unit of blood given  - PM H + H test ordered to monitor for bleeding    #KIA on CKD (baseline Scr 2.1-2.2), non-oliguric   #ESRD s/p kidney transplantation (on tacromlimus and mycophenolate)  #NAGMA  - Initially suspect cardiorenal syndrome, however Cr worsening with aggressive diuresis, Dced drip on 3/3  - s/p transplanted kidney biopsy 3/7 minimal histologic abnormality, possible calcineurin inhibitor toxicity   - BKV positive - transplant aware  - EBV and CMV negatvie, pending DSA   - Nuclear med lasix renogram negative for obstruction  - consider additional diuresis with IV pushes  - Daily AM Tacrolimus lvls   - Continue Tacro 2.0 q12 and Mycophenolate 360 mg BID   - Continue Sodium bicarb 650 BID  - Continue Bactrim ppx   - Transplant nephrology following  -known free fluid in abdomen, cannot rely on bladder scan, has been straight-cathed multiple times w/o urine ouput, low c/f obstruction, will rely on urine output    #ADHF  #Stage C systolic heart failure/NICM/HFrEF (30-35% TTE 03/2024)  #SSS s/p dual-chamber PM  #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  - CT PET at OSH did not show ischemia  - EP following for CRTD upgrade iso pacemaker-induced cardiomyopathy closer to discharge   - Daily RFP and Mg for K>4, Mg >2  - Will continue to monitor I&Os   - GDMT optimization as renal function tolerates inpatient vs outpatient      #Type A aortic dissection s/p  repair 2020  #chronic residual type B aortic dissection w/ R carotid artery dissection  #HTN  - BP well controlled in 100-110s/50-80s  - No outpatient anti-hypertensive  - Continue on Hydralazine 25mg tid (started during this admission)      #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   -iron studies: 75, TIBC 252, ferritin 764  - Started Aranesep 80 mcg q14 days, first dose 3/6 at 0900      Fluids: no  Access: IV  Antibiotics: Bactrim ppx   Electrolytes: PRN  Nutrition: cardiac   PPI: Protonix 40  DVT: Hep 5000 TID  CODE status: FULL CODE  NOK: Abdiel (wife) [phone]      Jose De Jesus Trujillo MD PhD

## 2024-03-18 NOTE — NURSING NOTE
Report to Receiving RN:    Report To: Jennifer RN   Time Report Called: [id number]  Hand-Off Communication: HD completed and tolerated fair, asymptomatic hypotension noted. 1u PRBC given. Pt net positive 150 mL. Post /72. UO while here 75 mL.   Complications During Treatment: No  Ultrafiltration Treatment: Yes  Medications Administered During Dialysis: No  Blood Products Administered During Dialysis: Yes  Labs Sent During Dialysis: No  Heparin Drip Rate Changes: N/A

## 2024-03-18 NOTE — NURSING NOTE
Report from Sending RN:    Report From: Jennifer ( RN)  Recent Surgery of Procedure: No  Baseline Level of Consciousness (LOC): a/o x 4  Oxygen Use: No  Type: none  Diabetic: No  Last BP Med Given Day of Dialysis: none  Last Pain Med Given: none  Lab Tests to be Obtained with Dialysis: No  Blood Transfusion to be Given During Dialysis: Yes, 1 unit PRBC  Available IV Access: Yes  Medications to be Administered During Dialysis: No  Continuous IV Infusion Running: No  Restraints on Currently or in the Last 24 Hours: No  Hand-Off Communication: ***

## 2024-03-18 NOTE — PROGRESS NOTES
Transplant Nephrology progress note     Date of admission: 2/29/2024     Reynaldo Francisco is a 68 y.o.  with PMH   Past Medical History:   Diagnosis Date    Arteriovenous fistula, acquired (CMS/HCC) 06/17/2022    AV fistula    Lung nodule     Nutritional anemia, unspecified     Anemia, deficiency    Personal history of malignant neoplasm of pancreas 06/17/2022    History of malignant neoplasm of pancreas        SUBJECTIVE:    Denied any complaints today.  Urine output in last 24 hours is 400 cc.  PRBC today for Hb 6.8. Last HD 3/14/24.   Feels better overall.    PROBLEM LIST:  Principal Problem:    Acute systolic (congestive) heart failure (CMS/HCC)  Active Problems:    Liver transplanted (CMS/HCC)    Protein-calorie malnutrition, unspecified severity (CMS/HCC)    Dilated cardiomyopathy (CMS/HCC)    Atrioventricular block, complete (CMS/HCC)    Diabetes mellitus, type 2 (CMS/HCC)    Asplenia    ATN (acute tubular necrosis) (CMS/HCC)    AV graft stenosis (CMS/HCC)    Back pain    Cerebral artery occlusion    End stage renal disease on dialysis (CMS/HCC)    Anemia    Gastroesophageal reflux disease    History of repair of [place] type A dissecting aneurysm of thoracic aorta    Hypertension    Increased heart rate    Lung nodule    Malignant neoplasm of colon (CMS/HCC)    Metastatic cancer to liver (CMS/HCC)    Moderate aortic insufficiency    Insulinoma    Presence of primary arteriovenous graft for hemodialysis    RBBB    Right jugular vein thrombosis    Right lower quadrant abdominal pain    Suture granuloma    Systolic murmur    SCC (squamous cell carcinoma)    Syncope and collapse    Delayed graft function of kidney transplant due to ATN requiring acute dialysis (CMS/HCC)    Pacemaker    Pseudoaneurysm of distal brachial artery (CMS/HCC)    Dissecting aneurysm of thoracic aorta, [place] type B (CMS/HCC)    Arteriovenous fistula (CMS/HCC)    Balanitis    Diarrhea    History of malignant neoplasm of pancreas     "Obstruction of inferior vena cava    Shortness of breath    Heart failure (CMS/HCC)    Acute renal failure with acute renal cortical necrosis superimposed on stage 4 chronic kidney disease (CMS/HCC)    Sick sinus syndrome (CMS/HCC)    Cardiomyopathy of undetermined type (CMS/HCC)    BBB (bundle branch block)         ALLERGIES:  Allergies   Allergen Reactions    Milk Diarrhea and GI Upset     (Skim milk) diarrhea    Shellfish Derived Hives and Itching    Iodinated Contrast Media Nausea/vomiting            CURRENT MEDICATIONS:  Scheduled medications  aspirin, 81 mg, oral, q AM  clotrimazole, , Topical, BID  darbepoetin carolyn, 80 mcg, subcutaneous, q14 days  [Held by provider] furosemide, 40 mg, oral, Daily  [Held by provider] hydrALAZINE, 25 mg, oral, TID  lidocaine, 1 patch, transdermal, Daily  mycophenolate, 360 mg, oral, BID  pantoprazole, 40 mg, oral, q AM  perflutren protein A microsphere, 0.5 mL, intravenous, Once in imaging  sulfamethoxazole-trimethoprim, 1 tablet, oral, Every Mon/Wed/Fri  sulfur hexafluoride microsphr, 2 mL, intravenous, Once in imaging  tacrolimus, 2 mg, oral, q12h KESHIA  tamsulosin, 0.4 mg, oral, Daily      Continuous medications     PRN medications  PRN medications: eucerin, trimethobenzamide       OBJECTIVE:    VITALS: Visit Vitals  /69   Pulse 85   Temp 36.6 °C (97.9 °F)   Resp 16   Ht 1.727 m (5' 8\")   Wt 57.1 kg (125 lb 14.1 oz)   SpO2 94%   BMI 19.14 kg/m²   Smoking Status Never   BSA 1.66 m²          General: No distress   Mucosa moist   AI, AC, AF     HEENT: PEERLA  CVS: S1 S2 no murmurs  RESP:  Lungs clear to auscultation   ABDO: Soft, non-tender   Neuro: A + O x 3  Skin: No rash   Extremities: No edema       LABS:  Results from last 72 hours   Lab Units 03/18/24  0450 03/17/24  0545   WBC AUTO x10*3/uL 4.5 4.9   HEMOGLOBIN g/dL 6.8* 7.9*   MCV fL 96 99   PLATELETS AUTO x10*3/uL 95* 98*   BUN mg/dL 90* 76*   CREATININE mg/dL 4.68* 5.64*   CALCIUM mg/dL 8.8 9.0   TACROLIMUS ng/mL  " --  4.9              Intake/Output Summary (Last 24 hours) at 3/18/2024 0958  Last data filed at 3/18/2024 0700  Gross per 24 hour   Intake 240 ml   Output 401 ml   Net -161 ml            ASSESSMENT AND PLAN:  Reynaldo Francisco is a 68 y.o. with a history of nonischemic cardiomyopathy with a EF around 40%, s/p dual-chamber pacemaker, insulinoma with hepatic metastasis s/p liver transplant as of 1/8/2018 and DDKT as of 3/5/2023 [facility].  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  Patient was a induced by Thymoglobulin and maintained on Tac low-dose of MMF.  DSA have been negative as of 1/2024.  Patient currently admitted with acute diastolic heart failure and KIA with his baseline creatinine 1.5-2.  Other history include aortic dissection, hypertension.     Allograft function/kidney:  -Acute kidney injury likely in the setting of cardiorenal physiology required dialysis for uremia and volume management.  S/p kidney biopsy on 3/7/2024 showing ATN, ultrasound showing moderate pelvicalyceal dilatation and hydroureter but Lasix renogram was negative for any obstruction.  Urine analysis looks bland, UPC is a 0.06..  Did not respond initially to Lasix.  -dialysis sessions on 3/7/2024 and 3/9/2024. Last HD 3/14/24. Will cont twice weekly HD and monitor for signs of renal recovery.   - plan for HD today x3h for volume and clearance. Anticipate next HD 3/22/24 unless otherwise indicated clinically.   -[place] red staining of kidney bx pending. S/p endomyocardial bx 3/14/24, result pending. SPEP with no M-protein, slightly elevated K/L ratio in the setting of KIA.  -Monitor kidney function closely and avoid nephrotoxins.     Hemodynamics:   - workup suspicious for amyloidosis versus sarcoidosis s/p endomyocardial biopsy this morning  -Blood pressures are soft agree with holding hydralazine.     Immunosuppression: As per the liver  transplant team continue with the Myfortic 360 twice daily, tacrolimus 2 mg twice daily.  Current tacrolimus levels are 6.8.     Anemia: acute on chronic anemia. PRBC today.      Bone mineral disease: Calcium and phosphorus levels acceptable    Rest of the management per primary team. Will follow.

## 2024-03-19 PROBLEM — I49.5 SICK SINUS SYNDROME (MULTI): Status: RESOLVED | Noted: 2023-01-01 | Resolved: 2024-01-01

## 2024-03-19 PROBLEM — R19.7 DIARRHEA: Status: RESOLVED | Noted: 2024-01-01 | Resolved: 2024-01-01

## 2024-03-19 PROBLEM — T81.89XA SUTURE GRANULOMA: Status: RESOLVED | Noted: 2023-01-01 | Resolved: 2024-01-01

## 2024-03-19 PROBLEM — Z85.07 HISTORY OF MALIGNANT NEOPLASM OF PANCREAS: Status: RESOLVED | Noted: 2024-01-01 | Resolved: 2024-01-01

## 2024-03-19 PROBLEM — C18.9 MALIGNANT NEOPLASM OF COLON (MULTI): Status: RESOLVED | Noted: 2020-10-22 | Resolved: 2024-01-01

## 2024-03-19 PROBLEM — R10.31 RIGHT LOWER QUADRANT ABDOMINAL PAIN: Status: RESOLVED | Noted: 2023-01-01 | Resolved: 2024-01-01

## 2024-03-19 PROBLEM — I82.890 RIGHT JUGULAR VEIN THROMBOSIS: Status: RESOLVED | Noted: 2021-01-09 | Resolved: 2024-01-01

## 2024-03-19 PROBLEM — N17.0 ATN (ACUTE TUBULAR NECROSIS) (CMS-HCC): Status: RESOLVED | Noted: 2018-01-10 | Resolved: 2024-01-01

## 2024-03-19 PROBLEM — R06.02 SHORTNESS OF BREATH: Status: RESOLVED | Noted: 2024-01-01 | Resolved: 2024-01-01

## 2024-03-19 PROBLEM — R55 SYNCOPE AND COLLAPSE: Status: RESOLVED | Noted: 2023-01-01 | Resolved: 2024-01-01

## 2024-03-19 PROBLEM — I71.012 DISSECTING ANEURYSM OF THORACIC AORTA, STANFORD TYPE B (MULTI): Status: RESOLVED | Noted: 2023-01-01 | Resolved: 2024-01-01

## 2024-03-19 PROBLEM — I87.1: Status: RESOLVED | Noted: 2022-03-28 | Resolved: 2024-01-01

## 2024-03-19 PROBLEM — N48.1 BALANITIS: Status: RESOLVED | Noted: 2024-01-01 | Resolved: 2024-01-01

## 2024-03-19 PROBLEM — T82.858A: Status: RESOLVED | Noted: 2023-01-01 | Resolved: 2024-01-01

## 2024-03-19 PROBLEM — I45.4 BBB (BUNDLE BRANCH BLOCK): Status: RESOLVED | Noted: 2024-01-01 | Resolved: 2024-01-01

## 2024-03-19 PROBLEM — M54.9 BACK PAIN: Status: RESOLVED | Noted: 2023-01-01 | Resolved: 2024-01-01

## 2024-03-19 PROBLEM — C44.92 SCC (SQUAMOUS CELL CARCINOMA): Status: RESOLVED | Noted: 2023-01-01 | Resolved: 2024-01-01

## 2024-03-19 PROBLEM — I44.2 ATRIOVENTRICULAR BLOCK, COMPLETE (MULTI): Status: RESOLVED | Noted: 2023-05-10 | Resolved: 2024-01-01

## 2024-03-19 NOTE — PROGRESS NOTES
Physical Therapy    Physical Therapy Treatment    Patient Name: Reynaldo Francisco  MRN: [id number]  Today's Date: 3/19/2024  Time Calculation  Start Time: 1046  Stop Time: 1057  Time Calculation (min): 11 min       Assessment/Plan   PT Assessment  PT Assessment Results: Decreased strength, Decreased endurance, Impaired balance, Decreased mobility  End of Session Communication: Bedside nurse  Assessment Comment: Pt with complaints of pain in the biopsy site and pain where the tape was on his chest. Pt able to ambulate 200' using FWW with supervision this date. RN MD mayra in room at start of treatment to hear pt's complaints of pain and assess biopsy site.  End of Session Patient Position: Up in chair, Alarm off, not on at start of session     PT Plan  Treatment/Interventions: Bed mobility, Transfer training, Stair training, Gait training, Balance training, Endurance training, Strengthening, Therapeutic exercise, Therapeutic activity, Range of motion, Home exercise program  PT Plan: Skilled PT  PT Frequency: 3 times per week  PT Discharge Recommendations: Low intensity level of continued care  PT Recommended Transfer Status: Assist x1, Contact guard  PT - OK to Discharge: Yes      General Visit Information:   PT  Visit  PT Received On: 03/19/24  General  Family/Caregiver Present: Yes  Caregiver Feedback: Pt's wife present and supportive throughout treatment.  Prior to Session Communication: Bedside nurse  Patient Position Received: Bed, 2 rail up, Alarm off, not on at start of session  General Comment: RN cleared pt for therapy. Pt supine in bed upon arrival. Pt pleasant and agreeable to therapy.    Subjective   Precautions:  Precautions  Medical Precautions: Cardiac precautions  Vital Signs:  Vital Signs  Heart Rate: 87  Heart Rate Source: Monitor  Patient Position: Standing    Objective   Pain:  Pain Assessment  Pain Assessment:  (Pt did not rate pain)  Pain Type: Acute pain  Pain Location:  (biopsy site)  Pain  Orientation: Right  Pain Descriptors: Aching  Patient's Stated Pain Goal:  (Pt reported the RN gave him Tylenol and it has helped)  Cognition:  Cognition  Overall Cognitive Status: Within Functional Limits    Activity Tolerance:  Activity Tolerance  Endurance: Tolerates 10 - 20 min exercise with multiple rests  Treatments:  Bed Mobility  Bed Mobility: Yes  Bed Mobility 1  Bed Mobility 1: Supine to sitting  Level of Assistance 1: Close supervision  Bed Mobility Comments 1: HOB elevated.    Ambulation/Gait Training  Ambulation/Gait Training Performed: Yes  Ambulation/Gait Training 1  Surface 1: Level tile  Device 1: Rolling walker  Assistance 1: Close supervision  Comments/Distance (ft) 1: 200'  Transfers  Transfer: Yes  Transfer 1  Transfer From 1: Bed to  Transfer to 1: Stand  Technique 1: Sit to stand  Transfer Device 1:  (no AD)  Transfer Level of Assistance 1: Close supervision  Transfers 2  Transfer From 2: Stand to  Transfer to 2: Sit  Technique 2: Stand to sit  Transfer Device 2:  (no AD)  Transfer Level of Assistance 2: Close supervision    Outcome Measures:  [facility] Basic Mobility  Turning from your back to your side while in a flat bed without using bedrails: A little  Moving from lying on your back to sitting on the side of a flat bed without using bedrails: A little  Moving to and from bed to chair (including a wheelchair): A little  Standing up from a chair using your arms (e.g. wheelchair or bedside chair): A little  To walk in hospital room: A little  Climbing 3-5 steps with railing: A little  Basic Mobility - Total Score: 18    Education Documentation  Body Mechanics, taught by Cassandra Madera PTA at 3/19/2024 11:11 AM.  Learner: Patient  Readiness: Acceptance  Method: Explanation  Response: Verbalizes Understanding    Precautions, taught by Cassandra Madera PTA at 3/19/2024 11:11 AM.  Learner: Patient  Readiness: Acceptance  Method: Explanation  Response: Verbalizes Understanding    Mobility Training,  taught by Cassandra Madera PTA at 3/19/2024 11:11 AM.  Learner: Patient  Readiness: Acceptance  Method: Explanation  Response: Verbalizes Understanding    Education Comments  No comments found.        OP EDUCATION:       Encounter Problems       Encounter Problems (Active)       PT Problem       Patient will ambulate 300 ft with LRAD and modified independence.   (Progressing)       Start:  03/01/24    Expected End:  03/20/24            Patient will transfer bed to/from chair with LRAD and modified independence.  (Progressing)       Start:  03/01/24    Expected End:  03/20/24            Patient will score >24 on the Tinetti test indicating low falls risk for homegoing.   (Progressing)       Start:  03/01/24    Expected End:  03/20/24            Patient will negotiate 3 OTIS with LRAD and modified independence.   (Progressing)       Start:  03/01/24    Expected End:  03/20/24               Pain - Adult

## 2024-03-19 NOTE — PROGRESS NOTES
"Subjective   NAOE. Feels worse compared to yesterday because of skin pain under bandages which feels \"burned.\" Has been trying emollients which has not been helpful. Says that swelling around pacemaker implant is stable. Denies fevers, chills,        Objective   PE:  General: no acute distress  Cardio: normal rate, pacemaker site on right chest more swollen concerns for worsening hematoma  Pulm: non-labored, no accessory muscle usage  GI: non-distended, BS present  : no horton  MSK: no joint swelling, grossly full active ROM  HEENT: grossly normocephalic  Psych: appropriate affect  Volume: mucous membranes moist    Last Recorded Vitals  Blood pressure 109/64, pulse 91, temperature 37 °C (98.6 °F), temperature source Temporal, resp. rate 16, height 1.727 m (5' 8\"), weight 57.1 kg (125 lb 14.1 oz), SpO2 96 %.  Intake/Output last 3 Shifts:  I/O last 3 completed shifts:  In: 3550 (62.2 mL/kg) [I.V.:1700 (29.8 mL/kg); Blood:350; Other:1500]  Out: 1898 (33.2 mL/kg) [Urine:700 (0.3 mL/kg/hr); Other:1198]  Weight: 57.1 kg     Relevant Results  Scheduled medications  aspirin, 81 mg, oral, q AM  clotrimazole, , Topical, BID  darbepoetin carolyn, 80 mcg, subcutaneous, q14 days  [Held by provider] furosemide, 40 mg, oral, Daily  [Held by provider] hydrALAZINE, 25 mg, oral, TID  lidocaine, 1 patch, transdermal, Daily  mycophenolate, 360 mg, oral, BID  pantoprazole, 40 mg, oral, q AM  perflutren protein A microsphere, 0.5 mL, intravenous, Once in imaging  sulfamethoxazole-trimethoprim, 1 tablet, oral, Every Mon/Wed/Fri  sulfur hexafluoride microsphr, 2 mL, intravenous, Once in imaging  tacrolimus, 2 mg, oral, q12h KESHIA  tamsulosin, 0.4 mg, oral, Daily      Continuous medications     PRN medications  Results for orders placed or performed during the hospital encounter of 02/29/24 (from the past 24 hour(s))   Type and screen   Result Value Ref Range    ABO TYPE O     Rh TYPE POS     ANTIBODY SCREEN NEG    CBC and Auto Differential "   Result Value Ref Range    WBC 3.9 (L) 4.4 - 11.3 x10*3/uL    nRBC 0.0 0.0 - 0.0 /100 WBCs    RBC 2.35 (L) 4.50 - 5.90 x10*6/uL    Hemoglobin 7.9 (L) 13.5 - 17.5 g/dL    Hematocrit 22.8 (L) 41.0 - 52.0 %    MCV 97 80 - 100 fL    MCH 33.6 26.0 - 34.0 pg    MCHC 34.6 32.0 - 36.0 g/dL    RDW 16.1 (H) 11.5 - 14.5 %    Platelets 80 (L) 150 - 450 x10*3/uL    Neutrophils % 67.8 40.0 - 80.0 %    Immature Granulocytes %, Automated 0.5 0.0 - 0.9 %    Lymphocytes % 13.3 13.0 - 44.0 %    Monocytes % 16.6 2.0 - 10.0 %    Eosinophils % 1.5 0.0 - 6.0 %    Basophils % 0.3 0.0 - 2.0 %    Neutrophils Absolute 2.66 1.20 - 7.70 x10*3/uL    Immature Granulocytes Absolute, Automated 0.02 0.00 - 0.70 x10*3/uL    Lymphocytes Absolute 0.52 (L) 1.20 - 4.80 x10*3/uL    Monocytes Absolute 0.65 0.10 - 1.00 x10*3/uL    Eosinophils Absolute 0.06 0.00 - 0.70 x10*3/uL    Basophils Absolute 0.01 0.00 - 0.10 x10*3/uL              cMRI 3/12:   IMPRESSION:  1. The left ventricle is dilated (LVEDVi = 107 mL/m2) with moderately  reduced systolic function (LVEF = 34%). Moderate global hypokinesis  without focal wall motion abnormality.  2. Multifocal areas of patchy mid myocardial enhancement, greatest in  the basal septum and lateral wall which are nonspecific and in  keeping with areas of scarring. In addition, there is a more focal  area of delayed enhancement within the true apex which may have some  subendocardial involvement and thus sequelae of prior infarction is  not entirely excluded. Approximate scar size = 14%. Of note, there  are areas of increased STIR signal correlates areas of delayed  enhancement and are consistent with active edema. Differential  considerations include sarcoidosis in the appropriate clinical  setting.  3. Mild aortic regurgitation (regurgitant fraction = 17%).  4. Mild concentric left ventricular hypertrophy with wall thickness  measuring up to 1.3 cm.  5. Biatrial enlargement.  6. Postsurgical changes of prior  ascending aortic graft repair and  residual aortic dissection extending from the distal arch to the  visualized descending aorta and not fully evaluated on this exam.  7. Large right and trace left pleural effusions with associated  atelectasis.    PET/Ct cardiac sarcoid scan 3/13  IMPRESSION:  1. Assuming appropriate fasting, there is increased metabolic  activity throughout the left ventricle, which is compatible with an  inflammatory process such as myocarditis or sarcoidosis. Findings can  also be seen with amyloidosis.  2. A small area of decreased perfusion is seen along the apical  anterior wall, within the apex as well as a mid size area of  decreased perfusion in the basal half of the lateral wall, compatible  with fibrotic changes.  3. The left ventricle is enlarged with globally reduced wall motion  and ejection fraction of 34%.  4. Mild increased metabolic activity is seen along the ascending  aorta, compatible with prior ascending aortic graft repair.  5. No evidence of hypermetabolic lymphadenopathy.     Assessment/Plan     Reynaldo Francisco is a 68 y.o. 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  Etiology of KIA appears to be most likely cardiorenal given elev CVP and PCWP, however Cr worsened with aggressive diuresis and additionally, found to have mild-mod MR, mod-severe TR and mod AI on TTE so valvular disease a more likely etiology of elevated filling pressures. However, Cr now rising again, making mixed intrinsic and cardiorenal etiologies feasible. Per transplant nephro team, lasix renogram obtained and negative for functional obstruction. NPO for  transplanted kidney biopsy today 3/6. BKV found to be positive. Active issues: hematoma around pacemaker monitoring for bleeding.     Changes Today:  - CTM pacemaker hematoma  - hgb incremented appropriately after 1x blood transfusion yesterday and has been holding stable    #KIA on CKD (baseline Scr 2.1-2.2), non-oliguric   #ESRD s/p kidney transplantation (on tacromlimus and mycophenolate)  #NAGMA  - Initially suspect cardiorenal syndrome, however Cr worsening with aggressive diuresis, Dced drip on 3/3  - s/p transplanted kidney biopsy 3/7 minimal histologic abnormality, possible calcineurin inhibitor toxicity   - BKV positive - transplant aware  - EBV and CMV negatvie, pending DSA   - Nuclear med lasix renogram negative for obstruction  - consider additional diuresis with IV pushes  - Daily AM Tacrolimus lvls   - Continue Tacro 2.0 q12 and Mycophenolate 360 mg BID   - Continue Sodium bicarb 650 BID  - Continue Bactrim ppx   - Transplant nephrology following  -known free fluid in abdomen, cannot rely on bladder scan, has been straight-cathed multiple times w/o urine ouput, low c/f obstruction, will rely on urine output    #ADHF  #Stage C systolic heart failure/NICM/HFrEF (30-35% TTE 03/2024)  #SSS s/p dual-chamber PM  #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  - CT PET at OSH did not show ischemia  - EP following for CRTD upgrade iso pacemaker-induced cardiomyopathy closer to discharge   - Daily RFP and Mg for K>4, Mg >2  - Will continue to monitor I&Os   - GDMT optimization as renal function tolerates inpatient vs outpatient      #Type A aortic dissection s/p repair 2020  #chronic residual type B aortic dissection w/ R carotid artery dissection  #HTN  - BP well controlled in 100-110s/50-80s  - No outpatient anti-hypertensive  - Continue on Hydralazine 25mg tid (started during this admission)      #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   -iron studies: 75, TIBC 252, ferritin 764  - Started Aranesep 80 mcg q14 days, first dose 3/6 at 0900      Fluids: no  Access: IV  Antibiotics: Bactrim ppx   Electrolytes: PRN  Nutrition: cardiac   PPI: Protonix 40  DVT: Hep 5000 TID  CODE status: FULL CODE  NOK: Abdiel (wife) [phone]      Jose De Jesus Trujillo MD PhD

## 2024-03-20 PROBLEM — I21.9 MYOCARDIAL INFARCTION (MULTI): Status: ACTIVE | Noted: 2024-01-01

## 2024-03-20 NOTE — DISCHARGE INSTRUCTIONS
Dear Mr. Francisco,    You were treated at [facility] for heart failure and also kidney injury. You were found to have pacemaker-mediated cardiomyopathy. Heart muscle biopsies were obtained. Kidney biopsies were obtained as well. Your pacemaker was upgraded on 3/15 to CRT-D. You tolerated this upgrade well. You developed some bruising and bleeding at the site of pacemaker change but this stabilized and you discharged home on 3/20.    After discharge, you will need dialysis until your kidney function improves. To aid in this recovery, you will both get dialysis here at [facility] on Monday and Friday at 12:30 PM. You will also take a medication called Bumex. Take 2 mg of Bumex daily for 1 week. If your urine output does not improve after 1 week then stop taking Bumex. You will also increase your mycophenolate to 360 mg twice daily. This will help protect your kidney transplant from rejection. Take tacrolimus 2 mg twice a day.    Your heart failure team wants you to follow up with them after you leave. We have requested that they see you within 2 weeks of discharge. They also want you to start on a medication called metoprolol succinate. Please take this as directed.    You will need to get labs drawn twice a week. Keep your regular lab appointment for [facility]. But now you will also have a blood test every Thursday here at  for monitoring your kidney. We have put that order in. Return here to get blood draws.    Here are the instructions regarding your pacemaker:    Discharge Instructions for your Cardiac Device   CARDIAC DEVICE CLINIC  [phone]              Incision:   1.           Keep your incision clean and dry for 1 week.  2.          May shower 7 days after the procedure. Do not submerge the incision in a tub, pool, hot tub, or lake for 4 weeks.  3.          Your incision should look better each day. If you notice unusual swelling, redness, drainage or fever greater than 100 degrees, please call the  Device Clinic or your Doctor's office.  4.          Avoid using deodorants, powders, creams, lotions, etc on your incision for 4 weeks.   5.          There are no stitches to be removed.  If you received a “glue” closure this may appear purple-gray and does not get removed but wears away slowly on its own.  Steri-strips (small white bandages) may be removed in one week or they may fall off on their own earlier.  Pain:  1.          It is normal for the area around the incision to be tender for a few weeks following surgery. Pain relievers such as Tylenol or Motrin (whichever you can take) are usually sufficient for pain relief. If the pain gets worse instead of better than please call the Device Clinic or your Doctor.  Activity:  1.          If you have a new device and new leads placed than avoid raising your arm above shoulder level for 4 weeks. Do no  anything weighing more than 15 pounds.   2.          Avoid exercising with the arm on the side of your pacemaker.  So no golf, swimming, tennis, bowling etc for 4 weeks.  3.          Driving: If you were driving prior to your procedure, you may resume driving in 1 week. If you experienced a loss of consciousness prior to your procedure, you should verify with your Doctor when you are able to resume driving.  ID CARD:  1.          It is important to carry your device ID card with you at all times.   2.          Inform doctors and health care providers that you have a pacemaker or defibrillator.  Electromagnetic Interference:  1.          Microwave ovens are safe to use.  2.          Cellular phones should be held to the opposite ear from your device. Do not carry your phone in your shirt pocket. Some i-phones that self-charge can interfere with your device so be sure to keep it away from your pacemaker/defibrillator.   3.          Read the Patient Booklet for more information. You may call either the Device Clinic [phone]  or the patient services of the  device  with questions about specific electrical appliances and interference problems.     IT IS YOUR RESPONSIBILITY TO MAKE AND KEEP APPOINTMENTS.   Please refer to your Device clinic handout.     Thank you for entrusting us with your care.    Sincerely,  Your  Medical Team

## 2024-03-20 NOTE — PROGRESS NOTES
Overnight Events:    No major overnight events. Discussed biopsy results with patient and his wife today.      Objective Data:  Last Recorded Vitals:  Vitals:    03/20/24 0808 03/20/24 1151 03/20/24 1246 03/20/24 1610   BP: 115/61 90/54 101/55 104/53   BP Location:       Patient Position:       Pulse: 84 83 94 83   Resp: 18 18  18   Temp: 37 °C (98.6 °F) 36.7 °C (98.1 °F)  36.4 °C (97.5 °F)   TempSrc:       SpO2: 95% 96%  96%   Weight:       Height:           Last Labs:  CBC - 3/20/2024:  4:56 AM  4.6 7.7 90    22.1      CMP - 3/20/2024:  4:56 AM  8.5 4.5 10 --- 0.4   3.3 3.9 4 46      PTT - 3/13/2024:  9:00 PM  1.2   13.7 31     TROPHS   Date/Time Value Ref Range Status   03/01/2024 12:03  0 - 53 ng/L Final     Comment:     Previous result verified on 2/29/2024 1656 on specimen/case [id number] called with component TRPHS for procedure Troponin I, High Sensitivity, Initial with value 664 ng/L.   02/29/2024 05:08  0 - 20 ng/L Final     Comment:     Previous result verified on 2/29/2024 1656 on specimen/case [id number] called with component TRPHS for procedure Troponin I, High Sensitivity, Initial with value 664 ng/L.   02/29/2024 04:18  0 - 20 ng/L Final     BNP   Date/Time Value Ref Range Status   03/01/2024 12:03  0 - 99 pg/mL Final   02/29/2024 04:18  0 - 99 pg/mL Final     HGBA1C   Date/Time Value Ref Range Status   03/08/2024 05:00 AM 5.9 see below % Final   05/01/2023 06:22 AM 6.1 <5.7 % Final   03/07/2023 03:24 AM 5.2 <5.7 % Final      Last I/O:  I/O last 3 completed shifts:  In: 472 (8.3 mL/kg) [P.O.:472]  Out: 300 (5.3 mL/kg) [Urine:300 (0.1 mL/kg/hr)]  Weight: 57.1 kg     Past Cardiology Tests (Last 3 Years):  Echo:  Transthoracic Echo (TTE) Complete 03/01/2024  PHYSICIAN INTERPRETATION:  Left Ventricle: The left ventricular systolic function is moderately to severely decreased, with an estimated ejection fraction of 30-35%. There is global hypokinesis of the left  ventricle with minor regional variations. The left ventricular cavity size is severely dilated. There is severe eccentric left ventricular hypertrophy. Left ventricular diastolic filling was not assessed.  Left Atrium: The left atrium is severely dilated. There is no evidence of a patent foramen ovale. A bubble study using agitated saline was performed. Bubble study is negative.  Right Ventricle: The right ventricle is normal in size. There is reduced right ventricular systolic function. A device is visualized in the right ventricle.  Right Atrium: The right atrium is severely dilated. There is a device visualized in the right atrium.  Aortic Valve: The aortic valve appears structurally normal. There is minimal aortic valve cusp calcification. There is moderate aortic valve regurgitation. The peak instantaneous gradient of the aortic valve is 4.3 mmHg.  Mitral Valve: The mitral valve is mildly thickened. There is mild to moderate mitral valve regurgitation.  Tricuspid Valve: The tricuspid valve is structurally normal. There is moderate to severe tricuspid regurgitation. The Doppler estimated RVSP is severely elevated at 59.4 mmHg.  Pulmonic Valve: The pulmonic valve is structurally normal. There is trace pulmonic valve regurgitation.  Pericardium: There is a trivial pericardial effusion.  Pleural: There is a moderate left pleural effusion.  Aorta: The aortic root is normal.  Systemic Veins: There is IVC inspiratory collapse greater than 50%. The hepatic vein shows a pattern of systolic flow reversal, suggestive of severe tricuspid regurgitation.  In comparison to the previous echocardiogram(s): Compared with study from 10/6/2023, LV dilatation and reduced LVEF is new. Previously LV systolic function was normal. Biatrial dilatation is know with possible some worsening. AR is known.        CONCLUSIONS:   1. Left ventricular systolic function is moderately to severely decreased with a 30-35% estimated ejection  fraction.   2. There is severe eccentric left ventricular hypertrophy.   3. There is reduced right ventricular systolic function.   4. The left atrium is severely dilated.   5. The right atrium is severely dilated.   6. There is a moderate pleural effusion.   7. Mild to moderate mitral valve regurgitation.   8. Moderate to severe tricuspid regurgitation visualized.   9. Severely elevated right ventricular systolic pressure.  10. Moderate aortic valve regurgitation.  11. Left ventricular cavity size is severely dilated.  12. There is no evidence of a patent foramen ovale.  13. There is global hypokinesis of the left ventricle with minor regional variations.  14. Compared with study from 10/6/2023, LV dilatation and reduced LVEF is new. Previously LV systolic function was normal. Biatrial dilatation is know with possible some worsening. AR is known.     Ejection Fractions:        EF   Date/Time Value Ref Range Status   03/01/2024 11:00 AM 33 %        Cath:  [facility] EMBx 3/14/24  CONCLUSIONS:   1. /93/(97), RA 11, PA 42/24/(30), PCW 22, sat 60%, CO/CI 5.9/3.5, SVR 1166 dynes, PVR 1.4 HUMPHREYS.   2. Preserved CO/CI with elevated biventricular filling pressures.   3. EMBx x5 via LFV sent to pathology to eval for infiltrative CM (r/o sarcoidosis).   4. Return to LT7 for ongoing management.     Cardiac Catheterization Procedure 03/01/2024  CONCLUSIONS:   1. Elevated right- and left-sided filling pressures, normal cardiac output (CO/CI 5.4/3.1).   2. MRA 18 mmHg.   3. RV 46/17 mmHg.   4. PA 51/25, mPA 36 mmHg.   5. MPCWP 33 mmHg (with large V waves).   6. CO 5.4 L/min, CI 3.1 L/min/m2.        Stress Test:  No results found for this or any previous visit from the past 1095 days.     Cardiac Imaging:  NM PET CT  3/13/24  IMPRESSION:  1. Assuming appropriate fasting, there is increased metabolic  activity throughout the left ventricle, which is compatible with an  inflammatory process such as myocarditis or sarcoidosis. Findings  can  also be seen with amyloidosis.  2. A small area of decreased perfusion is seen along the apical  anterior wall, within the apex as well as a mid size area of  decreased perfusion in the basal half of the lateral wall, compatible  with fibrotic changes.  3. The left ventricle is enlarged with globally reduced wall motion  and ejection fraction of 34%.  4. Mild increased metabolic activity is seen along the ascending  aorta, compatible with prior ascending aortic graft repair.  5. No evidence of hypermetabolic lymphadenopathy.        MR cardiac morphology and function w and wo IV contrast 03/12/2024  IMPRESSION:  1. The left ventricle is dilated (LVEDVi = 107 mL/m2) with moderately  reduced systolic function (LVEF = 34%). Moderate global hypokinesis  without focal wall motion abnormality.  2. Multifocal areas of patchy mid myocardial enhancement, greatest in  the basal septum and lateral wall which are nonspecific and in  keeping with areas of scarring. In addition, there is a more focal  area of delayed enhancement within the true apex which may have some  subendocardial involvement and thus sequelae of prior infarction is  not entirely excluded. Approximate scar size = 14%. Of note, there  are areas of increased STIR signal correlates areas of delayed  enhancement and are consistent with active edema. Differential  considerations include sarcoidosis in the appropriate clinical  setting.  3. Mild aortic regurgitation (regurgitant fraction = 17%).  4. Mild concentric left ventricular hypertrophy with wall thickness  measuring up to 1.3 cm.  5. Biatrial enlargement.  6. Postsurgical changes of prior ascending aortic graft repair and  residual aortic dissection extending from the distal arch to the  visualized descending aorta and not fully evaluated on this exam.  7. Large right and trace left pleural effusions with associated  atelectasis.      Inpatient Medications:  Scheduled medications   Medication Dose Route  Frequency    aspirin  81 mg oral q AM    bumetanide  2 mg oral Daily    clotrimazole   Topical BID    darbepoetin carolyn  80 mcg subcutaneous q14 days    [Held by provider] furosemide  40 mg oral Daily    [Held by provider] hydrALAZINE  25 mg oral TID    lidocaine  1 patch transdermal Daily    mycophenolate  360 mg oral BID    pantoprazole  40 mg oral q AM    perflutren protein A microsphere  0.5 mL intravenous Once in imaging    sulfamethoxazole-trimethoprim  1 tablet oral Every Mon/Wed/Fri    sulfur hexafluoride microsphr  2 mL intravenous Once in imaging    tacrolimus  2 mg oral q12h KESHIA    tamsulosin  0.4 mg oral Daily     PRN medications   Medication    acetaminophen    eucerin    trimethobenzamide     Continuous Medications   Medication Dose Last Rate       Physical Exam:  Physical Exam  Constitutional:       General: He is not in acute distress.     Appearance: Normal appearance. He is normal weight. He is not toxic-appearing.   HENT:      Head: Normocephalic and atraumatic.      Mouth/Throat:      Mouth: Mucous membranes are moist.      Pharynx: Oropharynx is clear.   Neck:      Comments: Chronic JVD RIJ due to previous port placement.   Cardiovascular:      Rate and Rhythm: Normal rate and regular rhythm.      Pulses: Normal pulses.      Heart sounds: Normal heart sounds. No murmur heard.     No friction rub. No gallop.   Pulmonary:      Effort: Pulmonary effort is normal. No respiratory distress.      Breath sounds: Normal breath sounds. No wheezing or rales.   Abdominal:      General: Abdomen is flat. Bowel sounds are normal. There is no distension.      Palpations: Abdomen is soft.      Tenderness: There is no abdominal tenderness.   Musculoskeletal:         General: Normal range of motion.      Cervical back: Normal range of motion and neck supple.      Right lower leg: No edema.      Left lower leg: No edema.   Skin:     General: Skin is warm and dry.      Capillary Refill: Capillary refill takes less  than 2 seconds.      Findings: No lesion.   Neurological:      General: No focal deficit present.      Mental Status: He is alert and oriented to person, place, and time. Mental status is at baseline.            Assessment/Plan   Stage C HF/HFrEF/NICM with LVEF 30-35% s/p CRT upgrade 3/15  Hx of SSS and CHB s/p PPM - suspected PICM due to RV apical pacing (97% V-pacing noted), infiltrative CM not ruled out  Hx of insulinoma with liver mets s/p OLT in 2018  KIA (likely ATN), ESRD s/p DDKT (subsequent to OLT) on intermittent HD currently     Recommendations:     - EMBx negative for cardiac amyloidosis and sarcoidosis. No additional immunosuppression is recommended at this time.  - At a minimum this patient should be started on metoprolol succinate 25mg daily. Renal dysfunction precludes other GDMT.  - Outpatient follow up in HF clinic is recommended to add/adjust HF GDMT regimen as renal function recovers.     Pt discussed with HF attending, Dr. Sandhu. D/w primary team.       Peripheral IV 03/07/24 20 G Right;Anterior Forearm (Active)   Site Assessment Clean;Dry;Intact 03/15/24 0727   Dressing Status Clean;Dry 03/15/24 0727   Number of days: 8       Hemodialysis Arteriovenous Fistula 01/01/18 Left Upper arm (Active)   Site Assessment Clean;Intact;Dry 03/15/24 0727   Dressing Status Clean;Dry 03/15/24 0727   Number of days: 2265       Code Status:  Full Code    I spent 40 minutes in the professional and overall care of this patient.        Tc Lizama DO

## 2024-03-20 NOTE — DISCHARGE SUMMARY
Discharge Diagnosis  Acute systolic (congestive) heart failure (CMS/HCC)  Acute kidney failure, s/p transplant currently on HD  S/p CRT D upgrade of PPM  Hematoma, post op at the PPM site.  Anemia  S/p liver and Kidney transplant.    Issues Requiring Follow-Up  Kidney failure  Dialysis [facility] Mo/Fr 12:30PM   CBC/RFP/Mg/tac on Mo/Th    Test Results Pending At Discharge  Pending Labs       No current pending labs.            Hospital Course  History Of Present Illness  Reynaldo Francisco is a 68 y.o. male presenting for management of ADHF and KIA.   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 Patient AOX3 however history largely obtained from wife (accompanying him) as he does not speak English. Over the past 1 week patient with increasing weight gain (primarily in the abdomen), ALONZO, orthopnea and PND. Denies chest pain, fever, sick contact, or cough. Prior to his symptoms patient stopped his daily diuretic at the instruction of his nephrologist in the setting of worsening renal injury (additional hx below). Presented to [facility] with vitals are 98.1 temp, 66 HR, 20 RR, 90% O2 on RA.  Pertinent labs include 863 BNP, 664 troponin, 10.6 hemoglobin, 3.3 WBC.  Initial EKG showed ST depressions in the anterior lateral leads with no reciprocal elevation posterior EKG showed elevations in the posterior segments thus STEMI code was activated. Notably patient is V-paced. Case was discussed with  canceled STEMI activation. Subsequent CXR and POCUS suggestive of volume overlaoad. Given 40 IV lasix (unclear response). Due to the fact that he has a complex transplant history and is on multiple antirejection medications that  require careful titration patient was accepted at the [facility] CICU in transfer.        Notable recent events (all occurring at [facility] in [place] including Transplant care):   -Liver transplantation in 2018 (requiring bypass) following unsuccesful first attempt 8 months prior. Course complicated by renal failure requiring RRT until renal transplantation in 03/2023.   -[employer] Dual Chamber PM placed 10/2023 for SSS  -Hospitalized 01/2024 with SOB/ALONZO/orthopnea/PND/abdominal distention and KIA . Initial plan was to work-up injury of transplanted kidney however symptoms and KIA improved with diuresis. Notably CMV TTE then demonstrated newly reduced EF 40% and severely hypokinetic apex. Completed stress PET (as part of ischemic evaluation) - negative. Concern that new cardiomyopathy was due to high pacing burden (unable to find documentation of device interrogation) however no device changes made. Discharged with Lasix 40 every day. Scr 2.1-2.2 on discharge.  -CMV and renal U/S negative  -symptoms continued to improve on DC with lowest weight achieved 128 lb (daily weight checks).   -On 2/6, outpatient CMP demonstrating increase in renal function. Nephrologist instructed patient to switch diuretic to EOD. Scr continued to uptrended to 3.3 such that patient instructed to hold diuretic  for the past week. Patient weight prior to presenting to [facility] 137 lb.      CICU Course:    Reynaldo Francisco is a 68 y.o. male presenting for management of ADHF and KIA.   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    On arrival (3/1), VSS  (breathing well on RA). EKG V-paced. Volume overloaded on exam and echo, diuresed with IV bumex and diuril pushes. Device interrogation -97% V-paced.      [place] placed in cath lab on 3/1, and pt found to have elev R sided filling pressures and elev Cr c/f cardiorenal syndrome and pacemaker-mediated cardiomyopathy, with aggressive diuresis initiated on Bumex drip and additional boluses of IV lasix, bumex and diuril. Pt did have good response with -2L net negative, however Cr continued to worsen to max of 4.39 with diuresis and pt started complaining of abdominal pain over site of transplanted kidney. TTE showed that patient also has severe TR, mod MR and mod AR, so we think that elev R sided pressures may be iso valvular disease rather than true volume overload. Held diuresis for 24H, Cr initially decreased and then increased, so starting to seem like possibly just intrinsic kidney disease given that Cr doesnt seem to correlate with volume status and our suspicion for cardiorenal is low now given stable and improved filling pressures on swan today. Transplant nephro following, rec taking off bumex drip and going to boluses. Also concerned about obstruction vs rejection. Recommended nuclear med lasix renogram (he is allergic to contrast), negative for any obstruction. Sent CMV neg, EBV neg, BK virus (38 international units /mL).  Transplant nephro rec'd kidney biopsy performed on 3/7 with minimal histologic abnormality, possible calcineurin inhibitor toxicity indicating likely functional etiology of KIA such as ATN. Pt underwent iHD per transplant renal recs with sessions on 3/7, 3/9, 3/14.    Seen by hepatology given h/o liver transplant, deferred tacro dosing to transplant nephro and rec discussion with [facility] hepatology team with any issues. Not reached out thus far, no active hepatic issues.    EP consulted for pacemaker-mediated cardiomyopathy given that patient is pacing-dependent, and rec'd cMRI prior to deciding  on pacemaker upgrade which had myocardial enhancement patterns with differential  considerations including sarcoidosis, PET sarcoid scan was rec'd which showed increased metabolic activity throughout the left ventricle, which is compatible with an inflammatory process such as myocarditis, sarcoidosis, or amyloidosis. EP cardiology rec'd HF cardiology consultation after which pt underwent endomyocardial biopsy  on 3/14. Pt ultimately underwent uneventful upgrade to CRT-D on 3/15. Patient tolerated the procedure well. Developed a hematoma around the pacemaker with some decrement in hemoglobin but stabilized and was discharged home on 3/20.      Pertinent Physical Exam At Time of Discharge  Physical Exam  General: no acute distress  Cardio: normal rate, pacemaker site on right chest more swollen concerns for worsening hematoma  Pulm: non-labored, no accessory muscle usage  GI: non-distended, BS present  : no horton  MSK: no joint swelling, grossly full active ROM  HEENT: grossly normocephalic  Psych: appropriate affect  Volume: mucous membranes moist      Home Medications     Medication List      START taking these medications     acetaminophen 325 mg tablet; Commonly known as: Tylenol; Take 3 tablets   (975 mg) by mouth every 8 hours if needed for moderate pain (4 - 6),   headaches or fever (temp greater than 38.0 C).   bumetanide 2 mg tablet; Commonly known as: Bumex; Take 1 tablet (2 mg)   by mouth once daily. Stop taking after 1 week if minimal urine output   metoprolol succinate XL 25 mg 24 hr tablet; Commonly known as:   Toprol-XL; Take 1 tablet (25 mg) by mouth once daily. Do not crush or   chew.   tamsulosin 0.4 mg 24 hr capsule; Commonly known as: Flomax; Take 1   capsule (0.4 mg) by mouth once daily. Do not start before March 20, 2024.     CHANGE how you take these medications     mycophenolate 360 mg EC tablet; Commonly known as: Myfortic; Take 1   tablet (360 mg) by mouth 2 times a day.; What changed:  medication   strength, how much to take   tacrolimus 1 mg capsule; Commonly known as: Prograf; Take 2 capsules (2   mg) by mouth every 12 hours.; What changed: when to take this, Another   medication with the same name was removed. Continue taking this   medication, and follow the directions you see here.     CONTINUE taking these medications     aspirin 81 mg EC tablet   Protonix 40 mg EC tablet; Generic drug: pantoprazole   sulfamethoxazole-trimethoprim 400-80 mg tablet; Commonly known as:   Bactrim     STOP taking these medications     clotrimazole 1 % cream; Commonly known as: Lotrimin   furosemide 40 mg tablet; Commonly known as: Lasix   sodium bicarbonate 650 mg tablet       Outpatient Follow-Up  Future Appointments   Date Time Provider Department Center   3/25/2024 12:00 PM John Manrique MD [id number] [place]   3/26/2024 11:00 AM PAR [facility] 3 ECHO ROOM 1 [id number] [facility]   3/26/2024 11:45 AM Vladimir Barber MD [id number] [place]   4/12/2024  1:30 PM [facility]   6/7/2024  1:15 PM Vladimir Barber MD [id number] [place]   8/13/2024 11:00 AM Declan Mina MD [id number] [place]       Jose De Jesus Trujillo MD PhD

## 2024-03-20 NOTE — CARE PLAN
Problem: Heart Failure  Goal: Report improvement of dyspnea/breathlessness this shift  Outcome: Progressing     Problem: Safety  Goal: Patient will be injury free during hospitalization  Outcome: Progressing     Problem: Skin  Goal: Promote/optimize nutrition  Outcome: Progressing  The patient's goal for the shift is to try to sleep for four hours without interruption    The clinical goals for the shift include patient will remain HDS throughout my shift.    The patient was waiting on needing to void before attempting to sleep and wanted to wait on taking his tylenol for pain until after he voids, affecting his ability to sleep without interruption , otherwise pt remained hemodynamically stable throughout my shift

## 2024-03-21 NOTE — PROGRESS NOTES
"Subjective   Patient ID: Reynaldo Francisco is a 68 y.o. male who presents for Hospital Follow-up (Very weak , not much feeling in both legs).    HPI   Hospital discharge fu.    Recently hospitalized for ADHF and KIA w/ known complex MHx including Stage C Systolic Heart Failure/NICM/HFrEF (40% TTE 01/2024), SSS s/p Dual-Chamber PM, Insulinoma w/ Hepatic Metastasis requiring liver transplant (2018) c/b renal failure s/p kindey transplant (on tacrolimus, mycophenolate mofetil; bsl Cr 2.1-2.2), Type A Aortic Dissection, HTN, Anemia (bsl Hgb 10).    Details of hospitalization reviewed per discharge summary on 3/20/24. Notable problems included ADHF, kidney failure post transplant requiring HD, s/p CRT D upgrade of PPM c/b post operative Hematoma. Also had biopsies of kidney and endomyocardium, inconclusive. Follow up action items included new onset dialysis. Did stay in ICU level care during hospitalization. Gets majority of transplant care in [place]. Has planned follow up with Cardiology/Heart Failure, Nephrology, Transplant Nephro (at [facility]) and referrals to Home Care pending.    On my interview, still feels weak, but Home Care will be setting up services soon. Denies any issues with breathing, leg swelling or other symptoms. He has been stable since discharge (yesterday). Advised to trial Bumex for a week by his nephrologist and may discontinue. Following with both  Transplant Nephro and [facility] Transplant Nephro for now while he's back on dialysis. Wife accompanied him for this visit and very informed about all steps of his care. Very good support, patient says he would be much worse off without her.    Review of Systems  12 Point ROS reviewed and otherwise negative except as stated above.    Objective   /62   Ht 1.727 m (5' 8\")   BMI 19.14 kg/m²     Physical Exam  General: awake, alert, conversant, NAD  Skin: bruising and healing incisions overlying R upper chest at site of PM procedures; wife confirms that it is " slowly improving post hospitalization  HENT: NCAT, TMI w/ moderate earwax buildup  Eyes: EOMI, no scleral icterus or conjunctival pallor  Cardiac: RRR, palpable PM as described above  Pulm: CTAB, normal respiratory effort, no inc WOB  Abdomen: soft, ND, NT, no involuntary guarding or rebound tenderness  EXT: no peripheral edema, no asymmetry noted  MSK: no focal joint swelling noted, sensation and strength intact 4/5 in all extremities b/l  Neuro: AOx3, able to follow commands, no gross FND  Psych: coherent thought process, appropriate mood and affect    Labs reviewed, noted to have downtrending Magneisum (advised patient wife; she has magnesium at home and doses it as necessary per his lab results) and uptrending Scr, but next dialysis scheduled for tomorrow.    Assessment/Plan   This was a highly complex transition of care visit completed within 7 days of hospital discharge. Available hospital records, labs, imaging were reviewed. Inpatient and outpatient medications were reconciled.      #Hospital Discharge Follow Up; Transitions of Care Management    #HypoMagnesemia  - Wife has magnesium at home as necessary, next labs are scheduled for today    #Post Hospital Deconditioning  - Home Care Services pending    #CKD (baseline Scr 2.1-2.2), non-oliguric   #ESRD s/p kidney transplantation (on tacromlimus and mycophenolate)  #Return to HD (MWF)  - follows w/ transplant Nephro, primarily in [place]  - s/p transplanted kidney biopsy 3/7 minimal histologic abnormality, possible calcineurin inhibitor toxicity  - Continue meds per [facility] transplant nephro (Tacro, mycophenylate, bactrim ppx, tamsulosin)     #ADHF  #Stage C systolic heart failure/NICM/HFrEF (30-35% TTE 03/2024)  #SSS s/p dual-chamber PM s/p recent Upgrade Procedure (3/2024)  #Hx of Complete Heart Block (2/2 [place])  #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  - On Aspirin, Bumex, Metoprolol  - Bumex management per Nephro (may not take on Dialysis days,  trial for a week)   - GDMT optimization per Heart Failure (pending appointment)  - Follows w/ Cadiology     #Type A aortic dissection s/p repair 2020  #Chronic residual type B aortic dissection w/ R carotid artery dissection  #Renovascular HTN  - BP well controlled in 100-110s/50-80s     #Insulinoma w/ hepatic metastasis requiring liver transplantation and pancreatic surgery (2018)  -C/w anti-rejection medication  -Following with transplant physicians at [facility]     #anemia, multifactorial (baseline Hgb 10)  - at baseline.   - s/p Aranesp at recent hospitalization  - Follows w/ Hematolgoy, Dr. Mina    #HM: Last colonoscopy in 2019, next due this year (q5 years), consider discussion at next visit; may benefit from GI consultation. Received pneumonia vaccines, Shingrix, flu shot. Vaccinated against COVID-19 & boosters previously. Longitudinal fu.     RTC 2 mo    Jaswant Knowles   IM PGY3    Trainee role: Resident    I saw and evaluated the patient. I personally obtained the key and critical portions of the history and physical exam or was physically present for key and critical portions performed by the trainee. I reviewed the trainee's documentation and discussed the patient with the trainee. I agree with the trainee's medical decision making as documented on the trainee's notes.    John Manrique M.D.

## 2024-03-21 NOTE — PROGRESS NOTES
Discharge Facility: [facility]  Discharge Diagnosis: NSTEMI, HF, kidney failure, pacemaker upgrade  Admission Date:  Discharge Date:     PCP Appointment Date:  Specialist Appointment Date: Follow up with transplant team  3/26/24 Dr Barber with echo  HF cardiology  Hospital Encounter and Summary: Linked   See discharge assessment below for further details    Engagement  Call Start Time: 1030 (3/21/2024 10:50 AM)    Medications  Medications reviewed with patient/caregiver?: Yes (3/21/2024 10:50 AM)  Is the patient having any side effects they believe may be caused by any medication additions or changes?: No (3/21/2024 10:50 AM)  Does the patient have all medications ordered at discharge?: Yes (3/21/2024 10:50 AM)  Care Management Interventions: No intervention needed (3/21/2024 10:50 AM)  Prescription Comments: Changed dosing of mycophenolate, tacrolimus, started bumex, metorpolol succinate, tamsulosin. Stopped sodium bicarb, furosemide (3/21/2024 10:50 AM)  Is the patient taking all medications as directed (includes completed medication regime)?: Yes (3/21/2024 10:50 AM)  Care Management Interventions: Provided patient education (3/21/2024 10:50 AM)  Medication Comments: No issues with obtaining medications (3/21/2024 10:50 AM)    Appointments  Does the patient have a primary care provider?: Yes (3/21/2024 10:50 AM)  Care Management Interventions: Verified appointment date/time/provider (3/21/2024 10:50 AM)  Has the patient kept scheduled appointments due by today?: Yes (3/21/2024 10:50 AM)  Care Management Interventions: Advised to schedule with specialist (Will set up appt with HF cardiology and transplant) (3/21/2024 10:50 AM)    Self Management  What is the home health agency?: [facility] (3/21/2024 10:50 AM)  Has home health visited the patient within 72 hours of discharge?: Call prior to 72 hours (3/21/2024 10:50 AM)    Patient Teaching  Does the patient have access to their discharge instructions?: Yes (3/21/2024 10:50  AM)  Care Management Interventions: Reviewed instructions with patient (3/21/2024 10:50 AM)  What is the patient's perception of their health status since discharge?: Improving (3/21/2024 10:50 AM)  Is the patient/caregiver able to teach back the hierarchy of who to call/visit for symptoms/problems? PCP, Specialist, Home Health nurse, Urgent Care, ED, 911: Yes (3/21/2024 10:50 AM)  Patient/Caregiver Education Comments: Aware to obtain labs twice weekly, notify specialists if any concerns in swelling or weight gain (3/21/2024 10:50 AM)    Wrap Up  Wrap Up Additional Comments: Spoke with spouse who says patient is doing ok since coming home, [facility]. Has dialysis Mon and Fri at [facility] and labs twice weekyl for transplant team (3/21/2024 10:50 AM)  Call End Time: 1040 (3/21/2024 10:50 AM)

## 2024-03-21 NOTE — TELEPHONE ENCOUNTER
Pt was just discharged from a 3 week hospital stay and wants to know if Dr. Barber wants him to keep his echo and ov for next week. He got a new pacemaker and is waiting for heart biopsy results.

## 2024-03-21 NOTE — TELEPHONE ENCOUNTER
Pt's wife LM for me with same information asking if echo is needed since he had one while admitted to [facility].    Reviewed with Dr. Barber, cancel echo for 3/26/24 and keep ov.    Called and notified pt's wife. I cancelled echo.

## 2024-03-22 NOTE — TELEPHONE ENCOUNTER
Called Abdiel Abdalla who states pt was at dialysis today and and they were having a hard time pulling off fluid. Per Abdiel Abdalla, pt's BP was very low and this has been the case at home with BP's 80's-90/40-50. Abdiel Suarez requesting to decrease Metoprolol Succinate 25mg to 1/2 tablet daily.    Reviewed with Dr. Barber. Per Dr. Barber, no decrease to Metoprolol Succinate, pt needs for cardiac issues and it shouldn't decrease BP. Per Dr. Barber, have pt's wife check with nephrologist about decreasing Bumex to help improve BP.    Called and notified Abdiel Abdalla regarding Metoprolol and Bumex. She will contact nephrologist. Abdiel Abdalla verbalizes understanding of all instructions and information provided. All questions and concerns addressed at this time.

## 2024-03-22 NOTE — TELEPHONE ENCOUNTER
Pt seen during HD 3/22/24.   Pre-HD wt 139 lbs.   Reports now making more urine ~500cc urine, on Bumex 2mg daily.   On exam b/l bases with decr air entry, JVP up to angle of mandible.  BP relatively low 85/50s. S/p midodrine 10 mgx1.      HD today per submitted orders.   Will attempt 1L UF as tolerated.  Will cont to use midodrine as needed to aide in UF.    Advised pt to monitor home Bps. If cont t run low, may need to adjust metop dose.

## 2024-03-22 NOTE — TELEPHONE ENCOUNTER
Pt's wife called stating one of her husbands other Dr's said his BP is too low.    They want Dr. Barber to lower his dosage of Metroprolol to 12.5 mg.    Please call his wife isaura Abdalla, and she will explain more.

## 2024-03-26 PROBLEM — R00.0 INCREASED HEART RATE: Status: RESOLVED | Noted: 2023-01-01 | Resolved: 2024-01-01

## 2024-03-26 PROBLEM — I21.9 MYOCARDIAL INFARCTION (MULTI): Status: RESOLVED | Noted: 2024-01-01 | Resolved: 2024-01-01

## 2024-03-26 PROBLEM — I42.9: Status: RESOLVED | Noted: 2024-01-01 | Resolved: 2024-01-01

## 2024-03-26 PROBLEM — R01.1 SYSTOLIC MURMUR: Status: RESOLVED | Noted: 2023-01-01 | Resolved: 2024-01-01

## 2024-03-26 NOTE — PROGRESS NOTES
Subjective   Reynaldo Francisco is a 68 y.o. male.    Chief Complaint:  Hospital follow-up for congestive heart failure and renal failure.    HPI    Since his last visit he developed progressive congestive heart failure.  He underwent cardiac evaluation is found to have marked left ventricular systolic dysfunction.  A PET scan showed an ejection fraction of 24%.  He also had right pleural effusion.  He was ultimately admitted to [facility].  He had an extensive workup for other reasons for the development of a dilated cardiomyopathy.  No clear etiology was identified.  His cardiac MRI did show some scarring.  Currently he is very weak after prolonged hospitalization.  Also feels fatigued.  Back on dialysis but his renal function is slowly improving.  He did have an upgrade to a biventricular ICD.        He was hospitalized on March 28, 2023 when he presented with a syncopal event. He was found to be in complete heart block. His beta-blockers were stopped and his heart block resolved. He did require a temporary pacemaker. At the time of his admission he was taken carvedilol 100 mg twice daily.     The patient suffered a type a aortic dissection in 2020 in [place]. He had emergent surgery.     Past medical history significant for a liver transplant. He developed end-stage renal disease on dialysis after his liver transplant.      Allergies  Medication    · No Known Drug Allergies   Recorded By: Ruperto Bustos; 3/17/2022 8:47:23 AM  NonMedication    · IV Contrast Dye   Allergy; Edema; Swelling; Recorded By: Ruperto Bustos; 3/17/2022 8:47:23 AM     Family History  Mother    · Family history of diabetes mellitus (V18.0) (Z83.3)   · Family history of hypertension (V17.49) (Z82.49)     Social History  Problems    · No alcohol use   · No illicit drug use   · Non-smoker (V49.89) (Z78.9)     Review of Systems   Constitutional: Positive for malaise/fatigue.   Cardiovascular:  Positive for dyspnea on exertion.        Visit Vitals  Smoking Status Never        Objective     Constitutional:       Appearance: Not in distress.   Neck:      Vascular: JVD normal.   Pulmonary:      Breath sounds: Normal breath sounds.   Cardiovascular:      Normal rate. Regular rhythm. S1 with normal intensity. S2 with normal intensity.       Murmurs: There is a grade 1/6 systolic murmur.      No gallop.    Pulses:     Intact distal pulses.   Edema:     Pretibial: bilateral 1+ edema of the pretibial area.     Ankle: bilateral 1+ edema of the ankle.  Abdominal:      General: Bowel sounds are normal.   Neurological:      Mental Status: Alert and oriented to person, place and time.         Lab Review:   Lab Results   Component Value Date     (L) 03/25/2024    K 3.8 03/25/2024    CL 94 (L) 03/25/2024    CO2 27 03/25/2024    BUN 71 (H) 03/25/2024    CREATININE 3.34 (H) 03/25/2024    GLUCOSE 106 (H) 03/25/2024    CALCIUM 8.5 (L) 03/25/2024       Assessment:    1.  Systolic congestive heart failure.  Etiology unclear.  We are going to uptitrate his beta-blockers to Toprol-XL 50 mg daily as he has increased heart rates.  Will bring his heart rates down to the point where he is now going to biventricular paced.    2.  Acute renal failure.  Creatinine is slowly improving.  Had a renal biopsy which apparently was negative.    3.  Dilated cardiomyopathy.  Unclear etiology.    4.  Status post liver transplant because of an insulinoma with metastases.    5.  Valvular heart disease.  Moderate severe tricuspid regurgitation and mild to moderate mitral regurgitation.  Not much right heart failure on today's examination.    Will repeat his echocardiographic study in 3 months to determine if he has had improvement in left ventricular function after placement of a biventricular device.

## 2024-03-26 NOTE — DOCUMENTATION CLARIFICATION NOTE
"    PATIENT:               EMMANUEL HOUSTON  ACCT #:                  [id number]  MRN:                       [id number]  :                       1955  ADMIT DATE:       2024 11:18 PM  DISCH DATE:        3/20/2024 6:35 PM  RESPONDING PROVIDER #:        [id number]          PROVIDER RESPONSE TEXT:    CKD stage 3    CDI QUERY TEXT:    [facility]_CKD    Instruction:    Based on your assessment of the patient and the clinical information, please provide the requested documentation by clicking on the appropriate radio button and enter any additional information if prompted.    Question: Please further clarify the diagnosis of chronic kidney disease as    When answering this query, please exercise your independent professional judgment. The fact that a question is being asked, does not imply that any particular answer is desired or expected.    The patient's clinical indicators include:  Clinical Information:  3/18/24 Transplant PN Dr. Helms  \"68 y.o. with a history of nonischemic cardiomyopathy with a EF around 40%, s/p dual-chamber pacemaker, insulinoma with hepatic metastasis s/p liver transplant as of 2018 and DDKT as of 3/5/2023 at [facility].  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  Patient was a induced by Thymoglobulin and maintained on Tac low-dose of MMF.  DSA have been negative as of 2024.  Patient currently admitted with acute diastolic heart failure and KIA with his baseline creatinine 1.5-2.  Other history include aortic dissection, hypertension.\"    Clinical Indicators:  24 2346 36.-61-/66 100 percent    3/18/24 Transplant PN Dr. Helms  \"Allograft function/kidney:  -Acute kidney injury likely in the setting of cardiorenal physiology required dialysis for uremia and volume management.  S/p kidney biopsy on 3/7/2024 showing ATN, ultrasound showing moderate pelvicalyceal " "dilatation and hydroureter but Lasix renogram was negative for any obstruction.  Urine analysis looks bland, UPC is a 0.06..  Did not respond initially to Lasix.  -dialysis sessions on 3/7/2024 and 3/9/2024. Last HD 3/14/24. Will cont twice weekly HD and monitor for signs of renal recovery.  - plan for HD today x3h for volume and clearance. Anticipate next HD 3/22/24 unless otherwise indicated clinically.  -[place] red staining of kidney bx pending. S/p endomyocardial bx 3/14/24, result pending. SPEP with no M-protein, slightly elevated K/L ratio in the setting of KIA.  -Monitor kidney function closely and avoid nephrotoxins.\"    2/29/24  SCr 3.41  to 3/17/24 5.64 to 3/22/24 3.83  2/29/24 GFR 19 to 3/17/24 10 to 3/22/24 16      3/19 IM PN Dr. Trujillo  \"#KIA on CKD (baseline Scr 2.1-2.2), non-oliguric\"    Treatment:  dialysis  labs  kidney bx  Transplant team management  Treatment directed towards ADHF    Risk Factors: kidney transplant, KIA with ATN, diuretics  Options provided:  -- CKD stage 1  -- CKD stage 2  -- CKD stage 3  -- CKD stage 4  -- CKD stage 5  -- ESRD  -- Other - I will add my own diagnosis  -- Refer to Clinical Documentation Reviewer    Query created by: Soha Schnieder on 3/26/2024 8:39 AM      Electronically signed by:  STACY BRIGGS MD 3/26/2024 8:43 AM          "

## 2024-03-28 NOTE — ED PROCEDURE NOTE
Procedure  Critical Care    Performed by: Landry Donohue MD  Authorized by: Landry Donohue MD    Critical care provider statement:     Critical care time (minutes):  15    Critical care time was exclusive of:  Separately billable procedures and treating other patients and teaching time    Critical care was necessary to treat or prevent imminent or life-threatening deterioration of the following conditions: NSTEMI.    Critical care was time spent personally by me on the following activities:  Ordering and performing treatments and interventions, ordering and review of laboratory studies, ordering and review of radiographic studies, pulse oximetry, re-evaluation of patient's condition, development of treatment plan with patient or surrogate, evaluation of patient's response to treatment, examination of patient, obtaining history from patient or surrogate and discussions with consultants    Care discussed with: admitting provider                 Landry Donohue MD  03/28/24 [id number]

## 2024-03-29 NOTE — TELEPHONE ENCOUNTER
Pt's wife called to report pt has gained 4 lbs in the last 2 days. Pt has slept in recliner the last 2 days. Starting to have increased SOB with exertion.    Pt has dialysis on Mondays and Fridays. Per nephrologist, pt not to take Bumex on dialysis days.    Wife concerned because of weight increase and pt may be filling up with fluid. Can pt have another diuretic to remove fluid?    Please advise. Thanks.

## 2024-03-29 NOTE — TELEPHONE ENCOUNTER
Reviewed with Dr. Barber. Per Dr. Barber, there isn't another medication from a cardiac perspective, fluid removal is dependent on dialysis.    Called and notified Lianne Meadows who verbalizes understanding.

## 2024-03-30 NOTE — PROGRESS NOTES
Reynaldo Francisco is a 68 y.o. male on day 0 of admission presenting with No Principal Problem: There is no principal problem currently on the Problem List. Please update the Problem List and refresh..      Subjective   Has had 2 kg weight gain. Saw cardiology and metoprolol increase to 50 mg. Bps have been better since 100s-110s since metoprolol. Making 400cc a day over last few days. Has only taken  bumex 4 times since discharge for various reasons including low Bps and holding on HD days. Endorses fatigue, appetite is good still.        Objective          Vitals 24HR  Heart Rate:  []   Temp:  [36.3 °C (97.3 °F)]         Intake/Output last 3 Shifts:    Intake/Output Summary (Last 24 hours) at 3/29/2024 2254  Last data filed at 3/29/2024 1500  Gross per 24 hour   Intake 800 ml   Output 2361 ml   Net -1561 ml       Physical Exam  Vitals reviewed.   Constitutional:       Appearance: Normal appearance.   HENT:      Head: Normocephalic and atraumatic.   Cardiovascular:      Rate and Rhythm: Normal rate and regular rhythm.      Heart sounds: No murmur heard.     Comments: JVD  Pulmonary:      Effort: Pulmonary effort is normal. No respiratory distress.      Breath sounds: No wheezing.   Abdominal:      General: Abdomen is flat. There is distension.      Palpations: Abdomen is soft.   Skin:     General: Skin is warm.   Neurological:      Mental Status: He is alert.            Assessment/Plan      Clearance and urine OP remain impaired. Plan to continue HD on MF schedule. Take bumex 2 mg daily, hold for Bps less than 90.       Yasmany Tracey MD

## 2024-04-01 PROBLEM — N19 RENAL FAILURE: Status: ACTIVE | Noted: 2024-01-01

## 2024-04-01 NOTE — PROGRESS NOTES
Reynaldo Francisco is a 68 y.o. male with PMHX notable for Stage C systolic heart failure/NICM/HFrEF (40% TTE 01/2024), SSS s/p dual-chamber PM, Insulinoma w/ hepatic metastasis requiring liver transplantation (2018) c/b renal failure s/p kidney transplantation (on tacrolimus and mycophenolate; baseline Scr 2.1-2.2 - being managed at [facility]), type A aortic dissection, HTN, anemia (baseline hemoglobin 10) who is presenting to the transplant clinic for SOB and increase weight.     Recent history notable for prolonged hospitalization for management of ADHF and KIA. Underwent renal biopsy on 03/07 without any signs of rejection, s/f ATN. Patient initiated on HD while inpatient and discharged on 03/20 on M/F dialysis schedule with plan for bumex 2 mg daily on non dialysis days. Since discharge, patient has been feeling increasingly SOB, orthopneic with volume overload s/f increased Lower extremity swelling and abdominal fullness. He has been compliant with his diuretic regimen (bumex 2 mg daily). UOP has declined over the last few weeks - 500 ml in 24 hours. Weights at home have increased to 145-147 lbs (baseline weight 137 lbs). Denies any dizziness or lightheadedness. Denies hematuria or UTI like symptoms. Denies any OTC meds or NSAID use. Bp's at home have been between /60's. Overall, Po intake has declined due to feeling of abdominal fullness.    Last HD on Friday, UF 2L   Plan for HD today at 12:30 pm     He was seen by cardiology (Dr. Barber a few days ago) - metoprolol succinate increased to 50 mg daily from 25 mg daily. No other changes.     PE  AAO x3  RRR  CTA b/l  + peripheral edema to thighs  Soft abdomen    Vitals   /67, HR 90, afebrile, weight 145.2 lbs     Medications:  Norvasc 5 mg daily, bumex 2 mg daily, metoprolol succinate 50 mg daily, protonix 2 mg daily  Transplant meds - being managed by [facility]     A/P:  Reynaldo Francisco is a 68 y.o. male with PMHX notable for Stage C systolic heart failure/NICM/HFrEF  (40% TTE 01/2024), SSS s/p dual-chamber PM, Insulinoma w/ hepatic metastasis requiring liver transplantation (2018) c/b renal failure s/p kidney transplantation (on tacrolimus and mycophenolate; baseline Scr 2.1-2.2 - being managed at [facility]), type A aortic dissection, HTN, anemia (baseline hemoglobin 10) who is presenting to the transplant clinic for SOB and increase weight.  After interview and examination today, it is evident that patient is in volume overload without much response from just PO diuretics.     Recommend HD today and direct admit to the hospital for IV diuresis and additional IUF sessions   May need to be up-titrated from twice weekly dialysis to three times weekly dialysis  Recommend transplant nephrology consult + cardiology consult  Anemia - needs mircera, iron stores adequate

## 2024-04-01 NOTE — PROGRESS NOTES
Reynaldo Francisco is a 68 y.o. male with pmhx of CHFrEF, s/p liver transplant 2018 c/b renal failure s/p kidney transplant March 2023. He gets all his transplant care at [facility].     Pt recently discharged 3/20 for weight gain, ALONZO with troponin elevation and concern for acute coronary syndrome. Pt admitted with signs of volume overload and did not respond to IV diuresis with worsening KIA so he had renal biopsy 3/7 that suggested likely ATN. He was started on iHD 3/7. Discharged on HD MF with bumex on non HD days however he has reportedly been making less urine and having ALONZO with orthopnea and swelling in lower extremities.      He has gained 2kg since last HD treatment.      Objective   Physical Exam  Vitals reviewed.   Constitutional:       Appearance: A&OX3  Cardiovascular:      Rate and Rhythm: Normal rate and regular rhythm.      Heart sounds: No murmur heard.     Comments: JVD  Pulmonary:      Effort: Pulmonary effort is normal. No respiratory distress.      Breath sounds: Faint crackles at base of lungs b/l  Extremities:  1+ pitting edema in LE b/l  Neurological:      Mental Status: He is alert.            Assessment/Plan   #s/p kidney transplant March 2023  #KIA-D 2/2 ATN  -started on HD 3/7 with HD on M,F  -iHD today with goal 2L UF however BP soft so will see if patient can tolerate  -Given UOP is declining with wt gain, ALONZO and worsening LE swelling will plan for admission to attempt for further diuresis and will eval for IUF tomorrow for further fluid removal      Marisel Vaughan, DO  PGY 4 Nephrology Fellow

## 2024-04-02 NOTE — PROGRESS NOTES
"Reynaldo Francisco is a 68 y.o. male on day 1 of admission presenting with Renal failure.    Subjective   Patient doing well when I saw him this morning. He says his abdomen feels full and has difficulty breathing when he lies down flat but feels better after dialysis session. Plan for ultrafiltration today and will continue diuresing. Swelling noted on site of CRT-D, consulted EP iso recently placed device.     Objective     Physical Exam    Constitutional:       General: He is not in acute distress.     Appearance: Normal appearance. He is normal weight.   HENT:      Head: Normocephalic and atraumatic.      Nose: Nose normal.   Eyes:      General: No scleral icterus.     Extraocular Movements: Extraocular movements intact.   Cardiovascular:      Rate and Rhythm: Normal rate and regular rhythm.      Heart sounds: Normal heart sounds. No murmur heard.     No friction rub. No gallop.      Comments: +JVD noted at 10cm  Pulmonary:      Effort: Pulmonary effort is normal. No respiratory distress.      Breath sounds: Rales present. No wheezing.   Chest:      Chest wall: No tenderness.   Abdominal:      General: There is no distension.      Palpations: Abdomen is soft.      Tenderness: There is no abdominal tenderness.   Musculoskeletal:         General: Normal range of motion.      Cervical back: Normal range of motion and neck supple.      Right lower leg: Edema present.      Left lower leg: Edema present.   Skin:     General: Skin is warm and dry.   Neurological:      Mental Status: He is alert and oriented to person, place, and time.   Psychiatric:         Mood and Affect: Mood normal.         Behavior: Behavior normal.     Last Recorded Vitals  Blood pressure 111/78, pulse 91, temperature 36.5 °C (97.7 °F), temperature source Temporal, resp. rate 17, height 1.702 m (5' 7\"), weight 63.6 kg (140 lb 3.4 oz), SpO2 94 %.  Intake/Output last 3 Shifts:  I/O last 3 completed shifts:  In: 235.7 (3.7 mL/kg) [I.V.:235.7 (3.7 " mL/kg)]  Out: 75 (1.2 mL/kg) [Urine:75 (0 mL/kg/hr)]  Weight: 63.6 kg     Relevant Results  aspirin, 81 mg, oral, q AM  heparin (porcine), 5,000 Units, subcutaneous, q8h  metoprolol succinate XL, 50 mg, oral, Nightly  mycophenolate, 360 mg, oral, BID  pantoprazole, 40 mg, oral, q AM  polyethylene glycol, 17 g, oral, Daily  sulfamethoxazole-trimethoprim, 1 tablet, oral, Every Mon/Wed/Fri  tacrolimus, 2 mg, oral, Nightly  tacrolimus, 3 mg, oral, Daily  tamsulosin, 0.4 mg, oral, Daily      Results from last 7 days   Lab Units 04/02/24  0707   WBC AUTO x10*3/uL 3.7*   HEMOGLOBIN g/dL 8.2*   HEMATOCRIT % 26.8*   PLATELETS AUTO x10*3/uL 141*     Results from last 7 days   Lab Units 04/02/24  0707   SODIUM mmol/L 138   POTASSIUM mmol/L 3.8   CHLORIDE mmol/L 98   CO2 mmol/L 29   BUN mg/dL 48*   CREATININE mg/dL 3.37*   EGFR mL/min/1.73m*2 19*   GLUCOSE mg/dL 101*   CALCIUM mg/dL 9.0   PHOSPHORUS mg/dL 3.5     Results from last 7 days   Lab Units 04/02/24  0707   ALK PHOS U/L 77   BILIRUBIN TOTAL mg/dL 1.4*   BILIRUBIN DIRECT mg/dL 0.4*   PROTEIN TOTAL g/dL 6.5   ALT U/L 8*   AST U/L 16     Results from last 7 days   Lab Units 04/01/24  2100   APTT seconds 30   INR  1.3*       Assessment/Plan   Principal Problem:    Renal failure    68 y.o. 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     UPDATES 4/2  - Plan for ultrafiltration today  - EP consulted for swelling at CRT-D site, appreciate recs  - Nephrology following, appreciate recs     #Volume Overload  #ESRD s/p kidney transplantation (on tacromlimus and mycophenolate)  -  Continue home IS regimen of tacrolimus 3 mg qam + 2 mg qPM and  BID. Tacro recently increased from 2/2 by [facility] nephrology group. Discussed with Dr. Lipscomb  - Transplant nephrology aware of admission   - Consider additional HD tomorrow, per DC records, appeared to have achieved weight in low 130s before DC. 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  :: EMBx negative for cardiac amyloidosis and sarcoidosis   :: CT PET at OSH did not show ischemia during last admission  :: S/p CRTD upgrade iso pacemaker-induced cardiomyopathy on last admission  :: Per device interrogation on 03/29, patient 91%   - Volume removal with HD as above  - Continue metoprolol 50 mg at bedtime  - Daily RFP and Mg for K>4, Mg >2  - GDMT optimization as renal function tolerates      #Type A aortic dissection s/p repair 2020  #chronic residual type B aortic dissection w/ R carotid artery dissection  #HTN  - BP well controlled in 100-110s/50-80s  - No outpatient anti-hypertensive  - Continue to monitor     #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   -iron studies: 75, TIBC 252, ferritin 764  - Started Aranesep 80 mcg q14 days on last admission      Fluids: none  Access: IV, HD access through LUE AVF  Antibiotics: Bactrim ppx   Electrolytes: PRN  Nutrition: cardiac, renal  PPI: Protonix 40  DVT: Hep 5000 TID  CODE status: FULL CODE  NOK: Abdiel (wife) [phone]        Code Status: Full Code    Chaim Torres MD

## 2024-04-02 NOTE — PROGRESS NOTES
4/2/24 0353 Transitional Care Coordinator Notes:    Assessment Note: Met with patient and spouse at the bedside to discuss discharge needs. Prior to admission, patient was signed up with [facility] for PT/OT services. Patient and spouse would like to resume care at discharge. Denies any financial or social work needs. Spouse will provide transportation at discharge.     Home Care: [facility] for PT/OT  DME: denies  PCP: Dr. John Manrique  Pharmacy: [facility] [address]  Falls: denies  Dialysis: twice weekly (M/F)                 Assessment/Plan   Principal Problem:    Renal failure    Discharge Plans: discharge home with home care           Cheri Santos RN

## 2024-04-02 NOTE — CARE PLAN
Problem: Safety  Goal: I will remain free of falls  Outcome: Progressing     Problem: Daily Care  Goal: Daily care needs are met  Outcome: Progressing

## 2024-04-02 NOTE — H&P
"Subjective   Patient ID: Reynaldo Francisco is a 68 y.o. male     HPI  68 y.o. 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     Per outpatient visit note from Dr. Helms -   \" Recent history notable for prolonged hospitalization for management of ADHF and KIA. Underwent renal biopsy on 03/07 without any signs of rejection, s/f ATN. Patient initiated on HD while inpatient and discharged on 03/20 on M/F dialysis schedule with plan for bumex 2 mg daily on non dialysis days. Since discharge, patient has been feeling increasingly SOB, orthopneic with volume overload s/f increased Lower extremity swelling and abdominal fullness. He has been compliant with his diuretic regimen (bumex 2 mg daily). UOP has declined over the last few weeks - 500 ml in 24 hours. Weights at home have increased to 145-147 lbs (baseline weight low 130s). Denies any dizziness or lightheadedness. Denies hematuria or UTI like symptoms. Denies any OTC meds or NSAID use. Bp's at home have been between /60's. Overall, Po intake has declined due to feeling of abdominal fullness.\"    Patient tolerated HD session today and transferred to floors for inpatient management of HD regimen and volume removal. Endorsed feeling better and less dyspneic after HD. Some orthopnea is still present but states that this is better after HD. Denied any pain or discomfort.     ROS:  Otherwise negative    Upon arrival to floor:  VSS - /73, HR 90s-100s, AF, 97% on RA  Post HD labs -   K 3.7, BC 30, BUN 40, Phos 2.8  Mag 1.7  BNP 1284, -800  WBC 4.3, Hgb 8, " Plt 145    Home Meds:  Current Outpatient Medications   Medication Instructions    acetaminophen (TYLENOL) 975 mg, oral, Every 8 hours PRN    aspirin 81 mg, oral, Every morning    bumetanide (BUMEX) 2 mg, oral, Daily, Stop taking after 1 week if minimal urine output    metoprolol succinate XL (TOPROL-XL) 50 mg, oral, Daily, Do not crush or chew.    mycophenolate (MYFORTIC) 360 mg, oral, 2 times daily    Protonix 40 mg, oral, Every morning    sulfamethoxazole-trimethoprim (Bactrim) 400-80 mg tablet 1 tablet, oral, Every Mon/Wed/Fri    tacrolimus (PROGRAF) 2 mg, oral, Every 12 hours scheduled (0630,1830)    tamsulosin (FLOMAX) 0.4 mg, oral, Daily     PMH: as above  SurgHx: Open cholecystectomy 2010-Liver lobectomy 2010-Splenectomy 2010  SocHx: Never smoker, no alcohol or drugs  FamHx: HTN    Allergies:  Allergies   Allergen Reactions    Milk Diarrhea and GI Upset     (Skim milk) diarrhea    Shellfish Derived Hives and Itching    Iodinated Contrast Media Nausea/vomiting        Objective   Physical Exam  Constitutional:       General: He is not in acute distress.     Appearance: Normal appearance. He is normal weight.   HENT:      Head: Normocephalic and atraumatic.      Nose: Nose normal.   Eyes:      General: No scleral icterus.     Extraocular Movements: Extraocular movements intact.   Cardiovascular:      Rate and Rhythm: Normal rate and regular rhythm.      Heart sounds: Normal heart sounds. No murmur heard.     No friction rub. No gallop.      Comments: +JVD noted at 10cm  Pulmonary:      Effort: Pulmonary effort is normal. No respiratory distress.      Breath sounds: Rales present. No wheezing.   Chest:      Chest wall: No tenderness.   Abdominal:      General: There is no distension.      Palpations: Abdomen is soft.      Tenderness: There is no abdominal tenderness.   Musculoskeletal:         General: Normal range of motion.      Cervical back: Normal range of motion and neck supple.      Right lower leg: Edema  present.      Left lower leg: Edema present.   Skin:     General: Skin is warm and dry.   Neurological:      Mental Status: He is alert and oriented to person, place, and time.   Psychiatric:         Mood and Affect: Mood normal.         Behavior: Behavior normal.         Results for orders placed or performed during the hospital encounter of 04/01/24 (from the past 24 hour(s))   B-type natriuretic peptide   Result Value Ref Range    BNP 1,284 (H) 0 - 99 pg/mL   CBC and Auto Differential   Result Value Ref Range    WBC 4.3 (L) 4.4 - 11.3 x10*3/uL    nRBC 0.7 (H) 0.0 - 0.0 /100 WBCs    RBC 2.45 (L) 4.50 - 5.90 x10*6/uL    Hemoglobin 8.0 (L) 13.5 - 17.5 g/dL    Hematocrit 25.8 (L) 41.0 - 52.0 %     (H) 80 - 100 fL    MCH 32.7 26.0 - 34.0 pg    MCHC 31.0 (L) 32.0 - 36.0 g/dL    RDW 17.5 (H) 11.5 - 14.5 %    Platelets 145 (L) 150 - 450 x10*3/uL    Neutrophils % 68.6 40.0 - 80.0 %    Immature Granulocytes %, Automated 0.5 0.0 - 0.9 %    Lymphocytes % 12.2 13.0 - 44.0 %    Monocytes % 14.5 2.0 - 10.0 %    Eosinophils % 3.7 0.0 - 6.0 %    Basophils % 0.5 0.0 - 2.0 %    Neutrophils Absolute 2.98 1.20 - 7.70 x10*3/uL    Immature Granulocytes Absolute, Automated 0.02 0.00 - 0.70 x10*3/uL    Lymphocytes Absolute 0.53 (L) 1.20 - 4.80 x10*3/uL    Monocytes Absolute 0.63 0.10 - 1.00 x10*3/uL    Eosinophils Absolute 0.16 0.00 - 0.70 x10*3/uL    Basophils Absolute 0.02 0.00 - 0.10 x10*3/uL   Comprehensive Metabolic Panel   Result Value Ref Range    Glucose 110 (H) 74 - 99 mg/dL    Sodium 139 136 - 145 mmol/L    Potassium 3.7 3.5 - 5.3 mmol/L    Chloride 99 98 - 107 mmol/L    Bicarbonate 30 21 - 32 mmol/L    Anion Gap 14 10 - 20 mmol/L    Urea Nitrogen 40 (H) 6 - 23 mg/dL    Creatinine 3.08 (H) 0.50 - 1.30 mg/dL    eGFR 21 (L) >60 mL/min/1.73m*2    Calcium 8.7 8.6 - 10.6 mg/dL    Albumin 4.0 3.4 - 5.0 g/dL    Alkaline Phosphatase 84 33 - 136 U/L    Total Protein 6.6 6.4 - 8.2 g/dL    AST 19 9 - 39 U/L    Bilirubin, Total 1.6  (H) 0.0 - 1.2 mg/dL    ALT 8 (L) 10 - 52 U/L   Coagulation Screen   Result Value Ref Range    Protime 14.2 (H) 9.8 - 12.8 seconds    INR 1.3 (H) 0.9 - 1.1    aPTT 30 27 - 38 seconds   Magnesium   Result Value Ref Range    Magnesium 1.70 1.60 - 2.40 mg/dL   Phosphorus   Result Value Ref Range    Phosphorus 2.8 2.5 - 4.9 mg/dL   Type and screen   Result Value Ref Range    ABO TYPE O     Rh TYPE POS     ANTIBODY SCREEN NEG          Assessment/Plan     68 y.o. 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     Patient with worsening volume status despite PO bumex at home. Possibly could require more frequent HD sessions per nephrology. BP on arrival to floors wnl, thus will attempt to diurese with IV bumex overnight and assess response in AM. Patient with improved symptoms post HD.     #Volume Overload  #ESRD s/p kidney transplantation (on tacromlimus and mycophenolate)  - Continue home IS regimen of tacrolimus 3 mg qam + 2 mg qPM and  BID. Tacro recently increased from 2/2 by [facility] nephrology group. Discussed with Dr. Lipscomb  - Transplant nephrology aware of admission   - Consider additional HD tomorrow, per DC records, appeared to have achieved weight in low 130s before DC. 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  :: EMBx negative for cardiac amyloidosis and sarcoidosis   :: CT PET at OSH did not show ischemia during last admission  :: S/p  CRTD upgrade iso pacemaker-induced cardiomyopathy on last admission  :: Per device interrogation on 03/29, patient 91%   - Volume removal with HD as above  - Continue metoprolol 50 mg at bedtime  - Daily RFP and Mg for K>4, Mg >2  - GDMT optimization as renal function tolerates      #Type A aortic dissection s/p repair 2020  #chronic residual type B aortic dissection w/ R carotid artery dissection  #HTN  - BP well controlled in 100-110s/50-80s  - No outpatient anti-hypertensive  - Continue to monitor     #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   -iron studies: 75, TIBC 252, ferritin 764  - Started Aranesep 80 mcg q14 days on last admission      Fluids: none  Access: IV, HD access through LUE AVF  Antibiotics: Bactrim ppx   Electrolytes: PRN  Nutrition: cardiac, renal  PPI: Protonix 40  DVT: Hep 5000 TID  CODE status: FULL CODE  NOK: Abdiel (wife) [phone]       Code Status: Full Code      Vance Cheng MD   Internal Medicine PGY-2

## 2024-04-02 NOTE — CONSULTS
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 Patient is s/p CRT-D upgrade on 3/15 with difficulty achieving hemostasis at the end of the procedure, patient known to EP providers to have large hematoma following procedure. EP consulted for any further recommendations.     Spoke to patient and his wife on interview at which time patient is generally well-appearing, pleasant, in NAD, appears mildly volume overloaded, and is without acute cardiopulmonary complaints. He reports he has had gradual accumulation of fluid with some worsening SOB and was advised to come in for elective admission for possible increased dialysis sessions and closer monitoring. Regarding his hematoma, his wife reports that it started to develop 2-3 days post procedure, but at this point it has started to look much less bruised and the swelling, though significant, has started to improve. It is not particularly painful at this time, mostly itchy. The patient is not very concerned at this time, but wanted to make sure the device is functioning well and thought if something needed to be done that it would be better to have it done while he's inpatient. No concern for infection at this time, patient not experiencing any fevers, labs are largely stable.     Telemetry shows HR 70s-90s  "with frequent V-pacing. Recent device interrogation 3/28 demonstrates normal function, V-pacing 91.7%.      Last Recorded Vitals:  Vitals:    04/01/24 2005 04/01/24 2027 04/02/24 0001 04/02/24 0756   BP: 110/73  101/58 111/78   BP Location: Right arm   Right arm   Patient Position: Sitting   Lying   Pulse: 100  60 91   Resp: 16  19 17   Temp: 36.1 °C (97 °F)  37.4 °C (99.3 °F) 36.5 °C (97.7 °F)   TempSrc: Temporal   Temporal   SpO2: 97%  96% 94%   Weight: 64.6 kg (142 lb 6.7 oz) 63.6 kg (140 lb 3.4 oz)     Height: 1.702 m (5' 7\")          Last Labs:  CBC - 4/2/2024:  7:07 AM  3.7 8.2 141    26.8      CMP - 4/2/2024:  7:07 AM  9.0 6.5 16 --- 1.4   3.5 3.9 8 77      PTT - 4/1/2024:  9:00 PM  1.3   14.2 30     Troponin I, High Sensitivity   Date/Time Value Ref Range Status   03/01/2024 12:03  (HH) 0 - 53 ng/L Final     Comment:     Previous result verified on 2/29/2024 1656 on specimen/case [id number] called with component TRPHS for procedure Troponin I, High Sensitivity, Initial with value 664 ng/L.   02/29/2024 05:08  (HH) 0 - 20 ng/L Final     Comment:     Previous result verified on 2/29/2024 1656 on specimen/case [id number] called with component TRPHS for procedure Troponin I, High Sensitivity, Initial with value 664 ng/L.   02/29/2024 04:18  (HH) 0 - 20 ng/L Final     BNP   Date/Time Value Ref Range Status   04/01/2024 09:00 PM 1,284 (H) 0 - 99 pg/mL Final   03/01/2024 12:03  (H) 0 - 99 pg/mL Final     Hemoglobin A1C   Date/Time Value Ref Range Status   03/08/2024 05:00 AM 5.9 (H) see below % Final   05/01/2023 06:22 AM 6.1 (H) <5.7 % Final   03/07/2023 03:24 AM 5.2 <5.7 % Final      Last I/O:  I/O last 3 completed shifts:  In: 235.7 (3.7 mL/kg) [I.V.:235.7 (3.7 mL/kg)]  Out: 75 (1.2 mL/kg) [Urine:75 (0 mL/kg/hr)]  Weight: 63.6 kg     Past Cardiology Tests (Last 3 Years):  EKG:  Electrocardiogram, 12-lead PRN ACS symptoms 03/16/2024      ECG 12 Lead 03/16/2024      ECG 12 Lead " 03/15/2024      Electrocardiogram, 12-lead PRN ACS symptoms 02/29/2024      ECG 12 lead 02/29/2024      ECG 12 lead 02/29/2024      ECG 12 lead 12/21/2023      ECG 12 lead tomorrow at 8 AM 10/28/2023      ECG 12 lead STAT 10/28/2023      Electrocardiogram, 12-lead PRN ACS symptoms 10/28/2023      Electrocardiogram, 12-lead PRN ACS symptoms 10/28/2023      Electrocardiogram, 12-lead PRN ACS symptoms 10/28/2023      ECG 12 Lead     Echo:  Transthoracic Echo (TTE) Complete 03/01/2024      Transthoracic Echo (TTE) Limited 03/01/2024      Onco-Echo Complete (Strain & 3D) 10/06/2023    Ejection Fractions:  EF   Date/Time Value Ref Range Status   03/01/2024 11:00 AM 33 %      Cath:  Cardiac Catheterization Procedure 03/14/2024      Cardiac Catheterization Procedure 03/01/2024    Stress Test:  No results found for this or any previous visit from the past 1095 days.    Cardiac Imaging:  MR cardiac morphology and function w and wo IV contrast 03/12/2024      Past Medical History:  He has a past medical history of Arteriovenous fistula, acquired (CMS/HCC) (06/17/2022), Lung nodule, Nutritional anemia, unspecified, and Personal history of malignant neoplasm of pancreas (06/17/2022).    Past Surgical History:  He has a past surgical history that includes Other surgical history (03/17/2022); Other surgical history (06/17/2022); Other surgical history (06/17/2022); US kidney transplant; Liver transplantation; Cardiac electrophysiology procedure (Right, 10/23/2023); Cardiac catheterization (N/A, 3/1/2024); Cardiac catheterization (N/A, 3/14/2024); and Cardiac electrophysiology procedure (Right, 3/15/2024).      Social History:  He reports that he has never smoked. He has never used smokeless tobacco. He reports that he does not currently use drugs. He reports that he does not drink alcohol.    Family History:  Family History   Problem Relation Name Age of Onset    Diabetes Mother      Hypertension Mother          Allergies:  Milk,  Shellfish derived, and Iodinated contrast media    Inpatient Medications:  Scheduled medications   Medication Dose Route Frequency    aspirin  81 mg oral q AM    heparin (porcine)  5,000 Units subcutaneous q8h    metoprolol succinate XL  50 mg oral Nightly    mycophenolate  360 mg oral BID    pantoprazole  40 mg oral q AM    polyethylene glycol  17 g oral Daily    sulfamethoxazole-trimethoprim  1 tablet oral Every Mon/Wed/Fri    tacrolimus  2 mg oral Nightly    tacrolimus  3 mg oral Daily    tamsulosin  0.4 mg oral Daily     PRN medications   Medication    acetaminophen     Continuous Medications   Medication Dose Last Rate     Outpatient Medications:  Current Outpatient Medications   Medication Instructions    acetaminophen (TYLENOL) 975 mg, oral, Every 8 hours PRN    aspirin 81 mg, oral, Every morning    bumetanide (BUMEX) 2 mg, oral, Daily, Stop taking after 1 week if minimal urine output    metoprolol succinate XL (TOPROL-XL) 50 mg, oral, Daily, Do not crush or chew.    mycophenolate (MYFORTIC) 360 mg, oral, 2 times daily    Protonix 40 mg, oral, Every morning    sulfamethoxazole-trimethoprim (Bactrim) 400-80 mg tablet 1 tablet, oral, Every Mon/Wed/Fri    tacrolimus (PROGRAF) 2 mg, oral, Every 12 hours scheduled (0630,1830)    tamsulosin (FLOMAX) 0.4 mg, oral, Daily       Physical Exam:  Constitutional: NAD, pleasant  HEENT: PERRLA, EOMI, no scleral icterus, MMM  CV: RRR, no m/r/g, no JVD +grapefruit-sized R-sided pocket hematoma, soft, non-tender, resolving bruising, incision site well-healed  Pulm: CTAB, no increased WOB  GI: Soft, NT, ND, normoactive BS  Extremities: 1+ LE edema  Skin: WWP  Neuro: A&Ox4, no FND  Psych: Mood and affect appropriate to situation     Assessment/Plan   Reynaldo Francisco is a 68-year-old male with PMH of Stage C systolic heart failure/NICM/HFrEF (30-35% TTE 3/2024), SSS s/p dcPPM with CRT-D upgrade 3/15/24, 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 Patient is s/p CRT-D upgrade on 3/15 with difficulty achieving hemostasis at the end of the procedure, patient known to EP providers to have large hematoma following procedure. EP consulted for any further recommendations.     Impression/Recommendations:  #SSS s/p dcPPM with CRT-D upgrade 3/15/24 c/b pocket hematoma, improving  -Patient had recent device interrogation showing normal device function, telemetry review looks unremarkable; patient primarily admitted for volume overload management  -No plan for intervention at this time, patient does not show signs of infection, swelling reportedly improving; intervention more likely to introduce infection resulting in need for device extraction, especially given patient is on immunosuppressive drug regimen with hx of transplant  -Recommend outpatient follow-up as scheduled with Dr. Adams on 4/14    Patient staffed with EP attending, Dr. Craig. EP will sign off, please page with additional questions.     I spent 45 minutes in the professional and overall care of this patient.    Sixto Christopher MD

## 2024-04-02 NOTE — CONSULTS
Transplant Nephrology progress note     Date of admission: 4/1/2024     Reynaldo Francisco is a 68 y.o.  with PMH   Past Medical History:   Diagnosis Date    Arteriovenous fistula, acquired (CMS/HCC) 06/17/2022    AV fistula    Lung nodule     Nutritional anemia, unspecified     Anemia, deficiency    Personal history of malignant neoplasm of pancreas 06/17/2022    History of malignant neoplasm of pancreas        SUBJECTIVE:  Reynaldo Francisco is a 68 y.o. male with pmhx of CHFrEF, insulinoma with hepatic mets s/p liver transplant 2018 c/b renal failure s/p kidney transplant March 2023. He gets all his transplant care at [facility] who was direct admitted from nephrology clinic due to fluid overload.      Pt recently discharged 3/20 for weight gain, ALONZO with troponin elevation and concern for acute coronary syndrome. Pt admitted with signs of volume overload and did not respond to IV diuresis with worsening KIA so he had renal biopsy 3/7 that suggested likely ATN with US showing moderate pelvicalcyceal dilitation however lasix renogram negative for obstruction. He was started on iHD 3/7, non oliguric so discharged on HD MF with bumex on non hD days.     He has reportedly been making less urine and having ALONZO with orthopnea and swelling in lower extremities. Compliant with bumex 2mg on non HD days.   He had HD yesterday with 2.4L UF. Pt feeling better today. Still poor UOP. Shortness of breath is improving a bit, he is on room air.         PROBLEM LIST:  Principal Problem:    Renal failure         ALLERGIES:  Allergies   Allergen Reactions    Milk Diarrhea and GI Upset     (Skim milk) diarrhea    Shellfish Derived Hives and Itching    Iodinated Contrast Media Nausea/vomiting            CURRENT MEDICATIONS:  Scheduled medications  aspirin, 81 mg, oral, q AM  heparin (porcine), 5,000 Units, subcutaneous, q8h  metoprolol succinate XL, 50 mg, oral, Nightly  mycophenolate, 360 mg, oral, BID  pantoprazole, 40 mg, oral, q  "AM  polyethylene glycol, 17 g, oral, Daily  sulfamethoxazole-trimethoprim, 1 tablet, oral, Every Mon/Wed/Fri  tacrolimus, 2 mg, oral, Nightly  tacrolimus, 3 mg, oral, Daily  tamsulosin, 0.4 mg, oral, Daily      Continuous medications     PRN medications  PRN medications: acetaminophen       OBJECTIVE:    VITALS: Visit Vitals  /66 (BP Location: Right arm, Patient Position: Sitting)   Pulse 88   Temp 36.8 °C (98.2 °F) (Temporal)   Resp 17   Ht 1.702 m (5' 7\")   Wt 63.6 kg (140 lb 3.4 oz)   SpO2 95%   BMI 21.96 kg/m²   Smoking Status Never   BSA 1.73 m²        General: No distress   CVS: S1 S2 no murmurs  RESP:  Lungs clear to auscultation   ABDO: Soft, non-tender   Neuro: A + O x 3  Skin: No rash   Extremities: trace edema  Access: TDC       LABS:  Results from last 72 hours   Lab Units 04/02/24  0816 04/02/24  0707   WBC AUTO x10*3/uL  --  3.7*   HEMOGLOBIN g/dL  --  8.2*   MCV fL  --  106*   PLATELETS AUTO x10*3/uL  --  141*   BUN mg/dL  --  48*   CREATININE mg/dL  --  3.37*   CALCIUM mg/dL  --  9.0   TACROLIMUS ng/mL 13.5  --             Intake/Output Summary (Last 24 hours) at 4/2/2024 1353  Last data filed at 4/2/2024 1226  Gross per 24 hour   Intake 275.7 ml   Output 75 ml   Net 200.7 ml          ASSESSMENT AND PLAN:  Reynaldo Francisco is a 68 y.o. with a history of nonischemic cardiomyopathy with a EF around 40%, s/p dual-chamber pacemaker, insulinoma with hepatic metastasis s/p liver transplant as of 1/8/2018 and DDKT as of 3/5/2023 at [facility].  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  Patient was a induced by Thymoglobulin and maintained on Tac,  low-dose of MMF.  DSA have been negative as of 1/2024.  Pt then admitted March 2024 due to volume overload, biopsy suggesting ATN and with declining UOP and poor clearance he was started on HD March 7 and discharged on HD . Now readmitted due to shortness of " breath, fluid overload.     Allograft function/kidney:  #KIA-D MF  -KIA from March in setting of decompensated HF, CRS with biopsy 3/7 showing minimal abnormalities with interstitial fibrosis, vacuolization of tubules with no signs of rejection, congo red negative for amyloid. Potentially 2/2 CNI toxicity? Vs hemodynamic injury however no recovery noted at this time with pt having decrease in UOP and signs of fluid overload   -renal ultrasound 3/1 showing moderate pelvicalyceal dilatation and hydroureter but Lasix renogram was negative for any obstruction.    -UA - bland Urine analysis looks bland, UPC is a 0.06..  Did not respond initially to Lasix.  -S/p endomyocardial bx 3/14/24 showing severe myocyte hypertrophy - signs of post inflammatory process vs ischemia   -iHD 4/1 with 2.4L UF. Metabolic parameters acceptable and pt appears stable on room air. Will plan for further fluid removal with IHD tomorrow  -Given worsening UOP and further decline in renal function recommend renal biopsy. Please discuss with IR protocol on holding aspirin therapy.   -strict I/O, daily RFP, daily weights   -Increase Bumex to 3mg on non HD days       Hemodynamics:   -BP stable      Immunosuppression:   -Myfortic 360 twice daily  tacrolimus 3mg AM and 2mg PM  -last Tac level 13.5  -Pls hold Tacrolimus tonight and recheck levels tomorrow to decide the dose   -repeat Tacrolimus level in AM     Anemia:   -Hb 8.2  -iron stores adequate  -EPO with HD tomorrow     Bone mineral disease: Calcium and phosphorus levels acceptable      Marisel Vaughan DO   Nephrology fellow PGY 4   Available via [employer]   Transplant pager #[phone]

## 2024-04-02 NOTE — CARE PLAN
The patient's goals for the shift include Pt will continue to remain HDS throughout this shift    The clinical goals for the shift include Pt will remain safe from injuries and free from falls    Problem: Safety  Goal: I will remain free of falls  Outcome: Progressing     Problem: Daily Care  Goal: Daily care needs are met  Outcome: Progressing     Problem: Fall/Injury  Goal: Not fall by end of shift  Outcome: Progressing

## 2024-04-02 NOTE — PROGRESS NOTES
Physical Therapy    Physical Therapy Evaluation    Patient Name: Reynaldo Francisco  MRN: [id number]  Today's Date: 4/2/2024   Time Calculation  Start Time: 1231  Stop Time: 1241  Time Calculation (min): 10 min    Assessment/Plan   PT Assessment  PT Assessment Results: Decreased strength, Decreased endurance, Impaired balance, Decreased mobility  Rehab Prognosis: Good  Evaluation/Treatment Tolerance: Patient limited by fatigue  Medical Staff Made Aware: Yes  End of Session Communication: Bedside nurse  Assessment Comment: Pt is a 68 year old male who presents for fluid overload. Pt demonstrates deficits in strength, endurance, and balance leading to impairments in functional mobility. Pt would benefit from continued therapy during hospital stay  End of Session Patient Position: Bed, 3 rail up, Alarm off, not on at start of session (sitting on EOB)  IP OR SWING BED PT PLAN  Inpatient or Swing Bed: Inpatient  PT Plan  Treatment/Interventions: Bed mobility, Transfer training, Gait training, Balance training, Stair training, Neuromuscular re-education, Strengthening, Endurance training, Range of motion, Therapeutic exercise, Therapeutic activity, Home exercise program, Positioning, Postural re-education  PT Plan: Skilled PT  PT Frequency: 3 times per week  PT Discharge Recommendations: Low intensity level of continued care  PT Recommended Transfer Status: Assist x1      Subjective   General Visit Information:  General  Reason for Referral: fluid overload  Past Medical History Relevant to Rehab: Stage C systolic heart failure/NICM/HFrEF (30-35% TTE 3/2024), SSS s/p dual-chamber PM, 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  Home Living:  Home Living  Type of Home: House  Lives With: Spouse  Home Adaptive Equipment: Walker rolling or standard  Home Layout: One level  Home Access: Stairs to enter without rails  Entrance Stairs-Number of Steps: 3  Bathroom Shower/Tub: Tub/shower unit  Bathroom Toilet: Standard  Bathroom Equipment:  (shower stool)  Prior Level of Function:  Prior Function Per Pt/Caregiver Report  Level of [place]: Independent with ADLs and functional transfers  Receives Help From:  (wife, IADLs)  ADL Assistance: Independent  Homemaking Assistance: Needs assistance  Ambulatory Assistance: Independent (was using walker has progressed to no AD, has assistance for stairs)  Vocational: Retired  Prior Function Comments: (-) driving, no falls  Precautions:  Precautions  Medical Precautions: Cardiac precautions, Fall precautions  Vital Signs:  Vital Signs  Heart Rate:  (87-89)  Heart Rate Source: Monitor    Objective   Pain:  Pain Assessment  Pain Assessment: 0-10  Pain Score: 0 - No pain  Cognition:  Cognition  Overall Cognitive Status: Within Functional Limits  Orientation Level: Oriented X4    General Assessments:  Activity Tolerance  Endurance: Tolerates 10 - 20 min exercise with multiple rests    Sensation  Sensation Comment: numbness and tingling from calves down       Functional Assessments:  Bed Mobility  Bed Mobility: No    Transfers  Transfer: Yes  Transfer 1  Transfer From 1: Stand to  Transfer to 1: Sit  Technique 1: Stand to sit  Transfer Level of Assistance 1: Contact guard, Minimal verbal cues  Trials/Comments 1: VCs, decreased control with descent from stand    Ambulation/Gait Training  Ambulation/Gait Training Performed: Yes  Ambulation/Gait Training 1  Surface 1:  Level tile  Device 1:  (HHA)  Assistance 1: Minimum assistance, Minimal verbal cues  Quality of Gait 1: Soft knee(s) (mild lateral trunk sway, decreased control of COM over YAMIL)  Comments/Distance (ft) 1: 45 ft    Stairs  Stairs: No  Extremity/Trunk Assessments:  RLE   RLE :  (>/= 3+/5 observed via function)  LLE   LLE :  (>/= 3+/5 observed via function)  Outcome Measures:  [facility] Basic Mobility  Turning from your back to your side while in a flat bed without using bedrails: A little  Moving from lying on your back to sitting on the side of a flat bed without using bedrails: A little  Moving to and from bed to chair (including a wheelchair): A little  Standing up from a chair using your arms (e.g. wheelchair or bedside chair): A little  To walk in hospital room: A little  Climbing 3-5 steps with railing: A little  Basic Mobility - Total Score: 18    Encounter Problems       Encounter Problems (Active)       Balance       Pt will score a 25/28 or greater on the Tinetti balance assessment in order to demonstrate low fall risk.        Start:  04/02/24    Expected End:  04/16/24               Mobility       STG - Patient will ambulate >150 ft with mod I and LRD and no LOB, progress as able and appropriate        Start:  04/02/24    Expected End:  04/16/24            STG - Patient will ascend and descend four to six stairs with CGA and LRD       Start:  04/02/24    Expected End:  04/16/24            Pt will ambulate 150 ft with mod I and LRD and no signs of fatigue or SOB        Start:  04/02/24    Expected End:  04/16/24               PT Transfers       STG - Patient will perform bed mobility independently        Start:  04/02/24    Expected End:  04/16/24            STG - Patient will transfer sit to and from stand with mod I and LRD       Start:  04/02/24    Expected End:  04/16/24                   Education Documentation  Body Mechanics, taught by Shelly Prajapati, PT at 4/2/2024  3:17 PM.  Learner: Significant Other,  Patient  Readiness: Acceptance  Method: Explanation  Response: Verbalizes Understanding  Comment: transfers, gait, activity and symptom monitoring, supine ther ex    Mobility Training, taught by Shelly Prajapati, PT at 4/2/2024  3:17 PM.  Learner: Significant Other, Patient  Readiness: Acceptance  Method: Explanation  Response: Verbalizes Understanding  Comment: transfers, gait, activity and symptom monitoring, supine ther ex    Education Comments  No comments found.        04/02/24 at 3:20 PM - Shelly Prajapati, PT

## 2024-04-03 PROBLEM — I13.0: Status: ACTIVE | Noted: 2024-01-01

## 2024-04-03 PROBLEM — N17.9 AKI (ACUTE KIDNEY INJURY) (CMS-HCC): Status: ACTIVE | Noted: 2024-01-01

## 2024-04-03 NOTE — PROGRESS NOTES
"  Transplant Nephrology progress note     Date of admission: 4/1/2024     Reynaldo Francisco is a 68 y.o.  with PMH   Past Medical History:   Diagnosis Date    Arteriovenous fistula, acquired (CMS/HCC) 06/17/2022    AV fistula    Lung nodule     Nutritional anemia, unspecified     Anemia, deficiency    Personal history of malignant neoplasm of pancreas 06/17/2022    History of malignant neoplasm of pancreas        SUBJECTIVE:  Pt seen after dialysis, tolerated 1.3L UF. Still complaining of orthopnea. Pt is NPO for biopsy and feels hungry. No other acute complaints. Very little UOP yesterday with no UOP today.         PROBLEM LIST:  Principal Problem:    Renal failure         ALLERGIES:  Allergies   Allergen Reactions    Milk Diarrhea and GI Upset     (Skim milk) diarrhea    Shellfish Derived Hives and Itching    Iodinated Contrast Media Nausea/vomiting            CURRENT MEDICATIONS:  Scheduled medications  [Held by provider] aspirin, 81 mg, oral, q AM  heparin (porcine), 5,000 Units, subcutaneous, q8h  metoprolol succinate XL, 50 mg, oral, Nightly  mycophenolate, 360 mg, oral, BID  pantoprazole, 40 mg, oral, q AM  polyethylene glycol, 17 g, oral, Daily  sulfamethoxazole-trimethoprim, 1 tablet, oral, Every Mon/Wed/Fri  tacrolimus, 2 mg, oral, BID  tamsulosin, 0.4 mg, oral, Daily      Continuous medications     PRN medications  PRN medications: acetaminophen       OBJECTIVE:    VITALS: Visit Vitals  /66   Pulse 87   Temp 36.6 °C (97.9 °F)   Resp 15   Ht 1.702 m (5' 7\")   Wt 64.1 kg (141 lb 5 oz)   SpO2 93%   BMI 22.13 kg/m²   Smoking Status Never   BSA 1.74 m²        General: No distress , pt sitting up in bed  Mucosa moist   AI, AC, AF     HEENT: PEERLA  CVS: S1 S2 no murmurs  RESP:  Lungs clear to auscultation   ABDO: Soft, non-tender   Neuro: A + O x 3  Skin: No rash   Extremities: edema in LE b/l       LABS:  Results from last 72 hours   Lab Units 04/03/24  0703 04/03/24  0701 04/03/24  0620   WBC AUTO x10*3/uL  -- "  4.0*  --    HEMOGLOBIN g/dL  --  8.1*  --    MCV fL  --  103*  --    PLATELETS AUTO x10*3/uL  --  142*  --    BUN mg/dL 52*  --   --    CREATININE mg/dL 4.36*  --   --    CALCIUM mg/dL 8.4*  --   --    TACROLIMUS ng/mL  --   --  10.5            Intake/Output Summary (Last 24 hours) at 4/3/2024 1236  Last data filed at 4/3/2024 1015  Gross per 24 hour   Intake 1440 ml   Output 2000 ml   Net -560 ml          ASSESSMENT AND PLAN:    Reynaldo Francisco is a 68 y.o. with a history of nonischemic cardiomyopathy with a EF around 40%, s/p dual-chamber pacemaker, insulinoma with hepatic metastasis s/p liver transplant as of 1/8/2018 and DDKT as of 3/5/2023 at [facility].  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  Patient was a induced by Thymoglobulin and maintained on Tac,  low-dose of MMF.  DSA have been negative as of 1/2024.  Pt then admitted March 2024 due to volume overload, biopsy suggesting ATN and with declining UOP and poor clearance he was started on HD March 7 and discharged on HD . Now readmitted due to shortness of breath, fluid overload.      Allograft function/kidney:  #KIA-D MF  -KIA from March in setting of decompensated HF, CRS with biopsy 3/7 showing minimal abnormalities with interstitial fibrosis, vacuolization of tubules with no signs of rejection, congo red negative for amyloid. Potentially 2/2 CNI toxicity? Vs hemodynamic injury however no recovery noted at this time with pt having decrease in UOP and signs of fluid overload   -renal ultrasound 3/1 showing moderate pelvicalyceal dilatation and hydroureter but Lasix renogram was negative for any obstruction.    -UA - bland. UPC is a 0.06..  Did not respond initially to Lasix.  -S/p endomyocardial bx 3/14/24 showing severe myocyte hypertrophy - signs of post inflammatory process vs ischemia   -iHD today - 1.3L UF achieved. BP soft so unable to achieve more  however given pt still appears edematous with poor UOP will plan for IUF tomorrow with goal 1.5-2L UF  -Given worsening UOP and further decline in renal function plan for renal biopsy - possibly today   -strict I/O, daily RFP, daily weights   -hold Bumex at this time     Hemodynamics:   -BP stable     Immunosuppression:   -Myfortic 360 twice daily  tacrolimus 3mg AM and 2mg PM -> Repeat Tac level 10  -Pls start back 1 .5mg BID Tacrolimus   -repeat Tacrolimus level in AM     Anemia:   -Hb 8.2  -iron stores adequate  -Darbe 80mcg - last given 3/20   -Due for Darbe 80mcg     Bone mineral disease: Calcium and phosphorus levels acceptable        Marisel Vaughan DO   Nephrology fellow PGY 4   Available via [employer] Chat   Transplant pager #[phone]

## 2024-04-03 NOTE — NURSING NOTE
Report to Receiving RN:    Report To: Solange BUTLER   Time Report Called: 1022  Hand-Off Communication: HD completed and tolerated well , no issues during tx. Removed 1L. Post /75 HR 82.   Complications During Treatment: No  Ultrafiltration Treatment: Yes  Medications Administered During Dialysis: No  Blood Products Administered During Dialysis: No  Labs Sent During Dialysis: Yes  Heparin Drip Rate Changes: No  Dialysis Catheter Dressing: NA  Last Dressing Change: NA

## 2024-04-03 NOTE — NURSING NOTE
Report from Sending RN:    Report From: Chica ( CARRIE)  Recent Surgery of Procedure: No  Baseline Level of Consciousness (LOC): a/o x 4  Oxygen Use: No  Type: none  Diabetic: No  Last BP Med Given Day of Dialysis: none  Last Pain Med Given: none  Lab Tests to be Obtained with Dialysis: Yes  Blood Transfusion to be Given During Dialysis: No  Available IV Access: Yes  Medications to be Administered During Dialysis: No  Continuous IV Infusion Running: No  Restraints on Currently or in the Last 24 Hours: No  Hand-Off Communication: no acute overnight or morning events; vss; Pt did not take morning medications; Pt will need labs; Pt is a full code; Josie Esteban RN.  Dialysis Catheter Dressing: Pt has a fistula; n/a  Last Dressing Change: n/a

## 2024-04-03 NOTE — PROGRESS NOTES
Physical Therapy                 Therapy Communication Note    Patient Name: Reynaldo Francisco  MRN: [id number]  Today's Date: 4/3/2024     Discipline: Physical Therapy    Missed Visit Reason: Missed Visit Reason:  (Pt is off the floor at dialysis. Will re-attempt as able and appropriate)    Missed Time: Attempt

## 2024-04-03 NOTE — PROGRESS NOTES
"Reynaldo Francisco is a 68 y.o. male on day 2 of admission presenting with Renal failure.    Subjective   No acute overnight events. Patient was getting hemodialysis when I went to see him this morning.    Objective     Physical Exam    Constitutional:       General: He is not in acute distress.     Appearance: Normal appearance. He is normal weight.   HENT:      Head: Normocephalic and atraumatic.      Nose: Nose normal.   Eyes:      General: No scleral icterus.     Extraocular Movements: Extraocular movements intact.   Cardiovascular:      Rate and Rhythm: Normal rate and regular rhythm.      Heart sounds: Normal heart sounds. No murmur heard.     No friction rub. No gallop.      Comments: +JVD noted at 10cm  Pulmonary:      Effort: Pulmonary effort is normal. No respiratory distress.      Breath sounds: Rales present. No wheezing.   Chest:      Chest wall: No tenderness.   Abdominal:      General: There is no distension.      Palpations: Abdomen is soft.      Tenderness: There is no abdominal tenderness.   Musculoskeletal:         General: Normal range of motion.      Cervical back: Normal range of motion and neck supple.      Right lower leg: Edema present.      Left lower leg: Edema present.   Skin:     General: Skin is warm and dry.   Neurological:      Mental Status: He is alert and oriented to person, place, and time.   Psychiatric:         Mood and Affect: Mood normal.         Behavior: Behavior normal.     Last Recorded Vitals  Blood pressure 102/66, pulse 82, temperature 36 °C (96.8 °F), temperature source Temporal, resp. rate 16, height 1.702 m (5' 7\"), weight 64.1 kg (141 lb 5 oz), SpO2 92 %.  Intake/Output last 3 Shifts:  I/O last 3 completed shifts:  In: 715.7 (11.2 mL/kg) [P.O.:480; I.V.:235.7 (3.7 mL/kg)]  Out: 75 (1.2 mL/kg) [Urine:75 (0 mL/kg/hr)]  Weight: 64.1 kg     Relevant Results  [Held by provider] aspirin, 81 mg, oral, q AM  heparin (porcine), 5,000 Units, subcutaneous, q8h  metoprolol succinate " XL, 50 mg, oral, Nightly  mycophenolate, 360 mg, oral, BID  pantoprazole, 40 mg, oral, q AM  polyethylene glycol, 17 g, oral, Daily  sulfamethoxazole-trimethoprim, 1 tablet, oral, Every Mon/Wed/Fri  tacrolimus, 2 mg, oral, BID  tamsulosin, 0.4 mg, oral, Daily      Results from last 7 days   Lab Units 04/03/24  0701   WBC AUTO x10*3/uL 4.0*   HEMOGLOBIN g/dL 8.1*   HEMATOCRIT % 24.4*   PLATELETS AUTO x10*3/uL 142*       Results from last 7 days   Lab Units 04/03/24  0703   SODIUM mmol/L 136   POTASSIUM mmol/L 4.0   CHLORIDE mmol/L 98   CO2 mmol/L 25   BUN mg/dL 52*   CREATININE mg/dL 4.36*   EGFR mL/min/1.73m*2 14*   GLUCOSE mg/dL 122*   CALCIUM mg/dL 8.4*   PHOSPHORUS mg/dL 4.3       Results from last 7 days   Lab Units 04/02/24  0707   ALK PHOS U/L 77   BILIRUBIN TOTAL mg/dL 1.4*   BILIRUBIN DIRECT mg/dL 0.4*   PROTEIN TOTAL g/dL 6.5   ALT U/L 8*   AST U/L 16       Results from last 7 days   Lab Units 04/01/24  2100   APTT seconds 30   INR  1.3*         Assessment/Plan   Principal Problem:    Renal failure    68 y.o. 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     UPDATES 4/3  - Hemodialysis today  - NPO since midnight, scheduled for kidney biopsy with IR  - CXR after dialysis. Plan for thoracentesis if unilateral pulmonary edema not resolved  - Transplant nephrology following     #Volume Overload  #ESRD s/p kidney transplantation (on tacromlimus and mycophenolate)  - Continue home IS regimen of tacrolimus 3 mg qam + 2 mg qPM and  BID. Tacro recently increased from 2/2 by [facility] nephrology group. Discussed with  Dr. Lipscomb  - Transplant nephrology aware of admission   - Consider additional HD tomorrow, per DC records, appeared to have achieved weight in low 130s before DC. 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  :: EMBx negative for cardiac amyloidosis and sarcoidosis   :: CT PET at OSH did not show ischemia during last admission  :: S/p CRTD upgrade iso pacemaker-induced cardiomyopathy on last admission  :: Per device interrogation on 03/29, patient 91%   - Volume removal with HD as above  - Continue metoprolol 50 mg at bedtime  - Daily RFP and Mg for K>4, Mg >2  - GDMT optimization as renal function tolerates      #Type A aortic dissection s/p repair 2020  #chronic residual type B aortic dissection w/ R carotid artery dissection  #HTN  - BP well controlled in 100-110s/50-80s  - No outpatient anti-hypertensive  - Continue to monitor     #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   -iron studies: 75, TIBC 252, ferritin 764  - Started Aranesep 80 mcg q14 days on last admission      Fluids: none  Access: IV, HD access through LUE AVF  Antibiotics: Bactrim ppx   Electrolytes: PRN  Nutrition: cardiac, renal  PPI: Protonix 40  DVT: Hep 5000 TID  CODE status: FULL CODE  NOK: Abdiel (wife) [phone]        Code Status: Full Code    Chaim Torres MD

## 2024-04-03 NOTE — PROGRESS NOTES
Occupational Therapy    Evaluation    Patient Name: Reynaldo Francisco  MRN: [id number]  Today's Date: 4/3/2024  Time Calculation  Start Time: 1131  Stop Time: 1147  Time Calculation (min): 16 min        Assessment:  End of Session Communication: Bedside nurse  End of Session Patient Position: Bed, 3 rail up, Alarm off, not on at start of session, Alarm off, caregiver present  OT Assessment Results: Decreased ADL status, Decreased upper extremity strength, Decreased endurance, Decreased functional mobility, Decreased IADLs    Plan:  Treatment Interventions: ADL retraining, Functional transfer training, UE strengthening/ROM, Endurance training, Patient/family training, Equipment evaluation/education, Compensatory technique education  OT Frequency: 2 times per week  OT Discharge Recommendations: Low intensity level of continued care    Subjective   General:  General  Reason for Referral: fluid overload  Past Medical History Relevant to Rehab: Stage C systolic heart failure/NICM/HFrEF (30-35% TTE 3/2024), SSS s/p dual-chamber PM, 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 Pt pleasant and cooperative re: subjective portion of OT eval, however, upon initiation of func mob, pt declines d/t fatigue from NPO /awaiting biopsy. Per EMR, pt overall CGA with func mob.    Precautions:  Medical Precautions: Cardiac precautions, Fall precautions    Pain:  Pain Assessment  Pain Assessment: 0-10  Pain Score: 0 - No  pain    Objective   Cognition:  Overall Cognitive Status: Within Functional Limits  Orientation Level: Oriented X4  Following Commands: Follows all commands and directions without difficulty    Home Living:  Type of Home: House  Lives With: Spouse  Home Adaptive Equipment: Walker rolling or standard, Wheelchair-manual  Home Layout: One level  Home Access: Stairs to enter without rails  Entrance Stairs-Number of Steps: 3  Bathroom Shower/Tub: Tub only  Bathroom Toilet: Standard  Bathroom Equipment:  (Shower stool)    Prior Function:  Receives Help From: Family  ADL Assistance: Independent  Homemaking Assistance: Independent (Pt able, however, wife completes cleaning /cooking)  Ambulatory Assistance: Independent (intermittent use of walker 2/2 fatigue; pt reports not typically ambulating in community d/t fatigue)  Hand Dominance: Right    IADL History:  Mode of Transportation: Family  Leisure and Hobbies: Pt reports decreased occupational balance re: leisure activities d/t fatigue and generalized weakness    ADL:  ADL Comments: Pt declines BADL completion this date d/t completing this AM and fatigue at time of OT eval; anticipate MIN A - CGA overall and INDEP with eating    Bed Mobility/Transfers:   Transfer 1  Trials/Comments 1: OT assist pt to initiate supine-sitting as pt initially agreeable, however, pt declines during transition d/t wanting to rest and fatigued from NPO. OT assists pt with repositioning in bed with MIN A. Pt and pt's wife pt completing func amb in hallway with HHA.    Vision:  Vision - Basic Assessment  Current Vision: Wears glasses all the time  Patient Visual Report:  (Pt reports needing updated perscription, however, d/t other health complications - that has been placed on hold)    Sensation:  Sensation Comment: Denies N/T at time of OT eval; reports intermittent numbness in B feet    Strength:  Strength Comments: BUE grossly WFL    Outcome Measures:  [facility] Daily Activity  Putting on and  taking off regular lower body clothing: A little  Bathing (including washing, rinsing, drying): A little  Putting on and taking off regular upper body clothing: A little  Toileting, which includes using toilet, bedpan or urinal: A little  Taking care of personal grooming such as brushing teeth: A little  Eating Meals: None  Daily Activity - Total Score: 19    OT Adult Other Outcome Measures  4AT: Negative    Education Documentation  Body Mechanics, taught by Zenaida Elizabeth OT at 4/3/2024 12:55 PM.  Learner: Patient  Readiness: Acceptance  Method: Explanation  Response: Verbalizes Understanding    Education Comments  No comments found.      Goals:  Encounter Problems       Encounter Problems (Active)       ADLs       Pt will complete UB /LB bathing tasks with modified independence while seated and AE as needed.  (Progressing)       Start:  04/03/24    Expected End:  04/17/24            Pt will complete LB dressing with independent level of assistance while seated and/or standing.   (Progressing)       Start:  04/03/24    Expected End:  04/17/24            Pt will complete toilet hygiene while seated /standing with independent level of assistance.   (Progressing)       Start:  04/03/24    Expected End:  04/17/24               BALANCE       Pt will demo improved dynamic sitting /standing balance while engaging in I/ADL task with independent level of assistance and no LOB.  (Progressing)       Start:  04/03/24    Expected End:  04/17/24               MOBILITY       Pt will complete functional ambulation household /community distance with modified independence and LRAD.  (Progressing)       Start:  04/03/24    Expected End:  04/17/24                      ---------------  Zenaida Elizabeth (OTR/L, OTD)  Inpatient Occupational Therapist   Rehab Office: [phone]

## 2024-04-03 NOTE — CARE PLAN
Problem: Fall/Injury  Goal: Not fall by end of shift  Outcome: Progressing  Goal: Be free from injury by end of the shift  Outcome: Progressing

## 2024-04-04 NOTE — PROGRESS NOTES
"Reynaldo Francisco is a 68 y.o. male on day 3 of admission presenting with Renal failure.    Subjective   No acute overnight events. Patient was getting hemodialysis when I went to see him this morning. NPO for Kidney biopsy today. Also planning for thoracentesis.     Objective     Physical Exam    Constitutional:       General: He is not in acute distress.     Appearance: Normal appearance. He is normal weight.   HENT:      Head: Normocephalic and atraumatic.      Nose: Nose normal.   Eyes:      General: No scleral icterus.     Extraocular Movements: Extraocular movements intact.   Cardiovascular:      Rate and Rhythm: Normal rate and regular rhythm.      Heart sounds: Normal heart sounds. No murmur heard.     No friction rub. No gallop.      Comments: +JVD noted at 10cm  Pulmonary:      Effort: Pulmonary effort is normal. No respiratory distress.      Breath sounds: Rales present. No wheezing.   Chest:      Chest wall: No tenderness.   Abdominal:      General: There is no distension.      Palpations: Abdomen is soft.      Tenderness: There is no abdominal tenderness.   Musculoskeletal:         General: Normal range of motion.      Cervical back: Normal range of motion and neck supple.      Right lower leg: Edema present.      Left lower leg: Edema present.   Skin:     General: Skin is warm and dry.   Neurological:      Mental Status: He is alert and oriented to person, place, and time.   Psychiatric:         Mood and Affect: Mood normal.         Behavior: Behavior normal.     Last Recorded Vitals  Blood pressure 146/82, pulse 77, temperature 36.2 °C (97.2 °F), temperature source Temporal, resp. rate 16, height 1.702 m (5' 7\"), weight 64.1 kg (141 lb 5 oz), SpO2 95 %.  Intake/Output last 3 Shifts:  I/O last 3 completed shifts:  In: 1200 (18.7 mL/kg) [I.V.:400 (6.2 mL/kg); Other:800]  Out: 2000 (31.2 mL/kg) [Other:2000]  Weight: 64.1 kg     Relevant Results  [Held by provider] aspirin, 81 mg, oral, q AM  [Held by provider] " heparin (porcine), 5,000 Units, subcutaneous, q8h  metoprolol succinate XL, 50 mg, oral, Nightly  mycophenolate, 360 mg, oral, BID  pantoprazole, 40 mg, oral, q AM  polyethylene glycol, 17 g, oral, Daily  sulfamethoxazole-trimethoprim, 1 tablet, oral, Every Mon/Wed/Fri  [Held by provider] tacrolimus, 2 mg, oral, BID  tamsulosin, 0.4 mg, oral, Daily      Results from last 7 days   Lab Units 04/04/24  0756   WBC AUTO x10*3/uL 4.1*   HEMOGLOBIN g/dL 8.0*   HEMATOCRIT % 24.3*   PLATELETS AUTO x10*3/uL 133*       Results from last 7 days   Lab Units 04/04/24  0758   SODIUM mmol/L 136   POTASSIUM mmol/L 4.1   CHLORIDE mmol/L 98   CO2 mmol/L 28   BUN mg/dL 36*   CREATININE mg/dL 3.85*   EGFR mL/min/1.73m*2 16*   GLUCOSE mg/dL 113*   CALCIUM mg/dL 8.4*   PHOSPHORUS mg/dL 3.5       Results from last 7 days   Lab Units 04/02/24  0707   ALK PHOS U/L 77   BILIRUBIN TOTAL mg/dL 1.4*   BILIRUBIN DIRECT mg/dL 0.4*   PROTEIN TOTAL g/dL 6.5   ALT U/L 8*   AST U/L 16       Results from last 7 days   Lab Units 04/01/24  2100   APTT seconds 30   INR  1.3*         Assessment/Plan   Principal Problem:    Renal failure  Active Problems:    Cardiovascular renal disease, stage 1-4 or unspecified chronic kidney disease, with heart failure (CMS/HCC)    KIA (acute kidney injury) (CMS/HCC)    68 y.o. 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     UPDATES 4/4  - Hemodialysis today  - NPO since midnight, scheduled for kidney biopsy with IR  - CXR largely unchanged. Thoracentesis done today -> 1300 ml of yellow fluid removed. Labs ordered  -  Transplant nephrology following     #Volume Overload  #ESRD s/p kidney transplantation (on tacromlimus and mycophenolate)  - Continue home IS regimen of tacrolimus 3 mg qam + 2 mg qPM and  BID. Tacro recently increased from 2/2 by [facility] nephrology group. Discussed with Dr. Lipscomb  - Transplant nephrology aware of admission   - Consider additional HD tomorrow, per DC records, appeared to have achieved weight in low 130s before DC. 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  :: EMBx negative for cardiac amyloidosis and sarcoidosis   :: CT PET at OSH did not show ischemia during last admission  :: S/p CRTD upgrade iso pacemaker-induced cardiomyopathy on last admission  :: Per device interrogation on 03/29, patient 91%   - Volume removal with HD as above  - Continue metoprolol 50 mg at bedtime  - Daily RFP and Mg for K>4, Mg >2  - GDMT optimization as renal function tolerates      #Type A aortic dissection s/p repair 2020  #chronic residual type B aortic dissection w/ R carotid artery dissection  #HTN  - BP well controlled in 100-110s/50-80s  - No outpatient anti-hypertensive  - Continue to monitor     #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   -iron studies: 75, TIBC 252, ferritin 764  - Started Aranesep 80 mcg q14 days on last admission      Fluids: none  Access: IV, HD access through LUE AVF  Antibiotics: Bactrim ppx   Electrolytes: PRN  Nutrition: cardiac, renal  PPI: Protonix 40  DVT: Hep 5000 TID  CODE status: FULL CODE  NOK: Abdiel (wife) [phone]        Code Status: Full Code    Chaim Torres MD

## 2024-04-04 NOTE — NURSING NOTE
Report from Sending RN:    Report From: Annie (CARRIE)  Recent Surgery of Procedure: No  Baseline Level of Consciousness (LOC): a/o x 4  Oxygen Use: No  Type: none  Diabetic: No  Last BP Med Given Day of Dialysis: none  Last Pain Med Given: none  Lab Tests to be Obtained with Dialysis: No  Blood Transfusion to be Given During Dialysis: No  Available IV Access: Yes  Medications to be Administered During Dialysis: No  Continuous IV Infusion Running: No  Restraints on Currently or in the Last 24 Hours: No  Hand-Off Communication: No acute overnight or morning; vss; Pt did not take morning medications; Pt will not need labs; Pt is a full code; Josie Esteban RN.  Dialysis Catheter Dressing: fistula  Last Dressing Change: Fistula

## 2024-04-04 NOTE — CARE PLAN
The patient's goals for the shift include Pt will continue to remain HDS throughout this shift    The clinical goals for the shift include Pt will remain safe from falls and injuries throughout shift    Over the shift, the patient did not make progress toward the following goals. Barriers to progression include history of kidney and liver transplant, PPM. Recommendations to address these barriers include monitor daily labs, V/S q4 hrs, report new changes to MD.

## 2024-04-04 NOTE — CARE PLAN
The patient's goals for the shift include Pt will continue to remain HDS throughout this shift    The clinical goals for the shift include Pt will remain safe from falls and injuries throughout shift      Pt is alert and oriented at bedside. Pt shows no s/s of distress on room air. Educated pt on being NPO after midnight and pt verbalizes understanding. Bed in lowest position and call light within reach. Will continue to monitor, assess, and update pt on plan of care.

## 2024-04-04 NOTE — POST-PROCEDURE NOTE
INTERVENTIONAL RADIOLOGY ADVANCED PRACTICE PROCEDURE  [facility] ([facility]    A time out was performed and Right Hemithorax was examined with US and appropriate entry point was confirmed and marked.   The patient was prepped and draped in a sterile manner, 1% lidocaine was used to anesthesize the skin and subcutaneous tissue.   A 5F Centesis needle was then introduced through the skin into the pleural space, the centesis catheter was then threaded without difficulty.   1300 ml of yellow fluid was removed without difficulty. The catheter was then removed.   No immediate complications were noted during and immediately following the procedure.

## 2024-04-04 NOTE — PROGRESS NOTES
Physical Therapy                 Therapy Communication Note    Patient Name: Reynaldo Francisco  MRN: [id number]  Today's Date: 4/4/2024     Discipline: Physical Therapy    Missed Visit Reason: Missed Visit Reason: Patient in a medical procedure (Pt is off the floor at dialysis. Will re-attempt as able and appropriate)    Missed Time: Attempt

## 2024-04-04 NOTE — POST-PROCEDURE NOTE
Interventional Radiology Post-Procedure Note    Right iliac fossa renal transplant biopsy      Indication for procedure: The primary encounter diagnosis was Renal failure. A diagnosis of Pacemaker was also pertinent to this visit.    Pre-Procedure Verification and Time Out:  · Procedure Location procedure area   · HUDDLE - Pre-procedure Verification completed   · TIME OUT - Final Verification completed immediately prior to procedure start   · DEBRIEF completed     General Information:  Date/Time of Procedure: 04/04/24 at 12:53 PM  Indication(s)/Pre - Procedure Diagnoses: c/f rejection  Post-Procedure Diagnosis: c/f rejection  Procedure Name: transplant renal biopsy  Findings: See PACS  Procedure performed by: Emanuel Sandoval MD   Assistant(s): Dr. Ghazala Santos MD  Estimated Blood Loss (mL):  10cc  Specimen: Yes, 2 cores  Informed Consent: written consent obtained    Procedure Details:    Technically successful and uncomplicated right iliac fossa transplant renal biopsy.  Please see PACS for full procedural details.    Patient Tolerance: good  Complications: None    Prep:  Ultrasound Guided Insertion: Yes  Hand Hygiene, Surgical Cap, Surgical Mask, Sterile Gloves, Glasses, Scrubs, and Drape  Patient Position: Supine  Site Prep: chlorhexidine, draped, usual sterile procedure followed    Anesthesia/Medications:  Procedural Sedation:  Medications  As of 04/04/24 1253      heparin (porcine) injection 5,000 Units (Units) Total dose:  0 Units* Dosing weight:  65.9   *Administration not included in total     Date/Time Rate/Dose/Volume Action       04/01/24  2200 *5,000 Units Missed               heparin (porcine) injection 5,000 Units (Units) Total dose:  10,000 Units* Dosing weight:  63.6   *Administration not included in total     Date/Time Rate/Dose/Volume Action       04/01/24  2230 *5,000 Units Missed     04/02/24  0630 *5,000 Units Missed      1656 5,000 Units Given      2148 5,000 Units Given     04/03/24  0630 *5,000  Units Missed      1430 *5,000 Units Missed      1542 *Not included in total Held by provider      2230 *5,000 Units Missed     04/04/24  0630 *5,000 Units Missed               polyethylene glycol (Glycolax, Miralax) packet 17 g (g) Total dose:  0 g* Dosing weight:  65.9   *Administration not included in total     Date/Time Rate/Dose/Volume Action       04/01/24  2045 *17 g Missed               polyethylene glycol (Glycolax, Miralax) packet 17 g (g) Total dose:  0 g* Dosing weight:  63.6   *Administration not included in total     Date/Time Rate/Dose/Volume Action       04/02/24  0900 *17 g Missed     04/03/24  0900 *17 g Missed               aspirin EC tablet 81 mg (mg) Total dose:  81 mg* Dosing weight:  63.6   *Administration not included in total     Date/Time Rate/Dose/Volume Action       04/02/24  0820 81 mg Given      1445 *Not included in total Held by provider     04/03/24  0900 *81 mg Missed     04/04/24  0900 *Not included in total Automatically Held               metoprolol succinate XL (Toprol-XL) 24 hr tablet 50 mg (mg) Total dose:  150 mg Dosing weight:  63.6      Date/Time Rate/Dose/Volume Action       04/01/24  2225 50 mg Given     04/02/24  2148 50 mg Given     04/03/24  2101 50 mg Given               mycophenolate (Myfortic) EC tablet 360 mg (mg) Total dose:  1,800 mg Dosing weight:  63.6      Date/Time Rate/Dose/Volume Action       04/02/24  0109 360 mg Given      0821 360 mg Given      2148 360 mg Given     04/03/24  1103 360 mg Given      2100 360 mg Given               pantoprazole (ProtoNix) EC tablet 40 mg (mg) Total dose:  80 mg Dosing weight:  63.6      Date/Time Rate/Dose/Volume Action       04/02/24  0821 40 mg Given     04/03/24  1103 40 mg Given               sulfamethoxazole-trimethoprim (Bactrim) 400-80 mg per tablet 1 tablet (tablet) Total dose:  1 tablet* Dosing weight:  63.6   *Administration not included in total     Date/Time Rate/Dose/Volume Action       04/02/24  0247 *1 tablet  Missed     04/03/24  2201 1 tablet Given               tacrolimus (Prograf) capsule 2 mg (mg) Total dose:  4 mg Dosing weight:  63.6      Date/Time Rate/Dose/Volume Action       04/01/24  2225 2 mg Given     04/03/24  1103 2 mg Given      1237 *Not included in total Held by provider      2100 *2 mg Missed     04/04/24  0900 *Not included in total Automatically Held               tacrolimus (Prograf) capsule 3 mg (mg) Total dose:  3 mg Dosing weight:  63.6      Date/Time Rate/Dose/Volume Action       04/02/24  0820 3 mg Given               tacrolimus (Prograf) capsule 1 mg (mg) Total dose:  1 mg Dosing weight:  63.6      Date/Time Rate/Dose/Volume Action       04/02/24  2028 1 mg Given               tamsulosin (Flomax) 24 hr capsule 0.4 mg (mg) Total dose:  0.8 mg Dosing weight:  63.6      Date/Time Rate/Dose/Volume Action       04/02/24  0820 0.4 mg Given     04/03/24  1103 0.4 mg Given               magnesium sulfate IV 4 g (mL/hr) Total dose:  1.43 g* Dosing weight:  63.6   *From user-documented volume     Date/Time Rate/Dose/Volume Action       04/02/24  0109 4 g - 25 mL/hr (over 240 min) New Bag      0246 25 mL/hr Rate Verify      0509  (over 240 min) Stopped               bumetanide (Bumex) injection 3 mg (mL/hr) Total volume:  Not documented* Dosing weight:  63.6   *Total volume has not been documented. View each administration to see the amount administered.     Date/Time Rate/Dose/Volume Action       04/02/24  0108 3 mg - 360 mL/hr (over 2 min) Given               darbepoetin carolyn (Aranesp) injection 80 mcg (mcg) Total dose:  80 mcg Dosing weight:  64.1      Date/Time Rate/Dose/Volume Action       04/03/24  1539 80 mcg Given               fentaNYL PF (Sublimaze) injection (mcg) Total dose:  50 mcg      Date/Time Rate/Dose/Volume Action       04/04/24  1235 50 mcg Given               midazolam (Versed) injection (mg) Total dose:  1 mg      Date/Time Rate/Dose/Volume Action       04/04/24  1237 1 mg Given                    Fentanyl: 50 mcg  Versed: 1 mg  1% Lidocaine: 8 mL    Attending Attestation:  I was present for the entire procedure    Emanuel Sandoval MD, PGY-5  Interventional Radiology  IR pager: [phone]    NON-Urgent on call weekends and after hours weekdays (5pm - 5am) IR pager: [phone]  Urgent & emergent on call weekends and after hours weekdays (5pm-7am) IR pager: [phone]

## 2024-04-04 NOTE — PROGRESS NOTES
"  Transplant Nephrology progress note     Date of admission: 4/1/2024     Reynaldo Francisco is a 68 y.o.  with [facility]   Past Medical History:   Diagnosis Date    Arteriovenous fistula, acquired (CMS/[facility]) 06/17/2022    AV fistula    Lung nodule     Nutritional anemia, unspecified     Anemia, deficiency    Personal history of malignant neoplasm of pancreas 06/17/2022    History of malignant neoplasm of pancreas        SUBJECTIVE:  Pt seen on dialysis. Tolerating UF well. Plan for biopsy today. No UOP. Shortness of breath improving.       PROBLEM LIST:  Principal Problem:    Renal failure  Active Problems:    Cardiovascular renal disease, stage 1-4 or unspecified chronic kidney disease, with heart failure (CMS/[facility])    KIA (acute kidney injury) (CMS/[facility])         ALLERGIES:  Allergies   Allergen Reactions    Milk Diarrhea and GI Upset     (Skim milk) diarrhea    Shellfish Derived Hives and Itching    Iodinated Contrast Media Nausea/vomiting            CURRENT MEDICATIONS:  Scheduled medications  [Held by provider] aspirin, 81 mg, oral, q AM  [Held by provider] heparin (porcine), 5,000 Units, subcutaneous, q8h  metoprolol succinate XL, 50 mg, oral, Nightly  mycophenolate, 360 mg, oral, BID  pantoprazole, 40 mg, oral, q AM  polyethylene glycol, 17 g, oral, Daily  sulfamethoxazole-trimethoprim, 1 tablet, oral, Every Mon/Wed/Fri  [Held by provider] tacrolimus, 2 mg, oral, BID  tamsulosin, 0.4 mg, oral, Daily      Continuous medications     PRN medications  PRN medications: acetaminophen       OBJECTIVE:    VITALS: Visit Vitals  /76   Pulse 78   Temp 36.7 °C (98.1 °F) (Temporal)   Resp 15   Ht 1.702 m (5' 7\")   Wt 64.1 kg (141 lb 5 oz)   SpO2 100%   BMI 22.13 kg/m²   Smoking Status Never   BSA 1.74 m²          General: No distress , pt sitting up in bed  Mucosa moist   AI, AC, AF     HEENT: PEERLA  CVS: S1 S2 no murmurs  RESP:  Lungs clear to auscultation   ABDO: Soft, non-tender   Neuro: A + O x 3  Skin: No rash   Extremities: " edema in LE b/l       LABS:  Results from last 72 hours   Lab Units 04/04/24  0758 04/04/24  0756 04/04/24  0539   WBC AUTO x10*3/uL  --  4.1*  --    HEMOGLOBIN g/dL  --  8.0*  --    MCV fL  --  101*  --    PLATELETS AUTO x10*3/uL  --  133*  --    BUN mg/dL 36*  --   --    CREATININE mg/dL 3.85*  --   --    CALCIUM mg/dL 8.4*  --   --    TACROLIMUS ng/mL  --   --  6.5              Intake/Output Summary (Last 24 hours) at 4/4/2024 1334  Last data filed at 4/4/2024 1100  Gross per 24 hour   Intake 800 ml   Output 2000 ml   Net -1200 ml            ASSESSMENT AND PLAN:    Reynaldo Francisco is a 68 y.o. 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  Patient was a induced by Thymoglobulin and maintained on Tac,  low-dose of MMF.  DSA have been negative as of 1/2024.  Pt then admitted March 2024 due to volume overload, biopsy suggesting ATN and with declining UOP and poor clearance he was started on HD March 7 and discharged on HD . Now readmitted due to shortness of breath, fluid overload.      Allograft function/kidney:  #KIA-D MF  -KIA from March in setting of decompensated HF, CRS with biopsy 3/7 showing minimal abnormalities with interstitial fibrosis, vacuolization of tubules with no signs of rejection, congo red negative for amyloid. Potentially 2/2 CNI toxicity? Vs hemodynamic injury however no recovery noted at this time with pt having decrease in UOP and signs of fluid overload   -renal ultrasound 3/1 showing moderate pelvicalyceal dilatation and hydroureter but Lasix renogram was negative for any obstruction.    -UA - bland. UPC is a 0.06..  Did not respond initially to Lasix.  -S/p endomyocardial bx 3/14/24 showing  severe myocyte hypertrophy - signs of post inflammatory process vs ischemia   -IUF today with 2.4L UF   -Plan for iHD tomorrow per schedule, pt remains anuric    -Given worsening UOP and further decline in renal function plan for renal biopsy - completed today   -strict I/O, daily RFP, daily weights   -hold Bumex at this time     Hemodynamics:   -BP stable     Immunosuppression:   -Myfortic 360 twice daily  tacrolimus 3mg AM and 2mg PM -> Repeat Tac level 6  -Pls start back 1.5mg BID Tacrolimus      Anemia:   -Hb 8.2  -iron stores adequate  -Darbe 80mcg - last given 4/3    Bone mineral disease: Calcium and phosphorus levels acceptable        Marisel Vaughan DO   Nephrology fellow PGY 4   Available via [employer] Chat   Transplant pager #[phone]

## 2024-04-04 NOTE — PROGRESS NOTES
4/4/24 7392 Transitional Care Coordinator Notes:    Patient will be scheduled for a renal biopsy and thoracentesis today or tomorrow. Per team, patient will possibly discharge over the weekend.                 Assessment/Plan   Principal Problem:    Renal failure  Active Problems:    Cardiovascular renal disease, stage 1-4 or unspecified chronic kidney disease, with heart failure (CMS/[facility])    KIA (acute kidney injury) (CMS/[facility])    Discharge Plans: discharge home with home care           Cheri Santos RN

## 2024-04-05 NOTE — PROGRESS NOTES
"Reynaldo Francisco is a 68 y.o. male on day 4 of admission presenting with Renal failure.    Subjective   No acute overnight events. Patient was getting hemodialysis when I went to see him this morning. He does report feeling better today.    Objective     Physical Exam    Constitutional:       General: He is not in acute distress.     Appearance: Normal appearance. He is normal weight.   HENT:      Head: Normocephalic and atraumatic.      Nose: Nose normal.   Eyes:      General: No scleral icterus.     Extraocular Movements: Extraocular movements intact.   Cardiovascular:      Rate and Rhythm: Normal rate and regular rhythm.      Heart sounds: Normal heart sounds. No murmur heard.     No friction rub. No gallop.      Comments: +JVD noted at 10cm  Pulmonary:      Effort: Pulmonary effort is normal. No respiratory distress.      Breath sounds: Rales present. No wheezing.   Chest:      Chest wall: No tenderness.   Abdominal:      General: There is no distension.      Palpations: Abdomen is soft.      Tenderness: There is no abdominal tenderness.   Musculoskeletal:         General: Normal range of motion.      Cervical back: Normal range of motion and neck supple.      Right lower leg: Edema present.      Left lower leg: Edema present.   Skin:     General: Skin is warm and dry.   Neurological:      Mental Status: He is alert and oriented to person, place, and time.   Psychiatric:         Mood and Affect: Mood normal.         Behavior: Behavior normal.     Last Recorded Vitals  Blood pressure 105/61, pulse 71, temperature 37.2 °C (99 °F), resp. rate 15, height 1.702 m (5' 7\"), weight 64.1 kg (141 lb 5 oz), SpO2 94 %.  Intake/Output last 3 Shifts:  I/O last 3 completed shifts:  In: 1000 (15.6 mL/kg) [I.V.:600 (9.4 mL/kg); Other:400]  Out: 2250 (35.1 mL/kg) [Urine:250 (0.1 mL/kg/hr); Other:2000]  Weight: 64.1 kg     Relevant Results  aspirin, 81 mg, oral, q AM  [START ON 4/6/2024] bumetanide, 2 mg, oral, q12h KESHIA  heparin " (porcine), 5,000 Units, subcutaneous, q8h  metoprolol succinate XL, 50 mg, oral, Nightly  mycophenolate, 360 mg, oral, BID  pantoprazole, 40 mg, oral, q AM  polyethylene glycol, 17 g, oral, Daily  sulfamethoxazole-trimethoprim, 1 tablet, oral, Every Mon/Wed/Fri  tacrolimus, 1.5 mg, oral, BID  tamsulosin, 0.4 mg, oral, Daily      Results from last 7 days   Lab Units 04/05/24  0613   WBC AUTO x10*3/uL 5.2   HEMOGLOBIN g/dL 7.9*   HEMATOCRIT % 23.2*   PLATELETS AUTO x10*3/uL 116*       Results from last 7 days   Lab Units 04/05/24  0613   SODIUM mmol/L 136   POTASSIUM mmol/L 4.3   CHLORIDE mmol/L 97*   CO2 mmol/L 26   BUN mg/dL 47*   CREATININE mg/dL 4.30*   EGFR mL/min/1.73m*2 14*   GLUCOSE mg/dL 99   CALCIUM mg/dL 8.9   PHOSPHORUS mg/dL 3.9       Results from last 7 days   Lab Units 04/02/24  0707   ALK PHOS U/L 77   BILIRUBIN TOTAL mg/dL 1.4*   BILIRUBIN DIRECT mg/dL 0.4*   PROTEIN TOTAL g/dL 6.5   ALT U/L 8*   AST U/L 16       Results from last 7 days   Lab Units 04/01/24  2100   APTT seconds 30   INR  1.3*         Assessment/Plan   Principal Problem:    Renal failure  Active Problems:    Cardiovascular renal disease, stage 1-4 or unspecified chronic kidney disease, with heart failure (CMS/HCC)    KIA (acute kidney injury) (CMS/HCC)    68 y.o. 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     UPDATES 4/5  - Had another Hemodialysis session today  - CXR s/p Thoracentesis shows mild worsening of pulmonary edema with persistent  small-to-moderate right pleural effusion  - Plan to start diuresing over the weekend as per  nephrology. Will start bumex as per their discretion  - Biopsy results pending. UOP low, kidney function worsening     #Volume Overload  #ESRD s/p kidney transplantation (on tacromlimus and mycophenolate)  - Continue home IS regimen of tacrolimus 3 mg qam + 2 mg qPM and  BID. Tacro recently increased from 2/2 by [facility] nephrology group. Discussed with Dr. Lipscomb  - Transplant nephrology aware of admission   - Consider additional HD tomorrow, per DC records, appeared to have achieved weight in low 130s before DC. 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  :: EMBx negative for cardiac amyloidosis and sarcoidosis   :: CT PET at OSH did not show ischemia during last admission  :: S/p CRTD upgrade iso pacemaker-induced cardiomyopathy on last admission  :: Per device interrogation on 03/29, patient 91%   - Volume removal with HD as above  - Continue metoprolol 50 mg at bedtime  - Daily RFP and Mg for K>4, Mg >2  - GDMT optimization as renal function tolerates      #Type A aortic dissection s/p repair 2020  #chronic residual type B aortic dissection w/ R carotid artery dissection  #HTN  - BP well controlled in 100-110s/50-80s  - No outpatient anti-hypertensive  - Continue to monitor     #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   -iron studies: 75, TIBC 252, ferritin 764  - Started Aranesep 80 mcg q14 days on last admission      Fluids: none  Access: IV, HD access through LUE AVF  Antibiotics: Bactrim ppx   Electrolytes: PRN  Nutrition: cardiac, renal  PPI: Protonix 40  DVT: Hep 5000 TID  CODE status: FULL CODE  NOK: Abdiel (wife) [phone]        Code Status: Full Code    Chaim Torres  MD

## 2024-04-05 NOTE — PROGRESS NOTES
4/5/24 9297 Transitional Care Coordinator Notes:    Per spouse, patient outpatient dialysis chair is scheduled at [facility] on M/F around 12:30 pm. Patient is scheduled for dialysis today.    Per team, patient may need 3 days of dialysis instead of 2. Nephrology is awaiting for the renal biopsy results before final decision is made. If biopsy shows worsening results, patient may need to establish an outpatient dialysis chair.    Per team, anticipating discharge for Monday. Team will place home care orders for home PT/OT with [facility].                 Assessment/Plan   Principal Problem:    Renal failure  Active Problems:    Cardiovascular renal disease, stage 1-4 or unspecified chronic kidney disease, with heart failure (CMS/[facility])    KIA (acute kidney injury) (CMS/[facility])    Discharge Plans: discharge home with home care           Cheri Santos RN

## 2024-04-05 NOTE — NURSING NOTE
"Patient refused to wear SCD's throughout day shift even though education was provided. Patient stated \"I do not want it, I can walk.\" Dr Torres made aware via secure chat. Message acknowledged. No new orders received at this time. WCTM.  "

## 2024-04-05 NOTE — NURSING NOTE
Report to Receiving RN:    Report To: CARRIE Tom  Time Report Called: 0922  Hand-Off Communication: NO acute change from RN to RN report.  Patient tolerated treatment well with 1L fluid removed.  Last BP 92/65, HR 79.  No complaint of pain or discomfort.    Complications During Treatment: No  Ultrafiltration Treatment: No  Medications Administered During Dialysis: No  Blood Products Administered During Dialysis: No  Labs Sent During Dialysis: No  Heparin Drip Rate Changes: No  Dialysis Catheter Dressing: N/A  Last Dressing Change:     Electronic Signatures:   (Signed 4/5/24)   Authored: Aroldo Jones RN    Last Updated: 9:23 AM by AROLDO JONES

## 2024-04-05 NOTE — PROGRESS NOTES
"  Transplant Nephrology progress note     Date of admission: 4/1/2024     Reynaldo Francisco is a 68 y.o.  with PMH   Past Medical History:   Diagnosis Date    Arteriovenous fistula, acquired (CMS/[facility]) 06/17/2022    AV fistula    Lung nodule     Nutritional anemia, unspecified     Anemia, deficiency    Personal history of malignant neoplasm of pancreas 06/17/2022    History of malignant neoplasm of pancreas        SUBJECTIVE:  Pt had HD today with 1L UF. Overall feeling shortness of breath is improving. Had 150cc UOP last night. Feeling tired today.       PROBLEM LIST:  Principal Problem:    Renal failure  Active Problems:    Cardiovascular renal disease, stage 1-4 or unspecified chronic kidney disease, with heart failure (CMS/[facility])    KIA (acute kidney injury) (CMS/[facility])         ALLERGIES:  Allergies   Allergen Reactions    Milk Diarrhea and GI Upset     (Skim milk) diarrhea    Shellfish Derived Hives and Itching    Iodinated Contrast Media Nausea/vomiting            CURRENT MEDICATIONS:  Scheduled medications  aspirin, 81 mg, oral, q AM  heparin (porcine), 5,000 Units, subcutaneous, q8h  metoprolol succinate XL, 50 mg, oral, Nightly  mycophenolate, 360 mg, oral, BID  pantoprazole, 40 mg, oral, q AM  polyethylene glycol, 17 g, oral, Daily  sulfamethoxazole-trimethoprim, 1 tablet, oral, Every Mon/Wed/Fri  tacrolimus, 1.5 mg, oral, BID  tamsulosin, 0.4 mg, oral, Daily      Continuous medications     PRN medications  PRN medications: acetaminophen       OBJECTIVE:    VITALS: Visit Vitals  BP 96/66   Pulse 68   Temp 37.3 °C (99.1 °F)   Resp 16   Ht 1.702 m (5' 7\")   Wt 64.1 kg (141 lb 5 oz)   SpO2 98%   BMI 22.13 kg/m²   Smoking Status Never   BSA 1.74 m²          General: No distress , pt sitting up in bed  Mucosa moist   CVS: S1 S2 no murmurs  RESP:  Lungs clear to auscultation   ABDO: Soft, non-tender   Neuro: A + O x 3  Skin: No rash   Extremities: no edema in LE b/l       LABS:  Results from last 72 hours   Lab Units " 04/05/24  0613 04/04/24  0756 04/04/24  0539   WBC AUTO x10*3/uL 5.2   < >  --    HEMOGLOBIN g/dL 7.9*   < >  --    MCV fL 100   < >  --    PLATELETS AUTO x10*3/uL 116*   < >  --    BUN mg/dL 47*   < >  --    CREATININE mg/dL 4.30*   < >  --    CALCIUM mg/dL 8.9   < >  --    TACROLIMUS ng/mL  --   --  6.5    < > = values in this interval not displayed.              Intake/Output Summary (Last 24 hours) at 4/5/2024 1222  Last data filed at 4/5/2024 0900  Gross per 24 hour   Intake 1000 ml   Output 1652 ml   Net -652 ml            ASSESSMENT AND PLAN:    Reynaldo Francisco is a 68 y.o. with a history of nonischemic cardiomyopathy with a EF around 40%, s/p dual-chamber pacemaker, insulinoma with hepatic metastasis s/p liver transplant as of 1/8/2018 and DDKT as of 3/5/2023 at [facility].  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  Patient was a induced by Thymoglobulin and maintained on Tac,  low-dose of MMF.  DSA have been negative as of 1/2024.  Pt then admitted March 2024 due to volume overload, biopsy suggesting ATN and with declining UOP and poor clearance he was started on HD March 7 and discharged on HD . Now readmitted due to shortness of breath, fluid overload.      Allograft function/kidney:  #KIA-D MF  -KIA from March in setting of decompensated HF, CRS with biopsy 3/7 showing minimal abnormalities with interstitial fibrosis, vacuolization of tubules with no signs of rejection, congo red negative for amyloid. Potentially 2/2 CNI toxicity? Vs hemodynamic injury however no recovery noted at this time with pt having decrease in UOP and signs of fluid overload   -renal ultrasound 3/1 showing moderate pelvicalyceal dilatation and hydroureter but Lasix renogram was negative for any obstruction.    -UA - bland. UPC is a 0.06.  -Pt received HD MWF and IUF Thursday this week with improvement in dyspnea, edema  -Given  worsening progression of renal function pt s/p biopsy 4/4 - pending results   -strict I/O, daily RFP, daily weights   -Plan for iHD Monday. Small UOP last night so recommend bumex 2mg BID     Hemodynamics:   -BP stable     Immunosuppression:   -Myfortic 360 twice daily  tacrolimus 3mg AM and 2mg PM -> Repeat Tac level 6  -continue 1.5mg BID Tacrolimus      Anemia:   -Hb 7-8  -iron stores adequate  -Darbe 80mcg - last given 4/3    Bone mineral disease: Calcium and phosphorus levels acceptable        Marisel Vaughan DO   Nephrology fellow PGY 4   Available via [employer] Chat   Transplant pager #[phone]

## 2024-04-05 NOTE — NURSING NOTE
Report from Sending RN:    Report From: Annie ( RN)  Recent Surgery of Procedure: Yes, RENAL BIOPSY   Baseline Level of Consciousness (LOC): a/o x 4  Oxygen Use: No  Type: none  Diabetic: No  Last BP Med Given Day of Dialysis: none  Last Pain Med Given: none  Lab Tests to be Obtained with Dialysis: Yes, CBC, RFP, Magnesium, lactate  Blood Transfusion to be Given During Dialysis: No  Available IV Access: Yes  Medications to be Administered During Dialysis: No  Continuous IV Infusion Running: No  Restraints on Currently or in the Last 24 Hours: No  Hand-Off Communication: No acute overnight or morning events; vss; Pt did not take morning medications; Pt will need labs; Pt may go off unit without telemetry; Pt is a full code;  Dialysis Catheter Dressin2024  Last Dressing Change: 2024

## 2024-04-05 NOTE — CARE PLAN
The patient's goals for the shift include Pt will continue to remain HDS throughout this shift    The clinical goals for the shift include patient will remain injuries free throughout day shift    Pt is alert and oriented at bedside. Pt shows no s/s of distress on room air. Bed in lowest position and call light within reach. Pt updated on plan for tomorrow's dialysis time. Will continue to monitor, assess, and update on plan of care.       Problem: Diabetes  Goal: Achieve decreasing blood glucose levels by end of shift  Outcome: Not Progressing  Goal: Increase stability of blood glucose readings by end of shift  Outcome: Not Progressing  Goal: Decrease in ketones present in urine by end of shift  Outcome: Not Progressing  Goal: Maintain electrolyte levels within acceptable range throughout shift  Outcome: Not Progressing  Goal: Maintain glucose levels >70mg/dl to <250mg/dl throughout shift  Outcome: Not Progressing  Goal: No changes in neurological exam by end of shift  Outcome: Not Progressing  Goal: Learn about and adhere to nutrition recommendations by end of shift  Outcome: Not Progressing  Goal: Vital signs within normal range for age by end of shift  Outcome: Not Progressing  Goal: Increase self care and/or family involovement by end of shift  Outcome: Not Progressing  Goal: Receive DSME education by end of shift  Outcome: Not Progressing     Problem: Safety  Goal: I will remain free of falls  Outcome: Not Progressing     Problem: Daily Care  Goal: Daily care needs are met  Outcome: Not Progressing     Problem: Discharge Barriers  Goal: My discharge needs are met  Outcome: Not Progressing     Problem: Fall/Injury  Goal: Not fall by end of shift  Outcome: Not Progressing  Goal: Be free from injury by end of the shift  Outcome: Not Progressing     Problem: Pain - Adult  Goal: Verbalizes/displays adequate comfort level or baseline comfort level  Outcome: Not Progressing     Problem: Safety - Adult  Goal: Free  from fall injury  Outcome: Not Progressing     Problem: Discharge Planning  Goal: Discharge to home or other facility with appropriate resources  Outcome: Not Progressing     Problem: Chronic Conditions and Co-morbidities  Goal: Patient's chronic conditions and co-morbidity symptoms are monitored and maintained or improved  Outcome: Not Progressing

## 2024-04-05 NOTE — CARE PLAN
The patient's goals for the shift include Pt will continue to remain HDS throughout this shift    The clinical goals for the shift include patient will remain injuries free throughout day shift    Over the shift, the patient did not make progress toward the following goals. Barriers to progression include PT has history of ESRD, liver transplant. Recommendations to address these barriers include administer all immuno suppresant medications and anti-rejection medications as scheduled, monitor V/S, report new changes to MD.

## 2024-04-05 NOTE — PROGRESS NOTES
Physical Therapy                 Therapy Communication Note    Patient Name: Reynaldo Francisco  MRN: [id number]  Today's Date: 4/5/2024     Discipline: Physical Therapy    Missed Visit Reason: Missed Visit Reason: Patient in a medical procedure (Pt is at dialysis. Will re-attempt as able and appropriate)    Missed Time: Attempt

## 2024-04-06 NOTE — PROGRESS NOTES
"  Transplant Nephrology progress note     Date of admission: 4/1/2024     Reynaldo Francisco is a 68 y.o.  with PMH   Past Medical History:   Diagnosis Date    Arteriovenous fistula, acquired (CMS/[facility]) 06/17/2022    AV fistula    Lung nodule     Nutritional anemia, unspecified     Anemia, deficiency    Personal history of malignant neoplasm of pancreas 06/17/2022    History of malignant neoplasm of pancreas        SUBJECTIVE:  Denied any complaints this morning.  Discussed with him about the biopsy report and long-term plan going along.    PROBLEM LIST:  Principal Problem:    Renal failure  Active Problems:    Cardiovascular renal disease, stage 1-4 or unspecified chronic kidney disease, with heart failure (CMS/[facility])    KIA (acute kidney injury) (CMS/[facility])         ALLERGIES:  Allergies   Allergen Reactions    Milk Diarrhea and GI Upset     (Skim milk) diarrhea    Shellfish Derived Hives and Itching    Iodinated Contrast Media Nausea/vomiting            CURRENT MEDICATIONS:  Scheduled medications  aspirin, 81 mg, oral, q AM  [Held by provider] bumetanide, 2 mg, oral, BID  heparin (porcine), 5,000 Units, subcutaneous, q8h  metoprolol succinate XL, 50 mg, oral, Nightly  mycophenolate, 360 mg, oral, BID  pantoprazole, 40 mg, oral, q AM  polyethylene glycol, 17 g, oral, Daily  sulfamethoxazole-trimethoprim, 1 tablet, oral, Every Mon/Wed/Fri  tacrolimus, 1.5 mg, oral, BID  tamsulosin, 0.4 mg, oral, Daily      Continuous medications     PRN medications  PRN medications: acetaminophen       OBJECTIVE:    VITALS: Visit Vitals  BP 95/57   Pulse 70   Temp 36.6 °C (97.9 °F)   Resp 16   Ht 1.702 m (5' 7\")   Wt 64.1 kg (141 lb 5 oz)   SpO2 96%   BMI 22.13 kg/m²   Smoking Status Never   BSA 1.74 m²          General: No distress , pt sitting up in bed  Mucosa moist   CVS: S1 S2 no murmurs  RESP:  Lungs clear to auscultation   ABDO: Soft, non-tender   Neuro: A + O x 3  Skin: No rash   Extremities: no edema in LE b/l       LABS:  Results from " last 72 hours   Lab Units 04/06/24  0636 04/04/24  0756 04/04/24  0539   WBC AUTO x10*3/uL 5.6   < >  --    HEMOGLOBIN g/dL 7.7*   < >  --    MCV fL 105*   < >  --    PLATELETS AUTO x10*3/uL 105*   < >  --    BUN mg/dL 30*   < >  --    CREATININE mg/dL 4.06*   < >  --    CALCIUM mg/dL 8.6   < >  --    TACROLIMUS ng/mL  --   --  6.5    < > = values in this interval not displayed.              Intake/Output Summary (Last 24 hours) at 4/6/2024 1514  Last data filed at 4/6/2024 0042  Gross per 24 hour   Intake --   Output 50 ml   Net -50 ml            ASSESSMENT AND PLAN:    Reynaldo Francisco is a 68 y.o. 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.  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  Patient was a induced by Thymoglobulin and maintained on Tac,  low-dose of MMF.  DSA have been negative as of 1/2024.  Pt then admitted March 2024 due to volume overload, biopsy suggesting ATN and with declining UOP and poor clearance he was started on HD March 7 and discharged on HD . Now readmitted due to shortness of breath, fluid overload.      Allograft function/kidney:  #KIA-D MF  -KIA from March in setting of decompensated HF, CRS with biopsy 3/7 showing minimal abnormalities with interstitial fibrosis, vacuolization of tubules with no signs of rejection, congo red negative for amyloid. Potentially 2/2 CNI toxicity? Vs hemodynamic injury however no recovery noted at this time with pt having decrease in UOP and signs of fluid overload   -renal ultrasound 3/1 showing moderate pelvicalyceal dilatation and hydroureter but Lasix renogram was negative for any obstruction.    -UA - bland. UPC is a 0.06.  -Biopsy still showing questionable CNI toxicity, interstitial fibrosis around 5  to 10%.  Patient currently on tacrolimus for both liver as well as kidney transplant unable to transition to other medications at this time.  -Will aim lower levels of tacrolimus to see if there is any renal recovery.  -Discussed with him and his wife in detail about the biopsy report and planning long-term dialysis Monday Wednesday Friday.  Will arrange for dialysis in our outpatient unit for now and going along can be transitioned to community dialysis and follow-up with [facility].  -Avoid nephrotoxins and renally dose medications.  Current access is a left upper extremity AV fistula which is functional.    Hemodynamics:   -BP running low.  Currently on metoprolol and Bumex.  Can consider Bumex on nondialysis days.     Immunosuppression:   -Myfortic 360 twice daily  -Continue with current dose of tacrolimus 1.5 twice daily, current levels are around 6.5 continue with the current dose.      Anemia:   -Hb 7-7  -iron stores adequate  -Darbe 80mcg - last given 4/3    Bone mineral disease: Calcium and phosphorus levels acceptable.    Thank you for consulting

## 2024-04-06 NOTE — CARE PLAN
The patient's goals for the shift include Pt will continue to remain HDS throughout this shift    The clinical goals for the shift include Maintain falls/safety precautions throughout shift. Bed Alarm Activated.    Over the shift, the patient did not make progress toward the following goals. Barriers to progression include patient has history of liver and kidney transplant, HF. Recommendations to address these barriers include administer anti-rejection medications as scheduled, monitor V/S, monitor daily labs, allow patient to ambulate as tolerated, use IS, report new changes to MD.

## 2024-04-06 NOTE — PROGRESS NOTES
"Reynaldo Francisco is a 68 y.o. male on day 5 of admission presenting with Renal failure.    Subjective   No acute events overnight, patient observed laying in bed comfortable in no apparent distress. Patient denies, n/v/d, fever chills    Objective     Physical Exam  Constitutional:       Appearance: Normal appearance.   HENT:      Head: Normocephalic.      Nose: Nose normal.      Mouth/Throat:      Mouth: Mucous membranes are moist.   Cardiovascular:      Rate and Rhythm: Normal rate and regular rhythm.      Pulses: Normal pulses.   Pulmonary:      Effort: Pulmonary effort is normal.   Abdominal:      General: Abdomen is flat.   Genitourinary:     Penis: Normal.    Musculoskeletal:         General: Normal range of motion.      Cervical back: Normal range of motion.   Neurological:      General: No focal deficit present.      Mental Status: He is alert.   Psychiatric:         Mood and Affect: Mood normal.         Last Recorded Vitals  Blood pressure (!) 113/45, pulse 67, temperature 36.5 °C (97.7 °F), resp. rate 16, height 1.702 m (5' 7\"), weight 64.1 kg (141 lb 5 oz), SpO2 93 %.  Intake/Output last 3 Shifts:  I/O last 3 completed shifts:  In: 1000 (15.6 mL/kg) [I.V.:600 (9.4 mL/kg); Other:400]  Out: 1702 (26.6 mL/kg) [Urine:300 (0.1 mL/kg/hr); Other:1402]  Weight: 64.1 kg     Relevant Results  aspirin, 81 mg, oral, q AM  [Held by provider] bumetanide, 2 mg, oral, BID  heparin (porcine), 5,000 Units, subcutaneous, q8h  metoprolol succinate XL, 50 mg, oral, Nightly  mycophenolate, 360 mg, oral, BID  pantoprazole, 40 mg, oral, q AM  polyethylene glycol, 17 g, oral, Daily  sulfamethoxazole-trimethoprim, 1 tablet, oral, Every Mon/Wed/Fri  tacrolimus, 1.5 mg, oral, BID  tamsulosin, 0.4 mg, oral, Daily       Results for orders placed or performed during the hospital encounter of 04/01/24 (from the past 24 hour(s))   Cytology (Non-Gynecologic)   Result Value Ref Range    Case Report       Non-gynecologic Cytology                  "         Case: [id number]                                   Authorizing Provider:  Fernando Garza MD     Collected:           04/04/2024 1349              Ordering Location:     [facility]       Received:            04/04/2024 1959                                     [place] [id number]                                                           Pathologist:           Heavenly Gordon MD                                                         Specimen:    PLEURAL FLUID RIGHT SIDE                                                                   Final Cytological Interpretation         A. PLEURAL FLUID RIGHT SIDE ., CYTOLOGY AND CELL BLOCK:           No malignant cells identified                       Slide(s) initially screened by LAKSHMI Duran at [facility] [address] [facility] [id number]  By the signature on this report, the individual or group listed as making the Final Interpretation/Diagnosis certifies that they have reviewed this case.       Clinical History       C/f hemothorax      Specimen Description       A. PLEURAL FLUID RIGHT SIDE.  Received 1300 ml dark yellow cloudy fluid with particles in sterile bottle .       Specimen Processing Detail       A1 Slides Only (No Block)   A1-1 Pap Stain NGYN ThinPrep    A2 Cell Block   A2-1 H&E      CBC and Auto Differential   Result Value Ref Range    WBC 5.6 4.4 - 11.3 x10*3/uL    nRBC 0.7 (H) 0.0 - 0.0 /100 WBCs    RBC 2.28 (L) 4.50 - 5.90 x10*6/uL    Hemoglobin 7.7 (L) 13.5 - 17.5 g/dL    Hematocrit 24.0 (L) 41.0 - 52.0 %     (H) 80 - 100 fL    MCH 33.8 26.0 - 34.0 pg    MCHC 32.1 32.0 - 36.0 g/dL    RDW 17.6 (H) 11.5 - 14.5 %    Platelets 105 (L) 150 - 450 x10*3/uL    Neutrophils % 69.1 40.0 - 80.0 %    Immature Granulocytes %, Automated 0.4 0.0 - 0.9 %    Lymphocytes % 14.5 13.0 - 44.0 %    Monocytes % 14.2 2.0 - 10.0 %    Eosinophils % 1.1 0.0 - 6.0 %    Basophils % 0.7 0.0 - 2.0 %    Neutrophils Absolute 3.90 1.20 - 7.70  x10*3/uL    Immature Granulocytes Absolute, Automated 0.02 0.00 - 0.70 x10*3/uL    Lymphocytes Absolute 0.82 (L) 1.20 - 4.80 x10*3/uL    Monocytes Absolute 0.80 0.10 - 1.00 x10*3/uL    Eosinophils Absolute 0.06 0.00 - 0.70 x10*3/uL    Basophils Absolute 0.04 0.00 - 0.10 x10*3/uL   Renal Function Panel   Result Value Ref Range    Glucose 91 74 - 99 mg/dL    Sodium 136 136 - 145 mmol/L    Potassium 4.1 3.5 - 5.3 mmol/L    Chloride 96 (L) 98 - 107 mmol/L    Bicarbonate 27 21 - 32 mmol/L    Anion Gap 17 10 - 20 mmol/L    Urea Nitrogen 30 (H) 6 - 23 mg/dL    Creatinine 4.06 (H) 0.50 - 1.30 mg/dL    eGFR 15 (L) >60 mL/min/1.73m*2    Calcium 8.6 8.6 - 10.6 mg/dL    Phosphorus 2.9 2.5 - 4.9 mg/dL    Albumin 3.7 3.4 - 5.0 g/dL   Magnesium   Result Value Ref Range    Magnesium 1.94 1.60 - 2.40 mg/dL    XR chest 1 view    Result Date: 4/5/2024  Interpreted By:  Demetrius Trujillo and Sheng Max STUDY: XR CHEST 1 VIEW;  4/5/2024 10:40 am   INDICATION: Signs/Symptoms:pulmonary edema.   COMPARISON: Chest radiograph dated 04/03/2024, 04/01/2024 and PET cardiac dated 03/13/2024   ACCESSION NUMBER(S): [id number]   ORDERING CLINICIAN: RUBINA CEJA   FINDINGS: AP radiograph of the chest     LINES AND DEVICES: Biventricular pacemaker and AICD battery pack device overlies the right lateral hemithorax with its leads projecting over the right atrium, right ventricle and coronary vein. Numerous intact median sternotomy wires. Surgical clips project over the bilateral upper quadrants.   CARDIOMEDIASTINAL SILHOUETTE: Stable enlargement of the cardiomediastinal silhouette. Aortic knob calcifications are seen.   LUNGS: Stable to mild worsening of perihilar and interstitial prominence suggests mild worsening of pulmonary edema. Persistent small-to-moderate right pleural effusion. No pneumothorax.   ABDOMEN: No remarkable upper abdominal findings.   BONES: No acute osseous abnormality. Moderate degenerative changes in the left acromioclavicular  joint.       1. Stable to mild worsening of pulmonary edema with persistent small-to-moderate right pleural effusion. 2. Medical devices as detailed above.     I personally reviewed the images/study and I agree with the findings as stated by Dr. Elder Page. This study was interpreted at [facility], [place], [place].   MACRO: None   Signed by: Demetrius Trujillo 4/5/2024 11:21 AM Dictation workstation:   [id number]    US kidney transplant    Result Date: 4/5/2024  Interpreted By:  Von White and O'Connor Gregory STUDY: US KIDNEY TRANSPLANT;  4/4/2024 5:57 pm   INDICATION: Signs/Symptoms:KIA.   COMPARISON: Transplant renal ultrasound dated 03/01/2024   ACCESSION NUMBER(S): [id number]   ORDERING CLINICIAN: NIKITA MCLAUGHLIN   TECHNIQUE: Grayscale, color, and spectral Doppler of the kidney transplant were performed.  This examination was interpreted at [facility].   FINDINGS: TRANSPLANT KIDNEY: Transplant kidney location:  The renal transplant is located in the right iliac fossa. The transplant kidney ddeitmxd55.8 cm in craniocaudal dimension. There is mild fullness of the pelvicaliceal system similar to the prior ultrasound. No hydroureter. No perinephric fluid collections are seen.   DOPPLER EVALUATION:   RESISTIVE INDICES: The resistive indices were as follows: 0.68 / 0.79 in the superior pole, 0.79 / 0.82 in the midpole, and 0.73 in the inferior pole. Resistive indices previously measured 0.78, 0.75, and 0.75 on prior ultrasound dated 03/01/2024. Wave forms are normal.   TRANSPLANT RENAL ARTERY AND VEIN: The main renal artery velocity is 58.9 cm/s, previously 42.0 cm/sec. The arterial anastomosis velocity is 52.4 cm/s, previously 39.3 cm/s. The adjacent iliac artery velocity is 63.5 cm/s , previously 70 cm/sec. Transplant renal vein is patent. The peak velocity in the main renal vein is 30.5 cm/s, in the adjacent iliac vein 44.6 cm/s . The venous  anastomosis velocity is 63.8 cm/s.   Other: Moderate-to-large volume free pelvic fluid is visualized.       1. Mild fullness of the pelvicaliceal system is similar to the prior ultrasound from 03/01/2024. 2. Doppler evaluation of the transplant kidney is within normal limits, as detailed above. 3. Free pelvic ascites.   I personally reviewed the images/study and I agree with the findings as stated by resident physician Dr. Dayday Yanes.   MACRO: None   Signed by: Von White 4/5/2024 5:29 AM Dictation workstation:   [id number]    US guided percutaneous biopsy renal right    Result Date: 4/4/2024  Interpreted By:  Ghazala Santso and Guirguis James STUDY: US GUIDED PERCUTANEOUS BIOPSY RENAL RIGHT;  4/4/2024 12:51 pm   INDICATION: Signs/Symptoms:Kidney biopsy.   COMPARISON: Renal transplant biopsy dated 03/07/2024 Ultrasound kidney transplant dated 03/01/2024   ACCESSION NUMBER(S): [id number]   ORDERING CLINICIAN: RUBINA CEJA   TECHNIQUE: INTERVENTIONALIST(S): MD Emanuel Burdick MD   CONSENT: The patient/patient's POA/next of kin was informed of the nature of the proposed procedure. The purposes, alternatives, risks, and benefits were explained and discussed. All questions were answered and consent was obtained.   SEDATION: Moderate conscious IV sedation services (supervision of administration, induction, and maintenance) were provided by the physician performing the procedure with intravenous fentanyl 50mcg and versed 1mg from 9070-2954. The physician was assisted by an independent trained observer, an interventional radiology nurse, in the continuous monitoring of patient level of consciousness and physiologic status.   MEDICATION/CONTRAST: No additional.   TIME OUT: A time out was performed immediately prior to procedure start with the interventional team, correctly identifying the patient name, date of birth, MRN, procedure, anatomy (including marking of site and side), patient position,  procedure consent form, relevant laboratory and imaging test results, antibiotic administration, safety precautions, and procedure-specific equipment needs.   COMPLICATIONS: No immediate adverse events identified.   FINDINGS: The patient was placed in the supine position. Limited sonographic images of the right iliac fossa were obtained for purposes of needle guidance, which demonstrated a normal-appearing renal transplant. The area of concern was prepped and draped under sterile technique.   1% lidocaine was injected subcutaneously and then into the deeper tissues surrounding the targeted area for biopsy. An 18 gauge core biopsy needle was passed via a 17 gauge coaxial introducer needle to obtain a total of 2 core samples.   Gelfoam pledgets were injected through the coaxial system for the purposes of hemostasis. Postprocedure images demonstrate no evidence for hemorrhage. The patient tolerated the procedure well and there were no immediate complications. Specimen(s) sent to pathology.       1. Status post successful ultrasound guided core needle biopsy of a right iliac fossa renal transplant. Specimen(s) Sent to pathology.   I was present for and/or performed the critical portions of the procedure and immediately available throughout the entire procedure.   I personally reviewed the image(s) / study and interpretation. I agree with the findings as stated.   Performed and dictated at [facility].   MACRO: None.     Signed by: Ghazala Santos 4/4/2024 3:46 PM Dictation workstation:   [id number]    US thoracentesis    Result Date: 4/4/2024  Interpreted By:  Harrison Franklin, STUDY: US THORACENTESIS; 4/4/20242:14 pm   INDICATION: Signs/Symptoms:C/f R sided hemothorax.   COMPARISON: None.   ACCESSION NUMBER(S): [id number]   ORDERING CLINICIAN: RUBINA CEJA   TECHNIQUE: INTERVENTIONALIST(S): Harrison Franklin   CONSENT: The patient/patient's POA/next of kin was informed of the  nature of the proposed procedure. The purposes, alternatives, risks, and benefits were explained and discussed. All questions were answered and consent was obtained.   SEDATION: None   MEDICATION/CONTRAST: No additional   TIME OUT: A time out was performed immediately prior to procedure start with the interventional team, correctly identifying the patient name, date of birth, MRN, procedure, anatomy (including marking of site and side), patient position, procedure consent form, relevant laboratory and imaging test results, antibiotic administration, safety precautions, and procedure-specific equipment needs.   FINDINGS: The patient was placed in the supine position.   The pleural space was examined with grey scale ultrasound, and the most accessible fluid identified and marked for thoracentesis.   The skin was prepped and draped in usual manner. Local anesthesia with Lidocaine was administered and a right-sided thoracentesis was performed.  A 5 German One-Step Valved thoracentesis needle/catheter was then placed where marked.  Approximately 1300 mL of yellowish colored fluid was removed.  The needle/catheter was then withdrawn.   The patient tolerated the procedure well and there were no immediate complications. Specimen(s) sent to the laboratory and pathology for further evaluation, per the requesting team.       Uneventful thoracentesis, as detailed above. Right Pleural space, 1300 mL   I personally performed and/or directly supervised this study and was present for the entire procedure.   Performed and dictated at [facility].   Signed by: Harrison Franklin 4/4/2024 2:14 PM Dictation workstation:   [id number]        Assessment/Plan   Principal Problem:    Renal failure  Active Problems:    Cardiovascular renal disease, stage 1-4 or unspecified chronic kidney disease, with heart failure (CMS/HCC)    KIA (acute kidney injury) (CMS/HCC)    68 y.o. male with PMHX Stage C systolic  heart failure/NICM/HFrEF (30-35% TTE 3/2024), SSS s/p dual-chamber PM, Insulinoma w/ hepatic metastasis requiring liver transplantation (2018) c/b renal failure s/p kidney transplantation (on tacro and MMFe; baseline Scr 2.1-2.2), recently restarted on HD (MF), type A aortic dissection, HTN, anemia (baseline hemoglobin 10) who presents at the direction of his nephrologist as a direct admission for fluid overload.      UPDATES 4/6  - Follow up renal biopsy  -Holding diuretics in setting of Low BP  -Renal transplant following  -Plan for iHD session on Monday  -Will need community dialysis chair before discharge       #Volume Overload  #ESRD s/p kidney transplantation (on tacromlimus and mycophenolate)  - Continue home IS regimen of tacrolimus 3 mg qam + 2 mg qPM and  BID. Tacro recently increased from 2/2 by [facility] nephrology group. Discussed with Dr. Lipscomb  - Transplant nephrology aware of admission   - Consider additional HD tomorrow, per DC records, appeared to have achieved weight in low 130s before DC. 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  :: EMBx negative for cardiac amyloidosis and sarcoidosis   :: CT PET at OSH did not show ischemia during last admission  :: S/p CRTD upgrade iso pacemaker-induced cardiomyopathy on last admission  :: Per device interrogation on 03/29, patient 91%   - Volume removal with HD as above  - Continue metoprolol 50 mg at bedtime  - Daily RFP and Mg for K>4, Mg >2  - GDMT optimization as renal function tolerates      #Type A aortic dissection s/p repair 2020  #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   -iron studies: 75, TIBC 252, ferritin 764  - Started Aranesep 80 mcg q14 days on last admission      Fluids: none  Access: IV, HD access through LUE AVF  Antibiotics: Bactrim ppx   Electrolytes: PRN  Nutrition: cardiac, renal  PPI: Protonix 40  DVT: Hep 5000 TID  CODE status: FULL CODE  NOK: Abdiel (wife) [phone]        Code Status: Full Code          Estefania Castro MD PGY-3

## 2024-04-07 NOTE — PROGRESS NOTES
"Reynaldo Francisco is a 68 y.o. male on day 6 of admission presenting with Renal failure.    Subjective   No acute events overnight, patient observed at bedside eating breakfast this am.        Objective     Physical Exam  Vitals reviewed.   Constitutional:       Appearance: Normal appearance.   HENT:      Head: Normocephalic.      Nose: Nose normal.      Mouth/Throat:      Mouth: Mucous membranes are moist.   Eyes:      Pupils: Pupils are equal, round, and reactive to light.   Cardiovascular:      Rate and Rhythm: Normal rate and regular rhythm.   Pulmonary:      Effort: Pulmonary effort is normal.   Abdominal:      General: Abdomen is flat.   Musculoskeletal:         General: Normal range of motion.      Cervical back: Normal range of motion.   Skin:     General: Skin is warm.   Neurological:      General: No focal deficit present.      Mental Status: He is alert.   Psychiatric:         Mood and Affect: Mood normal.         Last Recorded Vitals  Blood pressure 107/65, pulse 72, temperature 36.8 °C (98.2 °F), temperature source Temporal, resp. rate 18, height 1.702 m (5' 7\"), weight 64.1 kg (141 lb 5 oz), SpO2 96 %.  Intake/Output last 3 Shifts:  I/O last 3 completed shifts:  In: 240 (3.7 mL/kg) [P.O.:240]  Out: 150 (2.3 mL/kg) [Urine:150 (0.1 mL/kg/hr)]  Weight: 64.1 kg     Relevant Results  aspirin, 81 mg, oral, q AM  [Held by provider] bumetanide, 2 mg, oral, BID  heparin (porcine), 5,000 Units, subcutaneous, q8h  metoprolol succinate XL, 50 mg, oral, Nightly  mycophenolate, 360 mg, oral, BID  pantoprazole, 40 mg, oral, q AM  polyethylene glycol, 17 g, oral, Daily  sulfamethoxazole-trimethoprim, 1 tablet, oral, Every Mon/Wed/Fri  tacrolimus, 1.5 mg, oral, BID  tamsulosin, 0.4 mg, oral, Daily       Results for orders placed or performed during the hospital encounter of 04/01/24 (from the past 96 hour(s))   Tacrolimus level   Result Value Ref Range    Tacrolimus  6.5 <=15.0 ng/mL   CBC and Auto Differential   Result Value " Ref Range    WBC 4.1 (L) 4.4 - 11.3 x10*3/uL    nRBC 0.7 (H) 0.0 - 0.0 /100 WBCs    RBC 2.40 (L) 4.50 - 5.90 x10*6/uL    Hemoglobin 8.0 (L) 13.5 - 17.5 g/dL    Hematocrit 24.3 (L) 41.0 - 52.0 %     (H) 80 - 100 fL    MCH 33.3 26.0 - 34.0 pg    MCHC 32.9 32.0 - 36.0 g/dL    RDW 17.2 (H) 11.5 - 14.5 %    Platelets 133 (L) 150 - 450 x10*3/uL    Immature Granulocytes %, Automated 0.5 0.0 - 0.9 %    Immature Granulocytes Absolute, Automated 0.02 0.00 - 0.70 x10*3/uL   Magnesium   Result Value Ref Range    Magnesium 2.12 1.60 - 2.40 mg/dL   Manual Differential   Result Value Ref Range    Neutrophils %, Manual 75.2 40.0 - 80.0 %    Lymphocytes %, Manual 14.2 13.0 - 44.0 %    Monocytes %, Manual 8.8 2.0 - 10.0 %    Eosinophils %, Manual 1.8 0.0 - 6.0 %    Basophils %, Manual 0.0 0.0 - 2.0 %    Seg Neutrophils Absolute, Manual 3.08 1.20 - 7.00 x10*3/uL    Lymphocytes Absolute, Manual 0.58 (L) 1.20 - 4.80 x10*3/uL    Monocytes Absolute, Manual 0.36 0.10 - 1.00 x10*3/uL    Eosinophils Absolute, Manual 0.07 0.00 - 0.70 x10*3/uL    Basophils Absolute, Manual 0.00 0.00 - 0.10 x10*3/uL    Total Cells Counted 113     RBC Morphology See Below     RBC Fragments Few     Target Cells Few    Renal Function Panel   Result Value Ref Range    Glucose 113 (H) 74 - 99 mg/dL    Sodium 136 136 - 145 mmol/L    Potassium 4.1 3.5 - 5.3 mmol/L    Chloride 98 98 - 107 mmol/L    Bicarbonate 28 21 - 32 mmol/L    Anion Gap 14 10 - 20 mmol/L    Urea Nitrogen 36 (H) 6 - 23 mg/dL    Creatinine 3.85 (H) 0.50 - 1.30 mg/dL    eGFR 16 (L) >60 mL/min/1.73m*2    Calcium 8.4 (L) 8.6 - 10.6 mg/dL    Phosphorus 3.5 2.5 - 4.9 mg/dL    Albumin 3.7 3.4 - 5.0 g/dL   pH, Body Fluid   Result Value Ref Range    pH, Fluid 5.45 See Below   Sterile Fluid Culture/Smear    Specimen: Pleural; Fluid   Result Value Ref Range    Sterile Fluid Culture/Smear No growth to date     Gram Stain (1+) Rare Polymorphonuclear leukocytes     Gram Stain No organisms seen    Lactate  Dehydrogenase, Fluid   Result Value Ref Range    LD, Fluid 90 Not established. U/L   Glucose, Fluid   Result Value Ref Range    Glucose, Fluid 115 Not established mg/dL   Protein, Total Fluid   Result Value Ref Range    Protein, Total Fluid 3.6 Not established g/dL   Body Fluid Cell Count   Result Value Ref Range    Color, Fluid Yellow Colorless, Straw, Yellow    Clarity, Fluid Clear Clear    WBC, Fluid 83 See Comment /uL    RBC, Fluid 1,000 0  /uL /uL   Body Fluid Differential   Result Value Ref Range    Neutrophils %, Manual, Fluid 9 <25 % %    Lymphocytes %, Manual, Fluid 59 <75 % %    Mono/Macrophages %, Manual, Fluid 32 <70 % %    Eosinophils %, Manual, Fluid 0 0 % %    Basophils %, Manual, Fluid 0 0 % %    Immature Granulocytes %, Manual, Fluid 0 0 % %    Blasts %, Manual, Fluid 0 0 % %    Unclassified Cells %, Manual, Fluid 0 0 % %    Plasma Cells %, Manual, Fluid 0 0 % %    Total Cells Counted, Fluid 100    Triglycerides, Fluid   Result Value Ref Range    Triglycerides, Fluid 35 No established mg/dL   Amylase, Fluid   Result Value Ref Range    Amylase, Fluid 15 Not established. U/L   Lactate Dehydrogenase   Result Value Ref Range     84 - 246 U/L   CBC and Auto Differential   Result Value Ref Range    WBC 5.2 4.4 - 11.3 x10*3/uL    nRBC 1.0 (H) 0.0 - 0.0 /100 WBCs    RBC 2.31 (L) 4.50 - 5.90 x10*6/uL    Hemoglobin 7.9 (L) 13.5 - 17.5 g/dL    Hematocrit 23.2 (L) 41.0 - 52.0 %     80 - 100 fL    MCH 34.2 (H) 26.0 - 34.0 pg    MCHC 34.1 32.0 - 36.0 g/dL    RDW 17.1 (H) 11.5 - 14.5 %    Platelets 116 (L) 150 - 450 x10*3/uL    Immature Granulocytes %, Automated 0.4 0.0 - 0.9 %    Immature Granulocytes Absolute, Automated 0.02 0.00 - 0.70 x10*3/uL   Renal Function Panel   Result Value Ref Range    Glucose 99 74 - 99 mg/dL    Sodium 136 136 - 145 mmol/L    Potassium 4.3 3.5 - 5.3 mmol/L    Chloride 97 (L) 98 - 107 mmol/L    Bicarbonate 26 21 - 32 mmol/L    Anion Gap 17 10 - 20 mmol/L    Urea Nitrogen  47 (H) 6 - 23 mg/dL    Creatinine 4.30 (H) 0.50 - 1.30 mg/dL    eGFR 14 (L) >60 mL/min/1.73m*2    Calcium 8.9 8.6 - 10.6 mg/dL    Phosphorus 3.9 2.5 - 4.9 mg/dL    Albumin 3.8 3.4 - 5.0 g/dL   Magnesium   Result Value Ref Range    Magnesium 2.13 1.60 - 2.40 mg/dL   Manual Differential   Result Value Ref Range    Neutrophils %, Manual 86.2 40.0 - 80.0 %    Lymphocytes %, Manual 5.2 13.0 - 44.0 %    Monocytes %, Manual 6.0 2.0 - 10.0 %    Eosinophils %, Manual 0.9 0.0 - 6.0 %    Basophils %, Manual 1.7 0.0 - 2.0 %    Seg Neutrophils Absolute, Manual 4.48 1.20 - 7.00 x10*3/uL    Lymphocytes Absolute, Manual 0.27 (L) 1.20 - 4.80 x10*3/uL    Monocytes Absolute, Manual 0.31 0.10 - 1.00 x10*3/uL    Eosinophils Absolute, Manual 0.05 0.00 - 0.70 x10*3/uL    Basophils Absolute, Manual 0.09 0.00 - 0.10 x10*3/uL    Total Cells Counted 116     RBC Morphology See Below     Polychromasia Mild     Hypochromia Mild     RBC Fragments Few     Target Cells Few     Ovalocytes Few     [place] Cells Few    Cytology (Non-Gynecologic)   Result Value Ref Range    Case Report       Non-gynecologic Cytology                          Case: [id number]                                   Authorizing Provider:  Fernando Garza MD     Collected:           04/04/2024 1349              Ordering Location:     [facility]       Received:            04/04/2024 [address] [place] [address]                                                           Pathologist:           Heavenly Gordon MD                                                         Specimen:    PLEURAL FLUID RIGHT SIDE                                                                   Final Cytological Interpretation         A. PLEURAL FLUID RIGHT SIDE ., CYTOLOGY AND CELL BLOCK:           No malignant cells identified                       Slide(s) initially screened by LAKSHMI Duran at [facility] [address] [facility] [place] [address]  By the  signature on this report, the individual or group listed as making the Final Interpretation/Diagnosis certifies that they have reviewed this case.       Clinical History       C/f hemothorax      Specimen Description       A. PLEURAL FLUID RIGHT SIDE.  Received 1300 ml dark yellow cloudy fluid with particles in sterile bottle .       Specimen Processing Detail       A1 Slides Only (No Block)   A1-1 Pap Stain NGYN ThinPrep    A2 Cell Block   A2-1 H&E      CBC and Auto Differential   Result Value Ref Range    WBC 5.6 4.4 - 11.3 x10*3/uL    nRBC 0.7 (H) 0.0 - 0.0 /100 WBCs    RBC 2.28 (L) 4.50 - 5.90 x10*6/uL    Hemoglobin 7.7 (L) 13.5 - 17.5 g/dL    Hematocrit 24.0 (L) 41.0 - 52.0 %     (H) 80 - 100 fL    MCH 33.8 26.0 - 34.0 pg    MCHC 32.1 32.0 - 36.0 g/dL    RDW 17.6 (H) 11.5 - 14.5 %    Platelets 105 (L) 150 - 450 x10*3/uL    Neutrophils % 69.1 40.0 - 80.0 %    Immature Granulocytes %, Automated 0.4 0.0 - 0.9 %    Lymphocytes % 14.5 13.0 - 44.0 %    Monocytes % 14.2 2.0 - 10.0 %    Eosinophils % 1.1 0.0 - 6.0 %    Basophils % 0.7 0.0 - 2.0 %    Neutrophils Absolute 3.90 1.20 - 7.70 x10*3/uL    Immature Granulocytes Absolute, Automated 0.02 0.00 - 0.70 x10*3/uL    Lymphocytes Absolute 0.82 (L) 1.20 - 4.80 x10*3/uL    Monocytes Absolute 0.80 0.10 - 1.00 x10*3/uL    Eosinophils Absolute 0.06 0.00 - 0.70 x10*3/uL    Basophils Absolute 0.04 0.00 - 0.10 x10*3/uL   Renal Function Panel   Result Value Ref Range    Glucose 91 74 - 99 mg/dL    Sodium 136 136 - 145 mmol/L    Potassium 4.1 3.5 - 5.3 mmol/L    Chloride 96 (L) 98 - 107 mmol/L    Bicarbonate 27 21 - 32 mmol/L    Anion Gap 17 10 - 20 mmol/L    Urea Nitrogen 30 (H) 6 - 23 mg/dL    Creatinine 4.06 (H) 0.50 - 1.30 mg/dL    eGFR 15 (L) >60 mL/min/1.73m*2    Calcium 8.6 8.6 - 10.6 mg/dL    Phosphorus 2.9 2.5 - 4.9 mg/dL    Albumin 3.7 3.4 - 5.0 g/dL   Magnesium   Result Value Ref Range    Magnesium 1.94 1.60 - 2.40 mg/dL   CBC and Auto Differential   Result  Value Ref Range    WBC 4.3 (L) 4.4 - 11.3 x10*3/uL    nRBC 0.5 (H) 0.0 - 0.0 /100 WBCs    RBC 2.28 (L) 4.50 - 5.90 x10*6/uL    Hemoglobin 7.8 (L) 13.5 - 17.5 g/dL    Hematocrit 22.7 (L) 41.0 - 52.0 %     80 - 100 fL    MCH 34.2 (H) 26.0 - 34.0 pg    MCHC 34.4 32.0 - 36.0 g/dL    RDW 16.7 (H) 11.5 - 14.5 %    Platelets 107 (L) 150 - 450 x10*3/uL    Immature Granulocytes %, Automated 0.5 0.0 - 0.9 %    Immature Granulocytes Absolute, Automated 0.02 0.00 - 0.70 x10*3/uL   Renal Function Panel   Result Value Ref Range    Glucose 107 (H) 74 - 99 mg/dL    Sodium 135 (L) 136 - 145 mmol/L    Potassium 4.1 3.5 - 5.3 mmol/L    Chloride 95 (L) 98 - 107 mmol/L    Bicarbonate 27 21 - 32 mmol/L    Anion Gap 17 10 - 20 mmol/L    Urea Nitrogen 47 (H) 6 - 23 mg/dL    Creatinine 5.32 (H) 0.50 - 1.30 mg/dL    eGFR 11 (L) >60 mL/min/1.73m*2    Calcium 8.6 8.6 - 10.6 mg/dL    Phosphorus 4.0 2.5 - 4.9 mg/dL    Albumin 3.7 3.4 - 5.0 g/dL   Magnesium   Result Value Ref Range    Magnesium 1.98 1.60 - 2.40 mg/dL   Manual Differential   Result Value Ref Range    Neutrophils %, Manual 73.9 40.0 - 80.0 %    Bands %, Manual 2.6 0.0 - 5.0 %    Lymphocytes %, Manual 10.4 13.0 - 44.0 %    Monocytes %, Manual 7.0 2.0 - 10.0 %    Eosinophils %, Manual 4.4 0.0 - 6.0 %    Basophils %, Manual 0.0 0.0 - 2.0 %    Atypical Lymphocytes %, Manual 1.7 0.0 - 2.0 %    Seg Neutrophils Absolute, Manual 3.18 1.20 - 7.00 x10*3/uL    Bands Absolute, Manual 0.11 0.00 - 0.70 x10*3/uL    Lymphocytes Absolute, Manual 0.45 (L) 1.20 - 4.80 x10*3/uL    Monocytes Absolute, Manual 0.30 0.10 - 1.00 x10*3/uL    Eosinophils Absolute, Manual 0.19 0.00 - 0.70 x10*3/uL    Basophils Absolute, Manual 0.00 0.00 - 0.10 x10*3/uL    Atypical Lymphs Absolute, Manual 0.07 0.00 - 0.50 x10*3/uL    Total Cells Counted 115     Neutrophils Absolute, Manual 3.29 1.20 - 7.70 x10*3/uL    RBC Morphology See Below     Target Cells Few     Acanthocytes Few       No results found.        Assessment/Plan   Principal Problem:    Renal failure  Active Problems:    Cardiovascular renal disease, stage 1-4 or unspecified chronic kidney disease, with heart failure (CMS/HCC)    KIA (acute kidney injury) (CMS/HCC)  8 y.o. 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  Patient improving clinically s/p iHD sessions.     UPDATES 4/7  - Follow up renal biopsy  -Holding diuretics in setting of Low BP  -Renal transplant following  -Plan for discharge after iHD session on Monday       #Volume Overload (improving)  #ESRD s/p kidney transplantation (on tacromlimus and mycophenolate)  - Continue home IS regimen of tacrolimus 3 mg qam + 2 mg qPM and  BID. Tacro recently increased from 2/2 by [facility] nephrology group. Discussed with Dr. Lipscomb  - Transplant nephrology aware of admission   - Consider additional HD tomorrow, per DC records, appeared to have achieved weight in low 130s before DC. 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  :: EMBx negative for cardiac amyloidosis and sarcoidosis   :: CT PET at OSH did not show ischemia during last admission  :: S/p CRTD upgrade iso pacemaker-induced cardiomyopathy on last admission  :: Per device interrogation on 03/29, patient 91%   - Volume removal with HD as above  - Continue metoprolol 50 mg at bedtime  - Daily RFP and Mg for K>4, Mg >2  - GDMT optimization as renal function tolerates       #Type A aortic dissection s/p repair 2020  #chronic residual type B aortic dissection w/ R carotid artery dissection  #HTN  - BP well controlled in 100-110s/50-80s  - No outpatient anti-hypertensive  - Continue to monitor     #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   -iron studies: 75, TIBC 252, ferritin 764  - Started Aranesep 80 mcg q14 days on last admission      Fluids: none  Access: IV, HD access through LUE AVF  Antibiotics: Bactrim ppx   Electrolytes: PRN  Nutrition: cardiac, renal  PPI: Protonix 40  DVT: Hep 5000 TID  CODE status: FULL CODE  NOK: Abdiel (wife) [phone]        Code Status: Full Code             Estefania Castro MD PGY-3

## 2024-04-07 NOTE — PROGRESS NOTES
"  Transplant Nephrology progress note     Date of admission: 4/1/2024     Reynaldo Francisco is a 68 y.o.  with PMH   Past Medical History:   Diagnosis Date    Arteriovenous fistula, acquired (CMS/[facility]) 06/17/2022    AV fistula    Lung nodule     Nutritional anemia, unspecified     Anemia, deficiency    Personal history of malignant neoplasm of pancreas 06/17/2022    History of malignant neoplasm of pancreas        SUBJECTIVE:  Denied any complaints this morning.  Urine output in total is only 100 cc in the last 24 hours.  PROBLEM LIST:  Principal Problem:    Renal failure  Active Problems:    Cardiovascular renal disease, stage 1-4 or unspecified chronic kidney disease, with heart failure (CMS/[facility])    KIA (acute kidney injury) (CMS/[facility])         ALLERGIES:  Allergies   Allergen Reactions    Milk Diarrhea and GI Upset     (Skim milk) diarrhea    Shellfish Derived Hives and Itching    Iodinated Contrast Media Nausea/vomiting            CURRENT MEDICATIONS:  Scheduled medications  aspirin, 81 mg, oral, q AM  [Held by provider] bumetanide, 2 mg, oral, BID  heparin (porcine), 5,000 Units, subcutaneous, q8h  metoprolol succinate XL, 50 mg, oral, Nightly  mycophenolate, 360 mg, oral, BID  pantoprazole, 40 mg, oral, q AM  polyethylene glycol, 17 g, oral, Daily  sulfamethoxazole-trimethoprim, 1 tablet, oral, Every Mon/Wed/Fri  tacrolimus, 1.5 mg, oral, BID  tamsulosin, 0.4 mg, oral, Daily      Continuous medications     PRN medications  PRN medications: acetaminophen       OBJECTIVE:    VITALS: Visit Vitals  /65   Pulse 73   Temp 36.3 °C (97.3 °F)   Resp 16   Ht 1.702 m (5' 7\")   Wt 64.1 kg (141 lb 5 oz)   SpO2 92%   BMI 22.13 kg/m²   Smoking Status Never   BSA 1.74 m²          General: No distress , pt sitting up in bed  Mucosa moist   CVS: S1 S2 no murmurs  RESP:  Lungs clear to auscultation   ABDO: Soft, non-tender   Neuro: A + O x 3  Skin: No rash   Extremities: no edema in LE b/l       LABS:  Results from last 72 hours "   Lab Units 04/07/24  0756   WBC AUTO x10*3/uL 4.3*   HEMOGLOBIN g/dL 7.8*   MCV fL 100   PLATELETS AUTO x10*3/uL 107*   BUN mg/dL 47*   CREATININE mg/dL 5.32*   CALCIUM mg/dL 8.6   TACROLIMUS ng/mL 6.1              Intake/Output Summary (Last 24 hours) at 4/7/2024 1301  Last data filed at 4/7/2024 0600  Gross per 24 hour   Intake 240 ml   Output 100 ml   Net 140 ml            ASSESSMENT AND PLAN:    Reynaldo Francisco is a 68 y.o. with a history of nonischemic cardiomyopathy with a EF around 40%, s/p dual-chamber pacemaker, insulinoma with hepatic metastasis s/p liver transplant as of 1/8/2018 and DDKT as of 3/5/2023 at [facility].  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  Patient was a induced by Thymoglobulin and maintained on Tac,  low-dose of MMF.  DSA have been negative as of 1/2024.  Pt then admitted March 2024 due to volume overload, biopsy suggesting ATN and with declining UOP and poor clearance he was started on HD March 7 and discharged on HD . Now readmitted due to shortness of breath, fluid overload.      Allograft function/kidney:  #KIA-D MF  -KIA from March in setting of decompensated HF, CRS with biopsy 3/7 showing minimal abnormalities with interstitial fibrosis, vacuolization of tubules with no signs of rejection, congo red negative for amyloid. Potentially 2/2 CNI toxicity? Vs hemodynamic injury however no recovery noted at this time with pt having decrease in UOP and signs of fluid overload   -renal ultrasound 3/1 showing moderate pelvicalyceal dilatation and hydroureter but Lasix renogram was negative for any obstruction.    -UA - bland. UPC is a 0.06.  -Repeat Biopsy still showing questionable CNI toxicity, interstitial fibrosis around 5 to 10%.  Patient currently on tacrolimus for both liver as well as kidney transplant unable to transition to other medications at this time.  -Will aim lower levels  of tacrolimus to see if there is any renal recovery.  -Discussed with him and his wife in detail about the biopsy report and planning long-term dialysis Monday Wednesday Friday.  Will arrange for dialysis outpatient unit for now and going along can be transitioned to community dialysis and follow-up with [facility].  -Avoid nephrotoxins and renally dose medications.  Current access is a left upper extremity AV fistula which is functional.    Hemodynamics:   -BP running low.  Currently on metoprolol and Bumex.  Can consider Bumex on nondialysis days.     Immunosuppression:   -Myfortic 360 twice daily  -Continue with current dose of tacrolimus 1.5 twice daily, current levels are around 6.1 continue with the current dose.      Anemia:   -Hb 7-7  -iron stores adequate  -Darbe 80mcg - last given 4/3    Bone mineral disease: Calcium and phosphorus levels acceptable.    Thank you for consulting

## 2024-04-07 NOTE — DISCHARGE INSTRUCTIONS
Sulema Francisco,  You were admitted because you were found to have increased fluids during your outpatient appointment with nephrology. You had multiple hemodialysis sessions while here to remove excess fluids. You also had a biopsy of your kidney taken by Interventional radiology to see the status of your transplanted kidneys. You also had fluids drained from your lungs by Interventional radiology as well. The Transplant nephrology team was following and guided us through your treatment. We also had cardiology involved due to suspicion of a possible cardiac cause for your current symptoms. Your vitals are currently stable and fluid status is more controlled. Someone will be in contact with you regarding your kidney biopsy.    Please make sure to follow up with:  Cardiology at [facility] on 4/12/2024  Follow up with your nephrologist and PCP    Please Take:  Bumex 2 mg tablet 2 times a day by mouth on days you are NOT getting dialysis    It was a pleasure taking care of you!    Best,   Care Team

## 2024-04-07 NOTE — CARE PLAN
The patient's goals for the shift include Pt will continue to remain HDS throughout this shift    The clinical goals for the shift include pt's bp and HR remain within normal rang throughout shift    Over the shift, the patient did not make progress toward the following goals. Barriers to progression include patient has history of liver and kidney transplant. Recommendations to address these barriers include HD, diuretics as needed, monitor labs daily, monitor V/S, report new changes to MD.

## 2024-04-07 NOTE — CARE PLAN
Problem: Diabetes  Goal: Achieve decreasing blood glucose levels by end of shift  Outcome: Progressing  Goal: Increase stability of blood glucose readings by end of shift  Outcome: Progressing  Goal: Decrease in ketones present in urine by end of shift  Outcome: Progressing  Goal: Maintain electrolyte levels within acceptable range throughout shift  Outcome: Progressing  Goal: Maintain glucose levels >70mg/dl to <250mg/dl throughout shift  Outcome: Progressing  Goal: No changes in neurological exam by end of shift  Outcome: Progressing  Goal: Learn about and adhere to nutrition recommendations by end of shift  Outcome: Progressing  Goal: Vital signs within normal range for age by end of shift  Outcome: Progressing  Goal: Increase self care and/or family involovement by end of shift  Outcome: Progressing  Goal: Receive DSME education by end of shift  Outcome: Progressing     Problem: Safety  Goal: I will remain free of falls  Outcome: Progressing     Problem: Daily Care  Goal: Daily care needs are met  Outcome: Progressing     Problem: Discharge Barriers  Goal: My discharge needs are met  Outcome: Progressing     Problem: Fall/Injury  Goal: Not fall by end of shift  Outcome: Progressing  Goal: Be free from injury by end of the shift  Outcome: Progressing     Problem: Safety - Adult  Goal: Free from fall injury  Outcome: Progressing     Problem: Discharge Planning  Goal: Discharge to home or other facility with appropriate resources  Outcome: Progressing     Problem: Chronic Conditions and Co-morbidities  Goal: Patient's chronic conditions and co-morbidity symptoms are monitored and maintained or improved  Outcome: Progressing   The patient's goals for the shift include Pt will continue to remain HDS throughout this shift    The clinical goals for the shift include pt's bp and HR remain within normal rang throughout shift    Over the shift, the patient did not make progress toward the

## 2024-04-08 NOTE — NURSING NOTE
Report from Sending RN:    Report From: Destiny Nath RN)  Recent Surgery of Procedure: No  Baseline Level of Consciousness (LOC): a/o x 4  Oxygen Use: No  Type: none  Diabetic: No  Last BP Med Given Day of Dialysis: none  Last Pain Med Given: none  Lab Tests to be Obtained with Dialysis: Yes, cbc, rfp, mag  Blood Transfusion to be Given During Dialysis: No  Available IV Access: Yes  Medications to be Administered During Dialysis: No  Continuous IV Infusion Running: No  Restraints on Currently or in the Last 24 Hours: No  Hand-Off Communication: No acute overnight or morning events; vss; Pt did not take morning medications; Pt will need labs; Pt is a full code; Josie Esteban RN.  Dialysis Catheter Dressing: fistula  Last Dressing Change: fistula

## 2024-04-08 NOTE — PROGRESS NOTES
"  Transplant Nephrology progress note     Date of admission: 4/1/2024     Reynaldo Francisco is a 68 y.o.  with [facility]   Past Medical History:   Diagnosis Date    Arteriovenous fistula, acquired (CMS/Formerly [facility]) 06/17/2022    AV fistula    Lung nodule     Nutritional anemia, unspecified     Anemia, deficiency    Personal history of malignant neoplasm of pancreas 06/17/2022    History of malignant neoplasm of pancreas        SUBJECTIVE:  Seen after dialysis. Doing well today. No SOB. No LE swelling. No UOP still.       PROBLEM LIST:  Principal Problem:    Renal failure  Active Problems:    Cardiovascular renal disease, stage 1-4 or unspecified chronic kidney disease, with heart failure (CMS/[facility]ly [facility])    KIA (acute kidney injury) (CMS/[facility]ly [facility])         ALLERGIES:  Allergies   Allergen Reactions    Milk Diarrhea and GI Upset     (Skim milk) diarrhea    Shellfish Derived Hives and Itching    Iodinated Contrast Media Nausea/vomiting            CURRENT MEDICATIONS:  Scheduled medications  aspirin, 81 mg, oral, q AM  [Held by provider] bumetanide, 2 mg, oral, BID  heparin (porcine), 5,000 Units, subcutaneous, q8h  metoprolol succinate XL, 50 mg, oral, Nightly  mycophenolate, 360 mg, oral, BID  pantoprazole, 40 mg, oral, q AM  polyethylene glycol, 17 g, oral, Daily  sulfamethoxazole-trimethoprim, 1 tablet, oral, Every Mon/Wed/Fri  tacrolimus, 1.5 mg, oral, BID  tamsulosin, 0.4 mg, oral, Daily      Continuous medications     PRN medications  PRN medications: acetaminophen       OBJECTIVE:    VITALS: Visit Vitals  BP 98/56 (BP Location: Right arm, Patient Position: Lying)   Pulse 73   Temp 37.3 °C (99.1 °F) (Temporal)   Resp 17   Ht 1.702 m (5' 7\")   Wt 64.1 kg (141 lb 5 oz)   SpO2 92%   BMI 22.13 kg/m²   Smoking Status Never   BSA 1.74 m²          General: No distress , pt sitting up in bed  Mucosa moist   RESP:  Lungs clear to auscultation   ABDO: Soft, non-tender   Neuro: A + O x 3  Skin: No rash   Extremities: no edema in LE b/l       LABS:  Results " from last 72 hours   Lab Units 04/08/24  0721 04/07/24  0756   WBC AUTO x10*3/uL 3.8* 4.3*   HEMOGLOBIN g/dL 8.0* 7.8*   MCV fL 103* 100   PLATELETS AUTO x10*3/uL 119* 107*   BUN mg/dL 56* 47*   CREATININE mg/dL 5.85* 5.32*   CALCIUM mg/dL 8.5* 8.6   TACROLIMUS ng/mL  --  6.1              Intake/Output Summary (Last 24 hours) at 4/8/2024 1658  Last data filed at 4/8/2024 1331  Gross per 24 hour   Intake 1120 ml   Output 2901 ml   Net -1781 ml            ASSESSMENT AND PLAN:    Reynaldo Francisco is a 68 y.o. with a history of nonischemic cardiomyopathy with a EF around 40%, s/p dual-chamber pacemaker, insulinoma with hepatic metastasis s/p liver transplant as of 1/8/2018 and DDKT as of 3/5/2023 at [facility].  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  Patient was a induced by Thymoglobulin and maintained on Tac,  low-dose of MMF.  DSA have been negative as of 1/2024.  Pt then admitted March 2024 due to volume overload, biopsy suggesting ATN and with declining UOP and poor clearance he was started on HD March 7 and discharged on HD MF. Now readmitted due to shortness of breath, fluid overload.      Allograft function/kidney:  #KIA-D MWF, access AVF  -KIA from March in setting of decompensated HF, CRS with biopsy 3/7 showing minimal abnormalities with interstitial fibrosis, vacuolization of tubules with no signs of rejection, congo red negative for amyloid. Potentially 2/2 CNI toxicity? Vs hemodynamic injury however no recovery noted at this time with pt having decrease in UOP and signs of fluid overload   -renal ultrasound 3/1 showing moderate pelvicalyceal dilatation and hydroureter but Lasix renogram was negative for any obstruction.    -UA - bland. UPC is a 0.06.  -Repeat Biopsy still showing questionable CNI toxicity, interstitial fibrosis around 5 to 10%.  Patient currently on tacrolimus for both liver as well as kidney  transplant unable to transition to other medications at this time.  ->Will aim lower levels of tacrolimus to see if there is any renal recovery.  Will plan for long-term dialysis MWF at [facility] and can be transitioned to community dialysis and follow-up with [facility].    Hemodynamics:   -BP soft, however tolerated HD well with 2.5L UF. Currently on metoprolol and Bumex.  Continue Bumex on nondialysis days.     Immunosuppression:   -Myfortic 360 twice daily  -Continue with current dose of tacrolimus 1.5 twice daily, current levels are around 6.1 continue with the current dose.      Anemia:   -Hb 7-8  -iron stores adequate  -Darbe 80mcg - last given 4/3    Bone mineral disease: Calcium and phosphorus levels acceptable.    Thank you for consulting

## 2024-04-08 NOTE — NURSING NOTE
Report to Receiving RN:    Report To: BANDAR  Time Report Called: 1136  Hand-Off Communication: W removed 2.5L of fluid and post /61, P79  Complications During Treatment: No  Ultrafiltration Treatment: Yes  Medications Administered During Dialysis: No  Blood Products Administered During Dialysis: No  Labs Sent During Dialysis: Yes  Heparin Drip Rate Changes: N/A  Dialysis Catheter Dressing: NA  Last Dressing Change: fidstula        Last Updated: 11:35 AM by ROBER MIN

## 2024-04-08 NOTE — CARE PLAN
Problem: Diabetes  Goal: Achieve decreasing blood glucose levels by end of shift  Outcome: Progressing  Goal: Increase stability of blood glucose readings by end of shift  Outcome: Progressing  Goal: Decrease in ketones present in urine by end of shift  Outcome: Progressing  Goal: Maintain electrolyte levels within acceptable range throughout shift  Outcome: Progressing  Goal: Maintain glucose levels >70mg/dl to <250mg/dl throughout shift  Outcome: Progressing  Goal: No changes in neurological exam by end of shift  Outcome: Progressing  Goal: Learn about and adhere to nutrition recommendations by end of shift  Outcome: Progressing  Goal: Vital signs within normal range for age by end of shift  Outcome: Progressing  Goal: Increase self care and/or family involovement by end of shift  Outcome: Progressing  Goal: Receive DSME education by end of shift  Outcome: Progressing     Problem: Safety  Goal: I will remain free of falls  Outcome: Progressing     Problem: Daily Care  Goal: Daily care needs are met  Outcome: Progressing     Problem: Discharge Barriers  Goal: My discharge needs are met  Outcome: Progressing     Problem: Fall/Injury  Goal: Not fall by end of shift  Outcome: Progressing  Goal: Be free from injury by end of the shift  Outcome: Progressing     Problem: Pain - Adult  Goal: Verbalizes/displays adequate comfort level or baseline comfort level  Outcome: Progressing     Problem: Safety - Adult  Goal: Free from fall injury  Outcome: Progressing     Problem: Discharge Planning  Goal: Discharge to home or other facility with appropriate resources  Outcome: Progressing     Problem: Chronic Conditions and Co-morbidities  Goal: Patient's chronic conditions and co-morbidity symptoms are monitored and maintained or improved  Outcome: Progressing     The clinical goals for the shift include Patient will remain HDS throghout the shift

## 2024-04-08 NOTE — HOSPITAL COURSE
68 y.o. 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 While here he was continued on his immunosuppressive medication as overseen by transplant nephrology. He received daily hemodialysis sessions for volume control. On CXR he was found to have right sided pleural effusion which raised a small suspicion for hemothorax given that he had recent CRTD placement by cardiology and also not improving on fluid removal. Thoracentesis was done and 1.3L of fluid was removed and fluid studies were unremarkable. He had low urine output and was worsening. Kidney biopsy was done with IR and it  suggested calcineurin toxicity as opposed to rejection. Plan was to start Bumex during the weekend (4/6) but it was held given his low BP but other than that, patient was hemodynamically stable for the most part during his stay here and his volume status has improved.    KIDNEY BIOPSY  The biopsy overall shows minimal histologic abnormality. This suggests a functional etiology for the patient's rise in serum creatinine which in the transplant setting includes calcineurin inhibitor toxicity which can cause rise in creatinine without histologic changes. There is very subtle vacuolization of tubules which may further support CNI affect.

## 2024-04-08 NOTE — PROGRESS NOTES
04/08/24 1202   Discharge Planning   Home or Post Acute Services In home services   Type of Home Care Services Home OT;Home PT   Patient expects to be discharged to: [facility]   Does the patient need discharge transport arranged? No       Patient will discharge to home today after dialysis. Patient will continue dialysis as an outpatient through the [facility] and then will transition over to the community. [facility] able to accept for home care and start of care will resume in 24-48 hours after discharge. Patient spouse will transport patient home.

## 2024-04-08 NOTE — CARE PLAN
Problem: Diabetes  Goal: Achieve decreasing blood glucose levels by end of shift  Outcome: Progressing  Goal: Increase stability of blood glucose readings by end of shift  Outcome: Progressing  Goal: Decrease in ketones present in urine by end of shift  Outcome: Progressing  Goal: Maintain electrolyte levels within acceptable range throughout shift  Outcome: Progressing  Goal: Maintain glucose levels >70mg/dl to <250mg/dl throughout shift  Outcome: Progressing  Goal: No changes in neurological exam by end of shift  Outcome: Progressing  Goal: Learn about and adhere to nutrition recommendations by end of shift  Outcome: Progressing  Goal: Vital signs within normal range for age by end of shift  Outcome: Progressing  Goal: Increase self care and/or family involovement by end of shift  Outcome: Progressing  Goal: Receive DSME education by end of shift  Outcome: Progressing     Problem: Safety  Goal: I will remain free of falls  Outcome: Progressing     Problem: Daily Care  Goal: Daily care needs are met  Outcome: Progressing     Problem: Discharge Barriers  Goal: My discharge needs are met  Outcome: Progressing     Problem: Fall/Injury  Goal: Not fall by end of shift  Outcome: Progressing  Goal: Be free from injury by end of the shift  Outcome: Progressing     Problem: Pain - Adult  Goal: Verbalizes/displays adequate comfort level or baseline comfort level  Outcome: Progressing     Problem: Safety - Adult  Goal: Free from fall injury  Outcome: Progressing     Problem: Discharge Planning  Goal: Discharge to home or other facility with appropriate resources  Outcome: Progressing     Problem: Chronic Conditions and Co-morbidities  Goal: Patient's chronic conditions and co-morbidity symptoms are monitored and maintained or improved  Outcome: Progressing   The patient's goals for the shift include Pt will continue to remain HDS throughout this shift    The clinical goals for the shift include pt 's bp does not drop into  80s systolic throughout shift

## 2024-04-08 NOTE — DISCHARGE SUMMARY
Discharge Diagnosis  Renal failure    Test Results Pending At Discharge  Pending Labs       Order Current Status    Surgical Pathology Exam In process            Hospital Course  68 y.o. 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 While here he was continued on his immunosuppressive medication as overseen by transplant nephrology. He received daily hemodialysis sessions for volume control. On CXR he was found to have right sided pleural effusion which raised a small suspicion for hemothorax given that he had recent CRTD placement by cardiology and also not improving on fluid removal. Thoracentesis was done and 1.3L of fluid was removed and fluid studies were unremarkable. He had low urine output and was worsening. Kidney biopsy was done with IR and it  suggested calcineurin toxicity as opposed to rejection. Plan was to start Bumex during the weekend (4/6) but it was held given his low BP but other than that, patient was hemodynamically stable for the most part during his stay here and his volume status has improved.    KIDNEY BIOPSY  The biopsy overall shows minimal histologic abnormality. This suggests a functional etiology for the patient's rise in serum creatinine which in the transplant setting includes calcineurin inhibitor toxicity which can cause rise in creatinine without histologic changes. There is very subtle vacuolization of tubules which may further support CNI affect.    Pertinent Physical Exam At Time of Discharge  Physical Exam    Constitutional:       General: He is not in acute distress.      Appearance: Normal appearance. He is normal weight.   HENT:      Head: Normocephalic and atraumatic.      Nose: Nose normal.   Eyes:      General: No scleral icterus.     Extraocular Movements: Extraocular movements intact.   Cardiovascular:      Rate and Rhythm: Normal rate and regular rhythm.      Heart sounds: Normal heart sounds. No murmur heard.     No friction rub. No gallop.      Comments: +JVD noted at 10cm  Pulmonary:      Effort: Pulmonary effort is normal. No respiratory distress.      Breath sounds: Rales present. No wheezing.   Chest:      Chest wall: No tenderness.   Abdominal:      General: There is no distension.      Palpations: Abdomen is soft.      Tenderness: There is no abdominal tenderness.   Musculoskeletal:         General: Normal range of motion.      Cervical back: Normal range of motion and neck supple.      Right lower leg: Edema present.      Left lower leg: Edema present.   Skin:     General: Skin is warm and dry.   Neurological:      Mental Status: He is alert and oriented to person, place, and time.   Psychiatric:         Mood and Affect: Mood normal.         Behavior: Behavior normal.     Home Medications     Medication List      CHANGE how you take these medications     bumetanide 2 mg tablet; Commonly known as: Bumex; Take 1 tablet (2 mg)   by mouth 2 times a day. Do not start before April 9, 2024.; Start taking   on: April 9, 2024; What changed: when to take this, additional   instructions     CONTINUE taking these medications     acetaminophen 325 mg tablet; Commonly known as: Tylenol; Take 3 tablets   (975 mg) by mouth every 8 hours if needed for moderate pain (4 - 6),   headaches or fever (temp greater than 38.0 C).   aspirin 81 mg EC tablet   metoprolol succinate XL 50 mg 24 hr tablet; Commonly known as:   Toprol-XL; Take 1 tablet (50 mg) by mouth once daily. Do not crush or   chew.   mycophenolate 360 mg EC tablet; Commonly known as: Myfortic; Take 1   tablet (360 mg) by  mouth 2 times a day.   Protonix 40 mg EC tablet; Generic drug: pantoprazole   sulfamethoxazole-trimethoprim 400-80 mg tablet; Commonly known as:   Bactrim   tacrolimus 1 mg capsule; Commonly known as: Prograf; Take 2 capsules (2   mg) by mouth every 12 hours.   tamsulosin 0.4 mg 24 hr capsule; Commonly known as: Flomax; Take 1   capsule (0.4 mg) by mouth once daily. Do not start before March 20, 2024.       Outpatient Follow-Up  Future Appointments   Date Time Provider Department Center   4/9/2024  9:00 AM Cande Lawrence PharmD [id number] [facility]   4/12/2024  1:30 PM [facility] DEVICE [facility]IC UNIC1 U Rad   5/21/2024  2:45 PM John Manrique MD [id number] [place]   6/7/2024  1:00 PM PAR MAC 3 ECHO ROOM 3 [id number] PAR MAC   6/7/2024  1:45 PM Valdimir Barber MD [id number] [place]   8/13/2024 11:00 AM Declan Mina MD [id number] [place]       Chaim Torres MD

## 2024-04-08 NOTE — HH CARE COORDINATION
Home Care received a Referral to Resume Care for Physical Therapy and Occupational Therapy. We have processed the referral for a Resumption of Care on 4/9-4/10/24.     If you have any questions or concerns, please feel free to contact us at [phone]. Follow the prompts, enter your five digit zip code, and you will be directed to your care team on WEST 3.

## 2024-04-08 NOTE — PROGRESS NOTES
Physical Therapy                 Therapy Communication Note    Patient Name: Reynaldo Francisco  MRN: [id number]  Today's Date: 4/8/2024     Discipline: Physical Therapy    Missed Visit Reason: Missed Visit Reason:  (Pt is off the floor at dialysis. Will re-attempt as able and appropriate)    Missed Time: Attempt

## 2024-04-09 NOTE — PROGRESS NOTES
Pharmacy Post-Discharge Visit Initial    Reynaldo Francisco is a 68 y.o. male was referred to Clinical Pharmacy Team to complete a post-discharge medication optimization and monitoring visit.  The patient was referred for their Congestive Heart Failure and Diabetes.    Admission Date: 4.1.24  Discharge Date: 4.8.24 renal failure/fluid overload    PCP/ Referring Provider: John Manrique MD  Last Visit: 3.21.24  Next visit: 5.21.24    Follows cardiology, nephrology and transplant      Subjective   Allergies   Allergen Reactions    Milk Diarrhea and GI Upset     (Skim milk) diarrhea    Shellfish Derived Hives and Itching    Iodinated Contrast Media Nausea/vomiting       [id number] - [place], [place] - [address] [facility] [address]  [address] [facility] [address]  Phone: [phone] Fax: [phone]    [facility] - [place], [place] - [address]  [address]  [facility] [address]  [facility] [address]  Phone: [phone] Fax: [phone]    [facility]  [address], [address]  [facility] [address]  Phone: [phone] Fax: [phone]      Social History     Social History Narrative    Not on file        Notable Medication changes following discharge:  Start: none  Stop: none  Change: increased bumetanide to twice daily starting today    Denies cost issues at this wife - have insurance supplement.       HPI  CHF ASSESSMENT  Staging:  Most recent ejection fraction: 30-35%  ACC/AHA Stage: C    Symptom Assessment:  Weight changes/edema?: No, none since being home  Dyspnea?: None, improved  Dizziness/syncope/palpitations?: No  BP 96/57mmHg this morning  HR 80bpm this morning after walking; 73 after resting for about 5 minutes    Current Regimen:  ARNI/ACEi/ARB: No  Beta Blocker: Yes - metoprolol XL 50mg once daily  MRA: No  SGLT2i: No    Other therapy:  Bumetanide 2mg twice daily    Secondary Prevention:  The ASCVD Risk score (Radha CAMPOS, et al., 2019) failed  to calculate for the following reasons:    The patient has a prior MI or stroke diagnosis    Aspirin 81mg? yes  Statin?: No  HTN?: Yes - controlled        DIABETES MELLITUS TYPE 2:  Diagnosed with diabetes:  NOT DIAGNOSED.     Current diabetic medications include:  No medications    Secondary Prevention  Statin? No  ACE-I/ARB? N/A  Aspirin? Yes    Pertinent PMH Review:  PMH of Pancreatitis: No  PMH of Retinopathy: No  PMH of Urinary Tract Infections: No  PMH of MTC: No  Insulinoma w/ hepatic metastasis      Review of Systems    Objective     BP Readings from Last 4 Encounters:   04/08/24 98/56   04/01/24 103/67   03/27/24 108/75   03/26/24 96/60      There were no vitals filed for this visit.     LAB  Creatinine   Date Value Ref Range Status   04/08/2024 5.85 (H) 0.50 - 1.30 mg/dL Final     eGFR   Date Value Ref Range Status   04/08/2024 10 (L) >60 mL/min/1.73m*2 Final     Comment:     Calculations of estimated GFR are performed using the 2021 CKD-EPI Study Refit equation without the race variable for the IDMS-Traceable creatinine methods.  https://jasn.asnjournals.org/content/early/2021/09/22/ASN.8922231615     Sodium   Date Value Ref Range Status   04/08/2024 135 (L) 136 - 145 mmol/L Final     Potassium   Date Value Ref Range Status   04/08/2024 4.5 3.5 - 5.3 mmol/L Final     Chloride   Date Value Ref Range Status   04/08/2024 96 (L) 98 - 107 mmol/L Final     Urea Nitrogen   Date Value Ref Range Status   04/08/2024 56 (H) 6 - 23 mg/dL Final     AST   Date Value Ref Range Status   04/02/2024 16 9 - 39 U/L Final        Lab Results   Component Value Date    BILITOT 1.4 (H) 04/02/2024    CALCIUM 8.5 (L) 04/08/2024    CO2 25 04/08/2024    CL 96 (L) 04/08/2024    CREATININE 5.85 (H) 04/08/2024    GLUCOSE 91 04/08/2024    ALKPHOS 77 04/02/2024    K 4.5 04/08/2024    PROT 6.5 04/02/2024     (L) 04/08/2024    AST 16 04/02/2024    ALT 8 (L) 04/02/2024    BUN 56 (H) 04/08/2024    ANIONGAP 19 04/08/2024    MG 2.00  "04/08/2024    PHOS 4.4 04/08/2024    GGT 35 05/26/2022     04/05/2024    ALBUMIN 3.6 04/08/2024    LIPASE 10 10/16/2022    GFRMALE 45 (A) 08/02/2023     No results found for: \"TRIG\", \"CHOL\", \"LDLCALC\", \"HDL\"  Lab Results   Component Value Date    HGBA1C 5.9 (H) 03/08/2024         Current Outpatient Medications   Medication Instructions    acetaminophen (TYLENOL) 975 mg, oral, Every 8 hours PRN    aspirin 81 mg, oral, Every morning    bumetanide (BUMEX) 2 mg, oral, 2 times daily    metoprolol succinate XL (TOPROL-XL) 50 mg, oral, Daily, Do not crush or chew.    mycophenolate (MYFORTIC) 360 mg, oral, 2 times daily    Protonix 40 mg, oral, Every morning    sulfamethoxazole-trimethoprim (Bactrim) 400-80 mg tablet 1 tablet, oral, Every Mon/Wed/Fri    tacrolimus (PROGRAF) 2 mg, oral, Every 12 hours scheduled (0630,1830)    tamsulosin (FLOMAX) 0.4 mg, oral, Daily          DRUG INTERACTIONS  None at time of review      Assessment/Plan   Problem List Items Addressed This Visit       Diabetes mellitus, type 2 (CMS/HCC)     Not diagnosed.         Heart failure (CMS/HCC)     Followed by cardiology. Management complicated by renal failure on HD. Only on metoprolol XL and bumetanide at this time.     Wife inquired about dosing of bumetanide - stated that hospital nephrologist stated to take only on non-dialysis days, but instructions read to take daily. Instructed to reach out to nephrologist for advice on this. Can hold dose until after dialysis tomorrow if no answer and ask at the center to take or not.     No medication changes today. Encouraged wife to check patient's weight, BP and HR daily and keep a log to take to all appointments.     CONTINUE:  metoprolol XL 50mg once daily  Bumetanide 2mg twice daily            Labs ordered: none    Follow-up: as needed     Patient was provided with PharmD phone number and encouraged to reach out with any questions or concerns In the future or ask provider for another pharmacy " referral.    Time spent with pt: Total length of time 32 (minutes) of the encounter and more than 50% was spent counseling the patient.    Cande Lawrence PharmD, VAL  Clinical Pharmacist  Pharmacy Services  [phone]    Continue all meds under the continuation of care with the referring provider and clinical pharmacy team.    Verbal consent to manage patient's drug therapy was obtained from the patient and an individual authorized to act on behalf of a patient. They were informed they may decline to participate or withdraw from participation in pharmacy services at any time.

## 2024-04-09 NOTE — PROGRESS NOTES
Discharge Facility: [facility]  Discharge Diagnosis: fluid overload, renal failure  Admission Date: 4/1/24  Discharge Date: 4/8/24    PCP Appointment Date: 5/12/24  Specialist Appointment Date: HD this week Wed and Fri, Dr Adams pacemaker clinic 4/12  Has appt with [facility] transplant this week  Hospital Encounter and Summary: Linked  See discharge assessment below for further details    Engagement  Call Start Time: 0940 (4/9/2024  9:52 AM)    Medications  Medications reviewed with patient/caregiver?: Yes (4/9/2024  9:52 AM)  Is the patient having any side effects they believe may be caused by any medication additions or changes?: No (4/9/2024  9:52 AM)  Does the patient have all medications ordered at discharge?: Yes (4/9/2024  9:52 AM)  Care Management Interventions: Provided patient education; Advised patient to call provider (4/9/2024  9:52 AM)  Prescription Comments: Started on bumex 2mg twice daily, spouse will talk with nephrology to see if this is only taken on non dialysis days (4/9/2024  9:52 AM)  Is the patient taking all medications as directed (includes completed medication regime)?: Yes (4/9/2024  9:52 AM)  Care Management Interventions: Provided patient education (4/9/2024  9:52 AM)  Medication Comments: Met with clinical pharmacy prior to this call. (4/9/2024  9:52 AM)    Appointments  Does the patient have a primary care provider?: Yes (4/9/2024  9:52 AM)  Care Management Interventions: Verified appointment date/time/provider (Will keep May appt as is due to nephrology follow up) (4/9/2024  9:52 AM)  Has the patient kept scheduled appointments due by today?: Yes (4/9/2024  9:52 AM)  Care Management Interventions: Advised patient to keep appointment (4/9/2024  9:52 AM)    Self Management  What is the home health agency?: [facility] (4/9/2024  9:52 AM)  Has home health visited the patient within 72 hours of discharge?: Call prior to 72 hours (4/9/2024  9:52 AM)    Patient Teaching  Does the patient have access  to their discharge instructions?: Yes (4/9/2024  9:52 AM)  Care Management Interventions: Reviewed instructions with patient (4/9/2024  9:52 AM)  What is the patient's perception of their health status since discharge?: Returned to baseline/stable (4/9/2024  9:52 AM)  Is the patient/caregiver able to teach back the hierarchy of who to call/visit for symptoms/problems? PCP, Specialist, Home Health nurse, Urgent Care, ED, 911: Yes (4/9/2024  9:52 AM)  Patient/Caregiver Education Comments: Aware to monitor weight daily, call nephrology with significant swelling or weight gain (4/9/2024  9:52 AM)    Wrap Up  Wrap Up Additional Comments: Spoke with spouse about events last couple weeks leading up to hospitalization. Metoprolol was adjusted by Dr Barber but Dr Barber states fluid overload must be managed by nephrologists due to kidney issues with fluid. Met with  transplant team who admitted him to hospital for fluid overload management. Still has appts with [place] regarding history of transplant. Spouse requesting Dr Manrique to remove DM diagnosis due to not formally diagnosed diabetic but did use insulin in the past with issues post transplant in 2019 to avoid rejection. (4/9/2024  9:52 AM)  Call End Time: 0950 (4/9/2024  9:52 AM)

## 2024-04-09 NOTE — ASSESSMENT & PLAN NOTE
Followed by cardiology. Management complicated by renal failure on HD. Only on metoprolol XL and bumetanide at this time.     Wife inquired about dosing of bumetanide - stated that hospital nephrologist stated to take only on non-dialysis days, but instructions read to take daily. Instructed to reach out to nephrologist for advice on this. Can hold dose until after dialysis tomorrow if no answer and ask at the center to take or not.     No medication changes today. Encouraged wife to check patient's weight, BP and HR daily and keep a log to take to all appointments.     CONTINUE:  metoprolol XL 50mg once daily  Bumetanide 2mg twice daily

## 2024-04-12 NOTE — PROGRESS NOTES
Social Work Note:    LILIANE spoke with Dr Lipscomb who reported that patient needs to be set up with dialysis in the community.  LILIANE spoke with patient who asked this LISW to speak with wife.  LILIANE provided the patient's wife with the locations of [facility].  LILIANE explained that the closest are [facility] AND [place]. She explained that she is fine with [facility] and that she would liek the patient to go [facility] 2nd shift.  LILIANE sent information to [facility] Intake Days[facility].  LILIANE didn't hear back about chair time today so this LISW told patient's wife to bring patient to dialysis on Monday at .    LETY Wetzel, LILIANE-S

## 2024-04-23 PROBLEM — R06.02 SHORTNESS OF BREATH: Status: ACTIVE | Noted: 2024-01-01

## 2024-04-23 NOTE — CONSULTS
Consults  History Of Present Illness:    Reynaldo Francisco is a 68 y.o. male presenting with increased dyspnea. HE has PMH of NICM/HFrEF (LVEF = 30% on echocardiogram of 2/2024, S/P dual chamber CRT-D, insulinoma with hepatic metastases, S/P kidnet transplant recently restarted on HD (T-Th-Sat), type I aortic dissection, HTN on toprol.  Recent admission to [facility] with vigousdialysis and thoracocentesis.  No chest pain  .     Last Recorded Vitals:  Vitals:    04/23/24 0906 04/23/24 0930 04/23/24 1000 04/23/24 1130   BP: 108/58 98/67 108/58 104/63   BP Location: Right arm Right arm Right arm Right arm   Patient Position: Sitting Sitting Sitting Sitting   Pulse: 68 67 68 67   Resp: 18 19 20 20   Temp:       TempSrc:       SpO2: 97% 97% (!) 92% 97%   Weight:       Height:           Last Labs:  CBC - 4/23/2024:  3:33 AM  3.3 8.8 143    26.5      CMP - 4/23/2024:  3:33 AM  8.9 6.8 16 --- 0.9   4.1 4.1 7 82      PTT - 4/23/2024:  4:22 AM  1.3   14.4 32     Troponin I, High Sensitivity   Date/Time Value Ref Range Status   04/23/2024 08:39  (HH) 0 - 20 ng/L Final     Comment:     Previous result verified on 4/23/2024 0408 on specimen/case [id number] called with component TRPHS for procedure Troponin I, High Sensitivity, Initial with value 467 ng/L.   04/23/2024 04:22  (HH) 0 - 20 ng/L Final     Comment:     Previous result verified on 4/23/2024 0408 on specimen/case [id number] called with component TRPHS for procedure Troponin I, High Sensitivity, Initial with value 467 ng/L.   04/23/2024 03:33  (HH) 0 - 20 ng/L Final     BNP   Date/Time Value Ref Range Status   04/01/2024 09:00 PM 1,284 (H) 0 - 99 pg/mL Final   03/01/2024 12:03  (H) 0 - 99 pg/mL Final     Hemoglobin A1C   Date/Time Value Ref Range Status   03/08/2024 05:00 AM 5.9 (H) see below % Final   05/01/2023 06:22 AM 6.1 (H) <5.7 % Final   03/07/2023 03:24 AM 5.2 <5.7 % Final      Last I/O:  No intake/output data recorded.    Past Cardiology  Tests (Last 3 Years):  EKG:  Electrocardiogram, 12-lead PRN ACS symptoms 03/16/2024      ECG 12 Lead 03/16/2024      ECG 12 Lead 03/15/2024      Electrocardiogram, 12-lead PRN ACS symptoms 02/29/2024      ECG 12 lead 02/29/2024      ECG 12 lead 02/29/2024      ECG 12 lead 12/21/2023      ECG 12 lead tomorrow at 8 AM 10/28/2023      ECG 12 lead STAT 10/28/2023      Electrocardiogram, 12-lead PRN ACS symptoms 10/28/2023      Electrocardiogram, 12-lead PRN ACS symptoms 10/28/2023      Electrocardiogram, 12-lead PRN ACS symptoms 10/28/2023      ECG 12 Lead     Echo:  Transthoracic Echo (TTE) Complete 03/01/2024      Transthoracic Echo (TTE) Limited 03/01/2024      Onco-Echo Complete (Strain & 3D) 10/06/2023    Ejection Fractions:  EF   Date/Time Value Ref Range Status   03/01/2024 11:00 AM 33 %      Cath:  Cardiac Catheterization Procedure 03/14/2024      Cardiac Catheterization Procedure 03/01/2024    Stress Test:  No results found for this or any previous visit from the past 1095 days.    Cardiac Imaging:  MR cardiac morphology and function w and wo IV contrast 03/12/2024      Past Medical History:  He has a past medical history of Arteriovenous fistula, acquired (CMS-HCC) (06/17/2022), Lung nodule, Nutritional anemia, unspecified, and Personal history of malignant neoplasm of pancreas (06/17/2022).    Past Surgical History:  He has a past surgical history that includes Other surgical history (03/17/2022); Other surgical history (06/17/2022); Other surgical history (06/17/2022); US kidney transplant; Liver transplantation; Cardiac electrophysiology procedure (Right, 10/23/2023); Cardiac catheterization (N/A, 3/1/2024); Cardiac electrophysiology procedure (Right, 3/15/2024); and Cardiac catheterization (N/A, 3/14/2024).      Social History:  He reports that he has never smoked. He has never used smokeless tobacco. He reports that he does not currently use drugs. He reports that he does not drink alcohol.    Family  History:  Family History   Problem Relation Name Age of Onset    Diabetes Mother      Hypertension Mother          Allergies:  Milk, Shellfish derived, and Iodinated contrast media    Inpatient Medications:  Scheduled medications   Medication Dose Route Frequency    [START ON 4/24/2024] albumin human  25 g intravenous Once per day on Monday Wednesday Friday    aspirin  81 mg oral q AM    heparin (porcine)  5,000 Units subcutaneous q8h KESHIA    metoprolol succinate XL  50 mg oral Daily    midodrine  10 mg oral Daily    mycophenolate  180 mg oral BID    pantoprazole  40 mg oral q AM    [START ON 4/24/2024] sulfamethoxazole-trimethoprim  1 tablet oral Every Mon/Wed/Fri    tacrolimus  2 mg oral q12h KESHIA     PRN medications   Medication    acetaminophen    Or    acetaminophen    Or    acetaminophen     Continuous Medications   Medication Dose Last Rate     Outpatient Medications:  Current Outpatient Medications   Medication Instructions    acetaminophen (TYLENOL) 975 mg, oral, Every 8 hours PRN    aspirin 81 mg, oral, Every morning    bumetanide (BUMEX) 2 mg, oral, 2 times daily    metoprolol succinate XL (TOPROL-XL) 50 mg, oral, Daily, Do not crush or chew.    midodrine (PROAMATINE) 10 mg, oral, Once, During dialysis    midodrine (PROAMATINE) 10 mg, oral, Daily, Use as needed for SPB < 85    mycophenolate (MYFORTIC) 360 mg, oral, 2 times daily    mycophenolate (MYFORTIC) 180 mg, oral, 2 times daily    Protonix 40 mg, oral, Every morning    sulfamethoxazole-trimethoprim (Bactrim) 400-80 mg tablet 1 tablet, oral, Every Mon/Wed/Fri    tacrolimus (PROGRAF) 2 mg, oral, Every 12 hours scheduled (0630,1830)    tamsulosin (FLOMAX) 0.4 mg, oral, Daily       Physical Exam:  GEN: Chronically appearing 68 yom sitting 60 degrees comfortably  HEENT: Atraumatic, normocephalic  NECK: JVD  COR: Reg  LUNGS: Dullness at bases  ABD: Soft  EXT: No edema     Assessment/Plan   1.) Acute fluid overload from cardiorenal deterioration with ESRD  S/P transplant and NIDCM  2.) S/P type A aortic dissection  REC: 1.) Agree with echocardiogram to be reviewed  2.) Fluid management by nephjrology       Thank you for the consultation                             Will follow with you                                                      RIAN    Peripheral IV 04/23/24 20 G Right;Upper Arm (Active)   Site Assessment Clean;Dry;Intact 04/23/24 0516   Dressing Type Transparent 04/23/24 0516   Line Status Flushed 04/23/24 0516   Dressing Status Clean;Dry;Occlusive 04/23/24 0516   Number of days: 0       Code Status:  Full Code    I spent 30 minutes in the professional and overall care of this patient.        Emanuel Bain MD

## 2024-04-23 NOTE — PROGRESS NOTES
Pharmacy Medication History Review    Reynaldo Francisco is a 68 y.o. male admitted for Shortness of breath. Pharmacy reviewed the patient's iaksh-ii-qtrdyabhl medications and allergies for accuracy.    The list below reflectives the updated PTA list. Please review each medication in order reconciliation for additional clarification and justification.  Prior to Admission medications    Medication Sig Start Date End Date Taking? Authorizing Provider   acetaminophen (Tylenol) 325 mg tablet Take 3 tablets (975 mg) by mouth every 8 hours if needed for moderate pain (4 - 6), headaches or fever (temp greater than 38.0 C). 3/19/24  Yes Fabricio Fernandez MD   aspirin 81 mg EC tablet Take 1 tablet (81 mg) by mouth once daily in the morning.   Yes Historical Provider, MD   metoprolol succinate XL (Toprol-XL) 50 mg 24 hr tablet Take 1 tablet (50 mg) by mouth once daily. Do not crush or chew.  Patient taking differently: Take 1 tablet (50 mg) by mouth once daily at bedtime. Do not crush or chew. 3/26/24 3/26/25 Yes Vladimir Barber MD   midodrine (Proamatine) 10 mg tablet Take 1 tablet (10 mg) by mouth once daily. Use as needed for SPB < 85  Patient taking differently: Take 1 tablet (10 mg) by mouth once daily as needed (for SPB < 85). 4/19/24 4/19/25 Yes Rubio Helms MD   mycophenolate (Myfortic) 180 mg EC tablet Take 1 tablet (180 mg) by mouth 2 times a day. 4/18/24  Yes Historical Provider, MD   Protonix 40 mg EC tablet Take 1 tablet (40 mg) by mouth once daily in the morning. 3/21/18  Yes Historical Provider, MD   sulfamethoxazole-trimethoprim (Bactrim) 400-80 mg tablet Take 1 tablet by mouth once a day on Monday, Wednesday, and Friday.   Yes Historical Provider, MD   tacrolimus (Prograf) 1 mg capsule Take 2 capsules (2 mg) by mouth every 12 hours. 3/20/24  Yes Oniel Barr MD MPH   tamsulosin (Flomax) 0.4 mg 24 hr capsule Take 1 capsule (0.4 mg) by mouth once daily. Do not start before March 20, 2024. 3/20/24   Yes Fabricio Fernandez MD   bumetanide (Bumex) 2 mg tablet Take 1 tablet (2 mg) by mouth 2 times a day. Do not start before April 9, 2024.  Patient not taking: Reported on 4/23/2024 4/9/24 5/9/24 no Chaim Torres MD   midodrine (Proamatine) 10 mg tablet Take 1 tablet (10 mg) by mouth 1 time for 1 dose. During dialysis  Patient not taking: Reported on 4/23/2024 4/10/24 4/10/24 no Rubio Helms MD   mycophenolate (Myfortic) 360 mg EC tablet Take 1 tablet (360 mg) by mouth 2 times a day.  Patient not taking: Reported on 4/23/2024 3/20/24 7/18/24 no Elizabeth Toussaint MD        The list below reflectives the updated allergy list. Please review each documented allergy for additional clarification and justification.  Allergies  Reviewed by Shankar Lindsay DO on 4/23/2024        Severity Reactions Comments    Milk Medium Diarrhea, GI Upset (Skim milk) diarrhea    Shellfish Derived Medium Hives, Itching     Iodinated Contrast Media Low Nausea/vomiting             Below are additional concerns with the patient's PTA list.        Jaja Gauthier

## 2024-04-23 NOTE — ED PROVIDER NOTES
HPI   Chief Complaint   Patient presents with    Shortness of Breath     C/o sob, denies cp,dizzy, nausea, ha, abd pain or any other discomfort at this time. Pt has significant medical hx of kidney transplant, w/failure, liver transplant, and cardiac issues. Pt axox3 in triage. Appears lethargic and weak.       Patient presented for evaluation of shortness of breath.  Patient states he started having shortness of breath around 1 in the morning.  Patient notes he is nauseated.  Patient feels lightheaded.  Patient has a history of kidney and liver transplant.  Patient is currently on dialysis.  Last dialysis was last Saturday.  Patient is due for dialysis today.  Patient does take aspirin.  Family at bedside indicates patient normally has low blood pressure.                          [place] Coma Scale Score: 14                     Patient History   Past Medical History:   Diagnosis Date    Arteriovenous fistula, acquired (CMS-HCC) 06/17/2022    AV fistula    Lung nodule     Nutritional anemia, unspecified     Anemia, deficiency    Personal history of malignant neoplasm of pancreas 06/17/2022    History of malignant neoplasm of pancreas     Past Surgical History:   Procedure Laterality Date    CARDIAC CATHETERIZATION N/A 3/1/2024    Procedure: [place] Insertion;  Surgeon: Pedro Pantoja MD;  Location: [place] [address] Cardiac Cath Lab;  Service: Cardiovascular;  Laterality: N/A;  Occluded right & left IJ, requires femoral approach. [place] needed for hemodynamic monitoring for diuresis in acute exacerbation of HF and KIA in renal transplant patient.    CARDIAC CATHETERIZATION N/A 3/14/2024    Procedure: Endomyocardial Biopsy;  Surgeon: Evan Barrientos DO;  Location: [place] [address] Cardiac Cath Lab;  Service: Cardiovascular;  Laterality: N/A;    CARDIAC ELECTROPHYSIOLOGY PROCEDURE Right 10/23/2023    Procedure: PPM Single Ventricle Implant;  Surgeon: Cosme Pina MD;  Location: [facility] Cardiac Cath Lab;  Service:  Electrophysiology;  Laterality: Right;  medtronic notified    CARDIAC ELECTROPHYSIOLOGY PROCEDURE Right 3/15/2024    Procedure: Pacemaker Upgrade Biventricular to CRT-D;  Surgeon: Suresh Adams MD;  Location: [place] [address] Cardiac Cath Lab;  Service: Electrophysiology;  Laterality: Right;  PPM upgrade to CRT-D    LIVER TRANSPLANTATION      OTHER SURGICAL HISTORY  03/17/2022    Pancreatic surgery    OTHER SURGICAL HISTORY  06/17/2022    Liver surgery    OTHER SURGICAL HISTORY  06/17/2022    Liver transplantation    US KIDNEY TRANSPLANT       Family History   Problem Relation Name Age of Onset    Diabetes Mother      Hypertension Mother       Social History     Tobacco Use    Smoking status: Never    Smokeless tobacco: Never   Vaping Use    Vaping status: Never Used   Substance Use Topics    Alcohol use: Never    Drug use: Not Currently       Physical Exam   ED Triage Vitals [04/23/24 0304]   Temperature Heart Rate Respirations BP   36.8 °C (98.2 °F) 62 18 86/50      Pulse Ox Temp Source Heart Rate Source Patient Position   99 % Temporal Monitor Sitting      BP Location FiO2 (%)     -- --       Physical Exam  Vitals and nursing note reviewed.   Constitutional:       Appearance: Normal appearance. He is ill-appearing.   HENT:      Head: Atraumatic.      Right Ear: External ear normal.      Left Ear: External ear normal.      Nose: Nose normal.      Mouth/Throat:      Mouth: Mucous membranes are moist.      Pharynx: Oropharynx is clear.   Eyes:      Extraocular Movements: Extraocular movements intact.      Pupils: Pupils are equal, round, and reactive to light.   Cardiovascular:      Rate and Rhythm: Normal rate and regular rhythm.      Pulses: Normal pulses.   Pulmonary:      Effort: Pulmonary effort is normal.      Breath sounds: Examination of the right-lower field reveals decreased breath sounds. Examination of the left-lower field reveals decreased breath sounds. Decreased breath sounds present.   Abdominal:       Palpations: Abdomen is soft.      Tenderness: There is no abdominal tenderness.   Musculoskeletal:         General: No tenderness or signs of injury. Normal range of motion.        Arms:       Cervical back: Normal range of motion and neck supple. No rigidity or tenderness.   Skin:     General: Skin is warm and dry.   Neurological:      General: No focal deficit present.      Mental Status: He is alert and oriented to person, place, and time. Mental status is at baseline.   Psychiatric:         Mood and Affect: Mood normal.         Behavior: Behavior normal.         ED Course & MDM   ED Course as of 04/23/24 1328   Tue Apr 23, 2024   0434 Updated patient with findings.  Patient denies any chest pain or shortness of breath at this time.  Patient agrees with plan of admission. [JA]   0439 Discussed with Dr. Moise and he accepts the patient to his service.  [JA]      ED Course User Index  [JA] Shankar Lindsay DO         Diagnoses as of 04/23/24 1328   Shortness of breath   NSTEMI (non-ST elevated myocardial infarction) (Multi)   ESRD (end stage renal disease) (Multi)       Medical Decision Making  Ddx: MI, PE, Pneumonia, CHF, NSTEMI, other    Preseting for shortness of breath. EKG is paced. Does not meet sgarbossa criteria. CXR showing effusion and pulmonary vascular congestion on my review of the CXR. Pulses equal. Due for dialysis today. Patient is hypotensive. Hx of kidney and liver transplant. Family indicates that he is always hypotensive. Trop elevated. Patient is now asymptomatic at rest. Will admit for further evaluation. Will defer heparin at this time to admitting team as he is asymptomatic.   Admitted to IM team.         Procedure  ECG 12 lead    Performed by: Shankar Lindsay DO  Authorized by: Shankar Lindsay DO    ECG interpreted by ED Physician in the absence of a cardiologist: yes    Previous ECG:     Previous ECG:  Compared to current    Similarity:  No change    Comparison ECG info:   3/16/2024  Comments:      4/23/2024 03: 06 ventricular paced rhythm, rate 63, indeterminate axis, does not meet Sgarbossa criteria, abnormal EKG.  EKG interpreted by myself.       Shankar Lindsay DO  04/23/24 7420

## 2024-04-23 NOTE — H&P
HPI:  Reynaldo Francisco is a 68 y.o. 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  Patient states he woke up around midnight and was suddenly short of breath.  He then sat on the side of bed to catch his breath and then went back to sleep.  This happened a few times throughout the night and the patient felt something was wrong.  The wife subsequently drove the patient to the ED for further evaluation.  Patient has chest pain, fever, chills, cough, abdominal pain, vomiting, diarrhea.    The patient has a complicated medical history consisting of liver transplant (due to metastasis from insulinoma) and renal transplant.  He follows with [facility] transplant team.  Within the last year he developed worsening renal function and was found to have severe cardiomyopathy.  He has undergone extensive cardiac workup, but the etiology of his nonischemic cardiomyopathy is unclear, however possibly pacemaker induced as these events occurred after his pacemaker implantation, hence he was upgraded to CRT.  It appears he sustained an ATN after his acute decompensated heart failure presentation and had to be restarted on hemodialysis.  He still follows with [facility] for his transplant needs and [facility] for his dialysis needs.  His urine output has continued to progressively decrease.  He and his transplant team are hoping that after upgrading to CRT-D and improvement of his cardiomyopathy that he will have renal recovery.    ED workup significant for blood pressure 86/50, creatinine 5.23, high sensitive troponin 467 > 604, D-dimer 6880.  Chest x-ray significant for small to moderate right pleural effusion with atelectasis/possible consolidation.      Past Medical History: HFrEF (30-35% TTE 3/2024), SSS s/p dual-chamber PM, Insulinoma w/ hepatic metastasis requiring liver transplantation (2018) c/b renal failure s/p kidney transplantation (on tacro and MMFe; recently restarted on HD, type A aortic dissection, HTN, anemia  Past Surgical History: Open cholecystectomy 2010-Liver lobectomy 2010-Splenectomy 2010   Family History: HTN, DM  Social History: denies smoking, EtOH, illicit drug use    Review of Systems (Bold = positive)  Constitutional; Fever, chills, anorexia, weight loss, weakness  Eyes:  Blurry vision, diplopia, vision loss  ENT: Nasal congestion, earache or sore throat  Chest: Cough, dyspnea, hemoptysis, wheezing, pleurisy, Chest Pain,  palpitations, dyspnea on exertion, orthopnea  Gastrointestinal: Abdominal pain, nausea, vomiting, diarrhea, constipation, melena, hematemesis, hematochezia  Genitourinary: Dysuria, hematuria, frequency, urgency, flank pain  Musculoskeletal: Arthralgia, myalgia, weakness  Neurological: Dizziness, light-headedness, headache, syncope  Psychiatric: anxiety, depression, hallucinations  Skin: Rash, pruritis, rash, lesions  Endocrine: Heat intolerance, cold intolerance, polyuria, polydipsia  Hematologic: Bruising      Visit Vitals  BP 86/50 (Patient Position: Sitting)   Pulse 62   Temp 36.8 °C (98.2 °F) (Temporal)   Resp 18      Physical Exam:  General: Alert, awake, cooperative.  HEENT:  Normocephalic, atraumatic, mucus membranes moist.  Neck:  Trachea midline.  Neck supple.  Chest:  Clear to auscultation bilaterally. No wheezes, rales, or rhonchi.  CV:  Regular rate and rhythm.  Positive S1/S2.     GI: Bowel sounds present in all four quadrants, abdomen is soft, non-tender, non-distended.  Extremities:  No lower extremity edema or cyanosis.  Neurological:  AAOx3. No focal deficits.  Skin:  Warm and dry.      Lab Results   Component Value Date    WBC 3.3 (L) 04/23/2024    HGB 8.8 (L) 04/23/2024    HCT 26.5 (L) 04/23/2024      (H) 04/23/2024     (L) 04/23/2024      Lab Results   Component Value Date    GLUCOSE 88 04/23/2024    CALCIUM 8.9 04/23/2024     04/23/2024    K 3.5 04/23/2024    CO2 29 04/23/2024    CL 99 04/23/2024    BUN 41 (H) 04/23/2024    CREATININE 5.23 (H) 04/23/2024      Medications:  aspirin, 81 mg, oral, q AM  heparin (porcine), 5,000 Units, subcutaneous, q8h KESHIA  metoprolol succinate XL, 50 mg, oral, Daily  midodrine, 10 mg, oral, Once  midodrine, 10 mg, oral, Daily  mycophenolate, 180 mg, oral, BID  pantoprazole, 40 mg, oral, q AM  [START ON 4/24/2024] sulfamethoxazole-trimethoprim, 1 tablet, oral, Every Mon/Wed/Fri  tacrolimus, 2 mg, oral, q12h KESHIA  tamsulosin, 0.4 mg, oral, Daily       Assessment/ Plan:  68 y.o. 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     #Dyspnea  #Atelectasis  #Small to moderate right pleural effusion  #Elevated troponin  -His volume status is much improved since his [place] [facility]  His last dialysis session was on Saturday.  His chest x-ray does show small -moderate R pleural effusion, however this has not worsened since he underwent thoracentesis at his previous admission.  -Check Procal  -D-dimer is > 6000, will order VQ scan to assess (although patient is getting hemodialysis he is still hoping for renal recovery so will avoid contrast)  -Nephrology consulted for hemodialysis  -Elevated troponin likely in setting of poor renal clearance, will trend.  Cardiology consulted.  -Limited echo ordered  -Monitor on telemetry    #Pancytopenia  -Multifactorial in setting of renal dysfunction and immunosuppression, continue to monitor    #NICM/stage C/NYHA II/HFrEF (EF  30 to 35%)  #SSS and CHB s/p PPM (upgraded to CRT-D)  #S/p liver transplantation  #ESRD s/p kidney transplantation c/b ATN (on tacromlimus and mycophenolate)   -Continue aspirin, Toprol.  Patient's systolic blood pressures run in the 90s usually.  -Continue tacrolimus.  Mycophenolate was recently reduced to 180 mg twice daily by his transplant team due to diarrhea.  -Continue Bactrim prophylaxis  -Midodrine with dialysis and as needed  -Flomax, this can consider being discontinued if blood pressures are low.        Diet: Renal  DVT ppx: Heparin  IVF: -   Consults: Cards, nephro  Dispo: Telemetry    Sylvia Worley D.O.  Internal Medicine PGY-3    This is a preliminary note with physician attestation to follow.

## 2024-04-23 NOTE — PROGRESS NOTES
Reynaldo Francisco is a 68 y.o. male on day 0 of admission presenting with Shortness of breath.      Assessment / Plan        Dyspnea  Atelectasis  Small to moderate right pleural effusion  Nonischemic cardiomyopathy  HFrEF  Sick sinus syndrome and CHB status post PPM  Elevated troponin  ESRD status post liver transplantation, currently on dialysis T/TH/Sa  Plan:  -Consulted cardiology, awaiting recommendations  -Consulted nephrology for dialysis management/volume assessment, patient normally receives midodrine with dialysis  -Follow-up echo  -VQ scan revealed no definite wedge-shaped perfusion defects, low probability of PE  -Follow-up troponin after dialysis and follow-up morning troponin, elevated troponin may be due to decreased renal clearance    Chronic Medical conditions  Insulinoma  Pancytopenia  Liver transplantation  Plan:  -Continue aspirin, Toprol.  Patient systolic blood pressure is in the 90s  -Continue tacrolimus and mycophenolate  -Continue prophylactic back from  -Continue Flomax    DVT ppx: Heparin  IVF: None  Diet:  renal  Consults: Nephrology/cardiology    Care Planning/PT-OT: PT/OT consulted       Michael Sanders MD   PGY-1, Internal Medicine  This is a preliminary note, please await attending attestation for final A/P    Subjective     Patient seen and examined. No acute overnight events.  Patient states that shortness of breath feels better today.  Denies any chest pain, lightness, dizziness, new edema, or other new/acute symptoms since yesterday.      Objective       Physical Exam:  General: Alert, awake, cooperative.  HEENT:  Normocephalic, atraumatic, mucus membranes moist.  Neck:  Trachea midline.  Neck supple.  Chest:  Clear to auscultation bilaterally. No wheezes, rales, or rhonchi.  CV:  Regular rate and rhythm.  Positive S1/S2.   GI: Bowel sounds present in all four quadrants, abdomen is soft, non-tender, non-distended.  Extremities:  No lower extremity edema or cyanosis.  Neurological:   "AAOx3. No focal deficits.  Skin:  Warm and dry.    Last Recorded Vitals  Blood pressure 108/58, pulse 68, temperature 36.8 °C (98.2 °F), temperature source Temporal, resp. rate 20, height 1.702 m (5' 7\"), weight 59 kg (130 lb), SpO2 (!) 92%.  Intake/Output last 3 Shifts:  No intake/output data recorded.    Last CBC & BMP  Lab Results   Component Value Date    GLUCOSE 88 04/23/2024    CALCIUM 8.9 04/23/2024     04/23/2024    K 3.5 04/23/2024    CO2 29 04/23/2024    CL 99 04/23/2024    BUN 41 (H) 04/23/2024    CREATININE 5.23 (H) 04/23/2024     Lab Results   Component Value Date    WBC 3.3 (L) 04/23/2024    HGB 8.8 (L) 04/23/2024    HCT 26.5 (L) 04/23/2024     (H) 04/23/2024     (L) 04/23/2024           "

## 2024-04-23 NOTE — PROGRESS NOTES
Physical Therapy                 Therapy Communication Note    Patient Name: Reynaldo Francisco  MRN: [id number]  Today's Date: 4/23/2024     Discipline: Physical Therapy    Missed Visit Reason: Missed Visit Reason:  (Pt with uptrending troponins, currently trending, awaiting cardiology consult.)    Missed Time: Attempt 11:10

## 2024-04-23 NOTE — PROGRESS NOTES
Occupational Therapy                 Therapy Communication Note    Patient Name: Reynaldo Francisco  MRN: [id number]  Today's Date: 4/23/2024     Discipline: Occupational Therapy    Missed Visit Reason: Missed Visit Reason:  (uptrending troponins, currently trending and awaiting cardiology consult)    Missed Time: Attempt    Comment:

## 2024-04-23 NOTE — PRE-PROCEDURE NOTE
Report from Sending RN:    Report From: Isabel  Recent Surgery of Procedure: No  Baseline Level of Consciousness (LOC): Alert   Oxygen Use: No  Type: NA   Diabetic: Yes  Last BP Med Given Day of Dialysis: See MAR   Last Pain Med Given: See MAR   Lab Tests to be Obtained with Dialysis: No  Blood Transfusion to be Given During Dialysis: No  Available IV Access: Yes  Medications to be Administered During Dialysis: No  Continuous IV Infusion Running: No  Restraints on Currently or in the Last 24 Hours: N/A  Hand-Off Communication: Full Code   Dialysis Catheter Dressing:  NA   Last Dressing Change: L AVF

## 2024-04-23 NOTE — CONSULTS
Nephrology Consult Note    Reason For Consult  Elevated creatinine    History Of Present Illness  Reynaldo Francisco is a 68 y.o. 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) c/b renal failure s/p kidney transplantation (on tacro and MMFe/myfortic), restarted on HD (TThSat) on 3/24 (after repeat renal bx not c/w rejection), type A aortic dissection, HTN on toprol presenting with worsening SOB, ALONZO, found to have fluid overload    Very detailed hx obtained from wife at bedside  Last dialysis completed on Saturday, UF<1L  Edw being adjusted 59.8 - 59.2 - 58 kg  Diarrhea on cellcept, improved on myfortic, now back on lower dose 180  Oligoanuric  Poor po intake  R thoracentesis 4/2/24 1.3L removed  Midodrine PRN dialysis         Past Medical History  He has a past medical history of Arteriovenous fistula, acquired (CMS-HCC) (06/17/2022), Lung nodule, Nutritional anemia, unspecified, and Personal history of malignant neoplasm of pancreas (06/17/2022).    Surgical History  He has a past surgical history that includes Other surgical history (03/17/2022); Other surgical history (06/17/2022); Other surgical history (06/17/2022); US kidney transplant; Liver transplantation; Cardiac electrophysiology procedure (Right, 10/23/2023); Cardiac catheterization (N/A, 3/1/2024); Cardiac electrophysiology procedure (Right, 3/15/2024); and Cardiac catheterization (N/A, 3/14/2024).     Social History  He reports that he has never smoked. He has never used smokeless tobacco. He reports that he does not currently use drugs. He reports that he does not drink alcohol.    Family History  Family History   Problem Relation Name Age of Onset    Diabetes Mother      Hypertension Mother          Allergies  Milk, Shellfish derived, and Iodinated contrast media    Review of Systems  Per HPI     Physical Exam    Vitals 24HR  Heart Rate:   "[62-71]   Temperature:  [36.8 °C (98.2 °F)]   Respirations:  [18-20]   BP: ()/(50-67)   Height:  [170.2 cm (5' 7\")]   Weight:  [59 kg (130 lb)]   Pulse Ox:  [92 %-99 %]       Sick looking, pale, AAOx3, on NC O2  Decreased AEBL, crackles bases  RRR no murmurs  And very distended, + BS  Edema, chronic skin changes  LUE AVG           I&O 24HR    Intake/Output Summary (Last 24 hours) at 4/23/2024 1019  Last data filed at 4/23/2024 0831  Gross per 24 hour   Intake 100 ml   Output --   Net 100 ml       Scheduled medications  albumin human, 25 g, intravenous, Daily  aspirin, 81 mg, oral, q AM  heparin (porcine), 5,000 Units, subcutaneous, q8h KESHIA  metoprolol succinate XL, 50 mg, oral, Daily  midodrine, 10 mg, oral, Daily  mycophenolate, 180 mg, oral, BID  pantoprazole, 40 mg, oral, q AM  [START ON 4/24/2024] sulfamethoxazole-trimethoprim, 1 tablet, oral, Every Mon/Wed/Fri  tacrolimus, 2 mg, oral, q12h KESHIA  tamsulosin, 0.4 mg, oral, Daily      Continuous medications     PRN medications  PRN medications: acetaminophen **OR** acetaminophen **OR** acetaminophen    Relevant Results      Admission on 04/23/2024   Component Date Value Ref Range Status    Magnesium 04/23/2024 1.60  1.60 - 2.40 mg/dL Final    Glucose 04/23/2024 88  74 - 99 mg/dL Final    Sodium 04/23/2024 138  136 - 145 mmol/L Final    Potassium 04/23/2024 3.5  3.5 - 5.3 mmol/L Final    Chloride 04/23/2024 99  98 - 107 mmol/L Final    Bicarbonate 04/23/2024 29  21 - 32 mmol/L Final    Anion Gap 04/23/2024 14  10 - 20 mmol/L Final    Urea Nitrogen 04/23/2024 41 (H)  6 - 23 mg/dL Final    Creatinine 04/23/2024 5.23 (H)  0.50 - 1.30 mg/dL Final    eGFR 04/23/2024 11 (L)  >60 mL/min/1.73m*2 Final    Calculations of estimated GFR are performed using the 2021 CKD-EPI Study Refit equation without the race variable for the IDMS-Traceable creatinine methods.  https://jasn.asnjournals.org/content/early/2021/09/22/ASN.8459096035    Calcium 04/23/2024 8.9  8.6 - 10.3 " mg/dL Final    WBC 04/23/2024 3.3 (L)  4.4 - 11.3 x10*3/uL Final    nRBC 04/23/2024 0.6 (H)  0.0 - 0.0 /100 WBCs Final    RBC 04/23/2024 2.53 (L)  4.50 - 5.90 x10*6/uL Final    Hemoglobin 04/23/2024 8.8 (L)  13.5 - 17.5 g/dL Final    Hematocrit 04/23/2024 26.5 (L)  41.0 - 52.0 % Final    MCV 04/23/2024 105 (H)  80 - 100 fL Final    MCH 04/23/2024 34.8 (H)  26.0 - 34.0 pg Final    MCHC 04/23/2024 33.2  32.0 - 36.0 g/dL Final    RDW 04/23/2024 16.2 (H)  11.5 - 14.5 % Final    Platelets 04/23/2024 143 (L)  150 - 450 x10*3/uL Final    Immature Granulocytes %, Automated 04/23/2024 0.3  0.0 - 0.9 % Final    Immature Granulocyte Count (IG) includes promyelocytes, myelocytes and metamyelocytes but does not include bands. Percent differential counts (%) should be interpreted in the context of the absolute cell counts (cells/UL).    Immature Granulocytes Absolute, Au* 04/23/2024 0.01  0.00 - 0.70 x10*3/uL Final    Phosphorus 04/23/2024 4.0  2.5 - 4.9 mg/dL Final    The performance characteristics of phosphorus testing in heparinized plasma have been validated by the individual  laboratory site where testing is performed. Testing on heparinized plasma is not approved by the FDA; however, such approval is not necessary.    Albumin 04/23/2024 4.1  3.4 - 5.0 g/dL Final    Bilirubin, Total 04/23/2024 0.9  0.0 - 1.2 mg/dL Final    Bilirubin, Direct 04/23/2024 0.2  0.0 - 0.3 mg/dL Final    Alkaline Phosphatase 04/23/2024 82  33 - 136 U/L Final    ALT 04/23/2024 7 (L)  10 - 52 U/L Final    Patients treated with Sulfasalazine may generate falsely decreased results for ALT.    AST 04/23/2024 16  9 - 39 U/L Final    Total Protein 04/23/2024 6.8  6.4 - 8.2 g/dL Final    Lactate 04/23/2024 1.2  0.4 - 2.0 mmol/L Final    Protime 04/23/2024 14.4 (H)  9.8 - 12.8 seconds Final    INR 04/23/2024 1.3 (H)  0.9 - 1.1 Final    aPTT 04/23/2024 32  27 - 38 seconds Final    ABO TYPE 04/23/2024 O   Final    Rh TYPE 04/23/2024 POS   Final    ANTIBODY  SCREEN 04/23/2024 NEG   Final    D-Dimer, Quantitative VTE Exclusion 04/23/2024 6,880 (H)  <=500 ng/mL FEU Final    Troponin I, High Sensitivity 04/23/2024 467 (HH)  0 - 20 ng/L Final    Troponin I, High Sensitivity 04/23/2024 604 (HH)  0 - 20 ng/L Final    Previous result verified on 4/23/2024 0408 on specimen/case [id number] called with component [facility] for procedure Troponin I, High Sensitivity, Initial with value 467 ng/L.    Extra Tube 04/23/2024 Hold for add-ons.   Final    Auto resulted.    Neutrophils %, Manual 04/23/2024 66.0  40.0 - 80.0 % Final    Percent differential counts (%) should be interpreted in the context of the absolute cell counts (cells/uL).    Bands %, Manual 04/23/2024 2.0  0.0 - 5.0 % Final    Lymphocytes %, Manual 04/23/2024 21.0  13.0 - 44.0 % Final    Monocytes %, Manual 04/23/2024 7.0  2.0 - 10.0 % Final    Eosinophils %, Manual 04/23/2024 2.0  0.0 - 6.0 % Final    Basophils %, Manual 04/23/2024 2.0  0.0 - 2.0 % Final    Seg Neutrophils Absolute, Manual 04/23/2024 2.18  1.20 - 7.00 x10*3/uL Final    Bands Absolute, Manual 04/23/2024 0.07  0.00 - 0.70 x10*3/uL Final    Lymphocytes Absolute, Manual 04/23/2024 0.69 (L)  1.20 - 4.80 x10*3/uL Final    Monocytes Absolute, Manual 04/23/2024 0.23  0.10 - 1.00 x10*3/uL Final    Eosinophils Absolute, Manual 04/23/2024 0.07  0.00 - 0.70 x10*3/uL Final    Basophils Absolute, Manual 04/23/2024 0.07  0.00 - 0.10 x10*3/uL Final    Total Cells Counted 04/23/2024 100   Final    Neutrophils Absolute, Manual 04/23/2024 2.25  1.20 - 7.70 x10*3/uL Final    RBC Morphology 04/23/2024 See Below   Final    Hypochromia 04/23/2024 Mild   Final    RBC Fragments 04/23/2024 Few   Final    Target Cells 04/23/2024 Few   Final    Ovalocytes 04/23/2024 Few   Final    Kalin Cells 04/23/2024 Few   Final    Acanthocytes 04/23/2024 Few   Final    Giant Platelets 04/23/2024 Few   Final    Troponin I, High Sensitivity 04/23/2024 650 (HH)  0 - 20 ng/L Final    Previous  result verified on 4/23/2024 0408 on specimen/case [id number] called with component [facility] for procedure Troponin I, High Sensitivity, Initial with value 467 ng/L.    POCT Glucose 04/23/2024 101 (H)  74 - 99 mg/dL Final      NM Lung Perfusion    Result Date: 4/23/2024  Interpreted By:  Vlad Acevedo  and Martin Edwards STUDY: NM LUNG PERFUSION;  4/23/2024 8:22 am   INDICATION: Signs/Symptoms:dyspnea.   COMPARISON: Same day chest x-ray and chest x-ray from 04/05/2024   ACCESSION NUMBER(S): [id number]   ORDERING CLINICIAN: ROSALIO ANGEL   TECHNIQUE: DIVISION OF NUCLEAR MEDICINE PERFUSION LUNG SCANS   Multiple perfusion images of the lungs were acquired after the intravenous administration of 4 mCi of Tc-99m macroaggregated albumin (MAA). In addition, SPECT of the chest was performed.   FINDINGS: Perfusion images of both lungs demonstrate mild heterogenous perfusion throughout the lung fields bilaterally. There are multiple nonsegmental perfusion defects appreciated bilaterally, particularly in the right lung base. The findings in the right lung base likely correspond to a chronic pleural effusion seen on same day chest radiograph as well as the chest radiograph from 04/05/2024. No distinct wedge-shaped perfusion defects are appreciated.       Background nonspecific mild heterogenous perfusion throughout the bilateral lungs with multiple nonsegmental perfusion defects, with the right lower lung field defects likely corresponding with a chronic pleural effusion. No definite wedge-shaped perfusion defects identified. Findings are compatible with a low to intermediate probability for acute pulmonary embolism.   The interpretation above is based on modified PIOPED II and PISAPED criteria.   I personally reviewed the images/study and I agree with the findings as stated by Jerry Salazar MD (resident) . This study was interpreted at [facility], [place], [place].    MACRO: None   Signed by: Vlad Acevedo 4/23/2024 8:57 AM Dictation workstation:   [id number]    XR chest 1 view    Result Date: 4/23/2024  STUDY: Chest Radiograph;  4/23/2024 INDICATION: Shortness of breath. COMPARISON: 4/5/2024 XR Chest. ACCESSION NUMBER(S): [id number] ORDERING CLINICIAN: MEMO SEGUNDO TECHNIQUE:  Frontal chest was obtained at 04:06 hours. FINDINGS: CARDIOMEDIASTINAL SILHOUETTE: Cardiomediastinal silhouette is enlarged and stable in size and configuration.  Median sternotomy changes are again noted.  Unchanged right subclavian approach pacemaker  LUNGS: Small-to-moderate right pleural effusion is demonstrated with bibasilar atelectasis and/or consolidation.  Left lung is grossly clear.  No pneumothorax.  ABDOMEN: No remarkable upper abdominal findings.  BONES: No acute osseous changes.    Right-sided pleural effusion with right basilar atelectasis and/or consolidation. Signed by Bob Gorman MD    XR chest 1 view    Result Date: 4/5/2024  Interpreted By:  Demetrius Trujillo and Sheng Max STUDY: XR CHEST 1 VIEW;  4/5/2024 10:40 am   INDICATION: Signs/Symptoms:pulmonary edema.   COMPARISON: Chest radiograph dated 04/03/2024, 04/01/2024 and PET cardiac dated 03/13/2024   ACCESSION NUMBER(S): [id number]   ORDERING CLINICIAN: RUBINA CEJA   FINDINGS: AP radiograph of the chest     LINES AND DEVICES: Biventricular pacemaker and AICD battery pack device overlies the right lateral hemithorax with its leads projecting over the right atrium, right ventricle and coronary vein. Numerous intact median sternotomy wires. Surgical clips project over the bilateral upper quadrants.   CARDIOMEDIASTINAL SILHOUETTE: Stable enlargement of the cardiomediastinal silhouette. Aortic knob calcifications are seen.   LUNGS: Stable to mild worsening of perihilar and interstitial prominence suggests mild worsening of pulmonary edema. Persistent small-to-moderate right pleural effusion. No pneumothorax.   ABDOMEN: No  remarkable upper abdominal findings.   BONES: No acute osseous abnormality. Moderate degenerative changes in the left acromioclavicular joint.       1. Stable to mild worsening of pulmonary edema with persistent small-to-moderate right pleural effusion. 2. Medical devices as detailed above.     I personally reviewed the images/study and I agree with the findings as stated by Dr. Elder Page. This study was interpreted at [facility], [place], [place].   MACRO: None   Signed by: Demetrius Trujillo 4/5/2024 11:21 AM Dictation workstation:   [id number]    US kidney transplant    Result Date: 4/5/2024  Interpreted By:  Von White and O'Connor Gregory STUDY: US KIDNEY TRANSPLANT;  4/4/2024 5:57 pm   INDICATION: Signs/Symptoms:KIA.   COMPARISON: Transplant renal ultrasound dated 03/01/2024   ACCESSION NUMBER(S): [id number]   ORDERING CLINICIAN: NIKITA MCLAUGHLIN   TECHNIQUE: Grayscale, color, and spectral Doppler of the kidney transplant were performed.  This examination was interpreted at [facility].   FINDINGS: TRANSPLANT KIDNEY: Transplant kidney location:  The renal transplant is located in the right iliac fossa. The transplant kidney [id number] cm in craniocaudal dimension. There is mild fullness of the pelvicaliceal system similar to the prior ultrasound. No hydroureter. No perinephric fluid collections are seen.   DOPPLER EVALUATION:   RESISTIVE INDICES: The resistive indices were as follows: 0.68 / 0.79 in the superior pole, 0.79 / 0.82 in the midpole, and 0.73 in the inferior pole. Resistive indices previously measured 0.78, 0.75, and 0.75 on prior ultrasound dated 03/01/2024. Wave forms are normal.   TRANSPLANT RENAL ARTERY AND VEIN: The main renal artery velocity is 58.9 cm/s, previously 42.0 cm/sec. The arterial anastomosis velocity is 52.4 cm/s, previously 39.3 cm/s. The adjacent iliac artery velocity is 63.5 cm/s , previously 70 cm/sec.  Transplant renal vein is patent. The peak velocity in the main renal vein is 30.5 cm/s, in the adjacent iliac vein 44.6 cm/s . The venous anastomosis velocity is 63.8 cm/s.   Other: Moderate-to-large volume free pelvic fluid is visualized.       1. Mild fullness of the pelvicaliceal system is similar to the prior ultrasound from 03/01/2024. 2. Doppler evaluation of the transplant kidney is within normal limits, as detailed above. 3. Free pelvic ascites.   I personally reviewed the images/study and I agree with the findings as stated by resident physician Dr. Dayday Yanes.   MACRO: None   Signed by: Von White 4/5/2024 5:29 AM Dictation workstation:   [id number]    US guided percutaneous biopsy renal right    Result Date: 4/4/2024  Interpreted By:  Ghazala Santos and Guirguis James STUDY: US GUIDED PERCUTANEOUS BIOPSY RENAL RIGHT;  4/4/2024 12:51 pm   INDICATION: Signs/Symptoms:Kidney biopsy.   COMPARISON: Renal transplant biopsy dated 03/07/2024 Ultrasound kidney transplant dated 03/01/2024   ACCESSION NUMBER(S): [id number]   ORDERING CLINICIAN: RUBINA CEJA   TECHNIQUE: INTERVENTIONALIST(S): MD Emanuel Burdick MD   CONSENT: The patient/patient's POA/next of kin was informed of the nature of the proposed procedure. The purposes, alternatives, risks, and benefits were explained and discussed. All questions were answered and consent was obtained.   SEDATION: Moderate conscious IV sedation services (supervision of administration, induction, and maintenance) were provided by the physician performing the procedure with intravenous fentanyl 50mcg and versed 1mg from 1459-2656. The physician was assisted by an independent trained observer, an interventional radiology nurse, in the continuous monitoring of patient level of consciousness and physiologic status.   MEDICATION/CONTRAST: No additional.   TIME OUT: A time out was performed immediately prior to procedure start with the interventional team,  correctly identifying the patient name, date of birth, MRN, procedure, anatomy (including marking of site and side), patient position, procedure consent form, relevant laboratory and imaging test results, antibiotic administration, safety precautions, and procedure-specific equipment needs.   COMPLICATIONS: No immediate adverse events identified.   FINDINGS: The patient was placed in the supine position. Limited sonographic images of the right iliac fossa were obtained for purposes of needle guidance, which demonstrated a normal-appearing renal transplant. The area of concern was prepped and draped under sterile technique.   1% lidocaine was injected subcutaneously and then into the deeper tissues surrounding the targeted area for biopsy. An 18 gauge core biopsy needle was passed via a 17 gauge coaxial introducer needle to obtain a total of 2 core samples.   Gelfoam pledgets were injected through the coaxial system for the purposes of hemostasis. Postprocedure images demonstrate no evidence for hemorrhage. The patient tolerated the procedure well and there were no immediate complications. Specimen(s) sent to pathology.       1. Status post successful ultrasound guided core needle biopsy of a right iliac fossa renal transplant. Specimen(s) Sent to pathology.   I was present for and/or performed the critical portions of the procedure and immediately available throughout the entire procedure.   I personally reviewed the image(s) / study and interpretation. I agree with the findings as stated.   Performed and dictated at [facility].   MACRO: None.     Signed by: Ghazala Santos 4/4/2024 3:46 PM Dictation workstation:   [id number]    US thoracentesis    Result Date: 4/4/2024  Interpreted By:  Harrison Franklin, STUDY: US THORACENTESIS; 4/4/20242:14 pm   INDICATION: Signs/Symptoms:C/f R sided hemothorax.   COMPARISON: None.   ACCESSION NUMBER(S): [id number]   ORDERING CLINICIAN:  RUBINA CEJA   TECHNIQUE: INTERVENTIONALIST(S): Harrison Franklin   CONSENT: The patient/patient's POA/next of kin was informed of the nature of the proposed procedure. The purposes, alternatives, risks, and benefits were explained and discussed. All questions were answered and consent was obtained.   SEDATION: None   MEDICATION/CONTRAST: No additional   TIME OUT: A time out was performed immediately prior to procedure start with the interventional team, correctly identifying the patient name, date of birth, MRN, procedure, anatomy (including marking of site and side), patient position, procedure consent form, relevant laboratory and imaging test results, antibiotic administration, safety precautions, and procedure-specific equipment needs.   FINDINGS: The patient was placed in the supine position.   The pleural space was examined with grey scale ultrasound, and the most accessible fluid identified and marked for thoracentesis.   The skin was prepped and draped in usual manner. Local anesthesia with Lidocaine was administered and a right-sided thoracentesis was performed.  A 5 Tamazight One-Step Valved thoracentesis needle/catheter was then placed where marked.  Approximately 1300 mL of yellowish colored fluid was removed.  The needle/catheter was then withdrawn.   The patient tolerated the procedure well and there were no immediate complications. Specimen(s) sent to the laboratory and pathology for further evaluation, per the requesting team.       Uneventful thoracentesis, as detailed above. Right Pleural space, 1300 mL   I personally performed and/or directly supervised this study and was present for the entire procedure.   Performed and dictated at [facility].   Signed by: Harrison Franklin 4/4/2024 2:14 PM Dictation workstation:   [id number]    XR chest 1 view    Result Date: 4/3/2024  Interpreted By:  Vlad Brand, STUDY: XR CHEST 1 VIEW; 4/3/2024 11:48 am   INDICATION:  Signs/Symptoms:Pulmonary edema.   COMPARISON: 04/01/2024   ACCESSION NUMBER(S): [id number]   ORDERING CLINICIAN: RUBINA CEJA   FINDINGS:     CARDIOMEDIASTINAL SILHOUETTE: Patient is status post median sternotomy. Dual-chamber and coronary sinus pacemaker leads in place. No significant change in bilateral pleural effusions with right basilar atelectasis. No pneumothorax.   LUNGS: No significant change in bilateral pleural effusion and basilar edema. Correlate with fluid status. No pneumothorax. Minimal perihilar interstitial edema. No pneumothorax   ABDOMEN: No remarkable upper abdominal findings.   BONES: No acute osseous changes.       1.  No significant change in moderate-sized bilateral pleural effusions with bilateral perihilar edema.     Signed by: Vlad Brand 4/3/2024 3:06 PM Dictation workstation:   [id number]    XR chest 1 view    Result Date: 4/2/2024  Interpreted By:  Vlad Brand and Sheng Max STUDY: XR CHEST 1 VIEW;  4/1/2024 9:40 pm   INDICATION: Signs/Symptoms:sob, fluid overload.   COMPARISON: Chest radiograph dated 03/17/2024, 03/15/2024, 03/04/2024 and CT chest dated 10/31/2023   ACCESSION NUMBER(S): [id number]   ORDERING CLINICIAN: GLENN DAVIDSON   FINDINGS: AP radiograph of the chest     LINES AND DEVICES: Biventricular pacemaker and AICD battery pack device overlie the right lateral hemithorax with its leads projecting over the right atrium, right ventricle and coronary vein. Numerous intact median sternotomy wires. Surgical clips project over the left and right upper quadrants.   CARDIOMEDIASTINAL SILHOUETTE: Stable enlargement of the cardiomediastinal silhouette.   LUNGS: Improving interstitial markings suggest improving pulmonary edema. Similar appearance of moderate right and small left pleural effusions. No pneumothorax.   ABDOMEN: No remarkable upper abdominal findings.   BONES: No acute osseous abnormality. Moderate degenerative changes in the acromioclavicular joints.        1. Improving interstitial markings suggest improving pulmonary edema. 2. Similar appearance of moderate right and small left pleural effusions. 3. Medical devices as detailed above.     I personally reviewed the images/study and I agree with the findings as stated by Dr. Elder Page. This study was interpreted at [facility], [place], [place].   MACRO: None   Signed by: Vlad Brand 4/2/2024 4:47 PM Dictation workstation:   [id number]      Medications  (Not in a hospital admission)     Scheduled medications  albumin human, 25 g, intravenous, Daily  aspirin, 81 mg, oral, q AM  heparin (porcine), 5,000 Units, subcutaneous, q8h KESHIA  metoprolol succinate XL, 50 mg, oral, Daily  midodrine, 10 mg, oral, Daily  mycophenolate, 180 mg, oral, BID  pantoprazole, 40 mg, oral, q AM  [START ON 4/24/2024] sulfamethoxazole-trimethoprim, 1 tablet, oral, Every Mon/Wed/Fri  tacrolimus, 2 mg, oral, q12h KESHIA  tamsulosin, 0.4 mg, oral, Daily      Continuous medications     PRN medications  PRN medications: acetaminophen **OR** acetaminophen **OR** acetaminophen    Assessment/Plan     ESRD on IHD TTS, s/p renal txp 3/23 [facility], back on dialysis 3/24, LUE AVG    HTN/type A dissection on toprol 50 mg every day before admission, also on midodrine prn at dialysis    Volume status - up, with clinical s/o fluid overload and moderate pleural effusion R    Anemia, CKD, Hb below target    Secondary hyperpara, renal, iphos pending    Plan  - dialysis today, challenge UF as min fluid removal on Saturday and as above, will use CL<10% to guide, will use precautions for intradialytic hypotension, will need new edw  - continue midodrine prn  - if unable to remove fluid will plan for thoracentesis  - trend phos  - renal diet  - KIRA with dialysis, repeat iron stores, noted pancytopenia  - can dc flomax  - give toprol with holding parameters    Thank you for the opportunity to assist in the care of this patient, please  call with questions  Johnna Garza MD PhD

## 2024-04-24 NOTE — PROGRESS NOTES
04/24/24 1259   Discharge Planning   Living Arrangements Spouse/significant other   Support Systems Spouse/significant other   Type of Residence Private residence   Do you have animals or pets at home? No   Who is requesting discharge planning? Provider   Home or Post Acute Services In home services   Patient expects to be discharged to: home   Financial Resource Strain   How hard is it for you to pay for the very basics like food, housing, medical care, and heating? Not very     Met  with pt and his spouse, pt admit for sob.  Pt is walker at baseline, pt's spouse is active,  independent, does all of the driving and is his care giver.  Pt goes to [facility] on TTS.  At discharge plan is to continue [facility] as pt is active with this agency.  Home address and insurance verified.  [id number].  Ro Carroll RN TCC

## 2024-04-24 NOTE — SIGNIFICANT EVENT
Attempted to contact [facility] through multiple avenues.  Considering patient's uptrending troponin and progressively worsening EF with LV dysfunction would like previous cath reports that not available in Care Everywhere.  Called patient's transplant coordinator Marylou and left a voice message requesting callback from a physician to discuss patient's care and transplant team recommendations.    Called [facility] transfer center were unable to give any additional information and referred to their records department.  After being transferred to the records department awaiting on hold was informed that the records department was specific for [place] and had no access to [place] records.  Furthermore they stated they had no idea how to contact or request records from the [place] office.    Currently awaiting callback from patient's transplant coordinator who may be able to put our medical team in touch with 1 of patient's physicians.

## 2024-04-24 NOTE — POST-PROCEDURE NOTE
Report to Receiving RN:    Report To: [place]   Time Report Called: 2300  Hand-Off Communication: 3L removed BP 98/63 HR 66  Complications During Treatment: No  Ultrafiltration Treatment: No, HD   Medications Administered During Dialysis: No  Blood Products Administered During Dialysis: No  Labs Sent During Dialysis: No  Heparin Drip Rate Changes: N/A  Dialysis Catheter Dressing: NA   Last Dressing Change: L AVF     Electronic Signatures:                 Last Updated: 11:03 PM by MARCIANO JOHNSON

## 2024-04-24 NOTE — DISCHARGE INSTRUCTIONS
-If you begin to develop chest pain or significant shortness of breath come back to the hospital  -Follow-up with the nephrologist, cardiologist, and primary care provider as stated above.  If the listed nephrologist/cardiologist/primary care provider is not accurate then follow with the same provider you have already been seeing  -No new medications or medication changes will be made at discharge

## 2024-04-24 NOTE — PROGRESS NOTES
Subjective Data:  Breathing has improved    Overnight Events:    Troponins remaining elevated     Objective Data:  Last Recorded Vitals:  Vitals:    04/23/24 2301 04/24/24 0429 04/24/24 0810 04/24/24 1158   BP: 99/58 102/59 99/60 100/64   BP Location: Right arm Right arm Right arm Right arm   Patient Position: Lying Sitting Lying Lying   Pulse: 73 72 74 72   Resp: 16 16 17 16   Temp: 36.7 °C (98.1 °F) 37.1 °C (98.8 °F) 36.9 °C (98.4 °F) 36.9 °C (98.4 °F)   TempSrc: Temporal Temporal Temporal Temporal   SpO2: 97% 95% 98% 95%   Weight:       Height:           Last Labs:  CBC - 4/24/2024:  4:59 AM  3.3 8.0 131    24.0      CMP - 4/24/2024:  4:59 AM  8.6 6.8 16 --- 0.9   4.1 4.1 7 82      PTT - 4/23/2024:  4:22 AM  1.3   14.4 32     TROPHS   Date/Time Value Ref Range Status   04/24/2024 09:48  0 - 20 ng/L Final   04/23/2024 04:34  0 - 20 ng/L Final     Comment:     Previous result verified on 4/23/2024 0408 on specimen/case [id number] called with component [facility] for procedure Troponin I, High Sensitivity, Initial with value 467 ng/L.   04/23/2024 08:39  0 - 20 ng/L Final     Comment:     Previous result verified on 4/23/2024 0408 on specimen/case [id number] called with component [facility] for procedure Troponin I, High Sensitivity, Initial with value 467 ng/L.     BNP   Date/Time Value Ref Range Status   04/01/2024 09:00 PM 1,284 0 - 99 pg/mL Final   03/01/2024 12:03  0 - 99 pg/mL Final     HGBA1C   Date/Time Value Ref Range Status   03/08/2024 05:00 AM 5.9 see below % Final   05/01/2023 06:22 AM 6.1 <5.7 % Final   03/07/2023 03:24 AM 5.2 <5.7 % Final      Last I/O:  I/O last 3 completed shifts:  In: 1600 (27.1 mL/kg) [P.O.:300; I.V.:900 (15.3 mL/kg); Other:400]  Out: 6400 (108.5 mL/kg) [Other:6400]  Weight: 59 kg     Past Cardiology Tests (Last 3 Years):  EKG:  ECG 12 lead 04/23/2024      Electrocardiogram, 12-lead PRN ACS symptoms 03/16/2024      ECG 12 Lead 03/16/2024      ECG 12 Lead  03/15/2024      Electrocardiogram, 12-lead PRN ACS symptoms 02/29/2024      ECG 12 lead 02/29/2024      ECG 12 lead 02/29/2024      ECG 12 lead 12/21/2023      ECG 12 lead tomorrow at 8 AM 10/28/2023      ECG 12 lead STAT 10/28/2023      Electrocardiogram, 12-lead PRN ACS symptoms 10/28/2023      Electrocardiogram, 12-lead PRN ACS symptoms 10/28/2023      Electrocardiogram, 12-lead PRN ACS symptoms 10/28/2023      ECG 12 Lead     Echo:  Transthoracic Echo (TTE) Limited 04/23/2024      Transthoracic Echo (TTE) Complete 03/01/2024      Transthoracic Echo (TTE) Limited 03/01/2024      Onco-Echo Complete (Strain & 3D) 10/06/2023    Ejection Fractions:  EF   Date/Time Value Ref Range Status   03/01/2024 11:00 AM 33 %      Cath:  Cardiac Catheterization Procedure 03/14/2024      Cardiac Catheterization Procedure 03/01/2024    Stress Test:  No results found for this or any previous visit from the past 1095 days.    Cardiac Imaging:  MR cardiac morphology and function w and wo IV contrast 03/12/2024      Inpatient Medications:  Scheduled medications   Medication Dose Route Frequency    [START ON 4/25/2024] albumin human  25 g intravenous Once per day on Tuesday Thursday Saturday    aspirin  81 mg oral q AM    heparin (porcine)  5,000 Units subcutaneous q8h [facility]    metoprolol succinate XL  50 mg oral Daily    midodrine  10 mg oral Daily    mycophenolate  180 mg oral BID    pantoprazole  40 mg oral q AM    sulfamethoxazole-trimethoprim  1 tablet oral Every Mon/Wed/Fri    tacrolimus  2 mg oral q12h KESHIA     PRN medications   Medication    acetaminophen    Or    acetaminophen    Or    acetaminophen     Continuous Medications   Medication Dose Last Rate       Physical Exam:  GEN: chronically ill appearing m sitting 45 degrees  HEENT: Atraumatic  NECK: No JVD  COR: Reg  LUNGS: Clear  ABD: Soft  EXT: Warm     Assessment/Plan   1.) Progressively worsening LVE systolic function has been extensively evaluated with Cardiac MRI, PET Scan  and cardiac biopsy  3.) Non specific elevation of troponins from renal insufficiency  3.) S/P Type A aortic dissection  4,) ESRD on dialysis  REC:  1.) Fluid management per nephrology  2.) Could be discharged to F/U with dr. Barber  Discussed extensively with Dr. Alicia  Peripheral IV 04/23/24 20 G Right;Upper Arm (Active)   Site Assessment Intact;Dry;Clean 04/24/24 0824   Dressing Status Clean;Dry 04/24/24 0824   Number of days: 1       Hemodialysis Arteriovenous Fistula 01/01/18 Left Upper arm (Active)   Site Assessment Intact;Dry 04/24/24 0824   Dressing Status Dry;Clean 04/24/24 0824   Number of days: 2305       Code Status:  Full Code    I spent 45 minutes in the professional and overall care of this patient.        Emanuel Bain MD

## 2024-04-24 NOTE — CARE PLAN
The patient's goals for the shift include Sleep    The clinical goals for the shift include maintain safety      Problem: Pain - Adult  Goal: Verbalizes/displays adequate comfort level or baseline comfort level  Outcome: Progressing     Problem: Safety - Adult  Goal: Free from fall injury  Outcome: Progressing     Problem: Discharge Planning  Goal: Discharge to home or other facility with appropriate resources  Outcome: Progressing     Problem: Chronic Conditions and Co-morbidities  Goal: Patient's chronic conditions and co-morbidity symptoms are monitored and maintained or improved  Outcome: Progressing

## 2024-04-24 NOTE — CARE PLAN
The patient's goals for the shift include Sleep    The clinical goals for the shift include Pt to remian free from injury      Problem: Pain - Adult  Goal: Verbalizes/displays adequate comfort level or baseline comfort level  Outcome: Progressing     Problem: Safety - Adult  Goal: Free from fall injury  Outcome: Progressing     Problem: Discharge Planning  Goal: Discharge to home or other facility with appropriate resources  Outcome: Progressing     Problem: Chronic Conditions and Co-morbidities  Goal: Patient's chronic conditions and co-morbidity symptoms are monitored and maintained or improved  Outcome: Progressing     Problem: Diabetes  Goal: Achieve decreasing blood glucose levels by end of shift  Outcome: Progressing  Goal: Increase stability of blood glucose readings by end of shift  Outcome: Progressing  Goal: Decrease in ketones present in urine by end of shift  Outcome: Progressing  Goal: Maintain electrolyte levels within acceptable range throughout shift  Outcome: Progressing  Goal: Maintain glucose levels >70mg/dl to <250mg/dl throughout shift  Outcome: Progressing  Goal: No changes in neurological exam by end of shift  Outcome: Progressing  Goal: Learn about and adhere to nutrition recommendations by end of shift  Outcome: Progressing  Goal: Vital signs within normal range for age by end of shift  Outcome: Progressing  Goal: Increase self care and/or family involovement by end of shift  Outcome: Progressing  Goal: Receive DSME education by end of shift  Outcome: Progressing

## 2024-04-24 NOTE — PROGRESS NOTES
Occupational Therapy    Evaluation    Patient Name: Reynaldo Francisco  MRN: [id number]  Today's Date: 4/24/2024  Time Calculation  Start Time: 1020  Stop Time: 1034  Time Calculation (min): 14 min    Assessment  IP OT Assessment  OT Assessment: Patient would benefit from another follow-up OT visit to address ADL's and functional transfers/mobility.   Hospital admit 4/23/2024 due to SOB.  Per medical team assessment: #Dyspnea  #Atelectasis  #Small to moderate right pleural effusion  #Elevated troponin  Prognosis: Good  Evaluation/Treatment Tolerance: Patient limited by fatigue, Patient limited by pain  End of Session Communication: Bedside nurse  End of Session Patient Position: Up in chair, Alarm off, not on at start of session    Plan:  Treatment Interventions: ADL retraining, Functional transfer training, Endurance training, Compensatory technique education, Patient/family training, Equipment evaluation/education  OT Frequency: One time follow up visit  OT Discharge Recommendations: Low intensity level of continued care  OT - OK to Discharge: Yes to next level of care when medically cleared by physician/medical team    Subjective   Current Problem:  1. Shortness of breath  Transthoracic Echo (TTE) Limited    Transthoracic Echo (TTE) Limited      2. NSTEMI (non-ST elevated myocardial infarction) (Multi)        3. ESRD (end stage renal disease) (Multi)          General:  General  Reason for Referral: ADL  Referred By: John Manrique MD  Past Medical History Relevant to Rehab: NICM/HFrEF (30-35% TTE 3/2024), SSS s/p dual-chamber PM (upgraded to CRT-D), Insulinoma w/ hepatic metastasis requiring liver transplantation (2018) c/b renal failure s/p kidney transplantation (on tacro and MMFe; recently restarted on HD (TThSat), type A aortic dissection, HTN  Family/Caregiver Present: Yes (wife)  Prior to Session Communication: Bedside nurse  Patient Position Received: Bed, 2 rail up  General Comment: Patient seen in room 909;  cooperative, motivated    Precautions:  Medical Precautions: Fall precautions    Pain:  Pain Assessment  Pain Score: 0 - No pain    Objective   Cognition:  Overall Cognitive Status: Within Functional Limits     Home Living:  Type of Home: House  Lives With: Spouse  Home Adaptive Equipment: Walker rolling or standard, Wheelchair-manual  Home Layout: One level  Home Access: Stairs to enter with rails (2+1)  Bathroom Shower/Tub: Tub/shower unit  Bathroom Toilet: Standard  Bathroom Equipment: Hand-held shower hose (bath seat)     Prior Function:  Level of [place]: Independent with ADLs and functional transfers (wife does homemaking however assist for socks/shoes in last month)  Receives Help From: Family  Ambulatory Assistance: Independent (without device)  Hand Dominance: Right    Current ADL:  ADL Comments: anticipate carryover assist for self-care including lower body dressing, bathing, toileting.  To further address in OT sessions using ADL adaptive equipment/assistive techniques as needed to facilitate indep and ease in performance.    Bed Mobility/Transfers:   Bed Mobility  Bed Mobility: Yes  Bed Mobility 1  Bed Mobility 1: Supine to sitting, Sitting to supine  Level of Assistance 1: Independent  Bed Mobility Comments 1: HOB elevated for supine to sitting  Transfers  Transfer: Yes  Transfer 1  Transfer From 1: Sit to, Stand to  Transfer to 1: Bed, Chair with arms  Transfer Device 1: Walker  Transfer Level of Assistance 1: Independent    Ambulation/Gait Training:  Functional Mobility  Functional Mobility Performed: Yes  Functional Mobility 1  Device 1: Rolling walker  Assistance 1: Minimum assistance, Moderate assistance (then progressed to minimal assist)  Comments 1: unsteady    Sitting Balance:  Static Sitting Balance  Static Sitting-Level of Assistance: Distant supervision    Standing Balance:  Static Standing Balance  Static Standing-Level of Assistance: Minimum assistance  Static Standing-Comment/Number  of Minutes: fair    Sensation:  Light Touch: No apparent deficits    Extremities: RUE   RUE : Within Functional Limits (AROM throughout) and LUE   LUE: Within Functional Limits (AROM throughout)    Outcome Measures: [facility] Daily Activity  Putting on and taking off regular lower body clothing: A lot  Bathing (including washing, rinsing, drying): A lot  Putting on and taking off regular upper body clothing: None  Toileting, which includes using toilet, bedpan or urinal: A little  Taking care of personal grooming such as brushing teeth: None  Eating Meals: None  Daily Activity - Total Score: 19    EDUCATION:   Education Documentation  Body Mechanics, taught by Rei Sharma OT at 4/24/2024  2:16 PM.  Learner: Patient  Readiness: Acceptance  Method: Explanation  Response: Demonstrated Understanding, Needs Reinforcement    Goals:   Encounter Problems       Encounter Problems (Active)       OT Goals       Patient will perform bathroom functional transfers safely and independently using DME as needed  (Progressing)       Start:  04/24/24    Expected End:  04/29/24            Patient with complete lower body bathing/dressing and toileting with modified independence using adaptive equipment as needed  (Progressing)       Start:  04/24/24    Expected End:  04/29/24            Patient will tolerate standing for 5 mins. and show overall good (-) standing balance during ADL's and functional transfers/mobility  (Progressing)       Start:  04/24/24    Expected End:  04/29/24

## 2024-04-24 NOTE — DISCHARGE SUMMARY
Discharge Diagnosis  Dyspnea with shortness of breath following shortened dialysis session  Mild to moderate right pleural effusion  Nonischemic cardiomyopathy  HFrEF  Sick sinus syndrome and CHB status post PPM  Elevated troponin  ESRD status post kidney transplantation, currently on dialysis Tuesday/Thursday/Saturday  Liver transplantation  Insulinoma  Pancytopenia    Issues Requiring Follow-Up  Follow-up with primary care provider for management of health and sleep study for concern of apnea  Follow-up with cardiologist to monitor cardiac function  Follow-up with kidney doctor to manage dialysis and kidney transplant, concern for possible cardiorenal    Discharge Meds     Your medication list        CHANGE how you take these medications        Instructions Last Dose Given Next Dose Due   metoprolol succinate XL 50 mg 24 hr tablet  Commonly known as: Toprol-XL  What changed: when to take this      Take 1 tablet (50 mg) by mouth once daily. Do not crush or chew.       midodrine 10 mg tablet  Commonly known as: Proamatine  What changed: Another medication with the same name was changed. Make sure you understand how and when to take each.      Take 1 tablet (10 mg) by mouth 1 time for 1 dose. During dialysis       midodrine 10 mg tablet  Commonly known as: Proamatine  What changed:   when to take this  reasons to take this  additional instructions      Take 1 tablet (10 mg) by mouth once daily. Use as needed for SPB < 85       mycophenolate 180 mg EC tablet  Commonly known as: Myfortic  What changed: Another medication with the same name was removed. Continue taking this medication, and follow the directions you see here.                  CONTINUE taking these medications        Instructions Last Dose Given Next Dose Due   acetaminophen 325 mg tablet  Commonly known as: Tylenol      Take 3 tablets (975 mg) by mouth every 8 hours if needed for moderate pain (4 - 6), headaches or fever (temp greater than 38.0 C).        aspirin 81 mg EC tablet           Protonix 40 mg EC tablet  Generic drug: pantoprazole           sulfamethoxazole-trimethoprim 400-80 mg tablet  Commonly known as: Bactrim           tacrolimus 1 mg capsule  Commonly known as: Prograf      Take 2 capsules (2 mg) by mouth every 12 hours.       tamsulosin 0.4 mg 24 hr capsule  Commonly known as: Flomax      Take 1 capsule (0.4 mg) by mouth once daily. Do not start before March 20, 2024.              STOP taking these medications      bumetanide 2 mg tablet  Commonly known as: Bumex                 Test Results Pending At Discharge  Pending Labs       Order Current Status    Mycophenolic Acid In process            Hospital Course  Reynaldo Francisco is a 68 y.o. 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  Patient states he woke up around midnight and was suddenly short of breath.  He then sat on the side of bed to catch his breath and then went back to sleep.  This happened a few times throughout the night and the patient felt something was wrong.  The wife subsequently drove the patient to the ED for further evaluation.  ED workup was notable for elevated creatinine, elevated high-sensitivity troponin, elevated D-dimer, and x-ray showing small to moderate right pleural effusion with possible atelectasis/consolidation.  Upon collecting history patient was revealed that last dialysis session was cut short due to facility closing and patient may not have had as much fluid removed as typically seen.  Cardiology and nephrology were consulted.  Patient underwent dialysis. Felt back to baseline following morning with no cp, sob. [place] back at baseline.  Patient was deemed medically  stable for discharge on 4/24/2024 with appropriate follow-up as listed above.    Extensive discussion and review was had with cardiology regarding patient's elevating troponin and decreasing ejection fraction over time.  In January patient underwent PET stress test which revealed ejection fraction of 24% at rest, 44% on exertion.  Patient went to [facility] and was evaluated for heart failure underwent cardiac MRI with possible cardiac sarcoid. Biopsy confirmed this was negative. There was suspicion that worsening EF was due to non-ischemic cardiomyopathy. He was also evaluated for possibility that his pacemaker was contributory.  No ischemic testing is done in March and patient's troponin similarly elevated.  Given patient's dramatic improvement following dialysis and cessation of shortness of breath as well as lack of symptoms consistent with ACS such as chest pain.  Decision was made to not pursue cardiac catheterization at this time given potential for worsening kidney failure with contrast and low suspicion with evidence of previous nonischemic workup.    Pertinent Physical Exam At Time of Discharge  Physical Exam  General: Alert, awake, cooperative.  HEENT:  Normocephalic, atraumatic, mucus membranes moist.  Neck:  Trachea midline.  Neck supple.  Chest:  Clear to auscultation bilaterally. No wheezes, rales, or rhonchi.  CV:  Regular rate and rhythm.  Positive S1/S2.   GI: Bowel sounds present in all four quadrants, abdomen is soft, non-tender, non-distended.  Extremities:  No lower extremity edema or cyanosis.  Neurological:  AAOx3. No focal deficits.  Skin:  Warm and dry.       Outpatient Follow-Up  Future Appointments   Date Time Provider Department Center   4/26/2024  7:00 AM [facility]   5/21/2024  2:45 PM John Manrique MD [id number] [place]   6/7/2024  1:00 PM PAR MAC 3 ECHO ROOM 3 [id number] PAR MAC   6/7/2024  1:45 PM Vladimir Barber MD [id number] [place]   7/12/2024  3:00 PM ST[facility] RAZO  CARDIAC DEVICE CLINIC [id number] [facility]   8/13/2024 11:00 AM Declan Mina MD [id number] [place]         Michael Sanders MD

## 2024-04-24 NOTE — PROGRESS NOTES
Nephrology Consult Progress Note    Reynaldo Francisco is a 68 y.o. male on day 1 of admission presenting with Shortness of breath.      Subjective   No acute events overnight, dialysis completed late last night, improved clinically, tolerating po       Objective          Physical Exam    Vitals 24HR  Heart Rate:  [63-75]   Temp:  [36.5 °C (97.7 °F)-37.1 °C (98.8 °F)]   Resp:  [16-17]   BP: ()/(48-66)   SpO2:  [95 %-98 %]          Sick looking, pale, AAOx3, on NC O2  Decreased AEBL, crackles bases  RRR no murmurs  And very distended, + BS  Edema, chronic skin changes  LUE AVG           I&O 24HR    Intake/Output Summary (Last 24 hours) at 4/24/2024 1644  Last data filed at 4/24/2024 0600  Gross per 24 hour   Intake 1500 ml   Output 6400 ml   Net -4900 ml         Medications  No medications prior to admission.      Scheduled medications    Continuous medications    PRN medications      Relevant Results      Admission on 04/23/2024, Discharged on 04/24/2024   Component Date Value Ref Range Status    Ventricular Rate 04/23/2024 63  BPM Final    Atrial Rate 04/23/2024 63  BPM Final    MA Interval 04/23/2024 380  ms Final    QRS Duration 04/23/2024 174  ms Final    QT Interval 04/23/2024 513  ms Final    QTC Calculation(Bazett) 04/23/2024 526  ms Final    P Axis 04/23/2024 -58  degrees Final    R Axis 04/23/2024 229  degrees Final    T [place] 04/23/2024 64  degrees Final    QRS Count 04/23/2024 10  beats Final    Q Onset 04/23/2024 253  ms Final    T Offset 04/23/2024 509  ms Final    QTC Fredericia 04/23/2024 521  ms Final    Magnesium 04/23/2024 1.60  1.60 - 2.40 mg/dL Final    Glucose 04/23/2024 88  74 - 99 mg/dL Final    Sodium 04/23/2024 138  136 - 145 mmol/L Final    Potassium 04/23/2024 3.5  3.5 - 5.3 mmol/L Final    Chloride 04/23/2024 99  98 - 107 mmol/L Final    Bicarbonate 04/23/2024 29  21 - 32 mmol/L Final    Anion Gap 04/23/2024 14  10 - 20 mmol/L Final    Urea Nitrogen 04/23/2024 41 (H)  6 - 23 mg/dL Final     Creatinine 04/23/2024 5.23 (H)  0.50 - 1.30 mg/dL Final    eGFR 04/23/2024 11 (L)  >60 mL/min/1.73m*2 Final    Calculations of estimated GFR are performed using the 2021 CKD-EPI Study Refit equation without the race variable for the IDMS-Traceable creatinine methods.  https://jasn.asnjournals.org/content/early/2021/09/22/ASN.5811005957    Calcium 04/23/2024 8.9  8.6 - 10.3 mg/dL Final    WBC 04/23/2024 3.3 (L)  4.4 - 11.3 x10*3/uL Final    nRBC 04/23/2024 0.6 (H)  0.0 - 0.0 /100 WBCs Final    RBC 04/23/2024 2.53 (L)  4.50 - 5.90 x10*6/uL Final    Hemoglobin 04/23/2024 8.8 (L)  13.5 - 17.5 g/dL Final    Hematocrit 04/23/2024 26.5 (L)  41.0 - 52.0 % Final    MCV 04/23/2024 105 (H)  80 - 100 fL Final    MCH 04/23/2024 34.8 (H)  26.0 - 34.0 pg Final    MCHC 04/23/2024 33.2  32.0 - 36.0 g/dL Final    RDW 04/23/2024 16.2 (H)  11.5 - 14.5 % Final    Platelets 04/23/2024 143 (L)  150 - 450 x10*3/uL Final    Immature Granulocytes %, Automated 04/23/2024 0.3  0.0 - 0.9 % Final    Immature Granulocyte Count (IG) includes promyelocytes, myelocytes and metamyelocytes but does not include bands. Percent differential counts (%) should be interpreted in the context of the absolute cell counts (cells/UL).    Immature Granulocytes Absolute, Au* 04/23/2024 0.01  0.00 - 0.70 x10*3/uL Final    Phosphorus 04/23/2024 4.0  2.5 - 4.9 mg/dL Final    The performance characteristics of phosphorus testing in heparinized plasma have been validated by the individual  laboratory site where testing is performed. Testing on heparinized plasma is not approved by the FDA; however, such approval is not necessary.    Albumin 04/23/2024 4.1  3.4 - 5.0 g/dL Final    Bilirubin, Total 04/23/2024 0.9  0.0 - 1.2 mg/dL Final    Bilirubin, Direct 04/23/2024 0.2  0.0 - 0.3 mg/dL Final    Alkaline Phosphatase 04/23/2024 82  33 - 136 U/L Final    ALT 04/23/2024 7 (L)  10 - 52 U/L Final    Patients treated with Sulfasalazine may generate falsely decreased results  for ALT.    AST 04/23/2024 16  9 - 39 U/L Final    Total Protein 04/23/2024 6.8  6.4 - 8.2 g/dL Final    Lactate 04/23/2024 1.2  0.4 - 2.0 mmol/L Final    Protime 04/23/2024 14.4 (H)  9.8 - 12.8 seconds Final    INR 04/23/2024 1.3 (H)  0.9 - 1.1 Final    aPTT 04/23/2024 32  27 - 38 seconds Final    ABO TYPE 04/23/2024 O   Final    Rh TYPE 04/23/2024 POS   Final    ANTIBODY SCREEN 04/23/2024 NEG   Final    D-Dimer, Quantitative VTE Exclusion 04/23/2024 6,880 (H)  <=500 ng/mL FEU Final    Troponin I, High Sensitivity 04/23/2024 467 (HH)  0 - 20 ng/L Final    Troponin I, High Sensitivity 04/23/2024 604 (HH)  0 - 20 ng/L Final    Previous result verified on 4/23/2024 0408 on specimen/case [id number] called with component [facility] for procedure Troponin I, High Sensitivity, Initial with value 467 ng/L.    Extra Tube 04/23/2024 Hold for add-ons.   Final    Auto resulted.    Neutrophils %, Manual 04/23/2024 66.0  40.0 - 80.0 % Final    Percent differential counts (%) should be interpreted in the context of the absolute cell counts (cells/uL).    Bands %, Manual 04/23/2024 2.0  0.0 - 5.0 % Final    Lymphocytes %, Manual 04/23/2024 21.0  13.0 - 44.0 % Final    Monocytes %, Manual 04/23/2024 7.0  2.0 - 10.0 % Final    Eosinophils %, Manual 04/23/2024 2.0  0.0 - 6.0 % Final    Basophils %, Manual 04/23/2024 2.0  0.0 - 2.0 % Final    Seg Neutrophils Absolute, Manual 04/23/2024 2.18  1.20 - 7.00 x10*3/uL Final    Bands Absolute, Manual 04/23/2024 0.07  0.00 - 0.70 x10*3/uL Final    Lymphocytes Absolute, Manual 04/23/2024 0.69 (L)  1.20 - 4.80 x10*3/uL Final    Monocytes Absolute, Manual 04/23/2024 0.23  0.10 - 1.00 x10*3/uL Final    Eosinophils Absolute, Manual 04/23/2024 0.07  0.00 - 0.70 x10*3/uL Final    Basophils Absolute, Manual 04/23/2024 0.07  0.00 - 0.10 x10*3/uL Final    Total Cells Counted 04/23/2024 100   Final    Neutrophils Absolute, Manual 04/23/2024 2.25  1.20 - 7.70 x10*3/uL Final    RBC Morphology 04/23/2024  See Below   Final    Hypochromia 04/23/2024 Mild   Final    RBC Fragments 04/23/2024 Few   Final    Target Cells 04/23/2024 Few   Final    Ovalocytes 04/23/2024 Few   Final    Kalin Cells 04/23/2024 Few   Final    Acanthocytes 04/23/2024 Few   Final    Giant Platelets 04/23/2024 Few   Final    Tacrolimus  04/23/2024 12.2  <=15.0 ng/mL Final    Troponin I, High Sensitivity 04/23/2024 650 (HH)  0 - 20 ng/L Final    Previous result verified on 4/23/2024 0408 on specimen/case [id number] called with component [facility] for procedure Troponin I, High Sensitivity, Initial with value 467 ng/L.    Procalcitonin 04/23/2024 0.08 (H)  <=0.07 ng/mL Final    AV pk cornelia 04/23/2024 0.87  m/s Final    LV Biplane EF 04/23/2024 26  % Final    LVIDd 04/23/2024 5.70  cm Final    RVSP 04/23/2024 42.7  mmHg Final    AV pk grad 04/23/2024 3.1  mmHg Final    LV A4C EF 04/23/2024 23.7   Final    POCT Glucose 04/23/2024 101 (H)  74 - 99 mg/dL Final    Troponin I, High Sensitivity 04/23/2024 614 (HH)  0 - 20 ng/L Final    Previous result verified on 4/23/2024 0408 on specimen/case [id number] called with component [facility] for procedure Troponin I, High Sensitivity, Initial with value 467 ng/L.    Phosphorus 04/23/2024 4.1  2.5 - 4.9 mg/dL Final    MILD HEMOLYSIS DETECTED. The result may be falsely elevated due to hemolysis or other interferents. Clinical correlation is recommended. Repeat testing may be considered.  The performance characteristics of phosphorus testing in heparinized plasma have been validated by the individual  laboratory site where testing is performed. Testing on heparinized plasma is not approved by the FDA; however, such approval is not necessary.    Extra Tube 04/23/2024 Hold for add-ons.   Final    Auto resulted.    Magnesium 04/24/2024 1.80  1.60 - 2.40 mg/dL Final    WBC 04/24/2024 3.3 (L)  4.4 - 11.3 x10*3/uL Final    nRBC 04/24/2024 0.0  0.0 - 0.0 /100 WBCs Final    RBC 04/24/2024 2.33 (L)  4.50 - 5.90 x10*6/uL  Final    Hemoglobin 04/24/2024 8.0 (L)  13.5 - 17.5 g/dL Final    Hematocrit 04/24/2024 24.0 (L)  41.0 - 52.0 % Final    MCV 04/24/2024 103 (H)  80 - 100 fL Final    MCH 04/24/2024 34.3 (H)  26.0 - 34.0 pg Final    MCHC 04/24/2024 33.3  32.0 - 36.0 g/dL Final    RDW 04/24/2024 16.0 (H)  11.5 - 14.5 % Final    Platelets 04/24/2024 131 (L)  150 - 450 x10*3/uL Final    Glucose 04/24/2024 98  74 - 99 mg/dL Final    Sodium 04/24/2024 137  136 - 145 mmol/L Final    Potassium 04/24/2024 3.4 (L)  3.5 - 5.3 mmol/L Final    Chloride 04/24/2024 98  98 - 107 mmol/L Final    Bicarbonate 04/24/2024 30  21 - 32 mmol/L Final    Anion Gap 04/24/2024 12  10 - 20 mmol/L Final    Urea Nitrogen 04/24/2024 24 (H)  6 - 23 mg/dL Final    Creatinine 04/24/2024 3.50 (H)  0.50 - 1.30 mg/dL Final    eGFR 04/24/2024 18 (L)  >60 mL/min/1.73m*2 Final    Calculations of estimated GFR are performed using the 2021 CKD-EPI Study Refit equation without the race variable for the IDMS-Traceable creatinine methods.  https://jasn.asnjournals.org/content/early/2021/09/22/ASN.6325723428    Calcium 04/24/2024 8.6  8.6 - 10.3 mg/dL Final    POCT Glucose 04/23/2024 76  74 - 99 mg/dL Final    Troponin I, High Sensitivity 04/24/2024 771 (HH)  0 - 20 ng/L Final      ECG 12 lead    Result Date: 4/24/2024  Ventricular-paced complexes See ED provider note for full interpretation and clinical correlation Confirmed by Abbie Mejia ([id number]) on 4/24/2024 1:10:06 PM    Transthoracic Echo (TTE) Limited    Result Date: 4/23/2024    [facility], [address], [facility] [address]Rmu [phone] and                                 Fax [phone] TRANSTHORACIC ECHOCARDIOGRAM REPORT  Patient Name:      EMMANUEL Carter Physician:    [id number] Emanuel Bain MD Study Date:        4/23/2024            Ordering Provider:    [id number] MEMO MARTINEZ                                                                RATNA MRN/PID:           [id number]             Fellow: Accession#:        [id number]         Nurse: Date of Birth/Age: 1955 / 68 years Sonographer:          Raffy Ferris RDCS Gender:            M                    Additional Staff: Height:            170.18 cm            Admit Date:           4/23/2024 Weight:            58.97 kg             Admission Status:     Inpatient -                                                               Routine BSA / BMI:         1.68 m2 / 20.36      Encounter#:           [id number]                    kg/m2                                         Department Location:  [facility] Blood Pressure: 108 /58 mmHg Study Type:    TRANSTHORACIC ECHO (TTE) LIMITED Diagnosis/ICD: Shortness of breath-R06.02 CPT Code:      Echo Limited-88432; Doppler Limited-97921; Color Doppler-19887 Patient History: Diabetes:          Yes Pacer/Defib:       Permanent pacemaker Pertinent History: Cardiomyopathy, Dyspnea, HTN and CHF. ESRD on HD, COVID. Study Detail: The following Echo studies were performed: 2D, Doppler and color               flow. Technically challenging study due to patient lying in supine               position.  PHYSICIAN INTERPRETATION: Left Ventricle: The left ventricular systolic function is moderately to severely decreased, with an estimated ejection fraction of 25-30%. There is global hypokinesis of the left ventricle with minor regional variations. The left ventricular cavity size is upper limits of normal. Left ventricular diastolic filling was indeterminate. Left Atrium: The left atrium is elongated. Right Ventricle: The right ventricle is normal in size. There is normal right ventricular global systolic function. A device is visualized in the right ventricle. Right Atrium: The right atrium is moderately dilated. There is a device visualized in the right atrium. Aortic Valve: The aortic valve is trileaflet. There is mild aortic valve thickening. There is moderate to  severe aortic valve regurgitation. The peak instantaneous gradient of the aortic valve is 3.1 mmHg. Mitral Valve: The mitral valve is mild to moderately thickened. There is mild to moderate mitral valve regurgitation which is centrally directed. Tricuspid Valve: The tricuspid valve is structurally normal. There is moderate to severe tricuspid regurgitation. The Doppler estimated RVSP is mild to moderately elevated at 42.7 mmHg. Pulmonic Valve: The pulmonic valve is structurally normal. There is moderate pulmonic valve regurgitation. Pericardium: There is no pericardial effusion noted. Aorta: The aortic root was not assessed.  CONCLUSIONS:  1. Left ventricular systolic function is moderately to severely decreased with a 25-30% estimated ejection fraction.  2. The left atrium is elongated.  3. The right atrium is moderately dilated.  4. Mild to moderate mitral valve regurgitation.  5. Mild to moderately elevated right ventricular systolic pressure.  6. Moderate to severe tricuspid regurgitation visualized.  7. Moderate to severe aortic valve regurgitation.  8. There is moderate pulmonic valve regurgitation.  9. There is global hypokinesis of the left ventricle with minor regional variations. QUANTITATIVE DATA SUMMARY: 2D MEASUREMENTS:                           Normal Ranges: IVSd:          1.10 cm    (0.6-1.1cm) LVPWd:         0.90 cm    (0.6-1.1cm) LVIDd:         5.70 cm    (3.9-5.9cm) LVIDs:         5.40 cm LV Mass Index: 134.4 g/m2 LV % FS        5.3 % LV SYSTOLIC FUNCTION BY 2D PLANIMETRY (MOD):                     Normal Ranges: EF-A4C View: 23.7 % (>=55%) EF-A2C View: 29.7 % EF-Biplane:  26.4 % AORTIC VALVE:                       Normal Ranges: AoV Vmax:    0.87 m/s (<=1.7m/s) AoV Peak PG: 3.1 mmHg (<20mmHg) AORTIC INSUFFICIENCY: AI Vmax:       3.21 m/s AI Half-time:  373 msec AI Decel Rate: 231.20 cm/s2 TRICUSPID VALVE/RVSP:                             Normal Ranges: Peak TR Velocity: 3.15 m/s RV Syst Pressure:  42.7 mmHg (< 30mmHg)  [id number] Emanuel Bain MD Electronically signed on 4/23/2024 at 4:45:52 PM  ** Final **     NM Lung Perfusion    Result Date: 4/23/2024  Interpreted By:  Vlad Acevedo  and Martin Edwards STUDY: NM LUNG PERFUSION;  4/23/2024 8:22 am   INDICATION: Signs/Symptoms:dyspnea.   COMPARISON: Same day chest x-ray and chest x-ray from 04/05/2024   ACCESSION NUMBER(S): [id number]   ORDERING CLINICIAN: ROSALIO ANGEL   TECHNIQUE: DIVISION OF NUCLEAR MEDICINE PERFUSION LUNG SCANS   Multiple perfusion images of the lungs were acquired after the intravenous administration of 4 mCi of Tc-99m macroaggregated albumin (MAA). In addition, SPECT of the chest was performed.   FINDINGS: Perfusion images of both lungs demonstrate mild heterogenous perfusion throughout the lung fields bilaterally. There are multiple nonsegmental perfusion defects appreciated bilaterally, particularly in the right lung base. The findings in the right lung base likely correspond to a chronic pleural effusion seen on same day chest radiograph as well as the chest radiograph from 04/05/2024. No distinct wedge-shaped perfusion defects are appreciated.       Background nonspecific mild heterogenous perfusion throughout the bilateral lungs with multiple nonsegmental perfusion defects, with the right lower lung field defects likely corresponding with a chronic pleural effusion. No definite wedge-shaped perfusion defects identified. Findings are compatible with a low to intermediate probability for acute pulmonary embolism.   The interpretation above is based on modified PIOPED II and PISAPED criteria.   I personally reviewed the images/study and I agree with the findings as stated by Jerry Salazar MD (resident) . This study was interpreted at [facility], [place], [place].   MACRO: None   Signed by: Vlad Acevedo 4/23/2024 8:57 AM Dictation workstation:   [id number]    XR chest 1  view    Result Date: 4/23/2024  STUDY: Chest Radiograph;  4/23/2024 INDICATION: Shortness of breath. COMPARISON: 4/5/2024 XR Chest. ACCESSION NUMBER(S): [id number] ORDERING CLINICIAN: MEMO SEGUNDO TECHNIQUE:  Frontal chest was obtained at 04:06 hours. FINDINGS: CARDIOMEDIASTINAL SILHOUETTE: Cardiomediastinal silhouette is enlarged and stable in size and configuration.  Median sternotomy changes are again noted.  Unchanged right subclavian approach pacemaker  LUNGS: Small-to-moderate right pleural effusion is demonstrated with bibasilar atelectasis and/or consolidation.  Left lung is grossly clear.  No pneumothorax.  ABDOMEN: No remarkable upper abdominal findings.  BONES: No acute osseous changes.    Right-sided pleural effusion with right basilar atelectasis and/or consolidation. Signed by Bob Gorman MD    XR chest 1 view    Result Date: 4/5/2024  Interpreted By:  Demetrius Trujillo and Sheng Max STUDY: XR CHEST 1 VIEW;  4/5/2024 10:40 am   INDICATION: Signs/Symptoms:pulmonary edema.   COMPARISON: Chest radiograph dated 04/03/2024, 04/01/2024 and PET cardiac dated 03/13/2024   ACCESSION NUMBER(S): [id number]   ORDERING CLINICIAN: RUBINA CEJA   FINDINGS: AP radiograph of the chest     LINES AND DEVICES: Biventricular pacemaker and AICD battery pack device overlies the right lateral hemithorax with its leads projecting over the right atrium, right ventricle and coronary vein. Numerous intact median sternotomy wires. Surgical clips project over the bilateral upper quadrants.   CARDIOMEDIASTINAL SILHOUETTE: Stable enlargement of the cardiomediastinal silhouette. Aortic knob calcifications are seen.   LUNGS: Stable to mild worsening of perihilar and interstitial prominence suggests mild worsening of pulmonary edema. Persistent small-to-moderate right pleural effusion. No pneumothorax.   ABDOMEN: No remarkable upper abdominal findings.   BONES: No acute osseous abnormality. Moderate degenerative changes in the  left acromioclavicular joint.       1. Stable to mild worsening of pulmonary edema with persistent small-to-moderate right pleural effusion. 2. Medical devices as detailed above.     I personally reviewed the images/study and I agree with the findings as stated by Dr. Elder Page. This study was interpreted at [facility], [place], [place].   MACRO: None   Signed by: Demetrius Trujillo 4/5/2024 11:21 AM Dictation workstation:   [id number]    US kidney transplant    Result Date: 4/5/2024  Interpreted By:  Von White and O'Connor Gregory STUDY: US KIDNEY TRANSPLANT;  4/4/2024 5:57 pm   INDICATION: Signs/Symptoms:KIA.   COMPARISON: Transplant renal ultrasound dated 03/01/2024   ACCESSION NUMBER(S): [id number]   ORDERING CLINICIAN: NIKITA MCLAUGHLIN   TECHNIQUE: Grayscale, color, and spectral Doppler of the kidney transplant were performed.  This examination was interpreted at [facility] [place].   FINDINGS: TRANSPLANT KIDNEY: Transplant kidney location:  The renal transplant is located in the right iliac fossa. The transplant kidney [id number] cm in craniocaudal dimension. There is mild fullness of the pelvicaliceal system similar to the prior ultrasound. No hydroureter. No perinephric fluid collections are seen.   DOPPLER EVALUATION:   RESISTIVE INDICES: The resistive indices were as follows: 0.68 / 0.79 in the superior pole, 0.79 / 0.82 in the midpole, and 0.73 in the inferior pole. Resistive indices previously measured 0.78, 0.75, and 0.75 on prior ultrasound dated 03/01/2024. Wave forms are normal.   TRANSPLANT RENAL ARTERY AND VEIN: The main renal artery velocity is 58.9 cm/s, previously 42.0 cm/sec. The arterial anastomosis velocity is 52.4 cm/s, previously 39.3 cm/s. The adjacent iliac artery velocity is 63.5 cm/s , previously 70 cm/sec. Transplant renal vein is patent. The peak velocity in the main renal vein is 30.5 cm/s, in the adjacent iliac vein  44.6 cm/s . The venous anastomosis velocity is 63.8 cm/s.   Other: Moderate-to-large volume free pelvic fluid is visualized.       1. Mild fullness of the pelvicaliceal system is similar to the prior ultrasound from 03/01/2024. 2. Doppler evaluation of the transplant kidney is within normal limits, as detailed above. 3. Free pelvic ascites.   I personally reviewed the images/study and I agree with the findings as stated by resident physician Dr. Dayday Yanes.   MACRO: None   Signed by: Von White 4/5/2024 5:29 AM Dictation workstation:   [id number]    US guided percutaneous biopsy renal right    Result Date: 4/4/2024  Interpreted By:  Ghazala Santos and Guirguis James STUDY: US GUIDED PERCUTANEOUS BIOPSY RENAL RIGHT;  4/4/2024 12:51 pm   INDICATION: Signs/Symptoms:Kidney biopsy.   COMPARISON: Renal transplant biopsy dated 03/07/2024 Ultrasound kidney transplant dated 03/01/2024   ACCESSION NUMBER(S): [id number]   ORDERING CLINICIAN: RUBINA CEJA   TECHNIQUE: INTERVENTIONALIST(S): MD Emanuel Burdick MD   CONSENT: The patient/patient's POA/next of kin was informed of the nature of the proposed procedure. The purposes, alternatives, risks, and benefits were explained and discussed. All questions were answered and consent was obtained.   SEDATION: Moderate conscious IV sedation services (supervision of administration, induction, and maintenance) were provided by the physician performing the procedure with intravenous fentanyl 50mcg and versed 1mg from [id number]. The physician was assisted by an independent trained observer, an interventional radiology nurse, in the continuous monitoring of patient level of consciousness and physiologic status.   MEDICATION/CONTRAST: No additional.   TIME OUT: A time out was performed immediately prior to procedure start with the interventional team, correctly identifying the patient name, date of birth, MRN, procedure, anatomy (including marking of site and side),  patient position, procedure consent form, relevant laboratory and imaging test results, antibiotic administration, safety precautions, and procedure-specific equipment needs.   COMPLICATIONS: No immediate adverse events identified.   FINDINGS: The patient was placed in the supine position. Limited sonographic images of the right iliac fossa were obtained for purposes of needle guidance, which demonstrated a normal-appearing renal transplant. The area of concern was prepped and draped under sterile technique.   1% lidocaine was injected subcutaneously and then into the deeper tissues surrounding the targeted area for biopsy. An 18 gauge core biopsy needle was passed via a 17 gauge coaxial introducer needle to obtain a total of 2 core samples.   Gelfoam pledgets were injected through the coaxial system for the purposes of hemostasis. Postprocedure images demonstrate no evidence for hemorrhage. The patient tolerated the procedure well and there were no immediate complications. Specimen(s) sent to pathology.       1. Status post successful ultrasound guided core needle biopsy of a right iliac fossa renal transplant. Specimen(s) Sent to pathology.   I was present for and/or performed the critical portions of the procedure and immediately available throughout the entire procedure.   I personally reviewed the image(s) / study and interpretation. I agree with the findings as stated.   Performed and dictated at [facility].   MACRO: None.     Signed by: Ghazala Santos 4/4/2024 3:46 PM Dictation workstation:   [id number]    US thoracentesis    Result Date: 4/4/2024  Interpreted By:  Harrison Franklin, STUDY: US THORACENTESIS; 4/4/20242:14 pm   INDICATION: Signs/Symptoms:C/f R sided hemothorax.   COMPARISON: None.   ACCESSION NUMBER(S): [id number]   ORDERING CLINICIAN: RUBINA CEJA   TECHNIQUE: INTERVENTIONALIST(S): Harrison Franklin   CONSENT: The patient/patient's POA/next of kin was  informed of the nature of the proposed procedure. The purposes, alternatives, risks, and benefits were explained and discussed. All questions were answered and consent was obtained.   SEDATION: None   MEDICATION/CONTRAST: No additional   TIME OUT: A time out was performed immediately prior to procedure start with the interventional team, correctly identifying the patient name, date of birth, MRN, procedure, anatomy (including marking of site and side), patient position, procedure consent form, relevant laboratory and imaging test results, antibiotic administration, safety precautions, and procedure-specific equipment needs.   FINDINGS: The patient was placed in the supine position.   The pleural space was examined with grey scale ultrasound, and the most accessible fluid identified and marked for thoracentesis.   The skin was prepped and draped in usual manner. Local anesthesia with Lidocaine was administered and a right-sided thoracentesis was performed.  A 5 Tajik One-Step Valved thoracentesis needle/catheter was then placed where marked.  Approximately 1300 mL of yellowish colored fluid was removed.  The needle/catheter was then withdrawn.   The patient tolerated the procedure well and there were no immediate complications. Specimen(s) sent to the laboratory and pathology for further evaluation, per the requesting team.       Uneventful thoracentesis, as detailed above. Right Pleural space, 1300 mL   I personally performed and/or directly supervised this study and was present for the entire procedure.   Performed and dictated at [facility].   Signed by: Harrison Franklin 4/4/2024 2:14 PM Dictation workstation:   [id number]    XR chest 1 view    Result Date: 4/3/2024  Interpreted By:  Vlad Brand, STUDY: XR CHEST 1 VIEW; 4/3/2024 11:48 am   INDICATION: Signs/Symptoms:Pulmonary edema.   COMPARISON: 04/01/2024   ACCESSION NUMBER(S): [id number]   ORDERING CLINICIAN:  [facility]   FINDINGS:     CARDIOMEDIASTINAL SILHOUETTE: Patient is status post median sternotomy. Dual-chamber and coronary sinus pacemaker leads in place. No significant change in bilateral pleural effusions with right basilar atelectasis. No pneumothorax.   LUNGS: No significant change in bilateral pleural effusion and basilar edema. Correlate with fluid status. No pneumothorax. Minimal perihilar interstitial edema. No pneumothorax   ABDOMEN: No remarkable upper abdominal findings.   BONES: No acute osseous changes.       1.  No significant change in moderate-sized bilateral pleural effusions with bilateral perihilar edema.     Signed by: Vlad Brand 4/3/2024 3:06 PM Dictation workstation:   [id number]    XR chest 1 view    Result Date: 4/2/2024  Interpreted By:  Vlad Brand and Sheng Max STUDY: XR CHEST 1 VIEW;  4/1/2024 9:40 pm   INDICATION: Signs/Symptoms:sob, fluid overload.   COMPARISON: Chest radiograph dated 03/17/2024, 03/15/2024, 03/04/2024 and CT chest dated 10/31/2023   ACCESSION NUMBER(S): [id number]   ORDERING CLINICIAN: GLENN DAVIDSON   FINDINGS: AP radiograph of the chest     LINES AND DEVICES: Biventricular pacemaker and AICD battery pack device overlie the right lateral hemithorax with its leads projecting over the right atrium, right ventricle and coronary vein. Numerous intact median sternotomy wires. Surgical clips project over the left and right upper quadrants.   CARDIOMEDIASTINAL SILHOUETTE: Stable enlargement of the cardiomediastinal silhouette.   LUNGS: Improving interstitial markings suggest improving pulmonary edema. Similar appearance of moderate right and small left pleural effusions. No pneumothorax.   ABDOMEN: No remarkable upper abdominal findings.   BONES: No acute osseous abnormality. Moderate degenerative changes in the acromioclavicular joints.       1. Improving interstitial markings suggest improving pulmonary edema. 2. Similar appearance of moderate right and  small left pleural effusions. 3. Medical devices as detailed above.     I personally reviewed the images/study and I agree with the findings as stated by Dr. Elder Page. This study was interpreted at [facility], [place], [place].   MACRO: None   Signed by: Vlad Brand 4/2/2024 4:47 PM Dictation workstation:   [id number]        ASSESSMENT AND PLAN  68 y.o. 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) c/b renal failure s/p kidney transplantation (on tacro and MMFe/myfortic), restarted on HD (TThSat) on 3/24 (after repeat renal bx not c/w rejection), type A aortic dissection, HTN on toprol presenting with worsening SOB, ALONZO, found to have fluid overload     ESRD on IHD TTS, s/p renal txp 3/23 [facility], back on dialysis 3/24, LUE AVG     HTN/type A dissection on toprol 50 mg every day before admission, also on midodrine prn at dialysis     Volume status - up, with clinical s/o fluid overload and moderate pleural effusion R     Anemia, CKD, Hb below target     Secondary hyperpara, renal, iphos pending     Plan  - dialysis completed late last night, 3L removed, improved clinically  - will need new edw  - continue midodrine prn  - trend phos  - renal diet  - KIRA with dialysis, repeat iron stores, noted pancytopenia  - can dc flomax on discharge  - give toprol with holding parameters  - plan for dialysis again tomorrow, can be dose as OP     Thank you for the opportunity to assist in the care of this patient, please call with questions  Johnna Garza MD PhD

## 2024-04-24 NOTE — PROGRESS NOTES
Physical Therapy    Physical Therapy Evaluation    Patient Name: Reynaldo Francisco  MRN: [id number]  Today's Date: 4/24/2024   Time Calculation  Start Time: 1023  Stop Time: 1033  Time Calculation (min): 10 min    Assessment/Plan   PT Assessment  End of Session Communication: Bedside nurse, Charge Nurse  End of Session Patient Position: Bed, 2 rail up, Alarm off, caregiver present  IP OR SWING BED PT PLAN  Inpatient or Swing Bed: Inpatient  PT Plan  PT Frequency: 3 times per week (1-3 visits)  PT Discharge Recommendations: Low intensity level of continued care  PT - OK to Discharge: Yes      Subjective   General Visit Information:  General  Reason for Referral: Atelectasis,R pleural effusion  Referred By: Tommy  Past Medical History Relevant to Rehab: anemia,htn,chf,cardiomyopathy,pacer, liver and kidney transplants  Prior to Session Communication: Bedside nurse  Patient Position Received: Bed, 2 rail up, Alarm off, caregiver present  Home Living:  Home Living  Type of Home: House  Lives With: Spouse  Home Adaptive Equipment: Walker rolling or standard  Home Layout: One level  Home Access: Stairs to enter without rails (2+1 stairs)  Prior Level of Function:  Prior Function Per Pt/Caregiver Report  Level of [place]: Independent with ADLs and functional transfers  Ambulatory Assistance: Independent  Precautions:  Precautions  Medical Precautions: Cardiac precautions, Fall precautions  Vital Signs:       Objective   Pain:     Cognition:  Cognition  Overall Cognitive Status: Within Functional Limits    General Assessments:    Functional Assessments:  Bed Mobility  Bed Mobility:  (Ind)    Transfers  Transfer:  (Ind)    Ambulation/Gait Training  Ambulation/Gait Training Performed:  (75 ft wh walker min assist x 1)  Extremity/Trunk Assessments:    Outcome Measures:  [facility] Basic Mobility  Turning from your back to your side while in a flat bed without using bedrails: None  Moving from lying on your back to sitting on the side  of a flat bed without using bedrails: None  Moving to and from bed to chair (including a wheelchair): A little  Standing up from a chair using your arms (e.g. wheelchair or bedside chair): None  To walk in hospital room: A little  Climbing 3-5 steps with railing: Total  Basic Mobility - Total Score: 19    Encounter Problems       Encounter Problems (Active)       PT Problem       ambulation       Start:  04/24/24    Expected End:  05/01/24       Ambulate ind/sba w/ a wh walker 100 ft>            Pain - Adult              Education Documentation  Mobility Training, taught by Russ Soto, PT at 4/24/2024  2:26 PM.  Learner: Family, Patient  Readiness: Acceptance  Method: Explanation  Response: Verbalizes Understanding    Body Mechanics, taught by Russ Soto, PT at 4/24/2024  2:26 PM.  Learner: Family, Patient  Readiness: Acceptance  Method: Explanation  Response: Verbalizes Understanding    Education Comments  No comments found.

## 2024-04-25 NOTE — PROGRESS NOTES
Discharge Facility: [facility]  Discharge Diagnosis: NSTEMI, fluid overload, shortness of breath  Admission Date: 4/23/24  Discharge Date: 4/24/24    PCP Appointment Date: 5/21/24 no sooner appts  Specialist Appointment Date: June Dr Barber but aware to call to see if needs to be seen sooner  Hospital Encounter and Summary: Linked   See discharge assessment below for further details    Engagement  Call Start Time: 1000 (4/25/2024 10:14 AM)    Medications  Medications reviewed with patient/caregiver?: Yes (4/25/2024 10:14 AM)  Is the patient having any side effects they believe may be caused by any medication additions or changes?: No (4/25/2024 10:14 AM)  Does the patient have all medications ordered at discharge?: Yes (4/25/2024 10:14 AM)  Care Management Interventions: No intervention needed (4/25/2024 10:14 AM)  Prescription Comments: No changes to medications in hospital (4/25/2024 10:14 AM)  Is the patient taking all medications as directed (includes completed medication regime)?: Yes (4/25/2024 10:14 AM)  Care Management Interventions: Provided patient education (4/25/2024 10:14 AM)  Medication Comments: No issues with medications at this time. Had some issues with [facility] aba not having midodrine on hand last week, but Meadows got a script from Dr Hair and brought the medications to the center. (4/25/2024 10:14 AM)    Appointments  Does the patient have a primary care provider?: Yes (4/25/2024 10:14 AM)  Care Management Interventions: Verified appointment date/time/provider (Will keep appt on 5/21 as is for now and place on waitlist for cancellations due to no available appts within two weeks gfor hosp fu) (4/25/2024 10:14 AM)  Has the patient kept scheduled appointments due by today?: Yes (4/25/2024 10:14 AM)  Care Management Interventions: Advised to schedule with specialist (Aware to call Dr Barber's office to see about sooner appt) (4/25/2024 10:14 AM)    Self Management  What is the home health agency?: [facility]  (4/25/2024 10:14 AM)  Has home health visited the patient within 72 hours of discharge?: Call prior to 72 hours (4/25/2024 10:14 AM)  Home Health Interventions: Other (Comment) (Message sent to Dr Sanders to enter referral for resumption of care for PT OT) (4/25/2024 10:14 AM)    Patient Teaching  Does the patient have access to their discharge instructions?: Yes (4/25/2024 10:14 AM)  Care Management Interventions: Reviewed instructions with patient (4/25/2024 10:14 AM)  What is the patient's perception of their health status since discharge?: Returned to baseline/stable (4/25/2024 10:14 AM)  Is the patient/caregiver able to teach back the hierarchy of who to call/visit for symptoms/problems? PCP, Specialist, Home Health nurse, Urgent Care, ED, 911: Yes (4/25/2024 10:14 AM)  Patient/Caregiver Education Comments: Aware to seek care for any worsening shortness of breath (4/25/2024 10:14 AM)    Wrap Up  Wrap Up Additional Comments: Spoke at length with spouse, she states it has been a difficult transition from the  dialysis center to [facility]. Feels like not much communication happened between [facility] dialysis center and [facility] prior to switch, was unaware there was no doctor on site until a few days into treatment. Also was informed that they do not keep medications on site, rec script for midodrine from Dr Hair and did bring it with him to center for treatments now. Meadows says treatment times were different as well, was used to getting certain amount of time dialyzed at [facility] and now it is different at [facility]. She also says [facility] was not communicating with her regarding his dry weight, so perhaps took off less fluid than he was used to and this is what prompted ER visit. She believes this and combination of his heart issues is what caused it. She will call Dr Barber's office as well to see about sooner appt. (4/25/2024 10:14 AM)  Call End Time: 1014 (4/25/2024 10:14 AM)

## 2024-04-25 NOTE — TELEPHONE ENCOUNTER
Pt was hospitalized 4/23-4/24 for sob. He was told to follow up with Dr Barber in 1-3 weeks. Currently next ov is 6/7, would like sooner.

## 2024-04-25 NOTE — HH CARE COORDINATION
Home Care received a Referral to Resume Care for Physical Therapy and Occupational Therapy. We have processed the referral for a Resumption of Care on 4/25-4/26/24.     If you have any questions or concerns, please feel free to contact us at [phone]. Follow the prompts, enter your five digit zip code, and you will be directed to your care team on WEST 3.

## 2024-04-30 NOTE — PATIENT INSTRUCTIONS
We will see you back in June with an echocardiogram.    On dialysis days use Midodrine 10 mg in the morning and in the afternoon.

## 2024-04-30 NOTE — PROGRESS NOTES
Pallavi Francisco is a 68 y.o. male.    Chief Complaint:  Shortness of breath and fatigue.    HPI    He feels very weak.  He is generally short of breath.  Gets hypotensive particularly on dialysis days.  Has not had any syncopal events.  Gets around at home with a cane or a walker.  Able to sleep okay according to the patient.  No chest pains.    He has had multiple admissions and procedures since his last visit.  He had an extensive workup for his congestive heart failure and cardiomyopathy.  He had endomyocardial biopsies performed which were nondiagnostic.  He required hospitalization last month when he presented with increasing shortness of breath.  He is found to have decompensated congestive heart failure.    On March 15, 2024 he underwent an upgrade of his device to a biventricular device.    He had a cardiac MRI which showed an ejection fraction of 34% and a scar burden of 14%.  There was a suggestion of sarcoidosis versus amyloidosis.    tabitha his last visit he developed progressive congestive heart failure.  He underwent cardiac evaluation is found to have marked left ventricular systolic dysfunction.  A PET scan showed an ejection fraction of 24%.  He also had right pleural effusion.  He was ultimately admitted to [facility].  He had an extensive workup for other reasons for the development of a dilated cardiomyopathy.  No clear etiology was identified.  His cardiac MRI did show some scarring.  Currently he is very weak after prolonged hospitalization.  Also feels fatigued.  Back on dialysis but his renal function is slowly improving.  He did have an upgrade to a biventricular ICD.      The patient suffered a type a aortic dissection in 2020 in [place]. He had emergent surgery.     Past medical history significant for a liver transplant. He developed end-stage renal disease on dialysis after his liver transplant.  His liver transplant was performed in 2019.  He has had a kidney  transplant.  Recently has developed renal failure and has had to go back on dialysis.     Allergies  Medication    · No Known Drug Allergies   Recorded By: Ruperto Bustos; 3/17/2022 8:47:23 AM  NonMedication    · IV Contrast Dye   Allergy; Edema; Swelling; Recorded By: Ruperto Bustos; 3/17/2022 8:47:23 AM     Family History  Mother    · Family history of diabetes mellitus (V18.0) (Z83.3)   · Family history of hypertension (V17.49) (Z82.49)     Social History  Problems    · No alcohol use   · No illicit drug use   · Non-smoker (V49.89) (Z78.9)    Review of Systems   Constitutional: Positive for malaise/fatigue.   Cardiovascular:  Positive for dyspnea on exertion and palpitations.   Neurological:  Positive for light-headedness.       Visit Vitals  Smoking Status Never        Objective     Constitutional:       Appearance: Not in distress.   Neck:      Vascular: JVD normal.   Pulmonary:      Breath sounds: Normal breath sounds.   Cardiovascular:      Normal rate. Regular rhythm. distant S2.       Murmurs: There is no murmur.      No gallop.    Pulses:     Intact distal pulses.   Edema:     Pretibial: bilateral 1+ edema of the pretibial area.     Ankle: bilateral 1+ edema of the ankle.  Abdominal:      General: Bowel sounds are normal.   Neurological:      Mental Status: Alert and oriented to person, place and time.         Lab Review:   Lab Results   Component Value Date     04/24/2024    K 3.4 (L) 04/24/2024    CL 98 04/24/2024    CO2 30 04/24/2024    BUN 24 (H) 04/24/2024    CREATININE 3.50 (H) 04/24/2024    GLUCOSE 98 04/24/2024    CALCIUM 8.6 04/24/2024       Assessment:    1.  Systolic congestive heart failure.  Status post biventricular ICD.  Unfortunately his follow-up echocardiographic study demonstrated no significant improvement in left ventricular function.  This is 1 month after biventricular ICD implantation.  No apparent evidence of an infiltrative cardiomyopathy.  Will refer to the heart failure  service.  Not sure if he is a candidate for transplant.    2.  Hypotension.  Will use midodrine on dialysis days.

## 2024-05-17 PROBLEM — E87.70 HYPERVOLEMIA: Status: ACTIVE | Noted: 2024-01-01

## 2024-05-17 PROBLEM — I12.9 RENAL HYPERTENSION: Status: ACTIVE | Noted: 2024-01-01

## 2024-05-21 PROBLEM — R40.4 TRANSIENT ALTERATION OF AWARENESS: Status: ACTIVE | Noted: 2024-01-01

## 2024-05-21 NOTE — ED TRIAGE NOTES
Pt presents to the ED for Altered mental status since Monday. Pt went to dialysis today, but is still not feeling better. Pt is Aox4 in triage, but is very tired. Pt denies pain at this time.

## 2024-05-21 NOTE — ED PROVIDER NOTES
HPI   Chief Complaint   Patient presents with    Altered Mental Status       68-year-old male history of ESRD s/p renal transplant March 2023 (tacrolimus and mycophenolate) now on dialysis (T/TH/sat), HFrEF (30 to 35%), dual-chamber pacemaker, type B aortic dissection s/p repair 2020, chronic residual type B aortic dissection with carotid artery dissection, insulinoma with hepatic metastases status post liver transplant and pancreatic surgery (2018), anemia presenting to the ED due to generalized fatigue and altered mental status.  Hypotensive after dialysis today, having increasing agitation over the last week as well as increasing fatigue and intermittent periods of confusion.  Family states that patient has been endorsing some abdominal and chest pain intermittently, no measured fevers.  No falls.  On current immunosuppressive therapy.  Makes very minimal urine.  Patient is able to state name, does endorse some chest pain but remainder of history is limited due to mental status.          History provided by:  Caregiver and parent  History limited by:  Mental status change                      Edmund Coma Scale Score: 15                     Patient History   Past Medical History:   Diagnosis Date    Arteriovenous fistula, acquired (CMS-HCC) 06/17/2022    AV fistula    Lung nodule     Nutritional anemia, unspecified     Anemia, deficiency    Personal history of malignant neoplasm of pancreas 06/17/2022    History of malignant neoplasm of pancreas     Past Surgical History:   Procedure Laterality Date    CARDIAC CATHETERIZATION N/A 3/1/2024    Procedure: [place] Insertion;  Surgeon: Pedro Pantoja MD;  Location: [place] [address] Cardiac Cath Lab;  Service: Cardiovascular;  Laterality: N/A;  Occluded right & left IJ, requires femoral approach. [place] needed for hemodynamic monitoring for diuresis in acute exacerbation of HF and KIA in renal transplant patient.    CARDIAC CATHETERIZATION N/A 3/14/2024    Procedure:  Endomyocardial Biopsy;  Surgeon: Evan Barrientos DO;  Location: [place] [address] Cardiac Cath Lab;  Service: Cardiovascular;  Laterality: N/A;    CARDIAC ELECTROPHYSIOLOGY PROCEDURE Right 10/23/2023    Procedure: PPM Single Ventricle Implant;  Surgeon: Cosme Pina MD;  Location: [facility] Cardiac Cath Lab;  Service: Electrophysiology;  Laterality: Right;  medtronic notified    CARDIAC ELECTROPHYSIOLOGY PROCEDURE Right 3/15/2024    Procedure: Pacemaker Upgrade Biventricular to CRT-D;  Surgeon: Suresh Adams MD;  Location: [place] [address] Cardiac Cath Lab;  Service: Electrophysiology;  Laterality: Right;  PPM upgrade to CRT-D    LIVER TRANSPLANTATION      OTHER SURGICAL HISTORY  03/17/2022    Pancreatic surgery    OTHER SURGICAL HISTORY  06/17/2022    Liver surgery    OTHER SURGICAL HISTORY  06/17/2022    Liver transplantation    US KIDNEY TRANSPLANT       Family History   Problem Relation Name Age of Onset    Diabetes Mother      Hypertension Mother       Social History     Tobacco Use    Smoking status: Never    Smokeless tobacco: Never   Vaping Use    Vaping status: Never Used   Substance Use Topics    Alcohol use: Never    Drug use: Not Currently       Physical Exam   ED Triage Vitals [05/21/24 1759]   Temperature Heart Rate Respirations BP   36.7 °C (98 °F) 61 18 98/63      Pulse Ox Temp Source Heart Rate Source Patient Position   98 % Temporal -- --      BP Location FiO2 (%)     -- --       Physical Exam  Vitals and nursing note reviewed.   Constitutional:       General: He is not in acute distress.     Appearance: He is ill-appearing. He is not toxic-appearing.      Comments: Somnolent, slumped over in bed.   HENT:      Head: Normocephalic and atraumatic.      Mouth/Throat:      Pharynx: Oropharynx is clear.   Eyes:      Extraocular Movements: Extraocular movements intact.      Pupils: Pupils are equal, round, and reactive to light.   Cardiovascular:      Rate and Rhythm: Normal rate and regular rhythm.      Heart  sounds: Normal heart sounds. No murmur heard.     Comments: Pacemaker noted underlying skin, no erythema or fluctuance around pacemaker site.  Pulmonary:      Effort: Tachypnea and accessory muscle usage present.      Breath sounds: Decreased air movement present.      Comments: Coarse bilateral breath sounds  Abdominal:      Comments: Healed surgical abdominal scars.  Mild generalized tenderness, no significant rebound or guarding.   Musculoskeletal:         General: No swelling or tenderness.      Cervical back: No rigidity.   Lymphadenopathy:      Cervical: No cervical adenopathy.   Skin:     General: Skin is warm and dry.      Capillary Refill: Capillary refill takes 2 to 3 seconds.      Coloration: Skin is pale.   Neurological:      Mental Status: He is lethargic and confused.      Motor: Weakness present.      Comments: No focal neurologic deficits.         ED Course & MDM   ED Course as of 05/21/24 2207   Tue May 21, 2024   1805 Ventricular paced rhythm rate 60, ST elevation noted in lateral leads (V5/V6) new from prior ECG.  V2 ST depression.  Will obtain repeat. [KR]   1845 Atrial paced rhythm with occasional ventricular paced complexes, left axis deviation.  ST elevation in lead V5, V6, T wave inversions in 3 and aVF, limited due to poor baseline.  Cardiology evaluating, no call for STEMI. [KR]   1900 Ventricular paced rhythm rate 60, left axis, decrease in ST elevation in V6, ST elevation in V5, ST depression in V2, poor baseline.  Cardiology again reviewed, no call for STEMI [KR]   1906 Spoke with transplant nephrology regarding need for CT imaging with contrast given concern and need to rule out embolism versus dissection.  Given need to rule out life-threatening illnesses, okay to proceed with contrasted study.  Patient does have a contrast allergy, reportedly nausea) will obtain rapid contrast allergy prep [KR]   9640 XR chest 1 view  Chest x-ray showing cardiomegaly with increased central pulmonary  vascularity, no significant signs of interstitial edema.  Moderate right-sided pleural effusion, slightly increased from prior x-ray. [KR]      ED Course User Index  [KR] Pamela Padilla DO         Diagnoses as of 05/21/24 2207   Transient alteration of awareness       Medical Decision Making  68-year-old male history of ESRD s/p renal transplant March 2023 (tacrolimus and mycophenolate) now on dialysis (T/TH/sat), HFrEF (30 to 35%), dual-chamber pacemaker, type B aortic dissection s/p repair 2020, chronic residual type B aortic dissection with carotid artery dissection, insulinoma with hepatic metastases status post liver transplant and pancreatic surgery (2018), anemia presenting to the ED due to generalized fatigue and altered mental status.  Patient arrived lethargic, pale, disoriented and hypotensive.  Does have baseline hypotension however is increasingly lower.  Afebrile, however patient is on chronic immunosuppressive therapy, no overt signs of infection but low threshold to start antibiotics.  ECG initially with concerns for ST elevation, patient does have pacemaker, no reported defibrillation episodes, cardiology fellow called by attending for discussion, no acute clinical concerns for STEMI, initial troponin was elevated, patient does have a baseline elevated troponin but this is increased.  Repeat ECG showing some improvement.  Broad lab evaluation planned including blood cultures, immunosuppressive levels as well as CT head and CT angio chest abdomen pelvis given history of dissection as well as new to rule out pneumonia, PE or intra-abdominal pathology.  Patient with contrast allergy, premedicated with rapid prep.  Subsequently found to be hypokalemic for which we plan to replace.  Status post 500 cc bolus, caution in too much fluid given heart failure and ESRD on dialysis.  From improvement in blood pressure from.  Ultimately family then expressed wanting a goals of care discussion.  Goals of care  performed by attending at bedside, family and patient requesting comfort care measures only.  Therefore imaging and further labs were not obtained, patient to be admitted for comfort care and consideration of hospice.        Procedure  Procedures     Pamela Padilla DO  Resident  05/21/24 8681

## 2024-05-21 NOTE — PROGRESS NOTES
Subjective   Patient ID: Reynaldo Francisco is a 68 y.o. male who presents for Follow-up.    HPI   I performed this visit using real-time telehealth tools, including an audio connection between the patient at his/her home and myself (John Manrique MD) at  [facility].    Accompanied by his wife who provides most of the history.    Patient has been fatigued frequently after dialysis. He does not want to go to doctor appts on dialysis days. BP is frequently low after HD, takes midodrine on these days.     His memory is sometimes poor, often associated with fatigue after dialysis, per wife.    He has a appointment with [facility] liver transplant team tomorrow.    No recent fevers.  Review of Systems  12-point ROS reviewed and was negative unless otherwise noted in HPI.    Objective   There were no vitals taken for this visit.    Physical Exam  Physical exam omitted, as this was a virtual visit.  Assessment/Plan     #Fatigue/hypotension: after dialysis. They will discuss potential options with nephrology team.     #ESRD s/p kidney transplantation (on tacromlimus and mycophenolate)  #Back on routine HD  - follows w/ transplant Nephro at [facility]  - Continue meds per [facility] transplant nephro (Tacro, mycophenylate, bactrim ppx, tamsulosin)     #HFrEF  #Stage C systolic heart failure/NICM/HFrEF (30-35% TTE 03/2024)  #SSS s/p dual-chamber PM s/p recent Upgrade Procedure (3/2024)  #Hx of Complete Heart Block (2/2 Coreg)  #s/p cardiac MRI and endomyocardial biopsies that were nondiagnostic  - Follows w/ Cardiology  - He has been referred to heart failure service     #Type A aortic dissection s/p repair 2020  #Chronic residual type B aortic dissection w/ R carotid artery dissection  #Renovascular HTN  - BP stable     #Insulinoma w/ hepatic metastasis requiring liver transplantation and pancreatic surgery (2018)  -C/w anti-rejection medication  -Following with transplant physicians at [facility]     #anemia, multifactorial (baseline Hgb  10)  - at baseline.   - s/p Aranesp at recent hospitalization  - Follows w/ Hematolgoy, Dr. Mina     #HM: Last colonoscopy in 2019, next due this year (q5 years), consider discussion at next visit; may benefit from GI consultation. Received pneumonia vaccines, Shingrix, flu shot. Vaccinated against COVID-19 & boosters previously. Longitudinal fu.     RTC 2 mo

## 2024-05-21 NOTE — ED PROVIDER NOTES
I performed a history and physical examination of Reynaldo Francisco and discussed his management with Dr. Padilla.  I agree with the history, physical, assessment, and plan of care, with the following exceptions: None    I was present for the following procedures: None  Time Spent in Critical Care of the patient: 20  Time spent in discussions with the patient and family: 20    68-year-old male with history of end-stage renal disease gets dialyzed Tuesday, Thursday Saturdays, heart failure, recent kidney-liver transplant little over a year ago, has a dual chamber pacemaker and AICD in place, presents today with worsening confusion.    Patient has a [employer] AICD and pacemaker, interrogated this, no evidence of any firing.  Patient multiple EKGs here.  One of the EKGs at 1855 showed atrial paced rhythm, ventricular rate 61, CT interval 120, QTc 485, did read as acute MI/STEMI likely due to elevation in V5 and V6, this was repeated on the elevation had improved but not completely resolved.  I did discuss with cardiology ICU fellow who reviewed the EKGs.  We discussed patient's history.  No evidence of acute STEMI at this time.  Will continue to monitor.    I was asked to come in the room multiple times to speak to the family about code status.  As a family, they decided to make the patient DNR comfort care only.  They wished to hold off on any further testing including EKG, labs, imaging, medications.  Will keep the IV in place for comfort care medication specifically pain meds.  This decision was made in conjunction with the patient and the whole family was in agreement.  DNR comfort care order was placed.  Patient was admitted to medicine with plans for evaluation by hospice/palliative care in the morning    Gerson Cedeno MD MPH       Gerson Cedeno MD MPH  05/21/24 0023

## 2024-05-22 PROBLEM — Z51.5 HOSPICE CARE: Status: ACTIVE | Noted: 2024-01-01

## 2024-05-22 NOTE — H&P
History Of Present Illness  Mr. 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 Per family, he was noted to by hypotensive after dialysis and has been increasingly agitated and having episodes of confusion. Upon presentation to ED, patient was lethargic, pale, disoriented, and hypotensive. After conversation with ED staff and current patient presentation and prognosis, family elected for GOC meeting and changed patient status to DNR CC with consideration of hospice. He is now admitted for further management to [facility].     In the ED, ECG initially with concerns for ST elevation, patient does have pacemaker, no reported defibrillation episodes, cardiology fellow called by attending for discussion, no acute clinical concerns for STEMI, initial troponin was elevated (1,008), patient does have a baseline elevated troponin but this is increased. Repeat ECG showing some improvement. Broad lab evaluation planned including blood cultures, immunosuppressive levels as well as CT head and CT angio chest abdomen pelvis given history of dissection as well as new to rule out pneumonia, PE or intra-abdominal pathology. He was found to be hypokalemic. Status post 500 cc bolus (caution in too much fluid given heart failure and ESRD on dialysis). Given further discussion with family, imaging was not completed and potassium not repleted at this time.     Upon admission, patient interview was completed by family. There has been waxing and waning confusion ongoing for a while but over the last week (especially the last evening into today) it has gotten increasingly worse. During dialysis, he was  extremely agitated. He has also had some unintentional weight loss (a few pounds per wife) in the last week and has had a progressively decreasing appetite as well. He also has been short of breath and has had an intermittent dry cough.  He has had a history of diarrhea from his immunosuppressive drugs but this has improved as of late. The family has denied recent sick contacts, fever, chills, HA, dizziness, visual disturbances, nasal congestion, sore throat, dysphagia, CP, palpitations, wheezing, N/V/C, abdominal pain, melena, hematochezia, urinary s/s, hematuria, weakness, bleeding, bruising, rash, pruritus. All other ROS otherwise negative    ED COURSE:  Vitals: 36.7, 61, 18, 98/63, 98%  Labs: CMP-Na 135, K 2.9, BUN/Cr 16/3.48, Calcium 9.2. Trop 1,008, BNP 1722, Coag-PT/INR 15.2/1.3, PTT 33, CBC-WBC 3.4, H&H 10.4/29.4, plt 107. ABG-Ph 7.47, CO2 43, pO2 27, sO2 27, lactate 1.8  Imaging: CXR-Cardiomegaly with borderline or mildly prominent central pulmonary  vascularity but no overt edema.  Moderate right pleural effusion is slightly increased from the prior chest x-ray  Interventions: blood cultures sent, 500cc LR     Past Medical History  He has a past medical history of Arteriovenous fistula, acquired (CMS-HCC) (06/17/2022), Lung nodule, Nutritional anemia, unspecified, and Personal history of malignant neoplasm of pancreas (06/17/2022).    Surgical History  He has a past surgical history that includes Other surgical history (03/17/2022); Other surgical history (06/17/2022); Other surgical history (06/17/2022); US kidney transplant; Liver transplantation; Cardiac electrophysiology procedure (Right, 10/23/2023); Cardiac catheterization (N/A, 3/1/2024); Cardiac electrophysiology procedure (Right, 3/15/2024); and Cardiac catheterization (N/A, 3/14/2024).    Oncology History    No history exists.        Social History  He reports that he has never smoked. He has never used smokeless tobacco. He reports that he does  "not currently use drugs. He reports that he does not drink alcohol.     Allergies  Milk, Shellfish derived, and Iodinated contrast media    Review of Systems   Constitutional:  Positive for appetite change and unexpected weight change. Negative for chills and fever.   HENT: Negative.     Eyes: Negative.    Respiratory:  Positive for cough and shortness of breath.    Cardiovascular:  Negative for chest pain, palpitations and leg swelling.   Gastrointestinal:  Positive for abdominal distention and diarrhea. Negative for abdominal pain, constipation, nausea and vomiting.   Endocrine: Negative.    Genitourinary:  Negative for frequency and urgency.   Musculoskeletal: Negative.    Skin: Negative.    Allergic/Immunologic: Negative.    Neurological:  Positive for weakness. Negative for dizziness, numbness and headaches.   Hematological: Negative.    Psychiatric/Behavioral:  Positive for agitation and confusion.         Physical Exam  Constitutional:       Appearance: He is ill-appearing.   Cardiovascular:      Rate and Rhythm: Normal rate and regular rhythm.      Comments: pacer  Pulmonary:      Effort: Pulmonary effort is normal.      Breath sounds: Normal breath sounds. No rhonchi or rales.   Neurological:      Mental Status: He is disoriented.          Last Recorded Vitals  Blood pressure 81/55, pulse 63, temperature 36.7 °C (98 °F), temperature source Temporal, resp. rate (!) 22, height 1.702 m (5' 7.01\"), weight 55.8 kg (123 lb), SpO2 94%.    Relevant Results      Scheduled medications  diphenhydrAMINE, 50 mg, intravenous, Once  methylPREDNISolone sodium succinate (PF), 40 mg, intravenous, q4h      Continuous medications     PRN medications  PRN medications: acetaminophen, HYDROmorphone, HYDROmorphone  XR chest 1 view    Result Date: 5/21/2024  STUDY: Chest Radiograph;  5/21/2024 7:29 PM INDICATION: Altered mental status. COMPARISON: 4/23/2024 XR Chest. ACCESSION NUMBER(S): [id number] ORDERING CLINICIAN: JESÚS" FARYAR TECHNIQUE:  Frontal chest was obtained at 19:25 hours. FINDINGS: CARDIOMEDIASTINAL SILHOUETTE: Again seen is cardiomegaly and sternal wires sutures with a right sided cardiac pacemaker.  ..  LUNGS: Central pulmonary vascularity is borderline prominent on today's study but there is no overt or definite edema.  There is a moderately large right pleural effusion slightly increased from the prior chest x-ray. There is no evidence of pleural effusion on the left and there is no pneumothorax.  ABDOMEN: No remarkable upper abdominal findings.  BONES: No acute osseous changes.    Cardiomegaly with borderline or mildly prominent central pulmonary vascularity but no overt edema.  Moderate right pleural effusion is slightly increased from the prior chest x-ray.. Signed by Shankar Pinto MD    Results for orders placed or performed during the hospital encounter of 05/21/24 (from the past 24 hour(s))   Blood Gas Venous Full Panel Unsolicited   Result Value Ref Range    POCT pH, Venous 7.47 (H) 7.33 - 7.43 pH    POCT pCO2, Venous 43 41 - 51 mm Hg    POCT pO2, Venous 27 (L) 35 - 45 mm Hg    POCT SO2, Venous 27 (L) 45 - 75 %    POCT Oxy Hemoglobin, Venous 27.0 (L) 45.0 - 75.0 %    POCT Hematocrit Calculated, Venous 32.0 (L) 41.0 - 52.0 %    POCT Sodium, Venous 133 (L) 136 - 145 mmol/L    POCT Potassium, Venous 2.9 (LL) 3.5 - 5.3 mmol/L    POCT Chloride, Venous 96 (L) 98 - 107 mmol/L    POCT Ionized Calicum, Venous 1.08 (L) 1.10 - 1.33 mmol/L    POCT Glucose, Venous 103 (H) 74 - 99 mg/dL    POCT Lactate, Venous 1.8 0.4 - 2.0 mmol/L    POCT Base Excess, Venous 6.9 (H) -2.0 - 3.0 mmol/L    POCT HCO3 Calculated, Venous 31.3 (H) 22.0 - 26.0 mmol/L    POCT Hemoglobin, Venous 10.8 (L) 13.5 - 17.5 g/dL    POCT Anion Gap, Venous 9.0 (L) 10.0 - 25.0 mmol/L    Patient Temperature 37.0 degrees Celsius   CBC with Differential   Result Value Ref Range    WBC 3.4 (L) 4.4 - 11.3 x10*3/uL    nRBC 1.5 (H) 0.0 - 0.0 /100 WBCs    RBC 3.10 (L)  4.50 - 5.90 x10*6/uL    Hemoglobin 10.4 (L) 13.5 - 17.5 g/dL    Hematocrit 29.4 (L) 41.0 - 52.0 %    MCV 95 80 - 100 fL    MCH 33.5 26.0 - 34.0 pg    MCHC 35.4 32.0 - 36.0 g/dL    RDW 16.3 (H) 11.5 - 14.5 %    Platelets 107 (L) 150 - 450 x10*3/uL    Immature Granulocytes %, Automated 0.3 0.0 - 0.9 %    Immature Granulocytes Absolute, Automated 0.01 0.00 - 0.70 x10*3/uL   Comprehensive Metabolic Panel   Result Value Ref Range    Glucose 93 74 - 99 mg/dL    Sodium 135 (L) 136 - 145 mmol/L    Potassium 2.9 (LL) 3.5 - 5.3 mmol/L    Chloride 94 (L) 98 - 107 mmol/L    Bicarbonate 28 21 - 32 mmol/L    Anion Gap 16 10 - 20 mmol/L    Urea Nitrogen 16 6 - 23 mg/dL    Creatinine 3.48 (H) 0.50 - 1.30 mg/dL    eGFR 18 (L) >60 mL/min/1.73m*2    Calcium 9.2 8.6 - 10.6 mg/dL    Albumin 4.0 3.4 - 5.0 g/dL    Alkaline Phosphatase 92 33 - 136 U/L    Total Protein 7.2 6.4 - 8.2 g/dL    AST 17 9 - 39 U/L    Bilirubin, Total 1.8 (H) 0.0 - 1.2 mg/dL    ALT 4 (L) 10 - 52 U/L   Magnesium   Result Value Ref Range    Magnesium 1.64 1.60 - 2.40 mg/dL   Troponin I, High Sensitivity   Result Value Ref Range    Troponin I, High Sensitivity 1,008 (HH) 0 - 53 ng/L   Blood Culture    Specimen: Peripheral Venipuncture; Blood culture   Result Value Ref Range    Blood Culture Loaded on Instrument - Culture in progress    Blood Culture    Specimen: Peripheral Venipuncture; Blood culture   Result Value Ref Range    Blood Culture Loaded on Instrument - Culture in progress    B-type natriuretic peptide   Result Value Ref Range    BNP 1,722 (H) 0 - 99 pg/mL   Coagulation Screen   Result Value Ref Range    Protime 15.2 (H) 9.8 - 12.8 seconds    INR 1.3 (H) 0.9 - 1.1    aPTT 33 27 - 38 seconds   Manual Differential   Result Value Ref Range    Neutrophils %, Manual 76.8 40.0 - 80.0 %    Bands %, Manual 3.6 0.0 - 5.0 %    Lymphocytes %, Manual 10.7 13.0 - 44.0 %    Monocytes %, Manual 8.0 2.0 - 10.0 %    Eosinophils %, Manual 0.0 0.0 - 6.0 %    Basophils %,  Manual 0.9 0.0 - 2.0 %    Seg Neutrophils Absolute, Manual 2.61 1.20 - 7.00 x10*3/uL    Bands Absolute, Manual 0.12 0.00 - 0.70 x10*3/uL    Lymphocytes Absolute, Manual 0.36 (L) 1.20 - 4.80 x10*3/uL    Monocytes Absolute, Manual 0.27 0.10 - 1.00 x10*3/uL    Eosinophils Absolute, Manual 0.00 0.00 - 0.70 x10*3/uL    Basophils Absolute, Manual 0.03 0.00 - 0.10 x10*3/uL    Total Cells Counted 112     Neutrophils Absolute, Manual 2.73 1.20 - 7.70 x10*3/uL    RBC Morphology See Below     Target Cells Many     Acanthocytes Few        Assessment/Plan   Principal Problem:    Transient alteration of awareness    Mr. 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 Per family, he was noted to by hypotensive after dialysis and has been increasingly agitated and having episodes of confusion. Upon presentation to ED, patient was lethargic, pale, disoriented, and hypotensive. After conversation with ED staff and current patient presentation and prognosis, family elected for GOC meeting and changed patient status to DNR CC with consideration of hospice. He is now admitted for further management to [facility].       #End of life care  #Consideration of hospice  -Given patients presentation and current prognosis, family wishes for DNRCC.  -Hydromorphone ordered for moderate and severe pain  -SW consult in AM for hospice consideration    #Hypokalemia  -K on admit 2.9  -Not repleted at this time per family's request given DNRCC status    #Hx of hypertroponinemia  #C/F ST Elevation on ED ECG  -Admit trop 1,008  -Likely 2/2 poor renal clearance  -Hx of elevated troponin, however, higher than  baseline  -Cardiology fellow consulted in ED-No concern for acute clinical concerns for STEMI    #NICM/stage C/NYHA II/HFrEF (EF 30 to 35%)  #SSS and CHB s/p PPM (upgraded to CRT-D)  #S/p liver transplantation  #HTN  #ESRD s/p kidney transplantation c/b ATN (on tacrolimus and mycophenolate)   -All medications held given DNRCC status  -CICU fellow to bedside to assess pacer/ICD -deactivated ICD function 5/22/24           Diet: Regular  DVT ppx: none          Yovana Lombardo, APRN-CNP

## 2024-05-22 NOTE — PROGRESS NOTES
Pharmacy Medication History Review    Reynaldo Francisco is a 68 y.o. male admitted for Transient alteration of awareness. Pharmacy reviewed the patient's [id number] medications and allergies for accuracy.    The list below reflects the updated PTA list. Comments regarding how patient may be taking medications differently can be found in the Admit Orders Activity  Prior to Admission Medications   Prescriptions Last Dose Informant Patient Reported?   Protonix 40 mg EC tablet Past Week Spouse/Significant Other Yes   Sig: Take 1 tablet (40 mg) by mouth once daily in the morning. Not taking   acetaminophen (Tylenol) 325 mg tablet Unknown  Not taking Spouse/Significant Other No   Sig: Take 3 tablets (975 mg) by mouth every 8 hours if needed for moderate pain (4 - 6), headaches or fever (temp greater than 38.0 C).   aspirin 81 mg EC tablet Past Week  Not taking Spouse/Significant Other Yes   Sig: Take 1 tablet (81 mg) by mouth once daily in the morning.   bumetanide (Bumex) 2 mg tablet Not taking  Yes   Sig: Take by mouth every 12 hours.   metoprolol succinate XL (Toprol-XL) 50 mg 24 hr tablet Past Week  Not taking Spouse/Significant Other No   Sig: Take 1 tablet (50 mg) by mouth once daily. Do not crush or chew.   Patient taking differently: Take 1 tablet (50 mg) by mouth once daily at bedtime. Do not crush or chew.   midodrine (Proamatine) 10 mg tablet Unknown  No   Sig: Take 1 tablet (10 mg) by mouth 1 time for 1 dose. During dialysis   Patient not taking: Reported on 4/23/2024   midodrine (Proamatine) 10 mg tablet Past Week  Not taking Spouse/Significant Other No   Sig: Take 1 tablet (10 mg) by mouth once daily. Use as needed for SPB < 85   mycophenolate (Myfortic) 180 mg EC tablet Past Week  Not taking Spouse/Significant Other, Other Yes   Sig: Take 1 tablet (180 mg) by mouth 2 times a day.   sulfamethoxazole-trimethoprim (Bactrim) 400-80 mg tablet Past Week  Not taking Spouse/Significant Other Yes   Sig: Take 1  tablet by mouth once a day on Monday, Wednesday, and Friday.   tacrolimus (Prograf) 1 mg capsule Past Week  Not taking Spouse/Significant Other No   Sig: Take 2 capsules (2 mg) by mouth every 12 hours.   tamsulosin (Flomax) 0.4 mg 24 hr capsule Past Week  Not taking Spouse/Significant Other No   Sig: Take 1 capsule (0.4 mg) by mouth once daily. Do not start before March 20, 2024.      Facility-Administered Medications: None        The list below reflects the updated allergy list. Please review each documented allergy for additional clarification and justification.  Allergies  Reviewed by Evy Dodd RN on 5/21/2024        Severity Reactions Comments    Milk Medium Diarrhea, GI Upset (Skim milk) diarrhea    Shellfish Derived Medium Hives, Itching     Iodinated Contrast Media Low Nausea/vomiting             Patient was unable to be assessed for M2B at discharge. Patient will be going to comfort care.    Sources used to complete the med history include out patient fill history, OARRS, and interview with family member.      Below are additional concerns with the patient's PTA list.  Patient will be going to comfort care, and will not be taking any of the medications on his PTA list.    Tomasa Huff, Candida  Transitions of Care Pharmacist  [facility]  Please reach out via Secure Chat for questions, or if no response call [facility] or [facility]

## 2024-05-22 NOTE — CARE PLAN
Problem: Pain  Goal: My pain/discomfort is manageable  Outcome: Progressing     Problem: Safety  Goal: Patient will be injury free during hospitalization  Outcome: Progressing  Goal: I will remain free of falls  Outcome: Progressing   The patient's goals for the shift include      The clinical goals for the shift include      Over the shift, the patient did not make progress toward the following goals. Barriers to progression include pain control. Recommendations to address these barriers include pain control.

## 2024-05-22 NOTE — DISCHARGE SUMMARY
Discharge Diagnosis  Transient alteration of awareness      Hospital Course  Mr. 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 Per family, he was noted to by hypotensive after dialysis and has been increasingly agitated and having episodes of confusion. Upon presentation to ED, patient was lethargic, pale, disoriented, and hypotensive. After conversation with ED staff and current patient presentation and prognosis, family elected for GOC meeting and changed patient status to DNR CC with consideration of hospice. He is now admitted for further management to [facility]. Started on morphine gtt for end of life management. Social work and hospice consulted on 5/22.  Patient transitioned to hospice in the afternoon of 5/22 and was changed to an inpatient [facility] bed.       Pertinent Physical Exam At Time of Discharge  Physical Exam  Constitutional:       Comments: Comfort care    Pulmonary:      Comments: Agonal breathing   Neurological:      Mental Status: He is lethargic.       Home Medications     Medication List      STOP taking these medications     acetaminophen 325 mg tablet; Commonly known as: Tylenol   aspirin 81 mg EC tablet   bumetanide 2 mg tablet; Commonly known as: Bumex   metoprolol succinate XL 50 mg 24 hr tablet; Commonly known as: Toprol-XL   midodrine 10 mg tablet; Commonly known as: Proamatine   mycophenolate 180 mg EC tablet; Commonly known as: Myfortic   Protonix 40 mg EC tablet; Generic drug: pantoprazole   sulfamethoxazole-trimethoprim 400-80 mg tablet; Commonly known as:   Bactrim   tacrolimus 1 mg capsule; Commonly known as: Prograf   tamsulosin 0.4 mg 24 hr  capsule; Commonly known as: Flomax       Outpatient Follow-Up  Future Appointments   Date Time Provider Department Center   6/7/2024  1:00 PM PAR MAC 3 ECHO ROOM 3 [id number] [facility]   6/7/2024  1:45 PM Vladimir Barber MD [id number] [place]   6/20/2024  2:30 PM Burton Carrion MD [id number] [place]   7/12/2024  3:00 PM [facility] Rad   8/13/2024 11:00 AM Declan Mina MD [id number] [place]       Evy Nagy, APRN-CNP

## 2024-05-22 NOTE — CARE PLAN
Device re-programming  CRT-D  Medtronic Cobalt XT HFQuad [id number] #[id number]. Implanted April 12, 2024 by Dr. Adams.   Mode VVIR at rate of 60 bpm.  FVT and VT detection and therapies off    New Change:  VF detection and therapy turned off.     Kenyatta Toro MD  Cardiology, PGY4

## 2024-05-22 NOTE — SIGNIFICANT EVENT
Daily [facility] Update Plan of Care 5/22      Mr. 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 Per family, he was noted to by hypotensive after dialysis and has been increasingly agitated and having episodes of confusion. Upon presentation to ED, patient was lethargic, pale, disoriented, and hypotensive. After conversation with ED staff and current patient presentation and prognosis, family elected for GOC meeting and changed patient status to DNR CC with consideration of hospice. He is now admitted for further management to [facility]. Started on morphine gtt for end of life management. Social work and hospice consulted on 5/22. [facility] to come 5/22 to discuss hospice transition with family at bedside.         # End of life care  -Given patients presentation and current prognosis, family wishes for DNRCC.  - Patient was able to verbilize desire to go comfort care in front of his family while down in ED   - Comfort care order set placed 5/22  - s/p 0.2/ .04mg hydromorphone IVP for moderate and severe pain  - RDOS order set in place as patient can no longer communicate pain level   - Started IV morphine gtt 1mg/hr with 0.5mg IVP bolus Q15 minutes for pain or RDOS > or equal to 3, increased to 2mg/hr (5/22- )  - Started 0.2mg Robinul Q4 PRN for excess secretions   - Started 0.5mg IVP ativan Q4 PRN for anxiety/ restlessness/ insomnia   - Started IV 8mg zofran Q6 PRN for nausea/emesis   - Started albuterol nebulizer Q4 PRN for wheezing/ dyspnea   - Bowel regimen with daily Dulcolax suppository PRN for constipation    - Pleasure/ Comfort feeding ordered 5/22   - SW and Hospice consult  placed   - Spouse consented to referral being sent to [facility]   - Family wishes for laurita to remain inpt as GIP bed     # Hypokalemia  -K on admit 2.9  -Not repleted at this time per family's request given DNRCC status     # Hx of hypertroponinemia  #C/F ST Elevation on ED ECG  -Admit trop 1,008  -Likely 2/2 poor renal clearance  -Hx of elevated troponin, however, higher than baseline  -Cardiology fellow consulted in ED-No concern for acute clinical concerns for STEMI     # NICM/stage C/NYHA II/HFrEF (EF 30 to 35%)  # SSS and CHB s/p PPM (upgraded to CRT-D)  # S/p liver transplantation  # HTN  # ESRD s/p kidney transplantation c/b ATN (on tacrolimus and mycophenolate)   -All medications held given DNRCC status  -CICU fellow to bedside in ED to assess pacer/ICD -deactivated ICD function 5/22/24    DISPO:  - DNRCC (placed on admission)  - Patient will most likely be changed to GIP bed pending hospice consult   - Daughter Allie was listed as point of contact; [phone]        Assessment and plan discussed with attending physician Dr. Murray, APRN-CNP

## 2024-05-22 NOTE — PROGRESS NOTES
05/22/24 1100   Discharge Planning   Living Arrangements Spouse/significant other;Children   Support Systems Spouse/significant other;Children   Assistance Needed total   Type of Residence Private residence   Home or Post Acute Services Other (Comment);Post acute facilities (Rehab/SNF/etc)  (hospice)   Type of Post Acute Facility Services Skilled nursing;Other (Comment)  (hospice)   Type of Home Care Services Hospice   Patient expects to be discharged to: GIP   Does the patient need discharge transport arranged? No   Financial Resource Strain   How hard is it for you to pay for the very basics like food, housing, medical care, and heating? Pt Declined   Housing Stability   In the last 12 months, was there a time when you were not able to pay the mortgage or rent on time? Pt Declined   In the last 12 months, how many places have you lived? 1   In the last 12 months, was there a time when you did not have a steady place to sleep or slept in a shelter (including now)? Pt Declined   Transportation Needs   In the past 12 months, has lack of transportation kept you from medical appointments or from getting medications? Pt Declined   In the past 12 months, has lack of transportation kept you from meetings, work, or from getting things needed for daily living? Pt Declined     SW notified of hospice consult. SW met with pt spouse bedside and explained that [facility] has liaisons on-site who can meet with family to discuss services offered. SW explained that pt will stay in the room and R or another agency would oversee pt's care.   Spouse consented to referral being sent to R. Daughter Allie was listed as point of contact; [phone].  SW will follow and assist as needed. SANTO Magana.    05/22/2024 1300  R has a meeting scheduled bedside with pt and family this afternoon at 1500. SW will follow. SANTO Magana.

## 2024-05-22 NOTE — CARE PLAN
The patient's goals for the shift include      The clinical goals for the shift include patient will have pain controlled during the shift    Over the shift, the patient did not make progress toward the following goals. Barriers to progression include . Recommendations to address these barriers include   Problem: Pain  Goal: My pain/discomfort is manageable  Outcome: Progressing     Problem: Safety  Goal: Patient will be injury free during hospitalization  Outcome: Progressing  Goal: I will remain free of falls  Outcome: Progressing     Problem: Daily Care  Goal: Daily care needs are met  Outcome: Progressing     Problem: Psychosocial Needs  Goal: Demonstrates ability to cope with hospitalization/illness  Outcome: Progressing  Goal: Collaborate with me, my family, and caregiver to identify my specific goals  Outcome: Progressing     Problem: Discharge Barriers  Goal: My discharge needs are met  Outcome: Progressing   .

## 2024-05-22 NOTE — NURSING NOTE
Pt's two daughters and wife at bedside. When patient arrived to unit, family requested medications not be given (except comfort meds), no vitals, no assessment, or skin check be completed as pt is DNR-CC. RN notified Yovana Lombardo CNP of the family's wishes.

## 2024-05-22 NOTE — H&P
History Of Present Illness  Mr. 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 Per family, he was noted to by hypotensive after dialysis and has been increasingly agitated and having episodes of confusion. Upon presentation to ED, patient was lethargic, pale, disoriented, and hypotensive. After conversation with ED staff and current patient presentation and prognosis, family elected for GOC meeting and changed patient status to DNR CC with consideration of hospice. He is now admitted for further management to [facility].      In the ED, ECG initially with concerns for ST elevation, patient does have pacemaker, no reported defibrillation episodes, cardiology fellow called by attending for discussion, no acute clinical concerns for STEMI, initial troponin was elevated (1,008), patient does have a baseline elevated troponin but this is increased. Repeat ECG showing some improvement. Broad lab evaluation planned including blood cultures, immunosuppressive levels as well as CT head and CT angio chest abdomen pelvis given history of dissection as well as new to rule out pneumonia, PE or intra-abdominal pathology. He was found to be hypokalemic. Status post 500 cc bolus (caution in too much fluid given heart failure and ESRD on dialysis). Given further discussion with family, imaging was not completed and potassium not repleted at this time.      Upon admission, patient interview was completed by family. There has been waxing and waning confusion ongoing for a while but over the last week (especially the last evening into today) it has gotten increasingly worse. During dialysis, he was  extremely agitated. He has also had some unintentional weight loss (a few pounds per wife) in the last week and has had a progressively decreasing appetite as well. He also has been short of breath and has had an intermittent dry cough.  He has had a history of diarrhea from his immunosuppressive drugs but this has improved as of late. The family has denied recent sick contacts, fever, chills, HA, dizziness, visual disturbances, nasal congestion, sore throat, dysphagia, CP, palpitations, wheezing, N/V/C, abdominal pain, melena, hematochezia, urinary s/s, hematuria, weakness, bleeding, bruising, rash, pruritus. All other ROS otherwise negative     ED COURSE:  Vitals: 36.7, 61, 18, 98/63, 98%  Labs: CMP-Na 135, K 2.9, BUN/Cr 16/3.48, Calcium 9.2. Trop 1,008, BNP 1722, Coag-PT/INR 15.2/1.3, PTT 33, CBC-WBC 3.4, H&H 10.4/29.4, plt 107. ABG-Ph 7.47, CO2 43, pO2 27, sO2 27, lactate 1.8  Imaging: CXR-Cardiomegaly with borderline or mildly prominent central pulmonary  vascularity but no overt edema.  Moderate right pleural effusion is slightly increased from the prior chest x-ray  Interventions: blood cultures sent, 500cc LR     Past Medical History  He has a past medical history of Arteriovenous fistula, acquired (CMS-HCC) (06/17/2022), Lung nodule, Nutritional anemia, unspecified, and Personal history of malignant neoplasm of pancreas (06/17/2022).    Surgical History  He has a past surgical history that includes Other surgical history (03/17/2022); Other surgical history (06/17/2022); Other surgical history (06/17/2022); US kidney transplant; Liver transplantation; Cardiac electrophysiology procedure (Right, 10/23/2023); Cardiac catheterization (N/A, 3/1/2024); Cardiac electrophysiology procedure (Right, 3/15/2024); and Cardiac catheterization (N/A, 3/14/2024).    Oncology History    No history exists.        Social History  He reports that he has never smoked. He has never used smokeless tobacco. He reports that he does  not currently use drugs. He reports that he does not drink alcohol.     Allergies  Milk, Shellfish derived, and Iodinated contrast media    Review of Systems     Physical Exam  Constitutional:       Appearance: He is ill-appearing.      Comments: Comfort care, arousal to sound    Pulmonary:      Comments: Agonal breathing   Neurological:      Mental Status: He is lethargic and disoriented.       Last Recorded Vitals  There were no vitals taken for this visit.    Relevant Results  Scheduled medications  diphenhydrAMINE, 25 mg, intravenous, q6h    Continuous medications  morphine,     PRN medications  PRN medications: albuterol, bisacodyl, glycopyrrolate, LORazepam, ondansetron      Assessment/Plan   Active Problems:  There are no active Hospital Problems.    Mr. 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 Per family, he was noted to by hypotensive after dialysis and has been increasingly agitated and having episodes of confusion. Upon presentation to ED, patient was lethargic, pale, disoriented, and hypotensive. After conversation with ED staff, current patient presentation, and prognosis, family elected for GOC meeting and changed patient status to DNR CC with consideration of hospice. He is now admitted for further management to [facility]. Social work and hospice consulted on 5/22. Started on morphine gtt for end of life management and end of life supportive medications. [facility] met with family at bedside and transitioned patient to inpatient [facility] bed.         # End of life care  -Given patients presentation and current prognosis, family wishes for DNRCC.  - Patient was able to  verbilize desire to go comfort care in front of his family while down in ED   - Comfort care order set placed 5/22  - s/p 0.2/ .04mg hydromorphone IVP for moderate and severe pain  - RDOS order set in place as patient can no longer communicate pain level   - Started IV morphine gtt 1mg/hr with 0.5mg IVP bolus Q15 minutes for pain or RDOS > or equal to 3, increased to 2mg/hr (5/22- )  - Started 0.2mg Robinul Q4 PRN for excess secretions   - Started 0.25mg hycosamine Q4 PRN for excess secretions   - Started 0.5mg IVP ativan Q4 PRN for anxiety/ restlessness/ insomnia   - Started IV 8mg zofran Q6 PRN for nausea/emesis   - Started albuterol nebulizer Q4 PRN for wheezing/ dyspnea   - Started 25mg IVP benadryl Q6 PRN for itchiness   - Bowel regimen with daily Dulcolax suppository PRN for constipation    - Pleasure/ Comfort feeding ordered 5/22   - SW and Hospice consult placed   - Spouse consented to referral being sent to [facility]   - Patient transitioned to inpatient [facility] bed 5/22      # Hypokalemia  - K on admit 2.9  - Not repleted at this time per family's request given DNRCC status     # Hx of hypertroponinemia  # C/F ST Elevation on ED ECG  - Admit trop 1,008  - Likely 2/2 poor renal clearance  - Hx of elevated troponin, however, higher than baseline  -Cardiology fellow consulted in ED-No concern for acute clinical concerns for STEMI     # NICM/stage C/NYHA II/HFrEF (EF 30 to 35%)  # SSS and CHB s/p PPM (upgraded to CRT-D)  # S/p liver transplantation  # HTN  # ESRD s/p kidney transplantation c/b ATN (on tacrolimus and mycophenolate)   -All medications held given DNRCC status  -CICU fellow to bedside in ED to assess pacer/ICD -deactivated ICD function 5/22/24     DISPO:  - DNRCC (placed on admission)  - Patient will most likely be changed to GIP bed pending hospice consult   - Daughter Allie was listed as point of contact; [phone]        Assessment and plan discussed with attending physician Dr. Bradshaw  >60 minutes in the professional and overall care of this patient.    Evy Nagy, APRN-CNP

## 2024-05-22 NOTE — CONSULTS
Nutrition Note:   Nutrition Assessment       Patient is a 68 y.o. male with h/o insulinoma w/ hepatic mets requiring liver transplantation (2018) c/b renal failure s/p kidney transplantation; recently restarted on HD (TThSat). Now presents for hypotension after dialysis with increased confusion.    GOC discussion had this AM. Pt transitioned to comfort measures only with consideration for hospice. RDN will defer MST as further advanced medical nutrition therapy is not indicated at this time. Please reconsult should clinical course change & further intervention warranted.    Dietary Orders (From admission, onward)       Start     Ordered    05/21/24 2238  Adult diet Regular  Diet effective now        Question:  Diet type  Answer:  Regular    05/21/24 2238               Time Spent/Follow-up Reminder:   Time Spent (min): 15 minutes  Last Date of Nutrition Visit: 05/22/24  Nutrition Follow-Up Needed?: None  Follow up Comment: MST deferred

## 2024-05-22 NOTE — NURSING NOTE
RN Hospice Note    Reynaldo Francisco is a Hospice Patient.   Hospice terminal diagnosis: ESRD, heart failure   Physician: Dr. Yanet Henriquez MD, Evy Nagy NP   Visit type: GIP admission     Comments/recommendations: Pt likely to pass in hospital. Family agreeable to [facility] if pt lingers and discharge is needed.     Discharge Planning:  Patient to be discharged to [facility]    The following is to be completed:  Discharge order: [facility]  State DNR signed by MD: in [facility]  Nursing facility referral/transfer form: na  Medication reconciliation: na  PAS/RR or convalescent stay form: na  Prescriptions for al narcotics/new medications: TBD  Transportation: [facility]  Other: [facility] nurse to visit tomorrow     Plan of care reviewed with patient/family members Abdiel Abdalla -spouse, Daughters Allie and Clara  Plan of care reviewed with hospital staff members: nurseCristina RN    Please notify [facility] of any changes in condition. Thank you.  Office: [phone] (8 am-6:30 pm M-F and 8 am-4:30 pm weekends and holidays)   [phone] (6:30 pm-8 am M-F and 4:30 pm-8 am weekends and holidays)    Shanda Saba RN

## 2024-05-23 NOTE — DISCHARGE SUMMARY
Discharge Diagnosis  Hospice care    Issues Requiring Follow-Up  None, patient .     Hospital Course  Mr. 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 Per family, he was noted to by hypotensive after dialysis and has been increasingly agitated and having episodes of confusion. Upon presentation to ED, patient was lethargic, pale, disoriented, and hypotensive. After conversation with ED staff and current patient presentation and prognosis, family elected for GOC meeting and changed patient status to DNR CC with consideration of hospice. He is now admitted for further management to [facility]. Started on morphine gtt for end of life management. Social work and hospice consulted on .  Patient transitioned to inpatient hospice in the afternoon of  and was changed to an inpatient [facility] bed. Today the patient passed away peacefully with his family at bedside at 12:45 pm. Daughter Allie and wife Abdiel Abdalla informed at bedside. Attending Dr. Corona was also informed of his passing.     Pertinent Physical Exam At Time of Discharge  Physical Exam  Evaluated at bedside, No spontaneous movements were present. There was not response to verbal or tactile stimuli. Pupils were mid-dilated and fixed. No breath sounds were appreciated over either lung field. No carotid pulses were palpable. No heart sounds were auscultator over entire precordium. Patient pronounced dead at 12:45pm.       Cristal Beckham PA-C

## 2024-05-23 NOTE — SIGNIFICANT EVENT
I was called to patient's bedside to pronounce the patient who has . Evaluated at bedside, No spontaneous movements were present. There was not response to verbal or tactile stimuli. Pupils were mid-dilated and fixed. No breath sounds were appreciated over either lung field. No carotid pulses were palpable. No heart sounds were auscultator over entire precordium. Patient pronounced dead at 12:45pm. Family was present at bedside, attending physician was notified.  was notified.  Patient was DNR/CC under inpatient hospice care with [facility].

## 2024-05-24 ENCOUNTER — APPOINTMENT (OUTPATIENT)
Dept: TRANSPLANT | Facility: HOSPITAL | Age: 69
End: 2024-05-24
Payer: MEDICARE

## 2024-05-25 LAB
BACTERIA BLD CULT: NORMAL
BACTERIA BLD CULT: NORMAL

## 2024-06-07 ENCOUNTER — APPOINTMENT (OUTPATIENT)
Dept: CARDIOLOGY | Facility: CLINIC | Age: 69
End: 2024-06-07
Payer: MEDICARE

## 2024-06-18 ENCOUNTER — APPOINTMENT (OUTPATIENT)
Dept: CARDIOLOGY | Facility: CLINIC | Age: 69
End: 2024-06-18
Payer: MEDICARE

## 2024-06-20 ENCOUNTER — APPOINTMENT (OUTPATIENT)
Dept: CARDIOLOGY | Facility: CLINIC | Age: 69
End: 2024-06-20
Payer: MEDICARE

## 2024-07-12 ENCOUNTER — APPOINTMENT (OUTPATIENT)
Dept: CARDIOLOGY | Facility: HOSPITAL | Age: 69
End: 2024-07-12
Payer: MEDICARE

## 2024-08-13 ENCOUNTER — APPOINTMENT (OUTPATIENT)
Dept: HEMATOLOGY/ONCOLOGY | Facility: CLINIC | Age: 69
End: 2024-08-13
Payer: MEDICARE
